# Patient Record
Sex: FEMALE | Race: WHITE | Employment: OTHER | URBAN - METROPOLITAN AREA
[De-identification: names, ages, dates, MRNs, and addresses within clinical notes are randomized per-mention and may not be internally consistent; named-entity substitution may affect disease eponyms.]

---

## 2017-01-09 ENCOUNTER — HOSPITAL ENCOUNTER (OUTPATIENT)
Dept: OTHER | Age: 72
Discharge: HOME | End: 2017-01-09

## 2017-01-09 LAB
ALBUMIN SERPL-MCNC: 4.3 G/DL (ref 3.4–5)
ALP BLD-CCNC: 76 U/L (ref 40–129)
ALT SERPL-CCNC: 12 U/L (ref 10–40)
ANION GAP SERPL CALCULATED.3IONS-SCNC: 12 MMOL/L (ref 3–16)
AST SERPL-CCNC: 15 U/L (ref 15–37)
BACTERIA: ABNORMAL /HPF
BILIRUB SERPL-MCNC: <0.2 MG/DL (ref 0–1)
BILIRUBIN DIRECT: <0.2 MG/DL (ref 0–0.3)
BILIRUBIN URINE: NEGATIVE
BILIRUBIN, INDIRECT: NORMAL MG/DL (ref 0–1)
BLOOD, URINE: NEGATIVE
BUN BLDV-MCNC: 20 MG/DL (ref 7–20)
CALCIUM SERPL-MCNC: 9.5 MG/DL (ref 8.3–10.6)
CHLORIDE BLD-SCNC: 104 MMOL/L (ref 99–110)
CLARITY: CLEAR
CO2: 27 MMOL/L (ref 21–32)
COLOR: YELLOW
CREAT SERPL-MCNC: 0.7 MG/DL (ref 0.6–1.2)
CREATININE URINE: 155.6 MG/DL (ref 28–259)
EPITHELIAL CELLS, UA: ABNORMAL /HPF
GFR AFRICAN AMERICAN: >60
GFR NON-AFRICAN AMERICAN: >60
GLUCOSE BLD-MCNC: 100 MG/DL (ref 70–99)
GLUCOSE URINE: NEGATIVE MG/DL
KETONES, URINE: NEGATIVE MG/DL
LEUKOCYTE ESTERASE, URINE: ABNORMAL
MAGNESIUM: 1.8 MG/DL (ref 1.8–2.4)
MICROALBUMIN UR-MCNC: <1.2 MG/DL
MICROALBUMIN/CREAT UR-RTO: NORMAL MG/G (ref 0–30)
MICROSCOPIC EXAMINATION: YES
MUCUS: ABNORMAL /LPF
NITRITE, URINE: NEGATIVE
PH UA: 7.5
PHOSPHORUS: 3.3 MG/DL (ref 2.5–4.9)
POTASSIUM SERPL-SCNC: 4.7 MMOL/L (ref 3.5–5.1)
PROTEIN UA: ABNORMAL MG/DL
RBC UA: ABNORMAL /HPF (ref 0–2)
SODIUM BLD-SCNC: 143 MMOL/L (ref 136–145)
SPECIFIC GRAVITY UA: 1.02
TOTAL PROTEIN: 7.1 G/DL (ref 6.4–8.2)
URINE TYPE: ABNORMAL
UROBILINOGEN, URINE: 0.2 E.U./DL
WBC UA: ABNORMAL /HPF (ref 0–5)

## 2017-01-13 ENCOUNTER — OFFICE VISIT (OUTPATIENT)
Dept: FAMILY MEDICINE CLINIC | Age: 72
End: 2017-01-13

## 2017-01-13 VITALS
OXYGEN SATURATION: 98 % | HEART RATE: 57 BPM | BODY MASS INDEX: 29.19 KG/M2 | WEIGHT: 154.6 LBS | SYSTOLIC BLOOD PRESSURE: 120 MMHG | DIASTOLIC BLOOD PRESSURE: 62 MMHG | TEMPERATURE: 98 F | HEIGHT: 61 IN

## 2017-01-13 DIAGNOSIS — E11.9 TYPE 2 DIABETES MELLITUS WITHOUT COMPLICATION, WITHOUT LONG-TERM CURRENT USE OF INSULIN (HCC): Primary | ICD-10-CM

## 2017-01-13 DIAGNOSIS — E55.9 VITAMIN D DEFICIENCY: ICD-10-CM

## 2017-01-13 DIAGNOSIS — I10 ESSENTIAL HYPERTENSION: ICD-10-CM

## 2017-01-13 DIAGNOSIS — E78.2 MIXED HYPERLIPIDEMIA: ICD-10-CM

## 2017-01-13 DIAGNOSIS — E03.9 HYPOTHYROIDISM, UNSPECIFIED TYPE: ICD-10-CM

## 2017-01-13 DIAGNOSIS — F41.9 ANXIETY: ICD-10-CM

## 2017-01-13 PROCEDURE — 1036F TOBACCO NON-USER: CPT | Performed by: NURSE PRACTITIONER

## 2017-01-13 PROCEDURE — 4040F PNEUMOC VAC/ADMIN/RCVD: CPT | Performed by: NURSE PRACTITIONER

## 2017-01-13 PROCEDURE — G8427 DOCREV CUR MEDS BY ELIG CLIN: HCPCS | Performed by: NURSE PRACTITIONER

## 2017-01-13 PROCEDURE — 1090F PRES/ABSN URINE INCON ASSESS: CPT | Performed by: NURSE PRACTITIONER

## 2017-01-13 PROCEDURE — 3017F COLORECTAL CA SCREEN DOC REV: CPT | Performed by: NURSE PRACTITIONER

## 2017-01-13 PROCEDURE — G8482 FLU IMMUNIZE ORDER/ADMIN: HCPCS | Performed by: NURSE PRACTITIONER

## 2017-01-13 PROCEDURE — 1123F ACP DISCUSS/DSCN MKR DOCD: CPT | Performed by: NURSE PRACTITIONER

## 2017-01-13 PROCEDURE — 3014F SCREEN MAMMO DOC REV: CPT | Performed by: NURSE PRACTITIONER

## 2017-01-13 PROCEDURE — 99214 OFFICE O/P EST MOD 30 MIN: CPT | Performed by: NURSE PRACTITIONER

## 2017-01-13 PROCEDURE — G8420 CALC BMI NORM PARAMETERS: HCPCS | Performed by: NURSE PRACTITIONER

## 2017-01-13 PROCEDURE — G8399 PT W/DXA RESULTS DOCUMENT: HCPCS | Performed by: NURSE PRACTITIONER

## 2017-01-13 PROCEDURE — 3044F HG A1C LEVEL LT 7.0%: CPT | Performed by: NURSE PRACTITIONER

## 2017-01-13 RX ORDER — LORAZEPAM 0.5 MG/1
0.5 TABLET ORAL DAILY PRN
Qty: 15 TABLET | Refills: 0 | Status: SHIPPED | OUTPATIENT
Start: 2017-01-13 | End: 2017-04-13 | Stop reason: SDUPTHER

## 2017-01-13 ASSESSMENT — PATIENT HEALTH QUESTIONNAIRE - PHQ9
SUM OF ALL RESPONSES TO PHQ9 QUESTIONS 1 & 2: 2
2. FEELING DOWN, DEPRESSED OR HOPELESS: 1
SUM OF ALL RESPONSES TO PHQ QUESTIONS 1-9: 2
1. LITTLE INTEREST OR PLEASURE IN DOING THINGS: 1

## 2017-01-18 ASSESSMENT — ENCOUNTER SYMPTOMS
EYES NEGATIVE: 1
GASTROINTESTINAL NEGATIVE: 1
RESPIRATORY NEGATIVE: 1

## 2017-02-15 ENCOUNTER — TELEPHONE (OUTPATIENT)
Dept: PULMONOLOGY | Age: 72
End: 2017-02-15

## 2017-02-17 ENCOUNTER — OFFICE VISIT (OUTPATIENT)
Dept: PULMONOLOGY | Age: 72
End: 2017-02-17

## 2017-02-17 VITALS
BODY MASS INDEX: 26.46 KG/M2 | WEIGHT: 155 LBS | HEIGHT: 64 IN | OXYGEN SATURATION: 97 % | SYSTOLIC BLOOD PRESSURE: 128 MMHG | DIASTOLIC BLOOD PRESSURE: 74 MMHG | HEART RATE: 64 BPM | TEMPERATURE: 97.6 F | RESPIRATION RATE: 18 BRPM

## 2017-02-17 DIAGNOSIS — G47.33 OSA (OBSTRUCTIVE SLEEP APNEA): Primary | ICD-10-CM

## 2017-02-17 PROCEDURE — 1090F PRES/ABSN URINE INCON ASSESS: CPT | Performed by: INTERNAL MEDICINE

## 2017-02-17 PROCEDURE — G8399 PT W/DXA RESULTS DOCUMENT: HCPCS | Performed by: INTERNAL MEDICINE

## 2017-02-17 PROCEDURE — G8420 CALC BMI NORM PARAMETERS: HCPCS | Performed by: INTERNAL MEDICINE

## 2017-02-17 PROCEDURE — 3014F SCREEN MAMMO DOC REV: CPT | Performed by: INTERNAL MEDICINE

## 2017-02-17 PROCEDURE — 99213 OFFICE O/P EST LOW 20 MIN: CPT | Performed by: INTERNAL MEDICINE

## 2017-02-17 PROCEDURE — G8427 DOCREV CUR MEDS BY ELIG CLIN: HCPCS | Performed by: INTERNAL MEDICINE

## 2017-02-17 PROCEDURE — G8482 FLU IMMUNIZE ORDER/ADMIN: HCPCS | Performed by: INTERNAL MEDICINE

## 2017-02-17 PROCEDURE — 3017F COLORECTAL CA SCREEN DOC REV: CPT | Performed by: INTERNAL MEDICINE

## 2017-02-17 PROCEDURE — 1123F ACP DISCUSS/DSCN MKR DOCD: CPT | Performed by: INTERNAL MEDICINE

## 2017-02-17 PROCEDURE — 1036F TOBACCO NON-USER: CPT | Performed by: INTERNAL MEDICINE

## 2017-02-17 PROCEDURE — 4040F PNEUMOC VAC/ADMIN/RCVD: CPT | Performed by: INTERNAL MEDICINE

## 2017-02-17 ASSESSMENT — SLEEP AND FATIGUE QUESTIONNAIRES
HOW LIKELY ARE YOU TO NOD OFF OR FALL ASLEEP WHILE LYING DOWN TO REST IN THE AFTERNOON WHEN CIRCUMSTANCES PERMIT: 3
HOW LIKELY ARE YOU TO NOD OFF OR FALL ASLEEP WHILE SITTING AND TALKING TO SOMEONE: 0
NECK CIRCUMFERENCE (INCHES): 11.52
HOW LIKELY ARE YOU TO NOD OFF OR FALL ASLEEP WHILE WATCHING TV: 3
HOW LIKELY ARE YOU TO NOD OFF OR FALL ASLEEP WHILE SITTING QUIETLY AFTER LUNCH WITHOUT ALCOHOL: 1
HOW LIKELY ARE YOU TO NOD OFF OR FALL ASLEEP WHEN YOU ARE A PASSENGER IN A CAR FOR AN HOUR WITHOUT A BREAK: 0
ESS TOTAL SCORE: 9
HOW LIKELY ARE YOU TO NOD OFF OR FALL ASLEEP WHILE SITTING INACTIVE IN A PUBLIC PLACE: 0
HOW LIKELY ARE YOU TO NOD OFF OR FALL ASLEEP IN A CAR, WHILE STOPPED FOR A FEW MINUTES IN TRAFFIC: 0
HOW LIKELY ARE YOU TO NOD OFF OR FALL ASLEEP WHILE SITTING AND READING: 2

## 2017-02-24 ENCOUNTER — NURSE ONLY (OUTPATIENT)
Dept: FAMILY MEDICINE CLINIC | Age: 72
End: 2017-02-24

## 2017-02-24 DIAGNOSIS — I10 ESSENTIAL HYPERTENSION: Primary | ICD-10-CM

## 2017-02-24 DIAGNOSIS — E03.9 HYPOTHYROIDISM, UNSPECIFIED TYPE: ICD-10-CM

## 2017-02-24 DIAGNOSIS — E11.9 TYPE 2 DIABETES MELLITUS WITHOUT COMPLICATION, WITHOUT LONG-TERM CURRENT USE OF INSULIN (HCC): ICD-10-CM

## 2017-02-24 DIAGNOSIS — E78.2 MIXED HYPERLIPIDEMIA: ICD-10-CM

## 2017-02-24 LAB
ALBUMIN SERPL-MCNC: 4.4 G/DL (ref 3.4–5)
ALP BLD-CCNC: 76 U/L (ref 40–129)
ALT SERPL-CCNC: 15 U/L (ref 10–40)
AST SERPL-CCNC: 15 U/L (ref 15–37)
BILIRUB SERPL-MCNC: <0.2 MG/DL (ref 0–1)
BILIRUBIN DIRECT: <0.2 MG/DL (ref 0–0.3)
BILIRUBIN, INDIRECT: NORMAL MG/DL (ref 0–1)
CHOLESTEROL, TOTAL: 204 MG/DL (ref 0–199)
HDLC SERPL-MCNC: 69 MG/DL (ref 40–60)
LDL CHOLESTEROL CALCULATED: 109 MG/DL
TOTAL PROTEIN: 6.7 G/DL (ref 6.4–8.2)
TRIGL SERPL-MCNC: 132 MG/DL (ref 0–150)
TSH SERPL DL<=0.05 MIU/L-ACNC: 0.51 UIU/ML (ref 0.27–4.2)
VLDLC SERPL CALC-MCNC: 26 MG/DL

## 2017-02-24 PROCEDURE — 36415 COLL VENOUS BLD VENIPUNCTURE: CPT | Performed by: NURSE PRACTITIONER

## 2017-02-25 LAB
ESTIMATED AVERAGE GLUCOSE: 122.6 MG/DL
HBA1C MFR BLD: 5.9 %

## 2017-03-09 ENCOUNTER — TELEPHONE (OUTPATIENT)
Dept: PULMONOLOGY | Age: 72
End: 2017-03-09

## 2017-03-09 DIAGNOSIS — G47.33 OSA (OBSTRUCTIVE SLEEP APNEA): Primary | ICD-10-CM

## 2017-04-03 RX ORDER — VALSARTAN 80 MG/1
TABLET ORAL
Qty: 90 TABLET | Refills: 0 | Status: SHIPPED | OUTPATIENT
Start: 2017-04-03 | End: 2017-07-10 | Stop reason: SDUPTHER

## 2017-04-13 ENCOUNTER — OFFICE VISIT (OUTPATIENT)
Dept: FAMILY MEDICINE CLINIC | Age: 72
End: 2017-04-13

## 2017-04-13 ENCOUNTER — TELEPHONE (OUTPATIENT)
Dept: FAMILY MEDICINE CLINIC | Age: 72
End: 2017-04-13

## 2017-04-13 VITALS
HEIGHT: 61 IN | DIASTOLIC BLOOD PRESSURE: 84 MMHG | BODY MASS INDEX: 28.96 KG/M2 | WEIGHT: 153.4 LBS | HEART RATE: 60 BPM | SYSTOLIC BLOOD PRESSURE: 158 MMHG | OXYGEN SATURATION: 98 %

## 2017-04-13 DIAGNOSIS — R00.2 HEART PALPITATIONS: ICD-10-CM

## 2017-04-13 DIAGNOSIS — F41.9 ANXIETY: Primary | ICD-10-CM

## 2017-04-13 DIAGNOSIS — E11.9 TYPE 2 DIABETES MELLITUS WITHOUT COMPLICATION, WITHOUT LONG-TERM CURRENT USE OF INSULIN (HCC): ICD-10-CM

## 2017-04-13 DIAGNOSIS — I10 ESSENTIAL HYPERTENSION: ICD-10-CM

## 2017-04-13 PROCEDURE — 99213 OFFICE O/P EST LOW 20 MIN: CPT | Performed by: NURSE PRACTITIONER

## 2017-04-13 PROCEDURE — 1123F ACP DISCUSS/DSCN MKR DOCD: CPT | Performed by: NURSE PRACTITIONER

## 2017-04-13 PROCEDURE — 1036F TOBACCO NON-USER: CPT | Performed by: NURSE PRACTITIONER

## 2017-04-13 PROCEDURE — G8420 CALC BMI NORM PARAMETERS: HCPCS | Performed by: NURSE PRACTITIONER

## 2017-04-13 PROCEDURE — G8399 PT W/DXA RESULTS DOCUMENT: HCPCS | Performed by: NURSE PRACTITIONER

## 2017-04-13 PROCEDURE — G8427 DOCREV CUR MEDS BY ELIG CLIN: HCPCS | Performed by: NURSE PRACTITIONER

## 2017-04-13 PROCEDURE — 1090F PRES/ABSN URINE INCON ASSESS: CPT | Performed by: NURSE PRACTITIONER

## 2017-04-13 PROCEDURE — 3017F COLORECTAL CA SCREEN DOC REV: CPT | Performed by: NURSE PRACTITIONER

## 2017-04-13 PROCEDURE — 3014F SCREEN MAMMO DOC REV: CPT | Performed by: NURSE PRACTITIONER

## 2017-04-13 PROCEDURE — 4040F PNEUMOC VAC/ADMIN/RCVD: CPT | Performed by: NURSE PRACTITIONER

## 2017-04-13 PROCEDURE — 3044F HG A1C LEVEL LT 7.0%: CPT | Performed by: NURSE PRACTITIONER

## 2017-04-13 RX ORDER — PRAVASTATIN SODIUM 10 MG
10 TABLET ORAL DAILY
Qty: 90 TABLET | Refills: 3 | Status: SHIPPED | OUTPATIENT
Start: 2017-04-13 | End: 2017-06-28 | Stop reason: SDUPTHER

## 2017-04-13 RX ORDER — METOPROLOL TARTRATE 50 MG/1
50 TABLET, FILM COATED ORAL 2 TIMES DAILY
Qty: 180 TABLET | Refills: 3 | Status: SHIPPED | OUTPATIENT
Start: 2017-04-13 | End: 2017-10-23 | Stop reason: DRUGHIGH

## 2017-04-13 RX ORDER — AMLODIPINE BESYLATE 5 MG/1
2.5 TABLET ORAL DAILY
Qty: 45 TABLET | Refills: 3 | Status: SHIPPED | OUTPATIENT
Start: 2017-04-13 | End: 2017-06-28 | Stop reason: SDUPTHER

## 2017-04-13 RX ORDER — RANITIDINE 150 MG/1
150 TABLET ORAL 2 TIMES DAILY
Qty: 180 TABLET | Refills: 3 | Status: SHIPPED | OUTPATIENT
Start: 2017-04-13 | End: 2017-10-11 | Stop reason: ALTCHOICE

## 2017-04-13 RX ORDER — LORAZEPAM 0.5 MG/1
0.5 TABLET ORAL DAILY PRN
Qty: 15 TABLET | Refills: 0 | Status: SHIPPED | OUTPATIENT
Start: 2017-04-13 | End: 2017-07-10 | Stop reason: SDUPTHER

## 2017-04-13 RX ORDER — METFORMIN HYDROCHLORIDE 500 MG/1
TABLET, EXTENDED RELEASE ORAL
Qty: 180 TABLET | Refills: 1 | Status: SHIPPED | OUTPATIENT
Start: 2017-04-13 | End: 2017-06-28 | Stop reason: SDUPTHER

## 2017-04-13 ASSESSMENT — ENCOUNTER SYMPTOMS
GASTROINTESTINAL NEGATIVE: 1
RESPIRATORY NEGATIVE: 1
EYES NEGATIVE: 1

## 2017-04-13 ASSESSMENT — PATIENT HEALTH QUESTIONNAIRE - PHQ9
2. FEELING DOWN, DEPRESSED OR HOPELESS: 0
SUM OF ALL RESPONSES TO PHQ QUESTIONS 1-9: 1
SUM OF ALL RESPONSES TO PHQ9 QUESTIONS 1 & 2: 1
1. LITTLE INTEREST OR PLEASURE IN DOING THINGS: 1

## 2017-05-07 ENCOUNTER — HOSPITAL ENCOUNTER (OUTPATIENT)
Dept: OTHER | Age: 72
Discharge: HOME | End: 2017-05-07
Attending: INTERNAL MEDICINE | Admitting: INTERNAL MEDICINE

## 2017-05-07 LAB
ALBUMIN SERPL-MCNC: 4.4 G/DL (ref 3.4–5)
ANION GAP SERPL CALCULATED.3IONS-SCNC: 15 MMOL/L (ref 3–16)
BILIRUBIN URINE: NEGATIVE
BLOOD, URINE: NEGATIVE
BUN BLDV-MCNC: 20 MG/DL (ref 7–20)
CALCIUM SERPL-MCNC: 9.3 MG/DL (ref 8.3–10.6)
CHLORIDE BLD-SCNC: 101 MMOL/L (ref 99–110)
CLARITY: CLEAR
CO2: 25 MMOL/L (ref 21–32)
COLOR: YELLOW
CREAT SERPL-MCNC: 0.7 MG/DL (ref 0.6–1.2)
GFR AFRICAN AMERICAN: >60
GFR NON-AFRICAN AMERICAN: >60
GLUCOSE BLD-MCNC: 98 MG/DL (ref 70–99)
GLUCOSE URINE: NEGATIVE MG/DL
KETONES, URINE: NEGATIVE MG/DL
LEUKOCYTE ESTERASE, URINE: NEGATIVE
MAGNESIUM: 1.4 MG/DL (ref 1.8–2.4)
MICROSCOPIC EXAMINATION: NORMAL
NITRITE, URINE: NEGATIVE
PH UA: 5.5
PHOSPHORUS: 3.3 MG/DL (ref 2.5–4.9)
POTASSIUM SERPL-SCNC: 4.2 MMOL/L (ref 3.5–5.1)
PROTEIN UA: NEGATIVE MG/DL
SODIUM BLD-SCNC: 141 MMOL/L (ref 136–145)
SPECIFIC GRAVITY UA: >=1.03
URINE TYPE: NORMAL
UROBILINOGEN, URINE: 0.2 E.U./DL

## 2017-05-08 LAB
CREATININE URINE: 145.9 MG/DL (ref 28–259)
MICROALBUMIN UR-MCNC: <1.2 MG/DL
MICROALBUMIN/CREAT UR-RTO: NORMAL MG/G (ref 0–30)

## 2017-06-04 ENCOUNTER — HOSPITAL ENCOUNTER (OUTPATIENT)
Dept: OTHER | Age: 72
Discharge: HOME | End: 2017-06-04
Attending: INTERNAL MEDICINE | Admitting: INTERNAL MEDICINE

## 2017-06-04 LAB — MAGNESIUM: 1.8 MG/DL (ref 1.8–2.4)

## 2017-06-20 DIAGNOSIS — E03.9 HYPOTHYROIDISM, UNSPECIFIED TYPE: ICD-10-CM

## 2017-06-20 RX ORDER — LEVOTHYROXINE SODIUM 0.07 MG/1
TABLET ORAL
Qty: 90 TABLET | Refills: 3 | Status: SHIPPED | OUTPATIENT
Start: 2017-06-20 | End: 2018-03-07 | Stop reason: SDUPTHER

## 2017-06-28 DIAGNOSIS — E11.9 TYPE 2 DIABETES MELLITUS WITHOUT COMPLICATION, WITHOUT LONG-TERM CURRENT USE OF INSULIN (HCC): ICD-10-CM

## 2017-06-28 RX ORDER — AMLODIPINE BESYLATE 5 MG/1
2.5 TABLET ORAL DAILY
Qty: 45 TABLET | Refills: 3 | Status: SHIPPED | OUTPATIENT
Start: 2017-06-28 | End: 2017-07-10 | Stop reason: SDUPTHER

## 2017-06-28 RX ORDER — METFORMIN HYDROCHLORIDE 500 MG/1
TABLET, EXTENDED RELEASE ORAL
Qty: 180 TABLET | Refills: 0 | Status: SHIPPED | OUTPATIENT
Start: 2017-06-28 | End: 2017-07-10 | Stop reason: SDUPTHER

## 2017-06-28 RX ORDER — PRAVASTATIN SODIUM 10 MG
10 TABLET ORAL DAILY
Qty: 90 TABLET | Refills: 3 | Status: SHIPPED | OUTPATIENT
Start: 2017-06-28 | End: 2017-07-10 | Stop reason: SDUPTHER

## 2017-07-10 ENCOUNTER — OFFICE VISIT (OUTPATIENT)
Dept: FAMILY MEDICINE CLINIC | Age: 72
End: 2017-07-10

## 2017-07-10 VITALS
HEIGHT: 61 IN | SYSTOLIC BLOOD PRESSURE: 124 MMHG | DIASTOLIC BLOOD PRESSURE: 78 MMHG | OXYGEN SATURATION: 98 % | WEIGHT: 153.4 LBS | BODY MASS INDEX: 28.96 KG/M2 | HEART RATE: 60 BPM

## 2017-07-10 DIAGNOSIS — E03.9 HYPOTHYROIDISM, UNSPECIFIED TYPE: ICD-10-CM

## 2017-07-10 DIAGNOSIS — F41.9 ANXIETY: ICD-10-CM

## 2017-07-10 DIAGNOSIS — Z12.11 SCREENING FOR COLON CANCER: ICD-10-CM

## 2017-07-10 DIAGNOSIS — I10 ESSENTIAL HYPERTENSION: ICD-10-CM

## 2017-07-10 DIAGNOSIS — E11.9 TYPE 2 DIABETES MELLITUS WITHOUT COMPLICATION, WITHOUT LONG-TERM CURRENT USE OF INSULIN (HCC): Primary | ICD-10-CM

## 2017-07-10 DIAGNOSIS — E78.2 MIXED HYPERLIPIDEMIA: ICD-10-CM

## 2017-07-10 DIAGNOSIS — E11.9 TYPE 2 DIABETES MELLITUS WITHOUT COMPLICATION, WITHOUT LONG-TERM CURRENT USE OF INSULIN (HCC): ICD-10-CM

## 2017-07-10 DIAGNOSIS — E55.9 VITAMIN D DEFICIENCY: ICD-10-CM

## 2017-07-10 PROCEDURE — 36415 COLL VENOUS BLD VENIPUNCTURE: CPT | Performed by: NURSE PRACTITIONER

## 2017-07-10 PROCEDURE — 4040F PNEUMOC VAC/ADMIN/RCVD: CPT | Performed by: NURSE PRACTITIONER

## 2017-07-10 PROCEDURE — 3014F SCREEN MAMMO DOC REV: CPT | Performed by: NURSE PRACTITIONER

## 2017-07-10 PROCEDURE — 99214 OFFICE O/P EST MOD 30 MIN: CPT | Performed by: NURSE PRACTITIONER

## 2017-07-10 PROCEDURE — 1123F ACP DISCUSS/DSCN MKR DOCD: CPT | Performed by: NURSE PRACTITIONER

## 2017-07-10 PROCEDURE — G8419 CALC BMI OUT NRM PARAM NOF/U: HCPCS | Performed by: NURSE PRACTITIONER

## 2017-07-10 PROCEDURE — G8427 DOCREV CUR MEDS BY ELIG CLIN: HCPCS | Performed by: NURSE PRACTITIONER

## 2017-07-10 PROCEDURE — 1090F PRES/ABSN URINE INCON ASSESS: CPT | Performed by: NURSE PRACTITIONER

## 2017-07-10 PROCEDURE — 3046F HEMOGLOBIN A1C LEVEL >9.0%: CPT | Performed by: NURSE PRACTITIONER

## 2017-07-10 PROCEDURE — G8399 PT W/DXA RESULTS DOCUMENT: HCPCS | Performed by: NURSE PRACTITIONER

## 2017-07-10 PROCEDURE — 1036F TOBACCO NON-USER: CPT | Performed by: NURSE PRACTITIONER

## 2017-07-10 PROCEDURE — 3017F COLORECTAL CA SCREEN DOC REV: CPT | Performed by: NURSE PRACTITIONER

## 2017-07-10 RX ORDER — VALSARTAN 80 MG/1
TABLET ORAL
Qty: 90 TABLET | Refills: 1 | Status: SHIPPED | OUTPATIENT
Start: 2017-07-10 | End: 2018-01-17 | Stop reason: SDUPTHER

## 2017-07-10 RX ORDER — AMLODIPINE BESYLATE 5 MG/1
2.5 TABLET ORAL DAILY
Qty: 45 TABLET | Refills: 3 | Status: SHIPPED | OUTPATIENT
Start: 2017-07-10 | End: 2017-10-23 | Stop reason: DRUGHIGH

## 2017-07-10 RX ORDER — PRAVASTATIN SODIUM 10 MG
10 TABLET ORAL DAILY
Qty: 90 TABLET | Refills: 3 | Status: SHIPPED | OUTPATIENT
Start: 2017-07-10 | End: 2018-07-25

## 2017-07-10 RX ORDER — LORAZEPAM 0.5 MG/1
0.5 TABLET ORAL DAILY PRN
Qty: 15 TABLET | Refills: 0 | Status: SHIPPED | OUTPATIENT
Start: 2017-07-10 | End: 2018-01-22 | Stop reason: SDUPTHER

## 2017-07-10 RX ORDER — METFORMIN HYDROCHLORIDE 500 MG/1
TABLET, EXTENDED RELEASE ORAL
Qty: 180 TABLET | Refills: 0 | Status: SHIPPED | OUTPATIENT
Start: 2017-07-10 | End: 2018-04-25 | Stop reason: SDUPTHER

## 2017-07-10 RX ORDER — ESOMEPRAZOLE MAGNESIUM 20 MG/1
20 FOR SUSPENSION ORAL DAILY PRN
Qty: 30 PACKET | Status: SHIPPED | COMMUNITY
Start: 2017-07-10 | End: 2017-10-11 | Stop reason: ALTCHOICE

## 2017-07-10 ASSESSMENT — ENCOUNTER SYMPTOMS
EYES NEGATIVE: 1
RESPIRATORY NEGATIVE: 1
GASTROINTESTINAL NEGATIVE: 1

## 2017-07-11 LAB
A/G RATIO: 2 (ref 1.1–2.2)
ALBUMIN SERPL-MCNC: 4.7 G/DL (ref 3.4–5)
ALP BLD-CCNC: 70 U/L (ref 40–129)
ALT SERPL-CCNC: 15 U/L (ref 10–40)
ANION GAP SERPL CALCULATED.3IONS-SCNC: 15 MMOL/L (ref 3–16)
AST SERPL-CCNC: 14 U/L (ref 15–37)
BILIRUB SERPL-MCNC: 0.3 MG/DL (ref 0–1)
BUN BLDV-MCNC: 18 MG/DL (ref 7–20)
CALCIUM SERPL-MCNC: 9.6 MG/DL (ref 8.3–10.6)
CHLORIDE BLD-SCNC: 102 MMOL/L (ref 99–110)
CHOLESTEROL, TOTAL: 250 MG/DL (ref 0–199)
CO2: 25 MMOL/L (ref 21–32)
CREAT SERPL-MCNC: 0.7 MG/DL (ref 0.6–1.2)
ESTIMATED AVERAGE GLUCOSE: 134.1 MG/DL
GFR AFRICAN AMERICAN: >60
GFR NON-AFRICAN AMERICAN: >60
GLOBULIN: 2.4 G/DL
GLUCOSE BLD-MCNC: 98 MG/DL (ref 70–99)
HBA1C MFR BLD: 6.3 %
HDLC SERPL-MCNC: 69 MG/DL (ref 40–60)
LDL CHOLESTEROL CALCULATED: 139 MG/DL
POTASSIUM SERPL-SCNC: 4.4 MMOL/L (ref 3.5–5.1)
SODIUM BLD-SCNC: 142 MMOL/L (ref 136–145)
T3 FREE: 2 PG/ML (ref 2.3–4.2)
T4 FREE: 1.4 NG/DL (ref 0.9–1.8)
TOTAL PROTEIN: 7.1 G/DL (ref 6.4–8.2)
TRIGL SERPL-MCNC: 208 MG/DL (ref 0–150)
TSH SERPL DL<=0.05 MIU/L-ACNC: 1.65 UIU/ML (ref 0.27–4.2)
VITAMIN D 25-HYDROXY: 33 NG/ML
VLDLC SERPL CALC-MCNC: 42 MG/DL

## 2017-07-13 LAB
6-ACETYLMORPHINE: NOT DETECTED
7-AMINOCLONAZEPAM: NOT DETECTED
ALPHA-OH-ALPRAZOLAM: NOT DETECTED
ALPRAZOLAM: NOT DETECTED
AMPHETAMINE: NOT DETECTED
BARBITURATES: NOT DETECTED
BENZOYLECGONINE: NOT DETECTED
BUPRENORPHINE: NOT DETECTED
CARISOPRODOL: NOT DETECTED
CLONAZEPAM: NOT DETECTED
CODEINE: NOT DETECTED
CREATININE URINE: 130.3 MG/DL (ref 20–400)
DIAZEPAM: NOT DETECTED
DRUGS EXPECTED: NORMAL
EER PAIN MGT DRUG PANEL, HIGH RES/EMIT U: NORMAL
ETHYL GLUCURONIDE: NOT DETECTED
FENTANYL: NOT DETECTED
HYDROCODONE: NOT DETECTED
HYDROMORPHONE: NOT DETECTED
LORAZEPAM: NOT DETECTED
MARIJUANA METABOLITE: NOT DETECTED
MDA: NOT DETECTED
MDEA: NOT DETECTED
MDMA URINE: NOT DETECTED
MEPERIDINE: NOT DETECTED
METHADONE: NOT DETECTED
METHAMPHETAMINE: NOT DETECTED
METHYLPHENIDATE: NOT DETECTED
MIDAZOLAM: NOT DETECTED
MORPHINE: NOT DETECTED
NORBUPRENORPHINE, FREE: NOT DETECTED
NORDIAZEPAM: NOT DETECTED
NORFENTANYL: NOT DETECTED
NORHYDROCODONE, URINE: NOT DETECTED
NOROXYCODONE: NOT DETECTED
NOROXYMORPHONE, URINE: NOT DETECTED
OXAZEPAM: NOT DETECTED
OXYCODONE: NOT DETECTED
OXYMORPHONE: NOT DETECTED
PAIN MANAGEMENT DRUG PANEL: NORMAL
PAIN MANAGEMENT DRUG PANEL: NORMAL
PCP: NOT DETECTED
PHENTERMINE: NOT DETECTED
PROPOXYPHENE: NOT DETECTED
TAPENTADOL, URINE: NOT DETECTED
TAPENTADOL-O-SULFATE, URINE: NOT DETECTED
TEMAZEPAM: NOT DETECTED
TRAMADOL: NOT DETECTED
ZOLPIDEM: NOT DETECTED

## 2017-07-14 DIAGNOSIS — R79.89 T3 LOW IN SERUM: Primary | ICD-10-CM

## 2017-07-14 DIAGNOSIS — E03.9 HYPOTHYROIDISM, UNSPECIFIED TYPE: ICD-10-CM

## 2017-07-14 RX ORDER — LIOTHYRONINE SODIUM 5 UG/1
5 TABLET ORAL DAILY
Qty: 30 TABLET | Refills: 1 | Status: SHIPPED | OUTPATIENT
Start: 2017-07-14 | End: 2017-09-06 | Stop reason: SDUPTHER

## 2017-08-04 ENCOUNTER — HOSPITAL ENCOUNTER (OUTPATIENT)
Dept: SURGERY | Age: 72
Discharge: HOME | End: 2017-08-04
Attending: INTERNAL MEDICINE | Admitting: INTERNAL MEDICINE

## 2017-08-04 VITALS
RESPIRATION RATE: 16 BRPM | WEIGHT: 147 LBS | BODY MASS INDEX: 27.75 KG/M2 | TEMPERATURE: 98.6 F | HEIGHT: 61 IN | SYSTOLIC BLOOD PRESSURE: 123 MMHG | OXYGEN SATURATION: 96 % | HEART RATE: 66 BPM | DIASTOLIC BLOOD PRESSURE: 55 MMHG

## 2017-08-04 DIAGNOSIS — Z86.010 HISTORY OF COLONIC POLYPS: ICD-10-CM

## 2017-08-04 LAB
GLUCOSE BLD-MCNC: 107 MG/DL (ref 70–99)
PERFORMED ON: ABNORMAL

## 2017-08-04 RX ORDER — LIDOCAINE HYDROCHLORIDE 10 MG/ML
0.1 INJECTION, SOLUTION EPIDURAL; INFILTRATION; INTRACAUDAL; PERINEURAL ONCE
Status: DISCONTINUED | OUTPATIENT
Start: 2017-08-04 | End: 2017-08-05 | Stop reason: HOSPADM

## 2017-08-04 RX ORDER — SODIUM CHLORIDE, SODIUM LACTATE, POTASSIUM CHLORIDE, CALCIUM CHLORIDE 600; 310; 30; 20 MG/100ML; MG/100ML; MG/100ML; MG/100ML
INJECTION, SOLUTION INTRAVENOUS CONTINUOUS
Status: DISCONTINUED | OUTPATIENT
Start: 2017-08-04 | End: 2017-08-05 | Stop reason: HOSPADM

## 2017-08-04 RX ADMIN — SODIUM CHLORIDE, SODIUM LACTATE, POTASSIUM CHLORIDE, CALCIUM CHLORIDE: 600; 310; 30; 20 INJECTION, SOLUTION INTRAVENOUS at 10:44

## 2017-08-04 ASSESSMENT — PAIN - FUNCTIONAL ASSESSMENT: PAIN_FUNCTIONAL_ASSESSMENT: 0-10

## 2017-08-07 ENCOUNTER — OFFICE VISIT (OUTPATIENT)
Dept: DERMATOLOGY | Age: 72
End: 2017-08-07

## 2017-08-07 DIAGNOSIS — Z12.83 SCREENING EXAM FOR SKIN CANCER: ICD-10-CM

## 2017-08-07 DIAGNOSIS — L82.1 SEBORRHEIC KERATOSES: Primary | ICD-10-CM

## 2017-08-07 DIAGNOSIS — L65.8 FEMALE PATTERN HAIR LOSS: ICD-10-CM

## 2017-08-07 PROCEDURE — G8427 DOCREV CUR MEDS BY ELIG CLIN: HCPCS | Performed by: DERMATOLOGY

## 2017-08-07 PROCEDURE — 4040F PNEUMOC VAC/ADMIN/RCVD: CPT | Performed by: DERMATOLOGY

## 2017-08-07 PROCEDURE — G8419 CALC BMI OUT NRM PARAM NOF/U: HCPCS | Performed by: DERMATOLOGY

## 2017-08-07 PROCEDURE — 1036F TOBACCO NON-USER: CPT | Performed by: DERMATOLOGY

## 2017-08-07 PROCEDURE — 3014F SCREEN MAMMO DOC REV: CPT | Performed by: DERMATOLOGY

## 2017-08-07 PROCEDURE — 1123F ACP DISCUSS/DSCN MKR DOCD: CPT | Performed by: DERMATOLOGY

## 2017-08-07 PROCEDURE — 1090F PRES/ABSN URINE INCON ASSESS: CPT | Performed by: DERMATOLOGY

## 2017-08-07 PROCEDURE — 99213 OFFICE O/P EST LOW 20 MIN: CPT | Performed by: DERMATOLOGY

## 2017-08-07 PROCEDURE — G8399 PT W/DXA RESULTS DOCUMENT: HCPCS | Performed by: DERMATOLOGY

## 2017-08-07 PROCEDURE — 3017F COLORECTAL CA SCREEN DOC REV: CPT | Performed by: DERMATOLOGY

## 2017-08-14 ENCOUNTER — TELEPHONE (OUTPATIENT)
Dept: FAMILY MEDICINE CLINIC | Age: 72
End: 2017-08-14

## 2017-08-15 DIAGNOSIS — N64.4 BREAST PAIN: ICD-10-CM

## 2017-08-15 DIAGNOSIS — Z12.39 BREAST CANCER SCREENING: Primary | ICD-10-CM

## 2017-08-24 ENCOUNTER — HOSPITAL ENCOUNTER (OUTPATIENT)
Dept: MAMMOGRAPHY | Age: 72
Discharge: HOME | End: 2017-08-24
Attending: NURSE PRACTITIONER | Admitting: NURSE PRACTITIONER

## 2017-08-24 DIAGNOSIS — Z12.39 BREAST CANCER SCREENING: ICD-10-CM

## 2017-08-24 DIAGNOSIS — Z12.39 ENCOUNTER FOR OTHER SCREENING FOR MALIGNANT NEOPLASM OF BREAST: ICD-10-CM

## 2017-08-24 DIAGNOSIS — N64.4 BREAST PAIN: ICD-10-CM

## 2017-08-24 DIAGNOSIS — R92.8 ABNORMAL MAMMOGRAM, UNSPECIFIED: ICD-10-CM

## 2017-09-05 ENCOUNTER — NURSE ONLY (OUTPATIENT)
Dept: FAMILY MEDICINE CLINIC | Age: 72
End: 2017-09-05

## 2017-09-05 DIAGNOSIS — R79.89 T3 LOW IN SERUM: ICD-10-CM

## 2017-09-05 DIAGNOSIS — E03.9 HYPOTHYROIDISM, UNSPECIFIED TYPE: ICD-10-CM

## 2017-09-05 LAB
T3 TOTAL: 1.49 NG/ML (ref 0.8–2)
TSH REFLEX: 0.31 UIU/ML (ref 0.27–4.2)

## 2017-09-05 PROCEDURE — 36415 COLL VENOUS BLD VENIPUNCTURE: CPT | Performed by: NURSE PRACTITIONER

## 2017-09-06 RX ORDER — LIOTHYRONINE SODIUM 5 UG/1
5 TABLET ORAL DAILY
Qty: 90 TABLET | Refills: 0 | Status: SHIPPED | OUTPATIENT
Start: 2017-09-06 | End: 2017-10-11 | Stop reason: ALTCHOICE

## 2017-09-20 ENCOUNTER — OFFICE VISIT (OUTPATIENT)
Dept: FAMILY MEDICINE CLINIC | Age: 72
End: 2017-09-20

## 2017-09-20 ENCOUNTER — HOSPITAL ENCOUNTER (OUTPATIENT)
Dept: CT IMAGING | Facility: MEDICAL CENTER | Age: 72
Discharge: HOME | End: 2017-09-20
Attending: NURSE PRACTITIONER | Admitting: NURSE PRACTITIONER

## 2017-09-20 VITALS
SYSTOLIC BLOOD PRESSURE: 124 MMHG | TEMPERATURE: 98.3 F | HEIGHT: 61 IN | DIASTOLIC BLOOD PRESSURE: 70 MMHG | OXYGEN SATURATION: 98 % | BODY MASS INDEX: 28.92 KG/M2 | HEART RATE: 64 BPM | WEIGHT: 153.2 LBS

## 2017-09-20 DIAGNOSIS — I99.8 VASCULAR CALCIFICATION: ICD-10-CM

## 2017-09-20 DIAGNOSIS — R10.9 ABDOMINAL PAIN: ICD-10-CM

## 2017-09-20 DIAGNOSIS — R10.9 ABDOMINAL PAIN, UNSPECIFIED LOCATION: Primary | ICD-10-CM

## 2017-09-20 DIAGNOSIS — N13.30 HYDRONEPHROSIS, UNSPECIFIED HYDRONEPHROSIS TYPE: Primary | ICD-10-CM

## 2017-09-20 DIAGNOSIS — Z29.8 ENCOUNTER FOR HYDRATION PRIOR TO CT SCAN: ICD-10-CM

## 2017-09-20 DIAGNOSIS — I87.8 PHLEBOLITH: ICD-10-CM

## 2017-09-20 DIAGNOSIS — R10.9 RIGHT FLANK PAIN: ICD-10-CM

## 2017-09-20 LAB
BILIRUBIN, POC: NEGATIVE
BLOOD URINE, POC: NEGATIVE
CLARITY, POC: NORMAL
COLOR, POC: NORMAL
GLUCOSE URINE, POC: NEGATIVE
KETONES, POC: NEGATIVE
LEUKOCYTE EST, POC: NORMAL
NITRITE, POC: NEGATIVE
PH, POC: 5
PROTEIN, POC: NEGATIVE
SPECIFIC GRAVITY, POC: 1.02
UROBILINOGEN, POC: 0.2

## 2017-09-20 PROCEDURE — G8399 PT W/DXA RESULTS DOCUMENT: HCPCS | Performed by: NURSE PRACTITIONER

## 2017-09-20 PROCEDURE — G8417 CALC BMI ABV UP PARAM F/U: HCPCS | Performed by: NURSE PRACTITIONER

## 2017-09-20 PROCEDURE — 81002 URINALYSIS NONAUTO W/O SCOPE: CPT | Performed by: NURSE PRACTITIONER

## 2017-09-20 PROCEDURE — 4040F PNEUMOC VAC/ADMIN/RCVD: CPT | Performed by: NURSE PRACTITIONER

## 2017-09-20 PROCEDURE — G8427 DOCREV CUR MEDS BY ELIG CLIN: HCPCS | Performed by: NURSE PRACTITIONER

## 2017-09-20 PROCEDURE — 3014F SCREEN MAMMO DOC REV: CPT | Performed by: NURSE PRACTITIONER

## 2017-09-20 PROCEDURE — 1036F TOBACCO NON-USER: CPT | Performed by: NURSE PRACTITIONER

## 2017-09-20 PROCEDURE — 1090F PRES/ABSN URINE INCON ASSESS: CPT | Performed by: NURSE PRACTITIONER

## 2017-09-20 PROCEDURE — 1123F ACP DISCUSS/DSCN MKR DOCD: CPT | Performed by: NURSE PRACTITIONER

## 2017-09-20 PROCEDURE — 3017F COLORECTAL CA SCREEN DOC REV: CPT | Performed by: NURSE PRACTITIONER

## 2017-09-20 PROCEDURE — 99213 OFFICE O/P EST LOW 20 MIN: CPT | Performed by: NURSE PRACTITIONER

## 2017-09-20 ASSESSMENT — ENCOUNTER SYMPTOMS
ABDOMINAL PAIN: 1
BELCHING: 0
FLATUS: 0
NAUSEA: 0
VOMITING: 0
DIARRHEA: 1
CONSTIPATION: 0
HEMATOCHEZIA: 0

## 2017-09-20 ASSESSMENT — CROHNS DISEASE ACTIVITY INDEX (CDAI): CDAI SCORE: 0

## 2017-09-22 LAB
ORGANISM: ABNORMAL
URINE CULTURE, ROUTINE: ABNORMAL
URINE CULTURE, ROUTINE: ABNORMAL

## 2017-09-28 ASSESSMENT — ENCOUNTER SYMPTOMS
RESPIRATORY NEGATIVE: 1
EYES NEGATIVE: 1

## 2017-10-04 ENCOUNTER — OFFICE VISIT (OUTPATIENT)
Dept: FAMILY MEDICINE CLINIC | Age: 72
End: 2017-10-04

## 2017-10-04 VITALS
HEART RATE: 70 BPM | HEIGHT: 62 IN | BODY MASS INDEX: 28.67 KG/M2 | SYSTOLIC BLOOD PRESSURE: 130 MMHG | TEMPERATURE: 98.5 F | WEIGHT: 155.8 LBS | OXYGEN SATURATION: 97 % | DIASTOLIC BLOOD PRESSURE: 62 MMHG

## 2017-10-04 DIAGNOSIS — Z23 FLU VACCINE NEED: ICD-10-CM

## 2017-10-04 DIAGNOSIS — Z01.818 PREOPERATIVE EXAMINATION: Primary | ICD-10-CM

## 2017-10-04 DIAGNOSIS — E03.9 HYPOTHYROIDISM, UNSPECIFIED TYPE: ICD-10-CM

## 2017-10-04 LAB
A/G RATIO: 1.8 (ref 1.1–2.2)
ALBUMIN SERPL-MCNC: 4.5 G/DL (ref 3.4–5)
ALP BLD-CCNC: 76 U/L (ref 40–129)
ALT SERPL-CCNC: 19 U/L (ref 10–40)
ANION GAP SERPL CALCULATED.3IONS-SCNC: 17 MMOL/L (ref 3–16)
AST SERPL-CCNC: 19 U/L (ref 15–37)
BASOPHILS ABSOLUTE: 0.1 K/UL (ref 0–0.2)
BASOPHILS RELATIVE PERCENT: 1.1 %
BILIRUB SERPL-MCNC: <0.2 MG/DL (ref 0–1)
BUN BLDV-MCNC: 25 MG/DL (ref 7–20)
CALCIUM SERPL-MCNC: 10 MG/DL (ref 8.3–10.6)
CHLORIDE BLD-SCNC: 99 MMOL/L (ref 99–110)
CO2: 27 MMOL/L (ref 21–32)
CREAT SERPL-MCNC: 0.8 MG/DL (ref 0.6–1.2)
EOSINOPHILS ABSOLUTE: 0.1 K/UL (ref 0–0.6)
EOSINOPHILS RELATIVE PERCENT: 1.8 %
GFR AFRICAN AMERICAN: >60
GFR NON-AFRICAN AMERICAN: >60
GLOBULIN: 2.5 G/DL
GLUCOSE BLD-MCNC: 121 MG/DL (ref 70–99)
HCT VFR BLD CALC: 35.5 % (ref 36–48)
HEMOGLOBIN: 11.6 G/DL (ref 12–16)
LYMPHOCYTES ABSOLUTE: 1.4 K/UL (ref 1–5.1)
LYMPHOCYTES RELATIVE PERCENT: 19 %
MCH RBC QN AUTO: 27.5 PG (ref 26–34)
MCHC RBC AUTO-ENTMCNC: 32.8 G/DL (ref 31–36)
MCV RBC AUTO: 83.9 FL (ref 80–100)
MONOCYTES ABSOLUTE: 0.4 K/UL (ref 0–1.3)
MONOCYTES RELATIVE PERCENT: 5.8 %
NEUTROPHILS ABSOLUTE: 5.1 K/UL (ref 1.7–7.7)
NEUTROPHILS RELATIVE PERCENT: 72.3 %
PDW BLD-RTO: 14.6 % (ref 12.4–15.4)
PLATELET # BLD: 242 K/UL (ref 135–450)
PMV BLD AUTO: 9.8 FL (ref 5–10.5)
POTASSIUM SERPL-SCNC: 4.3 MMOL/L (ref 3.5–5.1)
RBC # BLD: 4.23 M/UL (ref 4–5.2)
SODIUM BLD-SCNC: 143 MMOL/L (ref 136–145)
T4 FREE: 1.3 NG/DL (ref 0.9–1.8)
TOTAL PROTEIN: 7 G/DL (ref 6.4–8.2)
TSH SERPL DL<=0.05 MIU/L-ACNC: 0.33 UIU/ML (ref 0.27–4.2)
WBC # BLD: 7.1 K/UL (ref 4–11)

## 2017-10-04 PROCEDURE — G0008 ADMIN INFLUENZA VIRUS VAC: HCPCS | Performed by: NURSE PRACTITIONER

## 2017-10-04 PROCEDURE — 99214 OFFICE O/P EST MOD 30 MIN: CPT | Performed by: NURSE PRACTITIONER

## 2017-10-04 PROCEDURE — 4040F PNEUMOC VAC/ADMIN/RCVD: CPT | Performed by: NURSE PRACTITIONER

## 2017-10-04 PROCEDURE — G8399 PT W/DXA RESULTS DOCUMENT: HCPCS | Performed by: NURSE PRACTITIONER

## 2017-10-04 PROCEDURE — 90688 IIV4 VACCINE SPLT 0.5 ML IM: CPT | Performed by: NURSE PRACTITIONER

## 2017-10-04 PROCEDURE — G8427 DOCREV CUR MEDS BY ELIG CLIN: HCPCS | Performed by: NURSE PRACTITIONER

## 2017-10-04 PROCEDURE — 3014F SCREEN MAMMO DOC REV: CPT | Performed by: NURSE PRACTITIONER

## 2017-10-04 PROCEDURE — 1036F TOBACCO NON-USER: CPT | Performed by: NURSE PRACTITIONER

## 2017-10-04 PROCEDURE — 36415 COLL VENOUS BLD VENIPUNCTURE: CPT | Performed by: NURSE PRACTITIONER

## 2017-10-04 PROCEDURE — 1090F PRES/ABSN URINE INCON ASSESS: CPT | Performed by: NURSE PRACTITIONER

## 2017-10-04 PROCEDURE — 3017F COLORECTAL CA SCREEN DOC REV: CPT | Performed by: NURSE PRACTITIONER

## 2017-10-04 PROCEDURE — G8417 CALC BMI ABV UP PARAM F/U: HCPCS | Performed by: NURSE PRACTITIONER

## 2017-10-04 PROCEDURE — 93000 ELECTROCARDIOGRAM COMPLETE: CPT | Performed by: NURSE PRACTITIONER

## 2017-10-04 PROCEDURE — G8484 FLU IMMUNIZE NO ADMIN: HCPCS | Performed by: NURSE PRACTITIONER

## 2017-10-04 PROCEDURE — 1123F ACP DISCUSS/DSCN MKR DOCD: CPT | Performed by: NURSE PRACTITIONER

## 2017-10-04 RX ORDER — PANTOPRAZOLE SODIUM 40 MG/1
TABLET, DELAYED RELEASE ORAL
COMMUNITY
Start: 2017-09-15 | End: 2018-10-25

## 2017-10-04 NOTE — PATIENT INSTRUCTIONS
risk of bleeding or interact with anesthesia. · If you take blood thinners, such as warfarin (Coumadin), clopidogrel (Plavix), or aspirin, be sure to talk to your doctor. He or she will tell you if you should stop taking these medicines before your surgery. Make sure that you understand exactly what your doctor wants you to do. · Your doctor will tell you which medicines to take or stop before your surgery. You may need to stop taking certain medicines a week or more before surgery. So talk to your doctor as soon as you can. · If you have an advance directive, let your doctor know. It may include a living will and a durable power of  for health care. Bring a copy to the hospital. If you don't have one, you may want to prepare one. It lets your doctor and loved ones know your health care wishes. Doctors advise that everyone prepare these papers before any type of surgery or procedure. · You will get exact instructions about when to stop eating before your surgery. It is important to have an empty stomach before surgery. But this can also lead to low blood sugar. Be sure to talk to your doctor more about this. · Check your blood sugar often in the hours before the surgery. What happens on the day of surgery? · Follow the instructions exactly about when to stop eating and drinking. If you don't, your surgery may be canceled. If your doctor told you to take your medicines on the day of surgery, take them with only a sip of water. · Take a bath or shower before you come in for your surgery. Do not apply lotions, perfumes, deodorants, or nail polish. · Do not shave the surgical site yourself. · Take off all jewelry and piercings. And take out contact lenses, if you wear them. At the hospital or surgery center  · Bring a picture ID. · The area for surgery is often marked to make sure there are no errors. · You will be kept comfortable and safe by your anesthesia provider.  The anesthesia may make you

## 2017-10-04 NOTE — PROGRESS NOTES
[facility].  [facility]                    Preoperative Consultation                  Marilou, [address] [facility]  YOB: 1945    Date of Service:  10/4/2017    Vitals:    10/04/17 1535   BP: 130/62   Site: Left Arm   Position: Sitting   Cuff Size: Medium Adult   Pulse: 70   Temp: 98.5 °F (36.9 °C)   TempSrc: Oral   SpO2: 97%   Weight: 155 lb 12.8 oz (70.7 kg)   Height: 5' 1.5\" (1.562 m)      Wt Readings from Last 2 Encounters:   10/04/17 155 lb 12.8 oz (70.7 kg)   09/20/17 153 lb 3.2 oz (69.5 kg)     BP Readings from Last 3 Encounters:   10/04/17 130/62   09/20/17 124/70   08/09/17 108/88        Chief Complaint   Patient presents with   Layla Mi Pre-op Exam     Patient is her for a pre op she is having surgery with Dr. Narcisa Hall on 10/18 Cystascope at [facility]      Allergies   Allergen Reactions    Aspirin Other (See Comments)     Brain cavernoma - avoid all blood thinners    Lipitor Other (See Comments)     Muscle cramps    Other      Avoid blood thinners    Sodium Hydroxide Other (See Comments)     Blisters    Stadol [Butorphanol Tartrate] Other (See Comments)     Increased BP    Vicodin [Hydrocodone-Acetaminophen]      Elevates blood pressure    Lamisil [Terbinafine Hcl] Rash    Prilosec [Omeprazole] Rash     Current Outpatient Prescriptions   Medication Sig Dispense Refill    pantoprazole (PROTONIX) 40 MG tablet       liothyronine (CYTOMEL) 5 MCG tablet Take 1 tablet by mouth daily 90 tablet 0    esomeprazole Magnesium (NEXIUM) 20 MG PACK Take 1 packet by mouth daily as needed 30 packet     valsartan (DIOVAN) 80 MG tablet TAKE ONE TABLET BY MOUTH DAILY 90 tablet 1    amLODIPine (NORVASC) 5 MG tablet Take 0.5 tablets by mouth daily 45 tablet 3    metFORMIN (GLUCOPHAGE-XR) 500 MG extended release tablet TAKE ONE TABLET BY MOUTH 2 TIMES DAILY 180 tablet 0    pravastatin (PRAVACHOL) 10 MG tablet Take 1 tablet by mouth daily 90 tablet 3    and affect. Her behavior is normal.     EKG Interpretation:  normal sinus rhythm,possible old anteroseptal infarct - must see cardiology for cardiac clearance    Lab Review   Office Visit on 09/20/2017   Component Date Value    Color, UA 09/20/2017 ramsey     Clarity, UA 09/20/2017 cloudy     Glucose, UA POC 09/20/2017 negative     Bilirubin, UA 09/20/2017 negative     Ketones, UA 09/20/2017 negative     Spec Grav, UA 09/20/2017 1.020     Blood, UA POC 09/20/2017 negative     pH, UA 09/20/2017 5.0     Protein, UA POC 09/20/2017 negative     Urobilinogen, UA 09/20/2017 0.2     Leukocytes, UA 09/20/2017 trace     Nitrite, UA 09/20/2017 negative     Urine Culture, Routine 09/20/2017 <50,000 CFU/ml mixed skin/urogenital toribio. No further workup*    Organism 09/20/2017 [facility] Group B (Strep agalacticae)*    Urine Culture, Routine 09/20/2017                      Value:50,000 CFU/ml  Susceptibility testing of penicillin and other beta-lactams is  not necessary for beta hemolytic Streptococci since resistant  strains have not been identified.  (CLSI R947-O54, 2017)     Nurse Only on 09/05/2017   Component Date Value    T3, Total 09/05/2017 1.49     TSH 09/05/2017 0.31    Hospital Outpatient Visit on 08/04/2017   Component Date Value    POC Glucose 08/04/2017 107*    Performed on 08/04/2017 ACCU-CHEK    Office Visit on 07/10/2017   Component Date Value    Cholesterol, Total 07/10/2017 250*    Triglycerides 07/10/2017 208*    HDL 07/10/2017 69*    LDL Calculated 07/10/2017 139*    VLDL CHOLESTEROL CALCULA* 07/10/2017 42     Vit D, 25-Hydroxy 07/10/2017 33.0     Sodium 07/10/2017 142     Potassium 07/10/2017 4.4     Chloride 07/10/2017 102     CO2 07/10/2017 25     Anion Gap 07/10/2017 15     Glucose 07/10/2017 98     BUN 07/10/2017 18     CREATININE 07/10/2017 0.7     GFR Non- 07/10/2017 >60     GFR  07/10/2017 >60     Calcium 07/10/2017 9.6     Total Protein 07/10/2017 7.1     Alb 07/10/2017 4.7     Albumin/Globulin Ratio 07/10/2017 2.0     Total Bilirubin 07/10/2017 0.3     Alkaline Phosphatase 07/10/2017 70     ALT 07/10/2017 15     AST 07/10/2017 14*    Globulin 07/10/2017 2.4     TSH 07/10/2017 1.65     Hemoglobin A1C 07/10/2017 6.3     eAG 07/10/2017 134.1     T4 Free 07/10/2017 1.4     Drugs Expected 07/10/2017 See Interp     Pain Management Drug Pan* 07/10/2017 Consistent     Creatinine, Ur 07/10/2017 130.3     Codeine 07/10/2017 Not Detected     Morphine 07/10/2017 Not Detected     6-Acetylmorphine 07/10/2017 Not Detected     Oxycodone 07/10/2017 Not Detected     Noroxycodone 07/10/2017 Not Detected     Oxymorphone 07/10/2017 Not Detected     NOROXYMORPHONE, URINE 07/10/2017 Not Detected     Hydrocodone 07/10/2017 Not Detected     NORHYDROCODONE, URINE 07/10/2017 Not Detected     Hydromorphone 07/10/2017 Not Detected     Buprenorphine 07/10/2017 Not Detected     Norbuprenorphine 07/10/2017 Not Detected     Fentanyl 07/10/2017 Not Detected     Norfentanyl 07/10/2017 Not Detected     Meperidine 07/10/2017 Not Detected     Tapentadol, Urine 07/10/2017 Not Detected     Tapentadol-O-Sulfate, Ur* 07/10/2017 Not Detected     Methadone 07/10/2017 Not Detected     Propoxyphene 07/10/2017 Not Detected     Tramadol 07/10/2017 Not Detected     Amphetamine 07/10/2017 Not Detected     Methamphetamine 07/10/2017 Not Detected     MDMA URINE 07/10/2017 Not Detected     MDA 07/10/2017 Not Detected     MDEA 07/10/2017 Not Detected     Methylphenidate 07/10/2017 Not Detected     Phentermine 07/10/2017 Not Detected     Benzoylecgonine 07/10/2017 Not Detected     Alprazolam 07/10/2017 Not Detected     Alpha-OH-alprazolam 07/10/2017 Not Detected     Clonazepam 07/10/2017 Not Detected     7-aminoclonazepam 07/10/2017 Not Detected     Diazepam 07/10/2017 Not Detected     NORDIAZEPAM 07/10/2017 Not Detected     OXAZEPAM 07/10/2017 Not 05/07/2017 141     Potassium 05/07/2017 4.2     Chloride 05/07/2017 101     CO2 05/07/2017 25     Anion Gap 05/07/2017 15     Glucose 05/07/2017 98     BUN 05/07/2017 20     CREATININE 05/07/2017 0.7     GFR Non- 05/07/2017 >60     GFR  05/07/2017 >60     Calcium 05/07/2017 9.3     Alb 05/07/2017 4.4     Magnesium 05/07/2017 1.40*    Phosphorus 05/07/2017 3.3     Color, UA 05/07/2017 Yellow     Clarity, UA 05/07/2017 Clear     Glucose, Ur 05/07/2017 Negative     Bilirubin Urine 05/07/2017 Negative     Ketones, Urine 05/07/2017 Negative     Specific Gravity, UA 05/07/2017 >=1.030     Blood, Urine 05/07/2017 Negative     pH, UA 05/07/2017 5.5     Protein, UA 05/07/2017 Negative     Urobilinogen, Urine 05/07/2017 0.2     Nitrite, Urine 05/07/2017 Negative     Leukocyte Esterase, Urine 05/07/2017 Negative     Microscopic Examination 05/07/2017 Not Indicated     Urine Type 05/07/2017 Not Specified     MICROALBUMIN, RANDOM URI* 05/07/2017 <1.20     Creatinine, Ur 05/07/2017 145.9     Microalb Creat Ratio 05/07/2017 see below    Office Visit on 05/02/2017   Component Date Value     05/02/2017 7    Orders Only on 05/02/2017   Component Date Value    Clinical Diagnosis 05/02/2017 Comment     Specimen adequacy: 05/02/2017 Comment     CYTOTECHNOLOGIST: 05/02/2017 Comment     Microscopic Observation 05/02/2017 .  Note: 05/02/2017 Comment     clinical report 05/02/2017 Comment     See below: 05/02/2017 Comment            Assessment:        73 y.o. patient with planned surgery as above. Known risk factors for perioperative complications: Diabetes mellitus , possible old myocardial infarction    Current medications which may produce withdrawal symptoms if withheld perioperatively: None      Plan:     1. Preoperative workup as follows: ECG, hemoglobin, hematocrit, electrolytes, creatinine, glucose, liver function studies.   2. Change in medication

## 2017-10-05 ENCOUNTER — TELEPHONE (OUTPATIENT)
Dept: CARDIOLOGY CLINIC | Age: 72
End: 2017-10-05

## 2017-10-05 NOTE — TELEPHONE ENCOUNTER
I spoke to Jacqueline Pantoja at  [facility] office. She stated that dr Seth Avalos was doing the cardiac clearance. Pt had a change with the EKG. Dr Seth Avalos needs a cardiac clearance from a cardiologist.  Ms. Joselito Turcios is scheduled for 10/11/17 at 11:00 with P.

## 2017-10-11 ENCOUNTER — OFFICE VISIT (OUTPATIENT)
Dept: CARDIOLOGY CLINIC | Age: 72
End: 2017-10-11

## 2017-10-11 ENCOUNTER — TELEPHONE (OUTPATIENT)
Dept: CARDIOLOGY CLINIC | Age: 72
End: 2017-10-11

## 2017-10-11 VITALS
BODY MASS INDEX: 28.16 KG/M2 | HEIGHT: 62 IN | DIASTOLIC BLOOD PRESSURE: 70 MMHG | HEART RATE: 65 BPM | SYSTOLIC BLOOD PRESSURE: 158 MMHG | OXYGEN SATURATION: 97 % | WEIGHT: 153 LBS

## 2017-10-11 DIAGNOSIS — Z01.810 PRE-OPERATIVE CARDIOVASCULAR EXAMINATION: Primary | ICD-10-CM

## 2017-10-11 DIAGNOSIS — I10 ESSENTIAL HYPERTENSION: ICD-10-CM

## 2017-10-11 DIAGNOSIS — R00.2 HEART PALPITATIONS: ICD-10-CM

## 2017-10-11 PROCEDURE — G8484 FLU IMMUNIZE NO ADMIN: HCPCS | Performed by: INTERNAL MEDICINE

## 2017-10-11 PROCEDURE — 1036F TOBACCO NON-USER: CPT | Performed by: INTERNAL MEDICINE

## 2017-10-11 PROCEDURE — 4040F PNEUMOC VAC/ADMIN/RCVD: CPT | Performed by: INTERNAL MEDICINE

## 2017-10-11 PROCEDURE — G8427 DOCREV CUR MEDS BY ELIG CLIN: HCPCS | Performed by: INTERNAL MEDICINE

## 2017-10-11 PROCEDURE — 3017F COLORECTAL CA SCREEN DOC REV: CPT | Performed by: INTERNAL MEDICINE

## 2017-10-11 PROCEDURE — 99213 OFFICE O/P EST LOW 20 MIN: CPT | Performed by: INTERNAL MEDICINE

## 2017-10-11 PROCEDURE — 3014F SCREEN MAMMO DOC REV: CPT | Performed by: INTERNAL MEDICINE

## 2017-10-11 PROCEDURE — 1123F ACP DISCUSS/DSCN MKR DOCD: CPT | Performed by: INTERNAL MEDICINE

## 2017-10-11 PROCEDURE — 1090F PRES/ABSN URINE INCON ASSESS: CPT | Performed by: INTERNAL MEDICINE

## 2017-10-11 PROCEDURE — G8417 CALC BMI ABV UP PARAM F/U: HCPCS | Performed by: INTERNAL MEDICINE

## 2017-10-11 PROCEDURE — G8399 PT W/DXA RESULTS DOCUMENT: HCPCS | Performed by: INTERNAL MEDICINE

## 2017-10-11 NOTE — LETTER
[address] [place] [address] Cardiology - [address] [address]  Phone: [phone]  Fax: [phone]    Unruly Titus MD        October 13, 2017     [address] [address]    Patient: Mariah Wang  MR Number: [id number]  YOB: 1945  Date of Visit: 10/11/2017    Dear Dr. Yamileth Valdes: Thank you for allowing me to participate in the care of Mariah Wang. Below are the relevant portions of my assessment and plan of care. History of present illness on initial date of evaluation:                        Mariah Wang is a 81 y.o. patient who presents for follow up and pre-operative risk assessment for cystoscope. She is scheduled to undergo this on 10/18/17 due to a kidney stone and hematuria. She underwent a pre-operative EKG earlier this month that was thought to be different from her previous EKGs. She reports she has been feeling well from a cardiac standpoint. She has been taking her medications as prescribed. She tolerates daily activity without symptoms. She checks her blood pressure at home and reports it is typically <288 systolic  Assessment:  81 y.o. patient with:  1. Pre-operative risk assessment                        ~cystoscope    2. Palpitations                        ~stable  3. Hyperlipidemia                        ~ sub-optimal 7/2016  4. Hypertension                         ~BP: (158-164)/(70)                          ~stable  5. Chest pain                         ~resolved                        ~normal cardiac cath 2014     Plan:  1. Okay to proceed with cystoscope as planned. There are no cardiac contraindications for the patient's scheduled surgery. The patient's clinical risk low to moderate, and they may proceed with surgery. Please notify patient of our recommendations. 2. Continue current medications. 3. Follow up with me in 1 year. If you have questions, please do not hesitate to call me. I look forward to following Claudine Rogers along with you.     Sincerely,        Diego Hdez MD

## 2017-10-11 NOTE — PROGRESS NOTES
[address] [facility]   Cardiovascular Evaluation    PATIENT: Marcy Lamar  DATE: 10/11/2017  MRN: [id number]  CSN: [id number]  : 1945    Primary Care Doctor: Duane Goad, CNP    Reason for evaluation:   Cardiac Valve Problem; Hypertension; Hyperlipidemia; and Cardiac Clearance (10/4/17 EKG. cystoscopy 10/18/17)      Subjective:     History of present illness on initial date of evaluation:   Marcy Lamar is a 81 y.o. patient who presents for follow up and pre-operative risk assessment for cystoscope. She is scheduled to undergo this on 10/18/17 due to a kidney stone and hematuria. She underwent a pre-operative EKG earlier this month that was thought to be different from her previous EKGs. She reports she has been feeling well from a cardiac standpoint. She has been taking her medications as prescribed. She tolerates daily activity without symptoms. She checks her blood pressure at home and reports it is typically <816 systolic. Patient Active Problem List   Diagnosis    Chest pain    Heart palpitations    GERD (gastroesophageal reflux disease)    Diverticulitis    MVP (mitral valve prolapse)    Fasciitis    Hypothyroidism    JOAQUIN (obstructive sleep apnea)    DM type 2 (diabetes mellitus, type 2) (HCC)    B12 deficiency    Vitamin D deficiency    Hypertension    Hyperlipidemia    Vitamin B12 deficiency    Cerebral cavernoma    Abnormal cardiovascular stress test    Postmenopausal atrophic vaginitis    Rectocele    Cystocele    Encounter for central line care    Vaginal atrophy    History of endometrial cancer    Hypomagnesemia    Anxiety    Encounter for follow-up surveillance of endometrial cancer    Cancer ([facility] [address]    Elevated CA-125         Cardiac Testing: I have reviewed the findings below. EKG:  ECHO:   STRESS TEST:  CATH:  BYPASS:  VASCULAR:      Past Medical History:   has a past medical history of Allergic; Anemia; Brain condition;  Cancer vitamin D (CHOLECALCIFEROL) 1000 UNIT TABS tablet Take 1,000 Units by mouth daily      metoprolol tartrate (LOPRESSOR) 50 MG tablet Take 1 tablet by mouth 2 times daily 180 tablet 3    Sennosides (SENOKOT PO) Take by mouth      magnesium oxide (MAG-OX) 400 (241.3 MG) MG TABS tablet Take 1 tablet by mouth 4 times daily 1 tid 90 tablet 5    docusate sodium (COLACE) 100 MG capsule Take 1 capsule by mouth daily as needed for Constipation 30 capsule 1    glucose blood VI test strips (HOSSEIN CONTOUR TEST) strip TEST ONCE DAILY DX: 250.00 100 strip 11     No current facility-administered medications for this visit. Allergies:  Aspirin; Lipitor; Other; Sodium hydroxide; Stadol [butorphanol tartrate]; Vicodin [hydrocodone-acetaminophen]; Lamisil [terbinafine hcl]; and Prilosec [omeprazole]     Review of Systems:   All 14 point review of symptoms completed. Pertinent positives identified in the HPI, all other review of symptoms negative as below.     Review of Systems - History obtained from the patient  General ROS: negative for - chills, fever or night sweats  Psychological ROS: negative for - disorientation or hallucinations  Ophthalmic ROS: negative for - dry eyes, eye pain or loss of vision  ENT ROS: negative for - nasal discharge or sore throat  Allergy and Immunology ROS: negative for - hives or itchy/watery eyes  Hematological and Lymphatic ROS: negative for - jaundice or night sweats  Endocrine ROS: negative for - mood swings or temperature intolerance  Breast ROS: deferred  Respiratory ROS: negative for - hemoptysis or stridor  Cardiovascular ROS: negative for - chest pain, dyspnea on exertion or palpitations  Gastrointestinal ROS: no abdominal pain, change in bowel habits, or black or bloody stools  Genito-Urinary ROS: no dysuria, trouble voiding, or hematuria  Musculoskeletal ROS: negative for - gait disturbance, joint pain or joint stiffness  Neurological ROS: negative for - seizures or speech

## 2017-10-12 RX ORDER — GLUCOSAMINE/D3/BOSWELLIA SERRA 1500MG-400
TABLET ORAL DAILY
COMMUNITY
End: 2018-07-25

## 2017-10-12 RX ORDER — LIOTHYRONINE SODIUM 5 UG/1
5 TABLET ORAL DAILY
COMMUNITY
End: 2018-01-22 | Stop reason: SDUPTHER

## 2017-10-12 NOTE — PRE-PROCEDURE INSTRUCTIONS
1.  Do not eat or drink anything after 12 midnight prior to surgery. This includes no water, chewing gum or mints. 2.  Take the following pills with a small sip of water on the morning of surgery . 3. Aspirin, Ibuprofen, Advil, Naproxen, Vitamin E and other Anti-inflammatory products should be stopped for 5 days before surgery or as directed by your physician. 4.  Check with your doctor regarding stopping Plavix, Coumadin, Lovenox, Fragmin or other blood thinners. 5.  Do not smoke and do not drink alcoholic beverages 24 hours prior to surgery. This includes NA Beer. 6.  You may brush your teeth and gargle the morning of surgery. DO NOT SWALLOW WATER.  7.  You MUST make arrangements for a responsible adult to take you home after your surgery. You will not be allowed to leave alone or drive yourself home. It is strongly suggested someone stay with you the first 24 hours. Your surgery will be cancelled if you do not have a ride home. 8.  A parent/legal guardian must accompany a child scheduled for surgery and plan to stay at the hospital until the child is discharged. Please do not bring other children with you. 9.  Please wear simple, loose fitting clothing to the hospital.  Jonatan Herd not bring valuables ( money, credit cards, checkbooks, etc.)  Do not wear any makeup (including no eye makeup) or nail polish on your fingers or toes. 10.  Do not wear any jewelry or piercing on the day of surgery. All body piercing jewelry must be removed. 11.  If you have dentures, they will be removed before going to the OR; we will provide you a container. If you wear contact lenses or glasses, they will be removed; please bring a case for them. 12.  Please see your family doctor/pediatrician for a history & physical and/or concerning medications. Bring any test results/reports from your physician's office the day of surgery. 15.  Remember to bring Blood Bank Bracelet to the hospital on the day of surgery.   14.  If

## 2017-10-18 ENCOUNTER — HOSPITAL ENCOUNTER (OUTPATIENT)
Dept: SURGERY | Age: 72
Discharge: HOME | End: 2017-10-18
Attending: UROLOGY | Admitting: UROLOGY

## 2017-10-18 VITALS
SYSTOLIC BLOOD PRESSURE: 159 MMHG | DIASTOLIC BLOOD PRESSURE: 78 MMHG | HEART RATE: 67 BPM | OXYGEN SATURATION: 94 % | TEMPERATURE: 97.8 F | RESPIRATION RATE: 20 BRPM

## 2017-10-18 DIAGNOSIS — N23 RENAL COLIC: ICD-10-CM

## 2017-10-18 LAB
GLUCOSE BLD-MCNC: 106 MG/DL (ref 70–99)
PERFORMED ON: ABNORMAL

## 2017-10-18 RX ORDER — OXYCODONE HYDROCHLORIDE AND ACETAMINOPHEN 5; 325 MG/1; MG/1
1 TABLET ORAL PRN
Status: CANCELLED | OUTPATIENT
Start: 2017-10-18 | End: 2017-10-18

## 2017-10-18 RX ORDER — MEPERIDINE HYDROCHLORIDE 25 MG/ML
12.5 INJECTION INTRAMUSCULAR; INTRAVENOUS; SUBCUTANEOUS EVERY 5 MIN PRN
Status: CANCELLED | OUTPATIENT
Start: 2017-10-18

## 2017-10-18 RX ORDER — HYDROMORPHONE HCL 110MG/55ML
0.25 PATIENT CONTROLLED ANALGESIA SYRINGE INTRAVENOUS EVERY 5 MIN PRN
Status: CANCELLED | OUTPATIENT
Start: 2017-10-18

## 2017-10-18 RX ORDER — HYDROMORPHONE HCL 110MG/55ML
0.5 PATIENT CONTROLLED ANALGESIA SYRINGE INTRAVENOUS EVERY 5 MIN PRN
Status: CANCELLED | OUTPATIENT
Start: 2017-10-18

## 2017-10-18 RX ORDER — PHENAZOPYRIDINE HYDROCHLORIDE 200 MG/1
200 TABLET, FILM COATED ORAL 3 TIMES DAILY PRN
Qty: 15 TABLET | Refills: 0 | Status: SHIPPED | OUTPATIENT
Start: 2017-10-18 | End: 2017-10-23

## 2017-10-18 RX ORDER — SODIUM CHLORIDE, SODIUM LACTATE, POTASSIUM CHLORIDE, CALCIUM CHLORIDE 600; 310; 30; 20 MG/100ML; MG/100ML; MG/100ML; MG/100ML
INJECTION, SOLUTION INTRAVENOUS CONTINUOUS
Status: DISCONTINUED | OUTPATIENT
Start: 2017-10-18 | End: 2017-10-19 | Stop reason: HOSPADM

## 2017-10-18 RX ORDER — SULFAMETHOXAZOLE AND TRIMETHOPRIM 400; 80 MG/1; MG/1
1 TABLET ORAL 2 TIMES DAILY
Qty: 8 TABLET | Refills: 0 | Status: SHIPPED | OUTPATIENT
Start: 2017-10-18 | End: 2017-10-22

## 2017-10-18 RX ORDER — OXYCODONE HYDROCHLORIDE AND ACETAMINOPHEN 5; 325 MG/1; MG/1
2 TABLET ORAL PRN
Status: CANCELLED | OUTPATIENT
Start: 2017-10-18 | End: 2017-10-18

## 2017-10-18 RX ORDER — ONDANSETRON 2 MG/ML
4 INJECTION INTRAMUSCULAR; INTRAVENOUS EVERY 10 MIN PRN
Status: CANCELLED | OUTPATIENT
Start: 2017-10-18

## 2017-10-18 RX ORDER — LABETALOL HYDROCHLORIDE 5 MG/ML
5 INJECTION, SOLUTION INTRAVENOUS EVERY 10 MIN PRN
Status: CANCELLED | OUTPATIENT
Start: 2017-10-18

## 2017-10-18 RX ORDER — HYDRALAZINE HYDROCHLORIDE 20 MG/ML
5 INJECTION INTRAMUSCULAR; INTRAVENOUS EVERY 10 MIN PRN
Status: CANCELLED | OUTPATIENT
Start: 2017-10-18

## 2017-10-18 RX ORDER — LIDOCAINE HYDROCHLORIDE 10 MG/ML
5 INJECTION, SOLUTION INFILTRATION; PERINEURAL ONCE
Status: COMPLETED | OUTPATIENT
Start: 2017-10-18 | End: 2017-10-18

## 2017-10-18 RX ADMIN — LIDOCAINE HYDROCHLORIDE 0.2 ML: 10 INJECTION, SOLUTION INFILTRATION; PERINEURAL at 12:12

## 2017-10-18 RX ADMIN — SODIUM CHLORIDE, SODIUM LACTATE, POTASSIUM CHLORIDE, CALCIUM CHLORIDE: 600; 310; 30; 20 INJECTION, SOLUTION INTRAVENOUS at 12:12

## 2017-10-18 ASSESSMENT — PAIN - FUNCTIONAL ASSESSMENT: PAIN_FUNCTIONAL_ASSESSMENT: 0-10

## 2017-10-18 ASSESSMENT — PAIN SCALES - GENERAL
PAINLEVEL_OUTOF10: 0
PAINLEVEL_OUTOF10: 0

## 2017-10-18 NOTE — PLAN OF CARE
Phase II:  1. Patient is identified using name and the date of birth. 2.  The patient is free from signs and symptoms of chemical, electrical, laser, radiation, positioning, or transfer/transport injury. 3.  The patient receives appropriate medication(s), safely administered during the Perioperative period. 4.  The patient has wound/tissue perfusion consistent with or improved from baseline levels established preoperatively. 5.  The patient is at or returning to normothermia at the conclusion of the immediate postoperative period. 6.  The patient's fluid, electrolyte, and acid base balances are consistent with or improved from baseline levels established preoperatively. 7.  The patient's pulmonary function is consistent with or improved from baseline levels established preoperatively. 8.  The patient's cardiovascular status is consistent with or improved from baseline levels established preoperatively. 9.  The patient/caregiver demonstrates knowledge of nutritional management related to the operative or other invasive procedure. 10. The patient/caregiver demonstrates knowledge of medication, pain, and wound management. 11. The patient participates in the rehabilitation process as applicable. 12.  The patient/caregiver participates in decisions affection his or her Perioperative plan of care. 13.  The patient's care is consistent with the individualized Perioperative plan of care. 14.  The patient's right to privacy is maintained. 15. The patient is the recipient of competent and ethical care within legal standards of practice. 16.  The patient's value system, lifestyle, ethnicity, and culture are considered, respected, and incorporated in the Perioperative plan of care and understands special services available. 17.  The patient demonstrates and/or reports adequate pain control throughout the the Perioperative period. 18.   The patient's neurological status is consistent with or improved from

## 2017-10-18 NOTE — PLAN OF CARE
Pre-Operative:  1. Patient/Caregiver identifies - states name and date of birth. 2.  The patient is free from signs and symptoms of injury. 3.  The patient receives appropriate medication(s), safely administered during the Perioperative period. 4.  The patient is free from signs and symptoms of infection. 5.  The patient has wound / tissue perfusion. 6.  The patients's fluid, electrolyte, and acid-base balances are established preoperatively. 7.  The patient's pulmonary function is established preoperatively. 8.  The patient's cardiovascular status is established preoperatively. 9.  The patient / caregiver demonstrates knowledge of nutritional management related to the operative or other invasive procedure. 10. The patient/caregiver demonstrates knowledge of medication management. 11. The patient/caregiver demonstrates knowledge of pain management. 12.  The patient participates in the rehabilitation process as applicable. 13.  The patient/caregiver participates in decisions affection his or her Perioperative plan of care. 14.  The patient's care is consistent with the individualized Perioperative plan of care. 15.  The patient's right to privacy is maintained. 16.  The patient is the recipient of competent and ethical care within legal standards of practice. 17.  The patient's value system, lifestyle, ethnicity, and culture are considered, respected, and incorporated in the Perioperative plan of care and understands special services available. 18.  The patient demonstrates and/or reports adequate pain control throughout the the Perioperative period. 19. The patient's neurological status is established preoperatively. 20. The patient/caregiver demonstrates knowledge of the expected responses to the operative or invasive procedure. 21.  Patient/Caregiver has reduced anxiety. Interventions- Familiarize with environment and equipment.   22. Patient/Caregiver verbalizes understanding of Phase I

## 2017-10-18 NOTE — PROGRESS NOTES
Arrived from OR awakens easily and respirations unlabored. No vaginal drainage noted at this time. Report received from Dominique Johnson RN. No discomfort noted at this time.

## 2017-10-18 NOTE — ANESTHESIA POST-OP
Postoperative Anesthesia Note    Name:    Stephanie Mcgarry  MRN:      [id number]    Patient Vitals for the past 12 hrs:   BP Temp Temp src Pulse Resp SpO2   10/18/17 1435 (!) 159/78 - - 67 20 94 %   10/18/17 1405 134/73 97.8 °F (36.6 °C) Temporal 86 16 92 %   10/18/17 1154 (!) 185/83 97.4 °F (36.3 °C) Temporal 72 16 97 %        LABS:    CBC  Lab Results   Component Value Date/Time    WBC 7.1 10/04/2017 04:22 PM    HGB 11.6 (L) 10/04/2017 04:22 PM    HCT 35.5 (L) 10/04/2017 04:22 PM     10/04/2017 04:22 PM     RENAL  Lab Results   Component Value Date/Time     10/04/2017 04:22 PM    K 4.3 10/04/2017 04:22 PM    CL 99 10/04/2017 04:22 PM    CO2 27 10/04/2017 04:22 PM    BUN 25 (H) 10/04/2017 04:22 PM    CREATININE 0.8 10/04/2017 04:22 PM    GLUCOSE 121 (H) 10/04/2017 04:22 PM    GLUCOSE 96 06/01/2016 11:08 AM     COAGS  Lab Results   Component Value Date/Time    PROTIME 11.3 10/24/2014 08:40 AM    INR 1.04 10/24/2014 08:40 AM    APTT 26.0 04/22/2014 09:21 AM       Intake & Output: In: 550 [P.O.:100; I.V.:450]  Out: -     Nausea & Vomiting:  No    Level of Consciousness:  Awake    Pain Assessment:  Adequate analgesia    Anesthesia Complications:  No apparent anesthetic complications    SUMMARY      Vital signs stable  OK to discharge from Stage I post anesthesia care.   Care transferred from Anesthesiology department on discharge from perioperative area

## 2017-10-18 NOTE — ANESTHESIA PRE-OP
Department of Anesthesiology  Preprocedure Note       Name:  Jayjay Morris   Age:  81 y.o.  :  1945                                          MRN:  [id number]         Date:  10/18/2017      Surgeon:    Procedure:    Medications prior to admission:   Prior to Admission medications    Medication Sig Start Date End Date Taking?  Authorizing Provider   Biotin 68059 MCG TABS Take by mouth daily   Yes Historical Provider, MD   liothyronine (CYTOMEL) 5 MCG tablet Take 5 mcg by mouth daily   Yes Historical Provider, MD   valsartan (DIOVAN) 80 MG tablet TAKE ONE TABLET BY MOUTH DAILY 7/10/17  Yes Akhil Sides, CNP   amLODIPine (NORVASC) 5 MG tablet Take 0.5 tablets by mouth daily 7/10/17  Yes Akhil Sides, CNP   metFORMIN (GLUCOPHAGE-XR) 500 MG extended release tablet TAKE ONE TABLET BY MOUTH 2 TIMES DAILY 7/10/17  Yes Akhil Sides, CNP   pravastatin (PRAVACHOL) 10 MG tablet Take 1 tablet by mouth daily 7/10/17  Yes Akhil Sides, CNP   LORazepam (ATIVAN) 0.5 MG tablet Take 1 tablet by mouth daily as needed for Anxiety 7/10/17  Yes Akhil Sides, CNP   levothyroxine (SYNTHROID) 75 MCG tablet TAKE ONE TABLET BY MOUTH DAILY 17  Yes Akhil Sides, CNP   vitamin D (CHOLECALCIFEROL) 1000 UNIT TABS tablet Take 1,000 Units by mouth daily   Yes Historical Provider, MD   metoprolol tartrate (LOPRESSOR) 50 MG tablet Take 1 tablet by mouth 2 times daily 17  Yes Akhil Sides, CNP   magnesium oxide (MAG-OX) 400 (241.3 MG) MG TABS tablet Take 1 tablet by mouth 4 times daily 1 tid 16  Yes Akhil Sides, CNP   docusate sodium (COLACE) 100 MG capsule Take 1 capsule by mouth daily as needed for Constipation 10/9/15  Yes Akhil Sides, CNP   pantoprazole (PROTONIX) 40 MG tablet  9/15/17   Historical Provider, MD   glucose blood VI test strips (HOSSEIN CONTOUR TEST) strip TEST ONCE DAILY DX: 250.00 10/13/16   Akhil Sides, CNP   Sennosides (SENOKOT PO) Take by mouth    Historical Provider, MD       Current medications:    Current Outpatient Prescriptions   Medication Sig Dispense Refill    Biotin 33747 MCG TABS Take by mouth daily      liothyronine (CYTOMEL) 5 MCG tablet Take 5 mcg by mouth daily      valsartan (DIOVAN) 80 MG tablet TAKE ONE TABLET BY MOUTH DAILY 90 tablet 1    amLODIPine (NORVASC) 5 MG tablet Take 0.5 tablets by mouth daily 45 tablet 3    metFORMIN (GLUCOPHAGE-XR) 500 MG extended release tablet TAKE ONE TABLET BY MOUTH 2 TIMES DAILY 180 tablet 0    pravastatin (PRAVACHOL) 10 MG tablet Take 1 tablet by mouth daily 90 tablet 3    LORazepam (ATIVAN) 0.5 MG tablet Take 1 tablet by mouth daily as needed for Anxiety 15 tablet 0    levothyroxine (SYNTHROID) 75 MCG tablet TAKE ONE TABLET BY MOUTH DAILY 90 tablet 3    vitamin D (CHOLECALCIFEROL) 1000 UNIT TABS tablet Take 1,000 Units by mouth daily      metoprolol tartrate (LOPRESSOR) 50 MG tablet Take 1 tablet by mouth 2 times daily 180 tablet 3    magnesium oxide (MAG-OX) 400 (241.3 MG) MG TABS tablet Take 1 tablet by mouth 4 times daily 1 tid 90 tablet 5    docusate sodium (COLACE) 100 MG capsule Take 1 capsule by mouth daily as needed for Constipation 30 capsule 1    pantoprazole (PROTONIX) 40 MG tablet       glucose blood VI test strips (HOSSEIN CONTOUR TEST) strip TEST ONCE DAILY DX: 250.00 100 strip 11    Sennosides (SENOKOT PO) Take by mouth       No current facility-administered medications for this encounter. Allergies:     Allergies   Allergen Reactions    Aspirin Other (See Comments)     Brain cavernoma - avoid all blood thinners    Lipitor Other (See Comments)     Muscle cramps    Other      Avoid blood thinners    Sodium Hydroxide Other (See Comments)     Blisters    Stadol [Butorphanol Tartrate] Other (See Comments)     Increased BP    Vicodin [Hydrocodone-Acetaminophen]      Elevates blood pressure    Lamisil [Terbinafine Hcl] Rash    Prilosec [Omeprazole] Rash of complication, not stated as uncontrolled     Unspecified sleep apnea     Vitamin B12 deficiency     Vitamin D deficiency        Past Surgical History:        Procedure Laterality Date    COLONOSCOPY      COLONOSCOPY  07/14/2016    COLONOSCOPY      DIAGNOSTIC CARDIAC CATH LAB PROCEDURE  1/12/2005    [facility], Dr Balbir Mayer, No CAD.  DILATION AND CURETTAGE OF UTERUS      ENDOSCOPY, COLON, DIAGNOSTIC      HYSTERECTOMY  1/28/2013    due to uterine cancer    SALIVARY GLAND SURGERY      KINGSLEY AND BSO  1/28/13    THYROID SURGERY      right thyroidectomy for benign reasons    UPPER GASTROINTESTINAL ENDOSCOPY  07/14/2016    UPPER GASTROINTESTINAL ENDOSCOPY         Social History:    Social History   Substance Use Topics    Smoking status: Never Smoker    Smokeless tobacco: Never Used    Alcohol use No                                Counseling given: Not Answered      Vital Signs (Current): There were no vitals filed for this visit. BP Readings from Last 3 Encounters:   10/11/17 (!) 158/70   10/04/17 130/62   09/20/17 124/70       NPO Status:                                                                                 BMI:   Wt Readings from Last 3 Encounters:   10/11/17 153 lb (69.4 kg)   10/04/17 155 lb 12.8 oz (70.7 kg)   09/20/17 153 lb 3.2 oz (69.5 kg)     There is no height or weight on file to calculate BMI.     Anesthesia Evaluation  Patient summary reviewed no history of anesthetic complications:   Airway: Mallampati: II  TM distance: >3 FB   Neck ROM: full  Mouth opening: > = 3 FB Dental: normal exam         Pulmonary:   (+) pneumonia:  sleep apnea: on CPAP,                             Cardiovascular:    (+) hypertension:,                   Neuro/Psych:   {neg ROS              GI/Hepatic/Renal:   (+) GERD: poorly controlled,           Endo/Other:    (+) Type II DM, , hypothyroidism::.                 Abdominal:           Vascular: Readings from Last 1 Encounters:   10/11/17 5' 1.5\" (1.562 m)     Wt Readings from Last 1 Encounters:   10/11/17 153 lb (69.4 kg)     There is no height or weight on file to calculate BMI. CBC   Lab Results   Component Value Date    WBC 7.1 10/04/2017    RBC 4.23 10/04/2017    RBC 4.09 06/07/2017    HGB 11.6 10/04/2017    HCT 35.5 10/04/2017    MCV 83.9 10/04/2017    RDW 14.6 10/04/2017     10/04/2017     CMP    Lab Results   Component Value Date     10/04/2017    K 4.3 10/04/2017    CL 99 10/04/2017    CO2 27 10/04/2017    BUN 25 10/04/2017    CREATININE 0.8 10/04/2017    GFRAA >60 10/04/2017    GFRAA >60 03/18/2013    AGRATIO 1.8 10/04/2017    LABGLOM >60 10/04/2017    GLUCOSE 121 10/04/2017    GLUCOSE 96 06/01/2016    PROT 7.0 10/04/2017    PROT 7.1 06/01/2016    CALCIUM 10.0 10/04/2017    BILITOT <0.2 10/04/2017    ALKPHOS 76 10/04/2017    AST 19 10/04/2017    ALT 19 10/04/2017     POCGlucose  No results for input(s): GLUCOSE in the last 72 hours. Coags    Lab Results   Component Value Date    PROTIME 11.3 10/24/2014    INR 1.04 10/24/2014    APTT 26.0 07/21/5826     HCG (If Applicable) No results found for: PREGTESTUR, PREGSERUM, HCG, HCGQUANT   ABGs No results found for: PHART, PO2ART, YUM4UPA, ZXG6MPV, BEART, E4IJQOHQ   Type & Screen (If Applicable)  No results found for: LABABO, LABRH        Anesthesia Plan      general     ASA 3       Induction: intravenous. MIPS: Postoperative opioids intended and Prophylactic antiemetics administered. Anesthetic plan and risks discussed with patient. Plan discussed with CRNA. All questions answered and agrees with plan.         Stanley Simmons MD   10/18/2017

## 2017-10-23 ENCOUNTER — OFFICE VISIT (OUTPATIENT)
Dept: FAMILY MEDICINE CLINIC | Age: 72
End: 2017-10-23

## 2017-10-23 VITALS
WEIGHT: 154 LBS | SYSTOLIC BLOOD PRESSURE: 160 MMHG | OXYGEN SATURATION: 98 % | DIASTOLIC BLOOD PRESSURE: 80 MMHG | HEART RATE: 57 BPM | BODY MASS INDEX: 29.07 KG/M2 | HEIGHT: 61 IN

## 2017-10-23 DIAGNOSIS — E78.2 MIXED HYPERLIPIDEMIA: ICD-10-CM

## 2017-10-23 DIAGNOSIS — E11.9 TYPE 2 DIABETES MELLITUS WITHOUT COMPLICATION, WITHOUT LONG-TERM CURRENT USE OF INSULIN (HCC): Primary | ICD-10-CM

## 2017-10-23 DIAGNOSIS — R00.2 HEART PALPITATIONS: ICD-10-CM

## 2017-10-23 DIAGNOSIS — F41.9 ANXIETY: ICD-10-CM

## 2017-10-23 DIAGNOSIS — I10 ESSENTIAL HYPERTENSION: ICD-10-CM

## 2017-10-23 PROBLEM — N81.9 PELVIC PROLAPSE: Status: ACTIVE | Noted: 2017-10-23

## 2017-10-23 PROBLEM — R31.9 HEMATURIA: Status: ACTIVE | Noted: 2017-10-23

## 2017-10-23 PROBLEM — N34.3 URETHRAL SYNDROME: Status: ACTIVE | Noted: 2017-10-23

## 2017-10-23 PROBLEM — N13.30 HYDRONEPHROSIS: Status: ACTIVE | Noted: 2017-10-23

## 2017-10-23 PROCEDURE — 1036F TOBACCO NON-USER: CPT | Performed by: NURSE PRACTITIONER

## 2017-10-23 PROCEDURE — 1090F PRES/ABSN URINE INCON ASSESS: CPT | Performed by: NURSE PRACTITIONER

## 2017-10-23 PROCEDURE — 36415 COLL VENOUS BLD VENIPUNCTURE: CPT | Performed by: NURSE PRACTITIONER

## 2017-10-23 PROCEDURE — G8484 FLU IMMUNIZE NO ADMIN: HCPCS | Performed by: NURSE PRACTITIONER

## 2017-10-23 PROCEDURE — 3044F HG A1C LEVEL LT 7.0%: CPT | Performed by: NURSE PRACTITIONER

## 2017-10-23 PROCEDURE — G8427 DOCREV CUR MEDS BY ELIG CLIN: HCPCS | Performed by: NURSE PRACTITIONER

## 2017-10-23 PROCEDURE — 3017F COLORECTAL CA SCREEN DOC REV: CPT | Performed by: NURSE PRACTITIONER

## 2017-10-23 PROCEDURE — 1123F ACP DISCUSS/DSCN MKR DOCD: CPT | Performed by: NURSE PRACTITIONER

## 2017-10-23 PROCEDURE — 3014F SCREEN MAMMO DOC REV: CPT | Performed by: NURSE PRACTITIONER

## 2017-10-23 PROCEDURE — 4040F PNEUMOC VAC/ADMIN/RCVD: CPT | Performed by: NURSE PRACTITIONER

## 2017-10-23 PROCEDURE — 99214 OFFICE O/P EST MOD 30 MIN: CPT | Performed by: NURSE PRACTITIONER

## 2017-10-23 PROCEDURE — G8417 CALC BMI ABV UP PARAM F/U: HCPCS | Performed by: NURSE PRACTITIONER

## 2017-10-23 PROCEDURE — G8399 PT W/DXA RESULTS DOCUMENT: HCPCS | Performed by: NURSE PRACTITIONER

## 2017-10-23 RX ORDER — METOPROLOL TARTRATE 50 MG/1
50 TABLET, FILM COATED ORAL 2 TIMES DAILY
Qty: 180 TABLET | Refills: 3 | Status: SHIPPED | OUTPATIENT
Start: 2017-10-23 | End: 2018-04-19 | Stop reason: SDUPTHER

## 2017-10-23 RX ORDER — AMLODIPINE BESYLATE 5 MG/1
5 TABLET ORAL DAILY
Qty: 30 TABLET | Refills: 3 | Status: SHIPPED | OUTPATIENT
Start: 2017-10-23 | End: 2018-01-22 | Stop reason: SDUPTHER

## 2017-10-23 NOTE — PROGRESS NOTES
edema, palpitations, dry cough and fatigue. Antihypertensive medication side effects: no medication side effects noted. Use of agents associated with hypertension: thyroid hormones. Hyperlipidemia:  No new myalgias or GI upset on pravastatin (Pravachol). Lab Results   Component Value Date    LABA1C 6.3 07/10/2017    LABA1C 5.9 02/24/2017    LABA1C 6.1 10/13/2016     Lab Results   Component Value Date    LABMICR Not Indicated 05/07/2017    LABMICR <1.20 05/07/2017    CREATININE 0.8 10/04/2017     Lab Results   Component Value Date    ALT 19 10/04/2017    AST 19 10/04/2017     Lab Results   Component Value Date    CHOL 250 (H) 07/10/2017    TRIG 208 (H) 07/10/2017    HDL 69 (H) 07/10/2017    LDLCALC 139 (H) 07/10/2017                Review of Systems   Constitutional: Negative. HENT: Negative. Eyes: Negative. Respiratory: Negative. Cardiovascular: Negative. Gastrointestinal: Negative. Genitourinary: Negative. Musculoskeletal: Negative. Skin: Negative. Neurological: Negative. Endo/Heme/Allergies: Negative. Psychiatric/Behavioral: Negative. All other systems reviewed and are negative. Past Medical History:   Diagnosis Date    Allergic     Anemia     Brain condition     pt states a cluster of blood vessels left side of brain    Cancer [facility]) 1/2013    endometrial cancer/ Dr. Theodore Brown and Dr. Zeinab Hollis Cerebral cavernoma     followed by Dr. Andrea Ramirez at [place] [id number] Complication of anesthesia     Takes a long time to wake up    Diverticulosis     Endometrial cancer ([facility] [address]     Fungus infection     Fungus infection on bilat toes.  GERD (gastroesophageal reflux disease)     Hyperlipidemia     Hypertension     Hypomagnesemia 1/2016    consult with Dr. Vannessa Feliz ([facility]) and no renal wasting.  felt most likely d/t HCTz and PPI,  HCTZ discontinued,  increased mag ox to 400 mg 2 tabs BID    Hypothyroidism     Irritable bowel syndrome     MVP (mitral valve prolapse)     JOAQUIN (obstructive sleep apnea)     Pneumonia     infant    Type 2 diabetes mellitus without complication (HCC)     Type II or unspecified type diabetes mellitus without mention of complication, not stated as uncontrolled     Unspecified sleep apnea     Vitamin B12 deficiency     Vitamin D deficiency      Family History   Problem Relation Age of Onset    Cancer Mother      colon    COPD Father     Heart Disease Father     Cancer Father      NMSC, skin     Past Surgical History:   Procedure Laterality Date    COLONOSCOPY      COLONOSCOPY  07/14/2016    COLONOSCOPY      DIAGNOSTIC CARDIAC CATH LAB PROCEDURE  1/12/2005    [facility], No CAD.  DILATION AND CURETTAGE OF UTERUS      ENDOSCOPY, COLON, DIAGNOSTIC      HYSTERECTOMY  1/28/2013    due to uterine cancer    OTHER SURGICAL HISTORY  10/18/2017    cystoscopy, bilateral retrogrades    SALIVARY GLAND SURGERY      KINGSLEY AND BSO  1/28/13    THYROID SURGERY      right thyroidectomy for benign reasons    UPPER GASTROINTESTINAL ENDOSCOPY  07/14/2016    UPPER GASTROINTESTINAL ENDOSCOPY       Social History     Social History    Marital status:      Spouse name: N/A    Number of children: N/A    Years of education: N/A     Occupational History    Not on file.      Social History Main Topics    Smoking status: Never Smoker    Smokeless tobacco: Never Used    Alcohol use No    Drug use: No    Sexual activity: No     Other Topics Concern    Not on file     Social History Narrative    No narrative on file     Current Outpatient Prescriptions   Medication Sig Dispense Refill    phenazopyridine (PYRIDIUM) 200 MG tablet Take 1 tablet by mouth 3 times daily as needed for Pain 15 tablet 0    Biotin 97950 MCG TABS Take by mouth daily      liothyronine (CYTOMEL) 5 MCG tablet Take 5 mcg by mouth daily      pantoprazole (PROTONIX) 40 MG tablet       valsartan (DIOVAN) 80 MG tablet TAKE ONE TABLET BY MOUTH DAILY 90 tablet 1    amLODIPine (NORVASC) 5 MG tablet Take 0.5 tablets by mouth daily 45 tablet 3    metFORMIN (GLUCOPHAGE-XR) 500 MG extended release tablet TAKE ONE TABLET BY MOUTH 2 TIMES DAILY 180 tablet 0    pravastatin (PRAVACHOL) 10 MG tablet Take 1 tablet by mouth daily 90 tablet 3    LORazepam (ATIVAN) 0.5 MG tablet Take 1 tablet by mouth daily as needed for Anxiety 15 tablet 0    levothyroxine (SYNTHROID) 75 MCG tablet TAKE ONE TABLET BY MOUTH DAILY 90 tablet 3    vitamin D (CHOLECALCIFEROL) 1000 UNIT TABS tablet Take 1,000 Units by mouth daily      metoprolol tartrate (LOPRESSOR) 50 MG tablet Take 1 tablet by mouth 2 times daily 180 tablet 3    glucose blood VI test strips (HOSSEIN CONTOUR TEST) strip TEST ONCE DAILY DX: 250.00 100 strip 11    Sennosides (SENOKOT PO) Take by mouth      magnesium oxide (MAG-OX) 400 (241.3 MG) MG TABS tablet Take 1 tablet by mouth 4 times daily 1 tid 90 tablet 5    docusate sodium (COLACE) 100 MG capsule Take 1 capsule by mouth daily as needed for Constipation 30 capsule 1     No current facility-administered medications for this visit. Objective:       Physical Exam   Constitutional: She is oriented to person, place, and time. She appears well-developed and well-nourished. No distress. HENT:   Head: Normocephalic and atraumatic. Right Ear: Tympanic membrane, external ear and ear canal normal.   Left Ear: Tympanic membrane, external ear and ear canal normal.   Nose: Nose normal.   Mouth/Throat: Uvula is midline, oropharynx is clear and moist and mucous membranes are normal. No oropharyngeal exudate. Eyes: Conjunctivae, EOM and lids are normal. Pupils are equal, round, and reactive to light. Neck: Normal range of motion. Neck supple. No JVD present. Carotid bruit is not present. No thyromegaly present. Cardiovascular: Normal rate, regular rhythm, normal heart sounds and normal pulses. Exam reveals no gallop and no friction rub. No murmur heard.   Pulses: Radial pulses are 2+ on the right side, and 2+ on the left side. Dorsalis pedis pulses are 2+ on the right side, and 2+ on the left side. Posterior tibial pulses are 2+ on the right side, and 2+ on the left side. Pulmonary/Chest: Effort normal and breath sounds normal.   Abdominal: Soft. Normal appearance and bowel sounds are normal. She exhibits no mass. There is no hepatosplenomegaly. There is no tenderness. Musculoskeletal: Normal range of motion. She exhibits no edema. Lymphadenopathy:        Head (right side): No submandibular adenopathy present. Head (left side): No submandibular adenopathy present. She has no cervical adenopathy. Neurological: She is alert and oriented to person, place, and time. She has normal strength. Gait normal.   Skin: Skin is warm and dry. No lesion and no rash noted. Psychiatric: She has a normal mood and affect. Her speech is normal and behavior is normal. Judgment and thought content normal. Cognition and memory are normal.   Nursing note and vitals reviewed. BP (!) 145/81 (Site: Left Arm, Position: Sitting, Cuff Size: Medium Adult)   Pulse 57   Ht 5' 0.75\" (1.543 m)   Wt 154 lb (69.9 kg)   SpO2 98%   BMI 29.34 kg/m²   Body mass index is 29.34 kg/m².     BP Readings from Last 2 Encounters:   10/23/17 (!) 145/81   10/18/17 (!) 159/78       Wt Readings from Last 3 Encounters:   10/23/17 154 lb (69.9 kg)   10/11/17 153 lb (69.4 kg)   10/04/17 155 lb 12.8 oz (70.7 kg)       Lab Review   Hospital Outpatient Visit on 10/18/2017   Component Date Value    POC Glucose 10/18/2017 106*    Performed on 10/18/2017 ACCU-CHEK    Office Visit on 10/04/2017   Component Date Value    WBC 10/04/2017 7.1     RBC 10/04/2017 4.23     Hemoglobin 10/04/2017 11.6*    Hematocrit 10/04/2017 35.5*    MCV 10/04/2017 83.9     MCH 10/04/2017 27.5     MCHC 10/04/2017 32.8     RDW 10/04/2017 14.6     Platelets 51/22/4447 242     MPV 10/04/2017 9.8     Neutrophils % 10/04/2017 72.3     Lymphocytes % 10/04/2017 19.0     Monocytes % 10/04/2017 5.8     Eosinophils % 10/04/2017 1.8     Basophils % 10/04/2017 1.1     Neutrophils # 10/04/2017 5.1     Lymphocytes # 10/04/2017 1.4     Monocytes # 10/04/2017 0.4     Eosinophils # 10/04/2017 0.1     Basophils # 10/04/2017 0.1     Sodium 10/04/2017 143     Potassium 10/04/2017 4.3     Chloride 10/04/2017 99     CO2 10/04/2017 27     Anion Gap 10/04/2017 17*    Glucose 10/04/2017 121*    BUN 10/04/2017 25*    CREATININE 10/04/2017 0.8     GFR Non- 10/04/2017 >60     GFR  10/04/2017 >60     Calcium 10/04/2017 10.0     Total Protein 10/04/2017 7.0     Alb 10/04/2017 4.5     Albumin/Globulin Ratio 10/04/2017 1.8     Total Bilirubin 10/04/2017 <0.2     Alkaline Phosphatase 10/04/2017 76     ALT 10/04/2017 19     AST 10/04/2017 19     Globulin 10/04/2017 2.5     TSH 10/04/2017 0.33     T4 Free 10/04/2017 1.3    Office Visit on 09/20/2017   Component Date Value    Color, UA 09/20/2017 ramsey     Clarity, UA 09/20/2017 cloudy     Glucose, UA POC 09/20/2017 negative     Bilirubin, UA 09/20/2017 negative     Ketones, UA 09/20/2017 negative     Spec Grav, UA 09/20/2017 1.020     Blood, UA POC 09/20/2017 negative     pH, UA 09/20/2017 5.0     Protein, UA POC 09/20/2017 negative     Urobilinogen, UA 09/20/2017 0.2     Leukocytes, UA 09/20/2017 trace     Nitrite, UA 09/20/2017 negative     Urine Culture, Routine 09/22/2017 <50,000 CFU/ml mixed skin/urogenital toribio. No further workup*    Organism 09/22/2017 [facility] Group B (Strep agalacticae)*    Urine Culture, Routine 09/22/2017                      Value:50,000 CFU/ml  Susceptibility testing of penicillin and other beta-lactams is  not necessary for beta hemolytic Streptococci since resistant  strains have not been identified.  (CLSI J181-D18, 2017)     Nurse Only on 09/05/2017   Component Date Value    T3, Total 09/05/2017 1.49     TSH 09/05/2017 0.31        No results found for this visit on 10/23/17. Assessment:       1. Type 2 diabetes mellitus without complication, without long-term current use of insulin ([address]    2. Essential hypertension    3. Mixed hyperlipidemia    4. Heart palpitations    5. Anxiety               Plan:       Dougie Ryan was seen today for anxiety, depression, hypertension, hyperlipidemia and diabetes. Diagnoses and all orders for this visit:    Type 2 diabetes mellitus without complication, without long-term current use of insulin (HCC)  -     Hemoglobin A1C  Diabetes Mellitus type II, under good control   1. Rx changes: none  2. Education: Reviewed ABCs of diabetes management (respective goals in parentheses):  A1C (<7), blood pressure (<130/80), and cholesterol (LDL <100). 3.  Compliance at present is estimated to be good. Efforts to improve compliance (if necessary) will be directed at dietary modifications, increased exercise and regular blood sugar monitoring. Diabetes education provided today:  Metabolic syndrome: association of diabetes with dyslipidemia, HTN and obesity. Diabetic Neuropathy: signs and therapy. Foot care: advised to wash feet daily, pat dry and apply lotion at night, avoiding between toes. Need to look at feet daily and report to a physician any signs of inflammation or skin damage. Discussed diabetes shoes and socks. Diabetic retinopathy: the most frequent cause of blindness in [place] currently. Measures to prevent that. Diabetic nephropathy: Kidney function, microalbumin as a sign of diabetic nephropathy. Stages of kidney disease. Nutrition as a mainstream of diabetes therapy. Carbs: good carbs and bad carbs, importance of carb counting, incorporation of protein with each meal to reduce Glycemic index, importance of portions,   Fats: Good fats and bad fats, meal planning and supplements.   Physical activity: advised to exercise 5-7 days a week 30-60 mins at least. Discussed how it affects BS readings. Essential hypertension  -     metoprolol tartrate (LOPRESSOR) 50 MG tablet; Take 1 tablet by mouth 2 times daily Taking 1 in the morning and 1/2 tablet in the afternoon  INCREASE  -     amLODIPine (NORVASC) 5 MG tablet; Take 1 tablet by mouth daily  Hypertension, uncontrolled. Medication: dosage change Of amlodipine from 2.5 mg to 5 mg daily. Dietary sodium restriction. Regular aerobic exercise. Check blood pressures daily and record and bring in for review in 2 weeks    Mixed hyperlipidemia  -     Lipid Panel  -     Comprehensive Metabolic Panel   1. Continue dietary measures. Reduce saturated fat, \"trans\" monounsaturated fatty acids, and cholesterol  Increase soluble fiber  Plant sterols 2g/d (e.g. Benecol)  2. Start/Continue regular exercise. Advised to engage in regular physical activity with frequency goal being  3-4 times a week    Heart palpitations  -     metoprolol tartrate (LOPRESSOR) 50 MG tablet; Take 1 tablet by mouth 2 times daily Taking 1 in the morning and 1/2 tablet in the afternoon    Anxiety  Chronic benzodiazepine treatment protocol was discussed with the patient again including taking medications only as prescribed , using one pharmacy and getting all controlled substances from one physician unless okayed with me. Proper safeguarding and disposal of medications were also discussed with the patient.     The current treatment regimen is needed to decrease the patient's anxiety symptoms, improve the quality of life and ability to function and improve sleep and mood symptoms.        Patient given following instructions - You are on medications which could impair your senses, you are at risk of weakness, falls, dizziness, or drowsiness. You should be careful during activities which could place you at risk of harm, such as climbing, using stairs, operating machinery, or driving vehicles.  If you feel you cannot safely do these activities, you should request others to help you, or avoid the activities altogether. If you are drowsy for any other reason, you should use the same precautions as listed above.     The patient is made aware of the potential of drowsiness with the prescribed medications, and that care should be exercised in operating machinery, vehicles, or placing oneself in situations of risk to their health, ie heights and climbing and stairs.     The patient denies nausea, vomiting, diarrhea and constipation. No respiratory problems. No increased sleepiness, drowsiness or confusion. The patient states that the medications and treatment have helped improve the quality of life and helped in psychosocial functioning. There are no indicators for possible drug abuse, addiction or diversion problems. Attestation: The Prescription Monitoring Report for this patient was reviewed today. [facility] Zenaida Mao CNP)  Documentation: Possible medication side effects, risk of tolerance and/or dependence, and alternative treatments discussed., No signs of potential drug abuse or diversion identified., Existing medication contract. (Bernardino Dancer, CNP)          Patient has been instructed call the office immediately with new symptoms, change in symptoms or worsening of symptoms. If this is not feasible, patient is instructed to report to the emergency room. Medication profile reviewed. Medication side effects and possible impairments from medications were discussed as applicable. Allergies were reviewed. Health maintenance was reviewed and updated as appropriate.

## 2017-10-23 NOTE — PATIENT INSTRUCTIONS
hours. They can cause low blood sugar if you don't eat as you planned. They include glipizide and glyburide. · Thiazolidinediones. These reduce the amount of blood glucose. They also help you respond better to insulin. They include pioglitazone and rosiglitazone. You may need to take more than one medicine for diabetes. Two or more medicines may work better to lower your blood sugar level than just one does. Follow-up care is a key part of your treatment and safety. Be sure to make and go to all appointments, and call your doctor if you are having problems. It's also a good idea to know your test results and keep a list of the medicines you take. How can you care for yourself at home? · Eat a healthy diet. Get some exercise each day. This may help you to reduce how much medicine you need. · Do not take other prescription or over-the-counter medicines, vitamins, herbal products, or supplements without talking to your doctor first. Some medicines for type 2 diabetes can cause problems with other medicines or supplements. · Tell your doctor if you plan to get pregnant. Some of these drugs are not safe for pregnant women. · Be safe with medicines. Take your medicines exactly as prescribed. Meglitinides and sulfonylureas can cause your blood sugar to drop very low. Call your doctor if you think you are having a problem with your medicine. · Check your blood sugar often. You can use a glucose monitor. Keeping track can help you know how certain foods, activities, and medicines affect your blood sugar. And it can help you keep your blood sugar from getting so low that it's not safe. When should you call for help? Call 911 anytime you think you may need emergency care. For example, call if:  · You passed out (lost consciousness). · You are confused or cannot think clearly. · Your blood sugar is very high or very low.   Watch closely for changes in your health, and be sure to contact your doctor if:  · Your blood sugar stays outside the level your doctor set for you. · You have any problems. Where can you learn more? Go to https://chpepiceweb.avelisbiotech.com. org and sign in to your Colores[employer] account. Enter H153 in the ReaLync box to learn more about \"Noninsulin Medicines for Type 2 Diabetes: Care Instructions. \"     If you do not have an account, please click on the \"Sign Up Now\" link. Current as of: March 13, 2017  Content Version: 11.3  © 9521-2721 [employer]. Care instructions adapted under license by [facility] ([facility]). If you have questions about a medical condition or this instruction, always ask your healthcare professional. [address] any warranty or liability for your use of this information. Patient Education        High Blood Pressure: Care Instructions  Your Care Instructions  If your blood pressure is usually above 140/90, you have high blood pressure, or hypertension. That means the top number is 140 or higher or the bottom number is 90 or higher, or both. Despite what a lot of people think, high blood pressure usually doesn't cause headaches or make you feel dizzy or lightheaded. It usually has no symptoms. But it does increase your risk for heart attack, stroke, and kidney or eye damage. The higher your blood pressure, the more your risk increases. Your doctor will give you a goal for your blood pressure. Your goal will be based on your health and your age. An example of a goal is to keep your blood pressure below 140/90. Lifestyle changes, such as eating healthy and being active, are always important to help lower blood pressure. You might also take medicine to reach your blood pressure goal.  Follow-up care is a key part of your treatment and safety. Be sure to make and go to all appointments, and call your doctor if you are having problems. It's also a good idea to know your test results and keep a list of the medicines you take.   How can you care for yourself at home? Medical treatment  · If you stop taking your medicine, your blood pressure will go back up. You may take one or more types of medicine to lower your blood pressure. Be safe with medicines. Take your medicine exactly as prescribed. Call your doctor if you think you are having a problem with your medicine. · Talk to your doctor before you start taking aspirin every day. Aspirin can help certain people lower their risk of a heart attack or stroke. But taking aspirin isn't right for everyone, because it can cause serious bleeding. · See your doctor regularly. You may need to see the doctor more often at first or until your blood pressure comes down. · If you are taking blood pressure medicine, talk to your doctor before you take decongestants or anti-inflammatory medicine, such as ibuprofen. Some of these medicines can raise blood pressure. · Learn how to check your blood pressure at home. Lifestyle changes  · Stay at a healthy weight. This is especially important if you put on weight around the waist. Losing even 10 pounds can help you lower your blood pressure. · If your doctor recommends it, get more exercise. Walking is a good choice. Bit by bit, increase the amount you walk every day. Try for at least 30 minutes on most days of the week. You also may want to swim, bike, or do other activities. · Avoid or limit alcohol. Talk to your doctor about whether you can drink any alcohol. · Try to limit how much sodium you eat to less than 2,300 milligrams (mg) a day. Your doctor may ask you to try to eat less than 1,500 mg a day. · Eat plenty of fruits (such as bananas and oranges), vegetables, legumes, whole grains, and low-fat dairy products. · Lower the amount of saturated fat in your diet. Saturated fat is found in animal products such as milk, cheese, and meat. Limiting these foods may help you lose weight and also lower your risk for heart disease. · Do not smoke.  Smoking increases your risk for heart attack and stroke. If you need help quitting, talk to your doctor about stop-smoking programs and medicines. These can increase your chances of quitting for good. When should you call for help? Call 911 anytime you think you may need emergency care. This may mean having symptoms that suggest that your blood pressure is causing a serious heart or blood vessel problem. Your blood pressure may be over 180/110. For example, call 911 if:  · You have symptoms of a heart attack. These may include:  ¨ Chest pain or pressure, or a strange feeling in the chest.  ¨ Sweating. ¨ Shortness of breath. ¨ Nausea or vomiting. ¨ Pain, pressure, or a strange feeling in the back, neck, jaw, or upper belly or in one or both shoulders or arms. ¨ Lightheadedness or sudden weakness. ¨ A fast or irregular heartbeat. · You have symptoms of a stroke. These may include:  ¨ Sudden numbness, tingling, weakness, or loss of movement in your face, arm, or leg, especially on only one side of your body. ¨ Sudden vision changes. ¨ Sudden trouble speaking. ¨ Sudden confusion or trouble understanding simple statements. ¨ Sudden problems with walking or balance. ¨ A sudden, severe headache that is different from past headaches. · You have severe back or belly pain. Do not wait until your blood pressure comes down on its own. Get help right away. Call your doctor now or seek immediate care if:  · Your blood pressure is much higher than normal (such as 180/110 or higher), but you don't have symptoms. · You think high blood pressure is causing symptoms, such as:  ¨ Severe headache. ¨ Blurry vision. Watch closely for changes in your health, and be sure to contact your doctor if:  · Your blood pressure measures 140/90 or higher at least 2 times. That means the top number is 140 or higher or the bottom number is 90 or higher, or both. · You think you may be having side effects from your blood pressure medicine.   · Your blood pressure is usually normal, but it goes above normal at least 2 times. Where can you learn more? Go to https://chpepiceweb.Elixr. org and sign in to your [employer] account. Enter Z432 in the Unified Social box to learn more about \"High Blood Pressure: Care Instructions. \"     If you do not have an account, please click on the \"Sign Up Now\" link. Current as of: August 8, 2016  Content Version: 11.3  © 2752-8603 [employer]. Care instructions adapted under license by [facility] ([facility]). If you have questions about a medical condition or this instruction, always ask your healthcare professional. [place] [address] any warranty or liability for your use of this information. Patient given following instructions - You are on medications which could impair your senses, you are at risk of weakness, falls, dizziness, or drowsiness. You should be careful during activities which could place you at risk of harm, such as climbing, using stairs, operating machinery, or driving vehicles. If you feel you cannot safely do these activities, you should request others to help you, or avoid the activities altogether. If you are drowsy for any other reason, you should use the same precautions as listed above.

## 2017-10-23 NOTE — OP NOTE
urine. At this point, under fluoroscopic  guidance, which was used throughout the remainder of the patient's  procedure, a 5-Estonian cone-tipped catheter was put into each ureteral  orifice. Bilateral retrograde pyelograms were then performed showing  the entire ureteral calyceal systems to be intact with no filling  defects, tumors, stones, or points of obstruction. Prompt drainage  was seen bilaterally. Overall, the patient tolerated both of these procedures well. After  draining her bladder, lidocaine jelly was applied and she was taken  back to the postop recovery room in stable condition.         Pradeep Gasca MD    D: 10/22/2017 21:48:58       T: 10/23/2017 4:34:10     [email or web address]  Job#: [id number]     Doc#: [id number]

## 2017-10-24 LAB
A/G RATIO: 1.5 (ref 1.1–2.2)
ALBUMIN SERPL-MCNC: 4.2 G/DL (ref 3.4–5)
ALP BLD-CCNC: 79 U/L (ref 40–129)
ALT SERPL-CCNC: 15 U/L (ref 10–40)
ANION GAP SERPL CALCULATED.3IONS-SCNC: 16 MMOL/L (ref 3–16)
AST SERPL-CCNC: 15 U/L (ref 15–37)
BILIRUB SERPL-MCNC: <0.2 MG/DL (ref 0–1)
BUN BLDV-MCNC: 28 MG/DL (ref 7–20)
CALCIUM SERPL-MCNC: 9.7 MG/DL (ref 8.3–10.6)
CHLORIDE BLD-SCNC: 99 MMOL/L (ref 99–110)
CHOLESTEROL, TOTAL: 209 MG/DL (ref 0–199)
CO2: 26 MMOL/L (ref 21–32)
CREAT SERPL-MCNC: 0.8 MG/DL (ref 0.6–1.2)
ESTIMATED AVERAGE GLUCOSE: 134.1 MG/DL
GFR AFRICAN AMERICAN: >60
GFR NON-AFRICAN AMERICAN: >60
GLOBULIN: 2.8 G/DL
GLUCOSE BLD-MCNC: 91 MG/DL (ref 70–99)
HBA1C MFR BLD: 6.3 %
HDLC SERPL-MCNC: 64 MG/DL (ref 40–60)
LDL CHOLESTEROL CALCULATED: 115 MG/DL
POTASSIUM SERPL-SCNC: 4.9 MMOL/L (ref 3.5–5.1)
SODIUM BLD-SCNC: 141 MMOL/L (ref 136–145)
TOTAL PROTEIN: 7 G/DL (ref 6.4–8.2)
TRIGL SERPL-MCNC: 150 MG/DL (ref 0–150)
VLDLC SERPL CALC-MCNC: 30 MG/DL

## 2017-10-27 ASSESSMENT — ENCOUNTER SYMPTOMS
RESPIRATORY NEGATIVE: 1
EYES NEGATIVE: 1
GASTROINTESTINAL NEGATIVE: 1

## 2017-10-30 ENCOUNTER — HOSPITAL ENCOUNTER (OUTPATIENT)
Dept: OTHER | Age: 72
Discharge: HOME | End: 2017-10-30
Attending: INTERNAL MEDICINE | Admitting: INTERNAL MEDICINE

## 2017-10-30 LAB — MAGNESIUM: 1.8 MG/DL (ref 1.8–2.4)

## 2017-10-31 DIAGNOSIS — E78.2 MIXED HYPERLIPIDEMIA: Primary | ICD-10-CM

## 2017-10-31 RX ORDER — ROSUVASTATIN CALCIUM 20 MG/1
20 TABLET, COATED ORAL NIGHTLY
Qty: 30 TABLET | Refills: 5 | Status: SHIPPED | OUTPATIENT
Start: 2017-10-31 | End: 2018-01-17 | Stop reason: SDUPTHER

## 2017-11-16 PROBLEM — H25.9 BILATERAL SENILE CATARACTS: Status: ACTIVE | Noted: 2017-11-16

## 2017-12-01 ENCOUNTER — HOSPITAL ENCOUNTER (OUTPATIENT)
Dept: OTHER | Age: 72
Discharge: HOME | End: 2017-12-01
Attending: INTERNAL MEDICINE | Admitting: INTERNAL MEDICINE

## 2017-12-01 LAB
ANION GAP SERPL CALCULATED.3IONS-SCNC: 13 MMOL/L (ref 3–16)
BUN BLDV-MCNC: 21 MG/DL (ref 7–20)
CALCIUM SERPL-MCNC: 9.6 MG/DL (ref 8.3–10.6)
CHLORIDE BLD-SCNC: 99 MMOL/L (ref 99–110)
CO2: 28 MMOL/L (ref 21–32)
CREAT SERPL-MCNC: 0.7 MG/DL (ref 0.6–1.2)
GFR AFRICAN AMERICAN: >60
GFR NON-AFRICAN AMERICAN: >60
GLUCOSE BLD-MCNC: 104 MG/DL (ref 70–99)
MAGNESIUM: 1.5 MG/DL (ref 1.8–2.4)
POTASSIUM SERPL-SCNC: 4.3 MMOL/L (ref 3.5–5.1)
SODIUM BLD-SCNC: 140 MMOL/L (ref 136–145)

## 2017-12-18 ENCOUNTER — HOSPITAL ENCOUNTER (OUTPATIENT)
Dept: OTHER | Age: 72
Discharge: HOME | End: 2017-12-18
Attending: INTERNAL MEDICINE | Admitting: INTERNAL MEDICINE

## 2017-12-18 LAB — MAGNESIUM: 1.8 MG/DL (ref 1.8–2.4)

## 2018-01-17 DIAGNOSIS — I10 ESSENTIAL HYPERTENSION: ICD-10-CM

## 2018-01-17 RX ORDER — VALSARTAN 80 MG/1
TABLET ORAL
Qty: 90 TABLET | Refills: 0 | Status: SHIPPED | OUTPATIENT
Start: 2018-01-17 | End: 2018-04-19 | Stop reason: SDUPTHER

## 2018-01-17 RX ORDER — ROSUVASTATIN CALCIUM 20 MG/1
20 TABLET, COATED ORAL NIGHTLY
Qty: 30 TABLET | Refills: 5 | Status: SHIPPED | OUTPATIENT
Start: 2018-01-17 | End: 2018-07-09 | Stop reason: SDUPTHER

## 2018-01-22 ENCOUNTER — OFFICE VISIT (OUTPATIENT)
Dept: FAMILY MEDICINE CLINIC | Age: 73
End: 2018-01-22

## 2018-01-22 VITALS
WEIGHT: 155 LBS | BODY MASS INDEX: 30.43 KG/M2 | HEIGHT: 60 IN | DIASTOLIC BLOOD PRESSURE: 84 MMHG | SYSTOLIC BLOOD PRESSURE: 132 MMHG

## 2018-01-22 DIAGNOSIS — I10 ESSENTIAL HYPERTENSION: ICD-10-CM

## 2018-01-22 DIAGNOSIS — F41.9 ANXIETY: ICD-10-CM

## 2018-01-22 DIAGNOSIS — E03.9 HYPOTHYROIDISM, UNSPECIFIED TYPE: ICD-10-CM

## 2018-01-22 DIAGNOSIS — E11.9 TYPE 2 DIABETES MELLITUS WITHOUT COMPLICATION, WITHOUT LONG-TERM CURRENT USE OF INSULIN (HCC): Primary | ICD-10-CM

## 2018-01-22 LAB — HBA1C MFR BLD: 6.3 %

## 2018-01-22 PROCEDURE — 1123F ACP DISCUSS/DSCN MKR DOCD: CPT | Performed by: NURSE PRACTITIONER

## 2018-01-22 PROCEDURE — G8427 DOCREV CUR MEDS BY ELIG CLIN: HCPCS | Performed by: NURSE PRACTITIONER

## 2018-01-22 PROCEDURE — 4040F PNEUMOC VAC/ADMIN/RCVD: CPT | Performed by: NURSE PRACTITIONER

## 2018-01-22 PROCEDURE — 3014F SCREEN MAMMO DOC REV: CPT | Performed by: NURSE PRACTITIONER

## 2018-01-22 PROCEDURE — 3044F HG A1C LEVEL LT 7.0%: CPT | Performed by: NURSE PRACTITIONER

## 2018-01-22 PROCEDURE — 3017F COLORECTAL CA SCREEN DOC REV: CPT | Performed by: NURSE PRACTITIONER

## 2018-01-22 PROCEDURE — G8417 CALC BMI ABV UP PARAM F/U: HCPCS | Performed by: NURSE PRACTITIONER

## 2018-01-22 PROCEDURE — G8484 FLU IMMUNIZE NO ADMIN: HCPCS | Performed by: NURSE PRACTITIONER

## 2018-01-22 PROCEDURE — 99214 OFFICE O/P EST MOD 30 MIN: CPT | Performed by: NURSE PRACTITIONER

## 2018-01-22 PROCEDURE — 1090F PRES/ABSN URINE INCON ASSESS: CPT | Performed by: NURSE PRACTITIONER

## 2018-01-22 PROCEDURE — 1036F TOBACCO NON-USER: CPT | Performed by: NURSE PRACTITIONER

## 2018-01-22 PROCEDURE — 83036 HEMOGLOBIN GLYCOSYLATED A1C: CPT | Performed by: NURSE PRACTITIONER

## 2018-01-22 PROCEDURE — G8399 PT W/DXA RESULTS DOCUMENT: HCPCS | Performed by: NURSE PRACTITIONER

## 2018-01-22 RX ORDER — AMLODIPINE BESYLATE 5 MG/1
5 TABLET ORAL DAILY
Qty: 30 TABLET | Refills: 5 | Status: SHIPPED | OUTPATIENT
Start: 2018-01-22 | End: 2018-08-02 | Stop reason: SDUPTHER

## 2018-01-22 RX ORDER — LIOTHYRONINE SODIUM 5 UG/1
5 TABLET ORAL DAILY
Qty: 30 TABLET | Refills: 5 | Status: SHIPPED | OUTPATIENT
Start: 2018-01-22 | End: 2018-07-26 | Stop reason: SDUPTHER

## 2018-01-22 RX ORDER — LORAZEPAM 0.5 MG/1
0.5 TABLET ORAL DAILY PRN
Qty: 15 TABLET | Refills: 0 | Status: SHIPPED | OUTPATIENT
Start: 2018-01-22 | End: 2018-02-21

## 2018-01-22 ASSESSMENT — ENCOUNTER SYMPTOMS
GASTROINTESTINAL NEGATIVE: 1
EYES NEGATIVE: 1
RESPIRATORY NEGATIVE: 1

## 2018-01-22 NOTE — PROGRESS NOTES
pedis pulses are 2+ on the right side, and 2+ on the left side. Posterior tibial pulses are 2+ on the right side, and 2+ on the left side. Pulmonary/Chest: Effort normal and breath sounds normal.   Abdominal: Soft. Normal appearance and bowel sounds are normal. She exhibits no mass. There is no hepatosplenomegaly. There is no tenderness. Musculoskeletal: Normal range of motion. She exhibits no edema. Lymphadenopathy:        Head (right side): No submandibular adenopathy present. Head (left side): No submandibular adenopathy present. She has no cervical adenopathy. Neurological: She is alert and oriented to person, place, and time. She has normal strength. Gait normal.   Skin: Skin is warm and dry. No lesion and no rash noted. Psychiatric: She has a normal mood and affect. Her speech is normal and behavior is normal. Judgment and thought content normal. Cognition and memory are normal.   Nursing note and vitals reviewed. /84 (Site: Left Arm, Position: Sitting, Cuff Size: Medium Adult)   Ht 5' (1.524 m)   Wt 155 lb (70.3 kg)   BMI 30.27 kg/m²   Body mass index is 30.27 kg/m². BP Readings from Last 2 Encounters:   01/22/18 132/84   10/23/17 (!) 160/80       Wt Readings from Last 3 Encounters:   01/22/18 155 lb (70.3 kg)   10/23/17 154 lb (69.9 kg)   10/11/17 153 lb (69.4 kg)       Lab Review   Hospital Outpatient Visit on 12/18/2017   Component Date Value    Magnesium 12/18/2017 1.80    Hospital Outpatient Visit on 12/01/2017   Component Date Value    Sodium 12/01/2017 140     Potassium 12/01/2017 4.3     Chloride 12/01/2017 99     CO2 12/01/2017 28     Anion Gap 12/01/2017 13     Glucose 12/01/2017 104*    BUN 12/01/2017 21*    CREATININE 12/01/2017 0.7     GFR Non- 12/01/2017 >60     GFR  12/01/2017 >60     Calcium 12/01/2017 9.6     Magnesium 12/01/2017 1.50*       No results found for this visit on 01/22/18.        Assessment: 1. Essential hypertension    2. Anxiety        Results for POC orders placed in visit on 01/22/18   POCT glycosylated hemoglobin (Hb A1C)   Result Value Ref Range    Hemoglobin A1C 6.3 %            Plan:       Orlin Ames was seen today for diabetes and medication check. Diagnoses and all orders for this visit:    Type 2 diabetes mellitus without complication, without long-term current use of insulin ([facility])  -     POCT glycosylated hemoglobin (Hb A1C)  Diabetes Mellitus type II, under excellent control   1. Rx changes: none  2. Education: Reviewed ABCs of diabetes management (respective goals in parentheses):  A1C (<7), blood pressure (<130/80), and cholesterol (LDL <100). 3.  Compliance at present is estimated to be excellent. Efforts to improve compliance (if necessary) will be directed at dietary modifications, increased exercise and regular blood sugar monitoring. Diabetes education provided today:  Metabolic syndrome: association of diabetes with dyslipidemia, HTN and obesity. Diabetic Neuropathy: signs and therapy. Foot care: advised to wash feet daily, pat dry and apply lotion at night, avoiding between toes. Need to look at feet daily and report to a physician any signs of inflammation or skin damage. Discussed diabetes shoes and socks. Diabetic retinopathy: the most frequent cause of blindness in [place] currently. Measures to prevent that. Diabetic nephropathy: Kidney function, microalbumin as a sign of diabetic nephropathy. Stages of kidney disease. Nutrition as a mainstream of diabetes therapy. Carbs: good carbs and bad carbs, importance of carb counting, incorporation of protein with each meal to reduce Glycemic index, importance of portions,   Fats: Good fats and bad fats, meal planning and supplements. Physical activity: advised to exercise 5-7 days a week 30-60 mins at least. Discussed how it affects BS readings.     Hypothyroidism, unspecified type  RESTART  -     liothyronine (CYTOMEL) 5

## 2018-01-22 NOTE — PROGRESS NOTES
BP Readings from Last 2 Encounters:   10/23/17 (!) 160/80   10/18/17 (!) 159/78     Hemoglobin A1C (%)   Date Value   10/23/2017 6.3     Microscopic Examination (no units)   Date Value   05/07/2017 Not Indicated     Microalbumin, Random Urine (mg/dL)   Date Value   05/07/2017 <1.20     LDL Calculated (mg/dL)   Date Value   10/23/2017 115 (H)              Tobacco use:  Patient  reports that she has never smoked. She has never used smokeless tobacco.    If Smoker - Cessation materials given? No    Is Daily aspirin on medication list?:  Yes    Diabetic retinal exam done? Yes   If yes, document in Health Maintenance. Monofilament placed on counter? Yes    Shoes and socks removed? Yes    BP taken with correct size cuff? Yes   Repeated if > 130/80     Microalbumin performed if applicable?   Yes

## 2018-02-14 ENCOUNTER — TELEPHONE (OUTPATIENT)
Dept: SURGERY | Age: 73
End: 2018-02-14

## 2018-02-15 ENCOUNTER — TELEPHONE (OUTPATIENT)
Dept: CARDIOLOGY CLINIC | Age: 73
End: 2018-02-15

## 2018-02-19 ENCOUNTER — PAT TELEPHONE (OUTPATIENT)
Dept: PREADMISSION TESTING | Age: 73
End: 2018-02-19

## 2018-02-19 VITALS — HEIGHT: 62 IN | WEIGHT: 155 LBS | BODY MASS INDEX: 28.52 KG/M2

## 2018-02-27 ENCOUNTER — HOSPITAL ENCOUNTER (OUTPATIENT)
Dept: OTHER | Age: 73
Discharge: HOME | End: 2018-02-27
Attending: INTERNAL MEDICINE | Admitting: INTERNAL MEDICINE

## 2018-02-27 LAB — MAGNESIUM: 1.7 MG/DL (ref 1.8–2.4)

## 2018-02-28 ENCOUNTER — OFFICE VISIT (OUTPATIENT)
Dept: PULMONOLOGY | Age: 73
End: 2018-02-28

## 2018-02-28 VITALS
BODY MASS INDEX: 28.34 KG/M2 | RESPIRATION RATE: 16 BRPM | OXYGEN SATURATION: 97 % | HEART RATE: 75 BPM | DIASTOLIC BLOOD PRESSURE: 68 MMHG | TEMPERATURE: 98.1 F | SYSTOLIC BLOOD PRESSURE: 137 MMHG | WEIGHT: 154 LBS | HEIGHT: 62 IN

## 2018-02-28 DIAGNOSIS — G47.33 OSA (OBSTRUCTIVE SLEEP APNEA): Primary | ICD-10-CM

## 2018-02-28 PROCEDURE — 3017F COLORECTAL CA SCREEN DOC REV: CPT | Performed by: INTERNAL MEDICINE

## 2018-02-28 PROCEDURE — G8417 CALC BMI ABV UP PARAM F/U: HCPCS | Performed by: INTERNAL MEDICINE

## 2018-02-28 PROCEDURE — G8427 DOCREV CUR MEDS BY ELIG CLIN: HCPCS | Performed by: INTERNAL MEDICINE

## 2018-02-28 PROCEDURE — G8399 PT W/DXA RESULTS DOCUMENT: HCPCS | Performed by: INTERNAL MEDICINE

## 2018-02-28 PROCEDURE — 1123F ACP DISCUSS/DSCN MKR DOCD: CPT | Performed by: INTERNAL MEDICINE

## 2018-02-28 PROCEDURE — 3014F SCREEN MAMMO DOC REV: CPT | Performed by: INTERNAL MEDICINE

## 2018-02-28 PROCEDURE — 4040F PNEUMOC VAC/ADMIN/RCVD: CPT | Performed by: INTERNAL MEDICINE

## 2018-02-28 PROCEDURE — 1090F PRES/ABSN URINE INCON ASSESS: CPT | Performed by: INTERNAL MEDICINE

## 2018-02-28 PROCEDURE — 1036F TOBACCO NON-USER: CPT | Performed by: INTERNAL MEDICINE

## 2018-02-28 PROCEDURE — 99213 OFFICE O/P EST LOW 20 MIN: CPT | Performed by: INTERNAL MEDICINE

## 2018-02-28 PROCEDURE — G8484 FLU IMMUNIZE NO ADMIN: HCPCS | Performed by: INTERNAL MEDICINE

## 2018-02-28 RX ORDER — LORAZEPAM 0.5 MG/1
0.5 TABLET ORAL EVERY 6 HOURS PRN
COMMUNITY
End: 2018-10-25 | Stop reason: SDUPTHER

## 2018-02-28 ASSESSMENT — SLEEP AND FATIGUE QUESTIONNAIRES
HOW LIKELY ARE YOU TO NOD OFF OR FALL ASLEEP WHEN YOU ARE A PASSENGER IN A CAR FOR AN HOUR WITHOUT A BREAK: 2
HOW LIKELY ARE YOU TO NOD OFF OR FALL ASLEEP WHILE WATCHING TV: 3
HOW LIKELY ARE YOU TO NOD OFF OR FALL ASLEEP WHILE SITTING AND READING: 0
HOW LIKELY ARE YOU TO NOD OFF OR FALL ASLEEP WHILE SITTING INACTIVE IN A PUBLIC PLACE: 0
HOW LIKELY ARE YOU TO NOD OFF OR FALL ASLEEP IN A CAR, WHILE STOPPED FOR A FEW MINUTES IN TRAFFIC: 0
HOW LIKELY ARE YOU TO NOD OFF OR FALL ASLEEP WHILE SITTING AND TALKING TO SOMEONE: 0
ESS TOTAL SCORE: 9
NECK CIRCUMFERENCE (INCHES): 13
HOW LIKELY ARE YOU TO NOD OFF OR FALL ASLEEP WHILE LYING DOWN TO REST IN THE AFTERNOON WHEN CIRCUMSTANCES PERMIT: 3
HOW LIKELY ARE YOU TO NOD OFF OR FALL ASLEEP WHILE SITTING QUIETLY AFTER LUNCH WITHOUT ALCOHOL: 1

## 2018-02-28 NOTE — PATIENT INSTRUCTIONS
[facility] representative Otis George. CPAP Equipment Cleaning and Disinfecting Schedule  Equipment Cleaning Frequency Instructions  Disinfecting Frequency   Non-Disposable Filters  Weekly Mild soapy water, Rinse, Air Dry Not Required   Disposable Filters Change as needed  2-4 weeks Do Not Wash Not Required   Hose/tubing Daily Mild soapy water, Rinse, Air Dry Once a week   Mask / Nasal Pillows Daily Mild soapy water, Rinse, Air Dry Once a week   Headgear Weekly Hand wash, Mild soapy water, Rinse, Dry  Not Required   Humidifier Daily Empty water daily  Mild soapy water, Rinse well, Air Dry  Once a week   CPAP Unit As Needed Dust with damp cloth,  No detergents or sprays Not Required         Disinfect (per schedule) with 1 part white vinegar and 3 parts water- soak mask and water chamber for 30 minutes every 1-2 weeks, more often if sick. Allow water/vinegar mixture to run through tubing. Allow all equipment to air dry. Drying Hints:   Always hang tubing away from direct sunlight, as this will cause the tubing to become yellow, brittle and crack over a period of time. DO NOT attach the wet tubing to your CPAP unit to blow-dry it. The moisture from the tubing can drain back into your machine. Moisture in your unit can cause sudden pressure increases or short circuits  DO's and DON'Ts:  - Don't use alcohol-based products to clean your mask, because it can cause the materials to become hard and brittle. - Don't put headgear in the washer or dryer  - Don't use any caustic or household cleaning solutions such as bleach on your CPAP   equipment.  - Do follow the recommended cleaning schedule. - Do change your disposable filter frequently. Adapted From: [email or web address]. shtm.   These are general suggestions for all models please follow specific s recommendations and specific instructions

## 2018-03-02 ENCOUNTER — HOSPITAL ENCOUNTER (OUTPATIENT)
Dept: SURGERY | Age: 73
Discharge: HOME | End: 2018-03-02
Attending: SURGERY | Admitting: SURGERY

## 2018-03-02 VITALS
TEMPERATURE: 98.8 F | HEART RATE: 84 BPM | HEIGHT: 62 IN | BODY MASS INDEX: 28.34 KG/M2 | OXYGEN SATURATION: 96 % | SYSTOLIC BLOOD PRESSURE: 143 MMHG | DIASTOLIC BLOOD PRESSURE: 75 MMHG | RESPIRATION RATE: 12 BRPM | WEIGHT: 154 LBS

## 2018-03-02 DIAGNOSIS — C54.1 ENDOMETRIAL CANCER (HCC): Primary | ICD-10-CM

## 2018-03-02 LAB
ANION GAP SERPL CALCULATED.3IONS-SCNC: 14 MMOL/L (ref 3–16)
BUN BLDV-MCNC: 18 MG/DL (ref 7–20)
CALCIUM SERPL-MCNC: 9.4 MG/DL (ref 8.3–10.6)
CHLORIDE BLD-SCNC: 102 MMOL/L (ref 99–110)
CO2: 27 MMOL/L (ref 21–32)
CREAT SERPL-MCNC: 0.7 MG/DL (ref 0.6–1.2)
GFR AFRICAN AMERICAN: >60
GFR NON-AFRICAN AMERICAN: >60
GLUCOSE BLD-MCNC: 106 MG/DL (ref 70–99)
GLUCOSE BLD-MCNC: 121 MG/DL (ref 70–99)
HCT VFR BLD CALC: 33.6 % (ref 36–48)
HEMOGLOBIN: 11.1 G/DL (ref 12–16)
MCH RBC QN AUTO: 26.7 PG (ref 26–34)
MCHC RBC AUTO-ENTMCNC: 33.1 G/DL (ref 31–36)
MCV RBC AUTO: 80.6 FL (ref 80–100)
PDW BLD-RTO: 14.3 % (ref 12.4–15.4)
PERFORMED ON: ABNORMAL
PLATELET # BLD: 200 K/UL (ref 135–450)
PMV BLD AUTO: 8.4 FL (ref 5–10.5)
POTASSIUM REFLEX MAGNESIUM: 4.6 MMOL/L (ref 3.5–5.1)
RBC # BLD: 4.16 M/UL (ref 4–5.2)
SODIUM BLD-SCNC: 143 MMOL/L (ref 136–145)
WBC # BLD: 5.6 K/UL (ref 4–11)

## 2018-03-02 PROCEDURE — 36590 REMOVAL TUNNELED CV CATH: CPT | Performed by: SURGERY

## 2018-03-02 RX ORDER — OXYCODONE HYDROCHLORIDE AND ACETAMINOPHEN 5; 325 MG/1; MG/1
1 TABLET ORAL PRN
Status: ACTIVE | OUTPATIENT
Start: 2018-03-02 | End: 2018-03-02

## 2018-03-02 RX ORDER — MEPERIDINE HYDROCHLORIDE 50 MG/ML
12.5 INJECTION INTRAMUSCULAR; INTRAVENOUS; SUBCUTANEOUS EVERY 5 MIN PRN
Status: DISCONTINUED | OUTPATIENT
Start: 2018-03-02 | End: 2018-03-03 | Stop reason: HOSPADM

## 2018-03-02 RX ORDER — MORPHINE SULFATE 1 MG/ML
1 INJECTION, SOLUTION EPIDURAL; INTRATHECAL; INTRAVENOUS EVERY 5 MIN PRN
Status: DISCONTINUED | OUTPATIENT
Start: 2018-03-02 | End: 2018-03-03 | Stop reason: HOSPADM

## 2018-03-02 RX ORDER — LABETALOL HYDROCHLORIDE 5 MG/ML
5 INJECTION, SOLUTION INTRAVENOUS EVERY 10 MIN PRN
Status: DISCONTINUED | OUTPATIENT
Start: 2018-03-02 | End: 2018-03-03 | Stop reason: HOSPADM

## 2018-03-02 RX ORDER — MORPHINE SULFATE 4 MG/ML
2 INJECTION, SOLUTION INTRAMUSCULAR; INTRAVENOUS EVERY 5 MIN PRN
Status: DISCONTINUED | OUTPATIENT
Start: 2018-03-02 | End: 2018-03-03 | Stop reason: HOSPADM

## 2018-03-02 RX ORDER — HYDRALAZINE HYDROCHLORIDE 20 MG/ML
5 INJECTION INTRAMUSCULAR; INTRAVENOUS EVERY 10 MIN PRN
Status: DISCONTINUED | OUTPATIENT
Start: 2018-03-02 | End: 2018-03-03 | Stop reason: HOSPADM

## 2018-03-02 RX ORDER — OXYCODONE HYDROCHLORIDE 5 MG/1
5 TABLET ORAL EVERY 4 HOURS PRN
Qty: 12 TABLET | Refills: 0 | Status: SHIPPED | OUTPATIENT
Start: 2018-03-02 | End: 2018-03-09

## 2018-03-02 RX ORDER — OXYCODONE HYDROCHLORIDE AND ACETAMINOPHEN 5; 325 MG/1; MG/1
2 TABLET ORAL PRN
Status: ACTIVE | OUTPATIENT
Start: 2018-03-02 | End: 2018-03-02

## 2018-03-02 RX ORDER — ONDANSETRON 2 MG/ML
4 INJECTION INTRAMUSCULAR; INTRAVENOUS
Status: ACTIVE | OUTPATIENT
Start: 2018-03-02 | End: 2018-03-02

## 2018-03-02 RX ORDER — DIPHENHYDRAMINE HYDROCHLORIDE 50 MG/ML
12.5 INJECTION INTRAMUSCULAR; INTRAVENOUS
Status: ACTIVE | OUTPATIENT
Start: 2018-03-02 | End: 2018-03-02

## 2018-03-02 RX ORDER — LIDOCAINE HYDROCHLORIDE 10 MG/ML
2 INJECTION, SOLUTION INFILTRATION; PERINEURAL
Status: DISPENSED | OUTPATIENT
Start: 2018-03-02 | End: 2018-03-02

## 2018-03-02 RX ORDER — SODIUM CHLORIDE, SODIUM LACTATE, POTASSIUM CHLORIDE, CALCIUM CHLORIDE 600; 310; 30; 20 MG/100ML; MG/100ML; MG/100ML; MG/100ML
INJECTION, SOLUTION INTRAVENOUS CONTINUOUS
Status: DISCONTINUED | OUTPATIENT
Start: 2018-03-02 | End: 2018-03-03 | Stop reason: HOSPADM

## 2018-03-02 RX ORDER — PROMETHAZINE HYDROCHLORIDE 25 MG/ML
6.25 INJECTION, SOLUTION INTRAMUSCULAR; INTRAVENOUS
Status: ACTIVE | OUTPATIENT
Start: 2018-03-02 | End: 2018-03-02

## 2018-03-02 ASSESSMENT — PAIN SCALES - GENERAL: PAINLEVEL_OUTOF10: 0

## 2018-03-05 LAB
EKG ATRIAL RATE: 71 BPM
EKG DIAGNOSIS: NORMAL
EKG P AXIS: 60 DEGREES
EKG P-R INTERVAL: 164 MS
EKG Q-T INTERVAL: 398 MS
EKG QRS DURATION: 70 MS
EKG QTC CALCULATION (BAZETT): 432 MS
EKG R AXIS: 13 DEGREES
EKG T AXIS: 45 DEGREES
EKG VENTRICULAR RATE: 71 BPM

## 2018-03-06 NOTE — H&P
Department of General Surgery - Adult   History and Physical      PATIENT NAME: Bette Naidu OF BIRTH: 1945    ADMISSION DATE: 3/2/2018  8:21 AM      TODAY'S DATE: 3/6/2018    CHIEF COMPLAINT:  none      HISTORY OF PRESENT ILLNESS:  The patient is a 81 y.o. female  who presents for removal of portacath. No fevers, chills, SOB or chest pain. Past Medical History:        Diagnosis Date    Allergic     Anemia     Brain condition     pt states a cluster of blood vessels left side of brain    Cancer [facility]) 1/2013    endometrial cancer/ Dr. Jade Westfall and Dr. Carreno Officer Cerebral cavernoma     followed by Dr. Edwina Hodge at [place] [address] Complication of anesthesia     Takes a long time to wake up    Diverticulosis     Endometrial cancer ([facility] [address]     Fungus infection     Fungus infection on bilat toes.  GERD (gastroesophageal reflux disease)     Hyperlipidemia     Hypertension     Hypomagnesemia 1/2016    consult with Dr. Macho Ruffin ([facility]) and no renal wasting. felt most likely d/t HCTz and PPI,  HCTZ discontinued,  increased mag ox to 400 mg 2 tabs BID    Hypothyroidism     Irritable bowel syndrome     MVP (mitral valve prolapse)     JOAQUIN (obstructive sleep apnea)     Pneumonia     infant    Prolonged emergence from general anesthesia     Type 2 diabetes mellitus without complication (HCC)     Type II or unspecified type diabetes mellitus without mention of complication, not stated as uncontrolled     Unspecified sleep apnea     Vitamin B12 deficiency     Vitamin D deficiency        Past Surgical History:        Procedure Laterality Date    COLONOSCOPY      COLONOSCOPY  07/14/2016    COLONOSCOPY      CYSTOSCOPY  10/18/2017    Cystoscopy with Urethral dilation    CYSTOSCOPY Bilateral 10/18/2017    Cystoscopy with bilateral retrograde pyelograms    DIAGNOSTIC CARDIAC CATH LAB PROCEDURE  1/12/2005    [facility], Dr Ruchi Lainez, No CAD.     DILATION AND CURETTAGE OF UTERUS      last 72 hours. BMP:  No results for input(s): NA, K, CL, CO2, BUN, CREATININE, GLUCOSE in the last 72 hours. Hepatic: No results for input(s): AST, ALT, ALB, BILITOT, ALKPHOS in the last 72 hours. Mag:    No results for input(s): MG in the last 72 hours. Phos:   No results for input(s): PHOS in the last 72 hours. INR: No results for input(s): INR in the last 72 hours. Radiology Review: Images personally reviewed by me. NA      ASSESSMENT AND PLAN:  80 yo with history of endometrial cancer  1. Discussed plans for portacath removal and risks  2. NPO since midnight  3. Plan or today      PRACTITIONER CERTIFICATION   I certify that Marleny Esquivel is expected to be hospitalized for 0 days based on the above assessment and plan.     [facility]  [id number]

## 2018-03-13 DIAGNOSIS — E11.9 TYPE 2 DIABETES MELLITUS WITHOUT COMPLICATION, WITHOUT LONG-TERM CURRENT USE OF INSULIN (HCC): ICD-10-CM

## 2018-03-13 RX ORDER — METFORMIN HYDROCHLORIDE 500 MG/1
TABLET, EXTENDED RELEASE ORAL
Qty: 180 TABLET | Refills: 3 | Status: SHIPPED | OUTPATIENT
Start: 2018-03-13 | End: 2019-03-20 | Stop reason: SDUPTHER

## 2018-04-04 ENCOUNTER — HOSPITAL ENCOUNTER (OUTPATIENT)
Dept: OTHER | Age: 73
Discharge: HOME | End: 2018-04-04
Attending: INTERNAL MEDICINE | Admitting: INTERNAL MEDICINE

## 2018-04-04 LAB — MAGNESIUM: 1.7 MG/DL (ref 1.8–2.4)

## 2018-04-19 DIAGNOSIS — I10 ESSENTIAL HYPERTENSION: ICD-10-CM

## 2018-04-19 DIAGNOSIS — R00.2 HEART PALPITATIONS: ICD-10-CM

## 2018-04-19 RX ORDER — VALSARTAN 80 MG/1
TABLET ORAL
Qty: 90 TABLET | Refills: 0 | Status: SHIPPED | OUTPATIENT
Start: 2018-04-19 | End: 2018-07-09 | Stop reason: SDUPTHER

## 2018-04-19 RX ORDER — METOPROLOL TARTRATE 50 MG/1
TABLET, FILM COATED ORAL
Qty: 60 TABLET | Refills: 0 | Status: SHIPPED | OUTPATIENT
Start: 2018-04-19 | End: 2018-06-05 | Stop reason: SDUPTHER

## 2018-04-25 ENCOUNTER — OFFICE VISIT (OUTPATIENT)
Dept: FAMILY MEDICINE CLINIC | Age: 73
End: 2018-04-25

## 2018-04-25 VITALS
DIASTOLIC BLOOD PRESSURE: 64 MMHG | BODY MASS INDEX: 28.52 KG/M2 | WEIGHT: 155 LBS | HEIGHT: 62 IN | SYSTOLIC BLOOD PRESSURE: 122 MMHG

## 2018-04-25 DIAGNOSIS — I10 ESSENTIAL HYPERTENSION: ICD-10-CM

## 2018-04-25 DIAGNOSIS — E11.42 TYPE 2 DIABETES MELLITUS WITH DIABETIC POLYNEUROPATHY, WITHOUT LONG-TERM CURRENT USE OF INSULIN (HCC): Primary | ICD-10-CM

## 2018-04-25 DIAGNOSIS — E78.2 MIXED HYPERLIPIDEMIA: ICD-10-CM

## 2018-04-25 DIAGNOSIS — E55.9 VITAMIN D DEFICIENCY: ICD-10-CM

## 2018-04-25 DIAGNOSIS — Z80.8 FAMILY HISTORY OF THYROID CANCER: ICD-10-CM

## 2018-04-25 DIAGNOSIS — E04.1 THYROID NODULE: ICD-10-CM

## 2018-04-25 LAB — HBA1C MFR BLD: 6.6 %

## 2018-04-25 PROCEDURE — 36415 COLL VENOUS BLD VENIPUNCTURE: CPT | Performed by: NURSE PRACTITIONER

## 2018-04-25 PROCEDURE — 99213 OFFICE O/P EST LOW 20 MIN: CPT | Performed by: NURSE PRACTITIONER

## 2018-04-25 PROCEDURE — 83036 HEMOGLOBIN GLYCOSYLATED A1C: CPT | Performed by: NURSE PRACTITIONER

## 2018-04-25 PROCEDURE — 4040F PNEUMOC VAC/ADMIN/RCVD: CPT | Performed by: NURSE PRACTITIONER

## 2018-04-25 PROCEDURE — G8399 PT W/DXA RESULTS DOCUMENT: HCPCS | Performed by: NURSE PRACTITIONER

## 2018-04-25 PROCEDURE — G8417 CALC BMI ABV UP PARAM F/U: HCPCS | Performed by: NURSE PRACTITIONER

## 2018-04-25 PROCEDURE — 1036F TOBACCO NON-USER: CPT | Performed by: NURSE PRACTITIONER

## 2018-04-25 PROCEDURE — 3017F COLORECTAL CA SCREEN DOC REV: CPT | Performed by: NURSE PRACTITIONER

## 2018-04-25 PROCEDURE — 2022F DILAT RTA XM EVC RTNOPTHY: CPT | Performed by: NURSE PRACTITIONER

## 2018-04-25 PROCEDURE — 3044F HG A1C LEVEL LT 7.0%: CPT | Performed by: NURSE PRACTITIONER

## 2018-04-25 PROCEDURE — 1090F PRES/ABSN URINE INCON ASSESS: CPT | Performed by: NURSE PRACTITIONER

## 2018-04-25 PROCEDURE — G8427 DOCREV CUR MEDS BY ELIG CLIN: HCPCS | Performed by: NURSE PRACTITIONER

## 2018-04-25 PROCEDURE — 1123F ACP DISCUSS/DSCN MKR DOCD: CPT | Performed by: NURSE PRACTITIONER

## 2018-04-25 ASSESSMENT — PATIENT HEALTH QUESTIONNAIRE - PHQ9
2. FEELING DOWN, DEPRESSED OR HOPELESS: 0
SUM OF ALL RESPONSES TO PHQ QUESTIONS 1-9: 0
SUM OF ALL RESPONSES TO PHQ9 QUESTIONS 1 & 2: 0
1. LITTLE INTEREST OR PLEASURE IN DOING THINGS: 0

## 2018-04-26 LAB
A/G RATIO: 2.2 (ref 1.1–2.2)
ALBUMIN SERPL-MCNC: 4.8 G/DL (ref 3.4–5)
ALP BLD-CCNC: 81 U/L (ref 40–129)
ALT SERPL-CCNC: 13 U/L (ref 10–40)
ANION GAP SERPL CALCULATED.3IONS-SCNC: 16 MMOL/L (ref 3–16)
AST SERPL-CCNC: 15 U/L (ref 15–37)
BILIRUB SERPL-MCNC: 0.3 MG/DL (ref 0–1)
BUN BLDV-MCNC: 23 MG/DL (ref 7–20)
CALCIUM SERPL-MCNC: 10.1 MG/DL (ref 8.3–10.6)
CHLORIDE BLD-SCNC: 100 MMOL/L (ref 99–110)
CHOLESTEROL, TOTAL: 177 MG/DL (ref 0–199)
CO2: 27 MMOL/L (ref 21–32)
CREAT SERPL-MCNC: 0.7 MG/DL (ref 0.6–1.2)
CREATININE URINE: 165.7 MG/DL (ref 28–259)
GFR AFRICAN AMERICAN: >60
GFR NON-AFRICAN AMERICAN: >60
GLOBULIN: 2.2 G/DL
GLUCOSE BLD-MCNC: 97 MG/DL (ref 70–99)
HDLC SERPL-MCNC: 73 MG/DL (ref 40–60)
LDL CHOLESTEROL CALCULATED: 73 MG/DL
MICROALBUMIN UR-MCNC: <1.2 MG/DL
MICROALBUMIN/CREAT UR-RTO: NORMAL MG/G (ref 0–30)
POTASSIUM SERPL-SCNC: 5.3 MMOL/L (ref 3.5–5.1)
SODIUM BLD-SCNC: 143 MMOL/L (ref 136–145)
TOTAL PROTEIN: 7 G/DL (ref 6.4–8.2)
TRIGL SERPL-MCNC: 153 MG/DL (ref 0–150)
TSH SERPL DL<=0.05 MIU/L-ACNC: 0.14 UIU/ML (ref 0.27–4.2)
VITAMIN D 25-HYDROXY: 49.7 NG/ML
VLDLC SERPL CALC-MCNC: 31 MG/DL

## 2018-04-30 ENCOUNTER — HOSPITAL ENCOUNTER (OUTPATIENT)
Dept: ULTRASOUND IMAGING | Age: 73
Discharge: HOME | End: 2018-04-30
Attending: NURSE PRACTITIONER | Admitting: NURSE PRACTITIONER

## 2018-04-30 DIAGNOSIS — E04.1 THYROID NODULE: ICD-10-CM

## 2018-04-30 DIAGNOSIS — E04.1 NONTOXIC SINGLE THYROID NODULE: ICD-10-CM

## 2018-05-01 ASSESSMENT — ENCOUNTER SYMPTOMS
GASTROINTESTINAL NEGATIVE: 1
EYES NEGATIVE: 1
RESPIRATORY NEGATIVE: 1

## 2018-05-02 DIAGNOSIS — E87.6 LOW BLOOD POTASSIUM: ICD-10-CM

## 2018-05-02 DIAGNOSIS — E03.9 HYPOTHYROIDISM, UNSPECIFIED TYPE: Primary | ICD-10-CM

## 2018-05-02 RX ORDER — LEVOTHYROXINE SODIUM 0.05 MG/1
50 TABLET ORAL DAILY
Qty: 30 TABLET | Refills: 1 | Status: SHIPPED | OUTPATIENT
Start: 2018-05-02 | End: 2018-06-19 | Stop reason: SDUPTHER

## 2018-05-03 ENCOUNTER — NURSE ONLY (OUTPATIENT)
Dept: FAMILY MEDICINE CLINIC | Age: 73
End: 2018-05-03

## 2018-05-03 DIAGNOSIS — E03.9 HYPOTHYROIDISM, UNSPECIFIED TYPE: ICD-10-CM

## 2018-05-03 DIAGNOSIS — E87.6 LOW BLOOD POTASSIUM: ICD-10-CM

## 2018-05-03 PROCEDURE — 36415 COLL VENOUS BLD VENIPUNCTURE: CPT | Performed by: NURSE PRACTITIONER

## 2018-05-04 LAB
POTASSIUM SERPL-SCNC: 4.1 MMOL/L (ref 3.5–5.1)
TSH SERPL DL<=0.05 MIU/L-ACNC: 0.1 UIU/ML (ref 0.27–4.2)

## 2018-05-25 ENCOUNTER — TELEPHONE (OUTPATIENT)
Dept: FAMILY MEDICINE CLINIC | Age: 73
End: 2018-05-25

## 2018-06-05 DIAGNOSIS — R00.2 HEART PALPITATIONS: ICD-10-CM

## 2018-06-05 DIAGNOSIS — I10 ESSENTIAL HYPERTENSION: ICD-10-CM

## 2018-06-05 RX ORDER — METOPROLOL TARTRATE 50 MG/1
TABLET, FILM COATED ORAL
Qty: 60 TABLET | Refills: 0 | Status: SHIPPED | OUTPATIENT
Start: 2018-06-05 | End: 2018-07-09 | Stop reason: SDUPTHER

## 2018-06-10 ENCOUNTER — HOSPITAL ENCOUNTER (OUTPATIENT)
Dept: OTHER | Age: 73
Discharge: HOME | End: 2018-06-10
Attending: INTERNAL MEDICINE | Admitting: INTERNAL MEDICINE

## 2018-06-10 LAB
ALBUMIN SERPL-MCNC: 4.6 G/DL (ref 3.4–5)
ANION GAP SERPL CALCULATED.3IONS-SCNC: 12 MMOL/L (ref 3–16)
BUN BLDV-MCNC: 19 MG/DL (ref 7–20)
CALCIUM SERPL-MCNC: 9.8 MG/DL (ref 8.3–10.6)
CHLORIDE BLD-SCNC: 99 MMOL/L (ref 99–110)
CO2: 27 MMOL/L (ref 21–32)
CREAT SERPL-MCNC: 0.7 MG/DL (ref 0.6–1.2)
GFR AFRICAN AMERICAN: >60
GFR NON-AFRICAN AMERICAN: >60
GLUCOSE BLD-MCNC: 102 MG/DL (ref 70–99)
MAGNESIUM: 1.7 MG/DL (ref 1.8–2.4)
PHOSPHORUS: 4.1 MG/DL (ref 2.5–4.9)
POTASSIUM SERPL-SCNC: 4.3 MMOL/L (ref 3.5–5.1)
SODIUM BLD-SCNC: 138 MMOL/L (ref 136–145)

## 2018-06-19 RX ORDER — LEVOTHYROXINE SODIUM 0.05 MG/1
TABLET ORAL
Qty: 30 TABLET | Refills: 0 | Status: SHIPPED | OUTPATIENT
Start: 2018-06-19 | End: 2018-07-26 | Stop reason: SDUPTHER

## 2018-07-09 DIAGNOSIS — R00.2 HEART PALPITATIONS: ICD-10-CM

## 2018-07-09 DIAGNOSIS — I10 ESSENTIAL HYPERTENSION: ICD-10-CM

## 2018-07-09 RX ORDER — METOPROLOL TARTRATE 50 MG/1
TABLET, FILM COATED ORAL
Qty: 60 TABLET | Refills: 0 | Status: SHIPPED | OUTPATIENT
Start: 2018-07-09 | End: 2018-07-26 | Stop reason: SDUPTHER

## 2018-07-09 RX ORDER — VALSARTAN 80 MG/1
TABLET ORAL
Qty: 90 TABLET | Refills: 0 | Status: SHIPPED | OUTPATIENT
Start: 2018-07-09 | End: 2018-07-20

## 2018-07-09 RX ORDER — ROSUVASTATIN CALCIUM 20 MG/1
20 TABLET, COATED ORAL NIGHTLY
Qty: 90 TABLET | Refills: 0 | Status: SHIPPED | OUTPATIENT
Start: 2018-07-09 | End: 2018-10-25

## 2018-07-19 ENCOUNTER — TELEPHONE (OUTPATIENT)
Dept: FAMILY MEDICINE CLINIC | Age: 73
End: 2018-07-19

## 2018-07-19 NOTE — TELEPHONE ENCOUNTER
I am not heard anything about valsartan being taken off of the market.  Please call pharmacy and checked the accuracy of this and then let me know

## 2018-07-19 NOTE — TELEPHONE ENCOUNTER
Please informed the patient that not all valsartan has been recalled. On the certain manufactures of valsartan have been recalled and patient should check with her pharmacy to see if she was on one from a  that has been recalled. Patient may discontinue valsartan if she desires.   May start Benicar 20 mg one by mouth daily #30 with one refill

## 2018-07-20 RX ORDER — OLMESARTAN MEDOXOMIL 20 MG/1
20 TABLET ORAL DAILY
Qty: 30 TABLET | Refills: 1 | Status: SHIPPED | OUTPATIENT
Start: 2018-07-20 | End: 2018-08-17

## 2018-07-23 ENCOUNTER — NURSE ONLY (OUTPATIENT)
Dept: FAMILY MEDICINE CLINIC | Age: 73
End: 2018-07-23

## 2018-07-23 DIAGNOSIS — E03.9 HYPOTHYROIDISM, UNSPECIFIED TYPE: Primary | ICD-10-CM

## 2018-07-23 PROCEDURE — 36415 COLL VENOUS BLD VENIPUNCTURE: CPT | Performed by: NURSE PRACTITIONER

## 2018-07-24 LAB — TSH SERPL DL<=0.05 MIU/L-ACNC: 1.04 UIU/ML (ref 0.27–4.2)

## 2018-07-25 ENCOUNTER — OFFICE VISIT (OUTPATIENT)
Dept: FAMILY MEDICINE CLINIC | Age: 73
End: 2018-07-25

## 2018-07-25 VITALS
DIASTOLIC BLOOD PRESSURE: 74 MMHG | BODY MASS INDEX: 28.71 KG/M2 | SYSTOLIC BLOOD PRESSURE: 132 MMHG | WEIGHT: 156 LBS | HEIGHT: 62 IN

## 2018-07-25 DIAGNOSIS — E03.9 HYPOTHYROIDISM, UNSPECIFIED TYPE: ICD-10-CM

## 2018-07-25 DIAGNOSIS — Z23 NEED FOR SHINGLES VACCINE: ICD-10-CM

## 2018-07-25 DIAGNOSIS — I10 ESSENTIAL HYPERTENSION: ICD-10-CM

## 2018-07-25 DIAGNOSIS — Z79.899 HIGH RISK MEDICATION USE: ICD-10-CM

## 2018-07-25 DIAGNOSIS — F41.9 ANXIETY: ICD-10-CM

## 2018-07-25 DIAGNOSIS — E11.42 TYPE 2 DIABETES MELLITUS WITH DIABETIC POLYNEUROPATHY, WITHOUT LONG-TERM CURRENT USE OF INSULIN (HCC): Primary | ICD-10-CM

## 2018-07-25 LAB — HBA1C MFR BLD: 6.2 %

## 2018-07-25 PROCEDURE — G8399 PT W/DXA RESULTS DOCUMENT: HCPCS | Performed by: NURSE PRACTITIONER

## 2018-07-25 PROCEDURE — 2022F DILAT RTA XM EVC RTNOPTHY: CPT | Performed by: NURSE PRACTITIONER

## 2018-07-25 PROCEDURE — 1090F PRES/ABSN URINE INCON ASSESS: CPT | Performed by: NURSE PRACTITIONER

## 2018-07-25 PROCEDURE — 83036 HEMOGLOBIN GLYCOSYLATED A1C: CPT | Performed by: NURSE PRACTITIONER

## 2018-07-25 PROCEDURE — 1123F ACP DISCUSS/DSCN MKR DOCD: CPT | Performed by: NURSE PRACTITIONER

## 2018-07-25 PROCEDURE — 1036F TOBACCO NON-USER: CPT | Performed by: NURSE PRACTITIONER

## 2018-07-25 PROCEDURE — 3017F COLORECTAL CA SCREEN DOC REV: CPT | Performed by: NURSE PRACTITIONER

## 2018-07-25 PROCEDURE — G8427 DOCREV CUR MEDS BY ELIG CLIN: HCPCS | Performed by: NURSE PRACTITIONER

## 2018-07-25 PROCEDURE — 99213 OFFICE O/P EST LOW 20 MIN: CPT | Performed by: NURSE PRACTITIONER

## 2018-07-25 PROCEDURE — 1101F PT FALLS ASSESS-DOCD LE1/YR: CPT | Performed by: NURSE PRACTITIONER

## 2018-07-25 PROCEDURE — G8417 CALC BMI ABV UP PARAM F/U: HCPCS | Performed by: NURSE PRACTITIONER

## 2018-07-25 PROCEDURE — 4040F PNEUMOC VAC/ADMIN/RCVD: CPT | Performed by: NURSE PRACTITIONER

## 2018-07-25 PROCEDURE — 3044F HG A1C LEVEL LT 7.0%: CPT | Performed by: NURSE PRACTITIONER

## 2018-07-25 ASSESSMENT — ENCOUNTER SYMPTOMS
EYES NEGATIVE: 1
GASTROINTESTINAL NEGATIVE: 1
RESPIRATORY NEGATIVE: 1

## 2018-07-25 NOTE — PATIENT INSTRUCTIONS
Patient Education        Learning About Diabetes Food Guidelines  Your Care Instructions    Meal planning is important to manage diabetes. It helps keep your blood sugar at a target level (which you set with your doctor). You don't have to eat special foods. You can eat what your family eats, including sweets once in a while. But you do have to pay attention to how often you eat and how much you eat of certain foods. You may want to work with a dietitian or a certified diabetes educator (CDE) to help you plan meals and snacks. A dietitian or CDE can also help you lose weight if that is one of your goals. What should you know about eating carbs? Managing the amount of carbohydrate (carbs) you eat is an important part of healthy meals when you have diabetes. Carbohydrate is found in many foods. · Learn which foods have carbs. And learn the amounts of carbs in different foods. ¨ Bread, cereal, pasta, and rice have about 15 grams of carbs in a serving. A serving is 1 slice of bread (1 ounce), ½ cup of cooked cereal, or 1/3 cup of cooked pasta or rice. ¨ Fruits have 15 grams of carbs in a serving. A serving is 1 small fresh fruit, such as an apple or orange; ½ of a banana; ½ cup of cooked or canned fruit; ½ cup of fruit juice; 1 cup of melon or raspberries; or 2 tablespoons of dried fruit. ¨ Milk and no-sugar-added yogurt have 15 grams of carbs in a serving. A serving is 1 cup of milk or 2/3 cup of no-sugar-added yogurt. ¨ Starchy vegetables have 15 grams of carbs in a serving. A serving is ½ cup of mashed potatoes or sweet potato; 1 cup winter squash; ½ of a small baked potato; ½ cup of cooked beans; or ½ cup cooked corn or green peas. · Learn how much carbs to eat each day and at each meal. A dietitian or CDE can teach you how to keep track of the amount of carbs you eat. This is called carbohydrate counting. · If you are not sure how to count carbohydrate grams, use the Plate Method to plan meals.  It is a good, quick way to make sure that you have a balanced meal. It also helps you spread carbs throughout the day. ¨ Divide your plate by types of foods. Put non-starchy vegetables on half the plate, meat or other protein food on one-quarter of the plate, and a grain or starchy vegetable in the final quarter of the plate. To this you can add a small piece of fruit and 1 cup of milk or yogurt, depending on how many carbs you are supposed to eat at a meal.  · Try to eat about the same amount of carbs at each meal. Do not \"save up\" your daily allowance of carbs to eat at one meal.  · Proteins have very little or no carbs per serving. Examples of proteins are beef, chicken, turkey, fish, eggs, tofu, cheese, cottage cheese, and peanut butter. A serving size of meat is 3 ounces, which is about the size of a deck of cards. Examples of meat substitute serving sizes (equal to 1 ounce of meat) are 1/4 cup of cottage cheese, 1 egg, 1 tablespoon of peanut butter, and ½ cup of tofu. How can you eat out and still eat healthy? · Learn to estimate the serving sizes of foods that have carbohydrate. If you measure food at home, it will be easier to estimate the amount in a serving of restaurant food. · If the meal you order has too much carbohydrate (such as potatoes, corn, or baked beans), ask to have a low-carbohydrate food instead. Ask for a salad or green vegetables. · If you use insulin, check your blood sugar before and after eating out to help you plan how much to eat in the future. · If you eat more carbohydrate at a meal than you had planned, take a walk or do other exercise. This will help lower your blood sugar. What else should you know? · Limit saturated fat, such as the fat from meat and dairy products. This is a healthy choice because people who have diabetes are at higher risk of heart disease. So choose lean cuts of meat and nonfat or low-fat dairy products.  Use olive or canola oil instead of butter or shortening when cooking. · Don't skip meals. Your blood sugar may drop too low if you skip meals and take insulin or certain medicines for diabetes. · Check with your doctor before you drink alcohol. Alcohol can cause your blood sugar to drop too low. Alcohol can also cause a bad reaction if you take certain diabetes medicines. Follow-up care is a key part of your treatment and safety. Be sure to make and go to all appointments, and call your doctor if you are having problems. It's also a good idea to know your test results and keep a list of the medicines you take. Where can you learn more? Go to [email or web address] and sign in to your [employer] account. Enter S934 in the [employer] box to learn more about \"Learning About Diabetes Food Guidelines. \"     If you do not have an account, please click on the \"Sign Up Now\" link. Current as of: December 7, 2017  Content Version: 11.6  © 4521-4180 [employer]. Care instructions adapted under license by [facility] ([facility]). If you have questions about a medical condition or this instruction, always ask your healthcare professional. [place] [address] any warranty or liability for your use of this information.

## 2018-07-25 NOTE — PROGRESS NOTES
Subjective:     Patient Name: Kavita Carnes is a 81 y.o. female. Chief Complaint   Patient presents with    Diabetes     pt is checking bs at home in the morning pt said they are running goood     Hyperlipidemia     pt is having leg cramps , pt is fasting for blood work if needed     Hypertension     pt is asking for a different medication she said the new one is to expensive     Anxiety     pt is doing fine , pt does not need refill at this time, 3 month control substance agreement       HPI  Diabetes Mellitus Type 2: Current symptoms/problems include neuropathy. Medication compliance:  compliant most of the time  Diabetic diet compliance:  compliant most of the time,  Weight trend: stable  Current exercise: no regular exercise  Barriers: none    Home blood sugar records: fasting range: 100-110, patient tests 1 time(s) per day  Any episodes of hypoglycemia? no  Eye exam current (within one year): yes   reports that she has never smoked. She has never used smokeless tobacco.   Daily Aspirin? No: allergy    Lab Results   Component Value Date    LABA1C 6.6 04/25/2018    LABA1C 6.3 01/22/2018    LABA1C 6.3 10/23/2017     Lab Results   Component Value Date    LABMICR <1.20 04/25/2018    CREATININE 0.7 06/10/2018     Lab Results   Component Value Date    ALT 13 04/25/2018    AST 15 04/25/2018     Lab Results   Component Value Date    CHOL 177 04/25/2018    TRIG 153 (H) 04/25/2018    HDL 73 (H) 04/25/2018    LDLCALC 73 04/25/2018            Mood Disorder:  Patient presents for follow-up of depression and anxiety disorder. Current complaints include: Anxious at times. She denies any other symptoms. Symptoms/signs of waa: none. External stressors: nothing new. Current treatment includes: benzodiazepine- Ativan when necessary. Medication side effects: none. Has only used 1-2 times since last prescription given. Hypothyroidism: Recent symptoms: hair loss, anxiety and palpitations.  She denies other Dolly and Dr. Mu Schafer    Cerebral cavernoma     followed by Dr. Charisse Shepherd at [place] [id number] Complication of anesthesia     Takes a long time to wake up    Diverticulosis     Endometrial cancer ([address]     Fungus infection     Fungus infection on bilat toes.  GERD (gastroesophageal reflux disease)     Hyperlipidemia     Hypertension     Hypomagnesemia 1/2016    consult with Dr. Jose Antonio Figueroa ([facility]) and no renal wasting. felt most likely d/t HCTz and PPI,  HCTZ discontinued,  increased mag ox to 400 mg 2 tabs BID    Hypothyroidism     Irritable bowel syndrome     MVP (mitral valve prolapse)     JOAQUIN (obstructive sleep apnea)     Pneumonia     infant    Prolonged emergence from general anesthesia     Type 2 diabetes mellitus without complication (HCC)     Type II or unspecified type diabetes mellitus without mention of complication, not stated as uncontrolled     Unspecified sleep apnea     Vitamin B12 deficiency     Vitamin D deficiency      Family History   Problem Relation Age of Onset    Cancer Mother         colon    COPD Father     Heart Disease Father     Cancer Father         NMSC, skin     Past Surgical History:   Procedure Laterality Date    COLONOSCOPY      COLONOSCOPY  07/14/2016    COLONOSCOPY      CYSTOSCOPY  10/18/2017    Cystoscopy with Urethral dilation    CYSTOSCOPY Bilateral 10/18/2017    Cystoscopy with bilateral retrograde pyelograms    DIAGNOSTIC CARDIAC CATH LAB PROCEDURE  1/12/2005    One Dr Dhruv Clemons, No CAD.     DILATION AND CURETTAGE OF UTERUS      ENDOSCOPY, COLON, DIAGNOSTIC      HYSTERECTOMY  1/28/2013    due to uterine cancer    OTHER SURGICAL HISTORY  10/18/2017    cystoscopy, bilateral retrogrades    OTHER SURGICAL HISTORY Left 03/02/2017    port removal    SALIVARY GLAND SURGERY      KINGSLEY AND BSO  1/28/13    THYROID SURGERY      right thyroidectomy for benign reasons    UPPER GASTROINTESTINAL ENDOSCOPY  07/14/2016    UPPER GASTROINTESTINAL ENDOSCOPY       Social History     Social History    Marital status:      Spouse name: N/A    Number of children: N/A    Years of education: N/A     Occupational History    Not on file. Social History Main Topics    Smoking status: Never Smoker    Smokeless tobacco: Never Used    Alcohol use No    Drug use: No    Sexual activity: No     Other Topics Concern    Not on file     Social History Narrative    No narrative on file     Current Outpatient Prescriptions   Medication Sig Dispense Refill    olmesartan (BENICAR) 20 MG tablet Take 1 tablet by mouth daily 30 tablet 1    rosuvastatin (CRESTOR) 20 MG tablet TAKE 1 TABLET BY MOUTH NIGHTLY 90 tablet 0    metoprolol tartrate (LOPRESSOR) 50 MG tablet TAKE ONE TABLET BY MOUTH 2 TIMES DAILY 60 tablet 0    levothyroxine (SYNTHROID) 50 MCG tablet TAKE ONE (1) tablet BY MOUTH DAILY 30 tablet 0    HOSSEIN CONTOUR NEXT TEST strip TEST ONCE DAILY DX: 250.00 100 each 5    metFORMIN (GLUCOPHAGE-XR) 500 MG extended release tablet TAKE ONE TABLET BY MOUTH 2 TIMES DAILY 180 tablet 3    LORazepam (ATIVAN) 0.5 MG tablet Take 0.5 mg by mouth every 6 hours as needed for Anxiety.  amLODIPine (NORVASC) 5 MG tablet Take 1 tablet by mouth daily 30 tablet 5    liothyronine (CYTOMEL) 5 MCG tablet Take 1 tablet by mouth daily 30 tablet 5    pantoprazole (PROTONIX) 40 MG tablet       Sennosides (SENOKOT PO) Take by mouth      magnesium oxide (MAG-OX) 400 (241.3 MG) MG TABS tablet Take 1 tablet by mouth 4 times daily 1 tid (Patient taking differently: Take 1,600 mg by mouth daily 1 tid) 90 tablet 5    docusate sodium (COLACE) 100 MG capsule Take 1 capsule by mouth daily as needed for Constipation 30 capsule 1     No current facility-administered medications for this visit. No changes in past medical history, past surgical history, social history, or family history were noted during the patient encounter unless specifically listed above.   All updates of past medical history, past surgical history, social history, or family history were reviewed personally by me during the office visit. All problems listed in the assessment are stable unless noted otherwise. Medication profile reviewed personally by me during the office visit. Medication side effects and possible impairments from medications were discussed as applicable. Objective:       Physical Exam   Constitutional: She is oriented to person, place, and time. Vital signs are normal. She appears well-developed and well-nourished. She is cooperative. Non-toxic appearance. She does not have a sickly appearance. No distress. HENT:   Head: Normocephalic and atraumatic. Right Ear: Hearing, tympanic membrane and ear canal normal.   Left Ear: Hearing, tympanic membrane and ear canal normal.   Nose: Nose normal.   Mouth/Throat: Uvula is midline, oropharynx is clear and moist and mucous membranes are normal.   Eyes: Conjunctivae and lids are normal.   Neck: Neck supple. Cardiovascular: Normal rate, regular rhythm, normal heart sounds and normal pulses. Pulmonary/Chest: Effort normal and breath sounds normal. No accessory muscle usage. No respiratory distress. Abdominal: Soft. Normal appearance. There is no tenderness. Lymphadenopathy:        Head (right side): No submental and no submandibular adenopathy present. Head (left side): No submental and no submandibular adenopathy present. She has no cervical adenopathy. Neurological: She is alert and oriented to person, place, and time. Examination of the feet reveals warm, good capillary refill, nail exam normal nails without lesions, no trophic changes or ulcerative lesions, normal DP and PT pulses, reduced sensation at right lateral ball of foot and right heel and callus formation bilateral balls of feet. Skin: Skin is warm and dry. No lesion and no rash noted. Psychiatric: She has a normal mood and affect.  Her speech is normal and behavior is normal. Cognition and memory are normal.       /74 (Site: Left Arm, Position: Sitting, Cuff Size: Medium Adult)   Ht 5' 1.5\" (1.562 m)   Wt 156 lb (70.8 kg)   BMI 29.00 kg/m²   Body mass index is 29 kg/m². BP Readings from Last 2 Encounters:   07/25/18 132/74   04/25/18 122/64       Wt Readings from Last 3 Encounters:   07/25/18 156 lb (70.8 kg)   04/25/18 155 lb (70.3 kg)   03/02/18 154 lb (69.9 kg)       Lab Review   Nurse Only on 07/23/2018   Component Date Value    TSH 07/23/2018 1.04    Hospital Outpatient Visit on 06/10/2018   Component Date Value    Magnesium 06/10/2018 1.70*    Sodium 06/10/2018 138     Potassium 06/10/2018 4.3     Chloride 06/10/2018 99     CO2 06/10/2018 27     Anion Gap 06/10/2018 12     Glucose 06/10/2018 102*    BUN 06/10/2018 19     CREATININE 06/10/2018 0.7     GFR Non- 06/10/2018 >60     GFR  06/10/2018 >60     Calcium 06/10/2018 9.8     Phosphorus 06/10/2018 4.1     Alb 06/10/2018 4.6        No results found for this visit on 07/25/18. Assessment:       1. Type 2 diabetes mellitus with diabetic polyneuropathy, without long-term current use of insulin ([address]    2. Anxiety    3. Hypothyroidism, unspecified type    4. Essential hypertension    5. Need for shingles vaccine    6. High risk medication use        Results for POC orders placed in visit on 07/25/18   POCT glycosylated hemoglobin (Hb A1C)   Result Value Ref Range    Hemoglobin A1C 6.2 %            Plan:       Sheila Boss was seen today for diabetes, hyperlipidemia, hypertension and anxiety. Diagnoses and all orders for this visit:    Type 2 diabetes mellitus with diabetic polyneuropathy, without long-term current use of insulin ([facility])  -     Diabetic Foot Exam  -     POCT glycosylated hemoglobin (Hb A1C)  Diabetes Mellitus type II, under excellent control   1. Rx changes: none  2.   Education: Reviewed ABCs of diabetes management (respective goals in parentheses):  A1C (<7), blood pressure (<130/80), and cholesterol (LDL <100). 3.  Compliance at present is estimated to be good. Efforts to improve compliance (if necessary) will be directed at dietary modifications, increased exercise and regular blood sugar monitoring. Diabetes education provided today:  Metabolic syndrome: association of diabetes with dyslipidemia, HTN and obesity. Diabetic Neuropathy: signs and therapy. Foot care: advised to wash feet daily, pat dry and apply lotion at night, avoiding between toes. Need to look at feet daily and report to a physician any signs of inflammation or skin damage. Discussed diabetes shoes and socks. Diabetic retinopathy: the most frequent cause of blindness in [place] currently. Measures to prevent that. Diabetic nephropathy: Kidney function, microalbumin as a sign of diabetic nephropathy. Stages of kidney disease. Nutrition as a mainstream of diabetes therapy. Carbs: good carbs and bad carbs, importance of carb counting, incorporation of protein with each meal to reduce Glycemic index, importance of portions,   Fats: Good fats and bad fats, meal planning and supplements. Physical activity: advised to exercise 5-7 days a week 30-60 mins at least. Discussed how it affects BS readings. Anxiety  -     Drug Panel-PM-HI Res-UR Interp-A  Chronic benzodiazepine treatment protocol was discussed with the patient again including taking medications only as prescribed , using one pharmacy and getting all controlled substances from one physician unless okayed with me.  Proper safeguarding and disposal of medications were also discussed with the patient.     The current treatment regimen is needed to decrease the patient's anxiety symptoms, improve the quality of life and ability to function and improve sleep and mood symptoms.        Patient given following instructions - You are on medications which could impair your senses, you are at risk of weakness, falls, dizziness, vaccine  -     zoster recombinant adjuvanted vaccine (SHINGRIX) 50 MCG SUSR injection; Inject 0.5 mLs into the muscle once for 1 dose One now and one in 2-6  month    High risk medication use  -     Drug Panel-PM-HI Res-UR Interp-A          Patient has been instructed call the office immediately with new symptoms, change in symptoms or worsening of symptoms. If this is not feasible, patient is instructed to report to the emergency room. Medication profile reviewed. Medication side effects and possible impairments from medications were discussed as applicable. Allergies were reviewed. Health maintenance was reviewed and updated as appropriate.

## 2018-07-26 DIAGNOSIS — I10 ESSENTIAL HYPERTENSION: ICD-10-CM

## 2018-07-26 DIAGNOSIS — E03.9 HYPOTHYROIDISM, UNSPECIFIED TYPE: ICD-10-CM

## 2018-07-26 DIAGNOSIS — R00.2 HEART PALPITATIONS: ICD-10-CM

## 2018-07-26 RX ORDER — LIOTHYRONINE SODIUM 5 UG/1
TABLET ORAL
Qty: 90 TABLET | Refills: 1 | Status: SHIPPED | OUTPATIENT
Start: 2018-07-26 | End: 2018-10-25

## 2018-07-26 RX ORDER — METOPROLOL TARTRATE 50 MG/1
TABLET, FILM COATED ORAL
Qty: 180 TABLET | Refills: 1 | Status: SHIPPED | OUTPATIENT
Start: 2018-07-26 | End: 2018-11-29 | Stop reason: ALTCHOICE

## 2018-07-26 RX ORDER — LEVOTHYROXINE SODIUM 0.05 MG/1
TABLET ORAL
Qty: 30 TABLET | Refills: 5 | Status: SHIPPED | OUTPATIENT
Start: 2018-07-26 | End: 2018-12-06

## 2018-07-30 LAB
6-ACETYLMORPHINE: NOT DETECTED
7-AMINOCLONAZEPAM: NOT DETECTED
ALPHA-OH-ALPRAZOLAM: NOT DETECTED
ALPRAZOLAM: NOT DETECTED
AMPHETAMINE: NOT DETECTED
BARBITURATES: NOT DETECTED
BENZOYLECGONINE: NOT DETECTED
BUPRENORPHINE: NOT DETECTED
CARISOPRODOL: NOT DETECTED
CLONAZEPAM: NOT DETECTED
CODEINE: NOT DETECTED
CREATININE URINE: 81.2 MG/DL (ref 20–400)
DIAZEPAM: NOT DETECTED
DRUGS EXPECTED: NORMAL
EER PAIN MGT DRUG PANEL, HIGH RES/EMIT U: NORMAL
ETHYL GLUCURONIDE: NOT DETECTED
FENTANYL: NOT DETECTED
HYDROCODONE: NOT DETECTED
HYDROMORPHONE: NOT DETECTED
LORAZEPAM: NOT DETECTED
MARIJUANA METABOLITE: NOT DETECTED
MDA: NOT DETECTED
MDEA: NOT DETECTED
MDMA URINE: NOT DETECTED
MEPERIDINE: NOT DETECTED
METHADONE: NOT DETECTED
METHAMPHETAMINE: NOT DETECTED
METHYLPHENIDATE: NOT DETECTED
MIDAZOLAM: NOT DETECTED
MORPHINE: NOT DETECTED
NORBUPRENORPHINE, FREE: NOT DETECTED
NORDIAZEPAM: NOT DETECTED
NORFENTANYL: NOT DETECTED
NORHYDROCODONE, URINE: NOT DETECTED
NOROXYCODONE: NOT DETECTED
NOROXYMORPHONE, URINE: NOT DETECTED
OXAZEPAM: NOT DETECTED
OXYCODONE: NOT DETECTED
OXYMORPHONE: NOT DETECTED
PAIN MANAGEMENT DRUG PANEL: NORMAL
PAIN MANAGEMENT DRUG PANEL: NORMAL
PCP: NOT DETECTED
PHENTERMINE: NOT DETECTED
PROPOXYPHENE: NOT DETECTED
TAPENTADOL, URINE: NOT DETECTED
TAPENTADOL-O-SULFATE, URINE: NOT DETECTED
TEMAZEPAM: NOT DETECTED
TRAMADOL: NOT DETECTED
ZOLPIDEM: NOT DETECTED

## 2018-08-02 DIAGNOSIS — I10 ESSENTIAL HYPERTENSION: ICD-10-CM

## 2018-08-02 DIAGNOSIS — E03.9 HYPOTHYROIDISM, UNSPECIFIED TYPE: ICD-10-CM

## 2018-08-02 DIAGNOSIS — R00.2 HEART PALPITATIONS: ICD-10-CM

## 2018-08-02 RX ORDER — AMLODIPINE BESYLATE 5 MG/1
TABLET ORAL
Qty: 30 TABLET | Refills: 0 | Status: SHIPPED | OUTPATIENT
Start: 2018-08-02 | End: 2018-08-27 | Stop reason: SDUPTHER

## 2018-08-02 RX ORDER — LEVOTHYROXINE SODIUM 0.05 MG/1
TABLET ORAL
Qty: 30 TABLET | Refills: 0 | OUTPATIENT
Start: 2018-08-02

## 2018-08-02 RX ORDER — METOPROLOL TARTRATE 50 MG/1
TABLET, FILM COATED ORAL
Qty: 60 TABLET | Refills: 0 | Status: SHIPPED | OUTPATIENT
Start: 2018-08-02 | End: 2019-02-18 | Stop reason: SDUPTHER

## 2018-08-02 RX ORDER — LIOTHYRONINE SODIUM 5 UG/1
TABLET ORAL
Qty: 30 TABLET | Refills: 0 | Status: SHIPPED | OUTPATIENT
Start: 2018-08-02 | End: 2018-10-25

## 2018-08-02 NOTE — TELEPHONE ENCOUNTER
Rich Guillen is requesting refill(s)   Last OV 7/25/18 (pertaining to medication)  LR 1/22/18 (per medication requested)  Next office visit scheduled or attempted No   If no, reason:

## 2018-08-13 DIAGNOSIS — Z78.0 ASYMPTOMATIC MENOPAUSAL STATE: ICD-10-CM

## 2018-08-13 DIAGNOSIS — Z13.820 OSTEOPOROSIS SCREENING: Primary | ICD-10-CM

## 2018-08-13 RX ORDER — OLMESARTAN MEDOXOMIL 20 MG/1
20 TABLET ORAL DAILY
Qty: 30 TABLET | Refills: 1 | Status: CANCELLED | OUTPATIENT
Start: 2018-08-13

## 2018-08-17 RX ORDER — LOSARTAN POTASSIUM 100 MG/1
100 TABLET ORAL DAILY
Qty: 30 TABLET | Refills: 3 | Status: SHIPPED | OUTPATIENT
Start: 2018-08-17 | End: 2018-10-25

## 2018-08-27 DIAGNOSIS — I10 ESSENTIAL HYPERTENSION: ICD-10-CM

## 2018-08-28 RX ORDER — AMLODIPINE BESYLATE 5 MG/1
TABLET ORAL
Qty: 30 TABLET | Refills: 1 | Status: SHIPPED | OUTPATIENT
Start: 2018-08-28 | End: 2018-10-25 | Stop reason: SDUPTHER

## 2018-09-04 ENCOUNTER — TELEPHONE (OUTPATIENT)
Dept: PULMONOLOGY | Age: 73
End: 2018-09-04

## 2018-09-04 DIAGNOSIS — G47.33 OSA (OBSTRUCTIVE SLEEP APNEA): Primary | ICD-10-CM

## 2018-10-10 ENCOUNTER — HOSPITAL ENCOUNTER (OUTPATIENT)
Dept: GENERAL RADIOLOGY | Age: 73
Discharge: HOME | End: 2018-10-10
Payer: MEDICARE

## 2018-10-10 DIAGNOSIS — Z78.0 ASYMPTOMATIC MENOPAUSAL STATE: ICD-10-CM

## 2018-10-10 DIAGNOSIS — Z13.820 OSTEOPOROSIS SCREENING: ICD-10-CM

## 2018-10-10 PROCEDURE — 77080 DXA BONE DENSITY AXIAL: CPT

## 2018-10-25 ENCOUNTER — OFFICE VISIT (OUTPATIENT)
Dept: FAMILY MEDICINE CLINIC | Age: 73
End: 2018-10-25
Payer: MEDICARE

## 2018-10-25 VITALS
HEIGHT: 62 IN | BODY MASS INDEX: 29.26 KG/M2 | DIASTOLIC BLOOD PRESSURE: 80 MMHG | WEIGHT: 159 LBS | SYSTOLIC BLOOD PRESSURE: 132 MMHG

## 2018-10-25 DIAGNOSIS — F41.9 ANXIETY: ICD-10-CM

## 2018-10-25 DIAGNOSIS — Z12.11 COLON CANCER SCREENING: ICD-10-CM

## 2018-10-25 DIAGNOSIS — E03.9 HYPOTHYROIDISM, UNSPECIFIED TYPE: ICD-10-CM

## 2018-10-25 DIAGNOSIS — E11.42 TYPE 2 DIABETES MELLITUS WITH DIABETIC POLYNEUROPATHY, WITHOUT LONG-TERM CURRENT USE OF INSULIN (HCC): ICD-10-CM

## 2018-10-25 DIAGNOSIS — E78.2 MIXED HYPERLIPIDEMIA: ICD-10-CM

## 2018-10-25 DIAGNOSIS — I10 ESSENTIAL HYPERTENSION: ICD-10-CM

## 2018-10-25 DIAGNOSIS — F33.1 MODERATE EPISODE OF RECURRENT MAJOR DEPRESSIVE DISORDER (HCC): Primary | ICD-10-CM

## 2018-10-25 DIAGNOSIS — R42 VERTIGO: ICD-10-CM

## 2018-10-25 LAB — HBA1C MFR BLD: 6.5 %

## 2018-10-25 PROCEDURE — G8427 DOCREV CUR MEDS BY ELIG CLIN: HCPCS | Performed by: NURSE PRACTITIONER

## 2018-10-25 PROCEDURE — 36415 COLL VENOUS BLD VENIPUNCTURE: CPT | Performed by: NURSE PRACTITIONER

## 2018-10-25 PROCEDURE — 99215 OFFICE O/P EST HI 40 MIN: CPT | Performed by: NURSE PRACTITIONER

## 2018-10-25 PROCEDURE — 83036 HEMOGLOBIN GLYCOSYLATED A1C: CPT | Performed by: NURSE PRACTITIONER

## 2018-10-25 PROCEDURE — 4040F PNEUMOC VAC/ADMIN/RCVD: CPT | Performed by: NURSE PRACTITIONER

## 2018-10-25 PROCEDURE — 2022F DILAT RTA XM EVC RTNOPTHY: CPT | Performed by: NURSE PRACTITIONER

## 2018-10-25 PROCEDURE — 1101F PT FALLS ASSESS-DOCD LE1/YR: CPT | Performed by: NURSE PRACTITIONER

## 2018-10-25 PROCEDURE — 1036F TOBACCO NON-USER: CPT | Performed by: NURSE PRACTITIONER

## 2018-10-25 PROCEDURE — 3044F HG A1C LEVEL LT 7.0%: CPT | Performed by: NURSE PRACTITIONER

## 2018-10-25 PROCEDURE — G0444 DEPRESSION SCREEN ANNUAL: HCPCS | Performed by: NURSE PRACTITIONER

## 2018-10-25 PROCEDURE — G8417 CALC BMI ABV UP PARAM F/U: HCPCS | Performed by: NURSE PRACTITIONER

## 2018-10-25 PROCEDURE — 1090F PRES/ABSN URINE INCON ASSESS: CPT | Performed by: NURSE PRACTITIONER

## 2018-10-25 PROCEDURE — 3017F COLORECTAL CA SCREEN DOC REV: CPT | Performed by: NURSE PRACTITIONER

## 2018-10-25 PROCEDURE — G8484 FLU IMMUNIZE NO ADMIN: HCPCS | Performed by: NURSE PRACTITIONER

## 2018-10-25 PROCEDURE — 1123F ACP DISCUSS/DSCN MKR DOCD: CPT | Performed by: NURSE PRACTITIONER

## 2018-10-25 PROCEDURE — G8399 PT W/DXA RESULTS DOCUMENT: HCPCS | Performed by: NURSE PRACTITIONER

## 2018-10-25 RX ORDER — AMLODIPINE BESYLATE 5 MG/1
TABLET ORAL
Qty: 90 TABLET | Refills: 2 | Status: SHIPPED | OUTPATIENT
Start: 2018-10-25 | End: 2019-06-17 | Stop reason: SDUPTHER

## 2018-10-25 RX ORDER — LORAZEPAM 0.5 MG/1
0.5 TABLET ORAL DAILY PRN
Qty: 15 TABLET | Refills: 0 | Status: SHIPPED | OUTPATIENT
Start: 2018-10-25 | End: 2019-04-15 | Stop reason: SDUPTHER

## 2018-10-25 RX ORDER — MECLIZINE HYDROCHLORIDE 25 MG/1
25 TABLET ORAL 3 TIMES DAILY PRN
Qty: 30 TABLET | Refills: 0 | Status: SHIPPED | OUTPATIENT
Start: 2018-10-25 | End: 2018-11-04

## 2018-10-25 RX ORDER — LOSARTAN POTASSIUM 100 MG/1
100 TABLET ORAL DAILY
Qty: 30 TABLET | Refills: 3 | Status: SHIPPED | OUTPATIENT
Start: 2018-10-25 | End: 2019-04-15 | Stop reason: SDUPTHER

## 2018-10-25 RX ORDER — LACTULOSE 10 G/15ML
20 SOLUTION ORAL 3 TIMES DAILY
COMMUNITY
End: 2019-04-15

## 2018-10-25 RX ORDER — CITALOPRAM 10 MG/1
10 TABLET ORAL DAILY
Qty: 30 TABLET | Refills: 2 | Status: SHIPPED | OUTPATIENT
Start: 2018-10-25 | End: 2018-12-06

## 2018-10-25 RX ORDER — ROSUVASTATIN CALCIUM 20 MG/1
10 TABLET, COATED ORAL NIGHTLY
Qty: 90 TABLET | Refills: 0
Start: 2018-10-25 | End: 2019-02-18 | Stop reason: SDUPTHER

## 2018-10-25 ASSESSMENT — ENCOUNTER SYMPTOMS
RESPIRATORY NEGATIVE: 1
GASTROINTESTINAL NEGATIVE: 1
EYES NEGATIVE: 1

## 2018-10-25 ASSESSMENT — PATIENT HEALTH QUESTIONNAIRE - PHQ9
1. LITTLE INTEREST OR PLEASURE IN DOING THINGS: 3
SUM OF ALL RESPONSES TO PHQ QUESTIONS 1-9: 11
9. THOUGHTS THAT YOU WOULD BE BETTER OFF DEAD, OR OF HURTING YOURSELF: 0
6. FEELING BAD ABOUT YOURSELF - OR THAT YOU ARE A FAILURE OR HAVE LET YOURSELF OR YOUR FAMILY DOWN: 0
SUM OF ALL RESPONSES TO PHQ QUESTIONS 1-9: 11
10. IF YOU CHECKED OFF ANY PROBLEMS, HOW DIFFICULT HAVE THESE PROBLEMS MADE IT FOR YOU TO DO YOUR WORK, TAKE CARE OF THINGS AT HOME, OR GET ALONG WITH OTHER PEOPLE: 0
5. POOR APPETITE OR OVEREATING: 1
7. TROUBLE CONCENTRATING ON THINGS, SUCH AS READING THE NEWSPAPER OR WATCHING TELEVISION: 0
8. MOVING OR SPEAKING SO SLOWLY THAT OTHER PEOPLE COULD HAVE NOTICED. OR THE OPPOSITE, BEING SO FIGETY OR RESTLESS THAT YOU HAVE BEEN MOVING AROUND A LOT MORE THAN USUAL: 0
3. TROUBLE FALLING OR STAYING ASLEEP: 1
2. FEELING DOWN, DEPRESSED OR HOPELESS: 3
SUM OF ALL RESPONSES TO PHQ9 QUESTIONS 1 & 2: 6
4. FEELING TIRED OR HAVING LITTLE ENERGY: 3

## 2018-10-25 NOTE — PROGRESS NOTES
POCT glycosylated hemoglobin (Hb A1C)   Result Value Ref Range    Hemoglobin A1C 6.5 %            Plan:       Wili Mccullough was seen today for diabetes, dizziness, other and hyperlipidemia. Diagnoses and all orders for this visit:    Moderate episode of recurrent major depressive disorder (HCC)---new   Start   -     citalopram (CELEXA) 10 MG tablet; Take 1 tablet by mouth daily  Educated if patient develops SI/HI/awa to call 911 or go to ER. Discussed use, benefit, risks and side effects of prescribed medications. Barriers to compliance discussed. All patient questions answered. Pt voiced understanding. Type 2 diabetes mellitus with diabetic polyneuropathy, without long-term current use of insulin (HCC)  -     POCT glycosylated hemoglobin (Hb A1C)  Well controlled,  No medication changes    Vertigo  -     meclizine (ANTIVERT) 25 MG tablet; Take 1 tablet by mouth 3 times daily as needed for Dizziness    Essential hypertension  -     amLODIPine (NORVASC) 5 MG tablet; TAKE ONE TABLET BY MOUTH DAILY  -     Comprehensive Metabolic Panel  -     losartan (COZAAR) 100 MG tablet; Take 1 tablet by mouth daily  Hypertension, controlled. Medication: no change. Dietary sodium restriction. Regular aerobic exercise. Check blood pressures weekly and record. Anxiety  -     LORazepam (ATIVAN) 0.5 MG tablet; Take 1 tablet by mouth daily as needed for Anxiety for up to 30 days. .  -     citalopram (CELEXA) 10 MG tablet; Take 1 tablet by mouth daily  Chronic benzodiazepine treatment protocol was discussed with the patient again including taking medications only as prescribed , using one pharmacy and getting all controlled substances from one physician unless okayed with me.  Proper safeguarding and disposal of medications were also discussed with the patient.     The current treatment regimen is needed to decrease the patient's anxiety symptoms, improve the quality of life and ability to function and improve sleep and mood symptoms.        Patient given following instructions - You are on medications which could impair your senses, you are at risk of weakness, falls, dizziness, or drowsiness. You should be careful during activities which could place you at risk of harm, such as climbing, using stairs, operating machinery, or driving vehicles. If you feel you cannot safely do these activities, you should request others to help you, or avoid the activities altogether. If you are drowsy for any other reason, you should use the same precautions as listed above.     The patient is made aware of the potential of drowsiness with the prescribed medications, and that care should be exercised in operating machinery, vehicles, or placing oneself in situations of risk to their health, ie heights and climbing and stairs.     The patient denies nausea, vomiting, diarrhea and constipation. No respiratory problems. No increased sleepiness, drowsiness or confusion. The patient states that the medications and treatment have helped improve the quality of life and helped in psychosocial functioning. There are no indicators for possible drug abuse, addiction or diversion problems. Attestation: The Prescription Monitoring Report for this patient was reviewed today. (Andrew Lyon, APRN - CNP)  Documentation: Possible medication side effects, risk of tolerance/dependence & alternative treatments discussed., No signs of potential drug abuse or diversion identified. (Andrew Lyon, APRN - CNP)    Mixed hyperlipidemia  -     Comprehensive Metabolic Panel  -     Lipid Panel  -     rosuvastatin (CRESTOR) 20 MG tablet; Take 0.5 tablets by mouth nightly---will try 1/2 tablet to see if it helps with leg cramps  Explained to patient that her low magnesium level may also be the cause    Hypothyroidism, unspecified type  -     TSH with Reflex    Colon cancer screening  -     POCT Fecal Immunochemical Test (FIT);  Future      Patient has been instructed call the office immediately with new symptoms, change in symptoms or worseningof symptoms. If this is not feasible, patient is instructed to report to the emergency room. Medication profile reviewed. Medication side effects and possible impairments from medications were discussed as applicable. Allergies were reviewed. Health maintenance was reviewed and updated as appropriate.

## 2018-10-26 LAB
A/G RATIO: 1.9 (ref 1.1–2.2)
ALBUMIN SERPL-MCNC: 4.7 G/DL (ref 3.4–5)
ALP BLD-CCNC: 79 U/L (ref 40–129)
ALT SERPL-CCNC: 22 U/L (ref 10–40)
ANION GAP SERPL CALCULATED.3IONS-SCNC: 16 MMOL/L (ref 3–16)
AST SERPL-CCNC: 17 U/L (ref 15–37)
BILIRUB SERPL-MCNC: 0.3 MG/DL (ref 0–1)
BUN BLDV-MCNC: 19 MG/DL (ref 7–20)
CALCIUM SERPL-MCNC: 9.6 MG/DL (ref 8.3–10.6)
CHLORIDE BLD-SCNC: 99 MMOL/L (ref 99–110)
CHOLESTEROL, TOTAL: 284 MG/DL (ref 0–199)
CO2: 25 MMOL/L (ref 21–32)
CREAT SERPL-MCNC: 0.7 MG/DL (ref 0.6–1.2)
GFR AFRICAN AMERICAN: >60
GFR NON-AFRICAN AMERICAN: >60
GLOBULIN: 2.5 G/DL
GLUCOSE BLD-MCNC: 103 MG/DL (ref 70–99)
HDLC SERPL-MCNC: 73 MG/DL (ref 40–60)
LDL CHOLESTEROL CALCULATED: 178 MG/DL
POTASSIUM SERPL-SCNC: 4.5 MMOL/L (ref 3.5–5.1)
SODIUM BLD-SCNC: 140 MMOL/L (ref 136–145)
T4 FREE: 1.3 NG/DL (ref 0.9–1.8)
TOTAL PROTEIN: 7.2 G/DL (ref 6.4–8.2)
TRIGL SERPL-MCNC: 166 MG/DL (ref 0–150)
TSH REFLEX: 5.28 UIU/ML (ref 0.27–4.2)
VLDLC SERPL CALC-MCNC: 33 MG/DL

## 2018-10-29 DIAGNOSIS — E03.9 HYPOTHYROIDISM, UNSPECIFIED TYPE: Primary | ICD-10-CM

## 2018-10-30 ENCOUNTER — HOSPITAL ENCOUNTER (OUTPATIENT)
Dept: MAMMOGRAPHY | Age: 73
Discharge: HOME | End: 2018-10-30
Payer: MEDICARE

## 2018-10-30 DIAGNOSIS — Z12.31 ENCOUNTER FOR SCREENING MAMMOGRAM FOR BREAST CANCER: ICD-10-CM

## 2018-10-30 PROCEDURE — 77067 SCR MAMMO BI INCL CAD: CPT

## 2018-11-04 ENCOUNTER — HOSPITAL ENCOUNTER (OUTPATIENT)
Age: 73
Discharge: HOME | End: 2018-11-04
Payer: MEDICARE

## 2018-11-04 LAB
ALBUMIN SERPL-MCNC: 4.3 G/DL (ref 3.4–5)
ANION GAP SERPL CALCULATED.3IONS-SCNC: 13 MMOL/L (ref 3–16)
BUN BLDV-MCNC: 20 MG/DL (ref 7–20)
CALCIUM SERPL-MCNC: 9.4 MG/DL (ref 8.3–10.6)
CHLORIDE BLD-SCNC: 102 MMOL/L (ref 99–110)
CO2: 27 MMOL/L (ref 21–32)
CREAT SERPL-MCNC: 0.8 MG/DL (ref 0.6–1.2)
GFR AFRICAN AMERICAN: >60
GFR NON-AFRICAN AMERICAN: >60
GLUCOSE BLD-MCNC: 123 MG/DL (ref 70–99)
MAGNESIUM: 1.3 MG/DL (ref 1.8–2.4)
PHOSPHORUS: 3.3 MG/DL (ref 2.5–4.9)
POTASSIUM SERPL-SCNC: 4 MMOL/L (ref 3.5–5.1)
SODIUM BLD-SCNC: 142 MMOL/L (ref 136–145)

## 2018-11-04 PROCEDURE — 36415 COLL VENOUS BLD VENIPUNCTURE: CPT

## 2018-11-04 PROCEDURE — 83735 ASSAY OF MAGNESIUM: CPT

## 2018-11-04 PROCEDURE — 80069 RENAL FUNCTION PANEL: CPT

## 2018-11-09 ENCOUNTER — NURSE ONLY (OUTPATIENT)
Dept: FAMILY MEDICINE CLINIC | Age: 73
End: 2018-11-09
Payer: MEDICARE

## 2018-11-09 DIAGNOSIS — Z23 NEED FOR INFLUENZA VACCINATION: Primary | ICD-10-CM

## 2018-11-09 PROCEDURE — 90688 IIV4 VACCINE SPLT 0.5 ML IM: CPT | Performed by: NURSE PRACTITIONER

## 2018-11-09 PROCEDURE — G0008 ADMIN INFLUENZA VIRUS VAC: HCPCS | Performed by: NURSE PRACTITIONER

## 2018-11-12 DIAGNOSIS — E83.42 HYPOMAGNESEMIA: ICD-10-CM

## 2018-11-13 ENCOUNTER — HOSPITAL ENCOUNTER (OUTPATIENT)
Age: 73
Discharge: HOME | End: 2018-11-13
Payer: MEDICARE

## 2018-11-13 LAB
ANION GAP SERPL CALCULATED.3IONS-SCNC: 11 MMOL/L (ref 3–16)
BUN BLDV-MCNC: 22 MG/DL (ref 7–20)
CALCIUM SERPL-MCNC: 9.8 MG/DL (ref 8.3–10.6)
CHLORIDE BLD-SCNC: 100 MMOL/L (ref 99–110)
CO2: 30 MMOL/L (ref 21–32)
CREAT SERPL-MCNC: 0.9 MG/DL (ref 0.6–1.2)
GFR AFRICAN AMERICAN: >60
GFR NON-AFRICAN AMERICAN: >60
GLUCOSE BLD-MCNC: 126 MG/DL (ref 70–99)
MAGNESIUM: 1.8 MG/DL (ref 1.8–2.4)
POTASSIUM SERPL-SCNC: 4.2 MMOL/L (ref 3.5–5.1)
SODIUM BLD-SCNC: 141 MMOL/L (ref 136–145)

## 2018-11-13 PROCEDURE — 36415 COLL VENOUS BLD VENIPUNCTURE: CPT

## 2018-11-13 PROCEDURE — 83735 ASSAY OF MAGNESIUM: CPT

## 2018-11-13 PROCEDURE — 80048 BASIC METABOLIC PNL TOTAL CA: CPT

## 2018-11-29 ENCOUNTER — OFFICE VISIT (OUTPATIENT)
Dept: CARDIOLOGY CLINIC | Age: 73
End: 2018-11-29
Payer: MEDICARE

## 2018-11-29 VITALS
SYSTOLIC BLOOD PRESSURE: 120 MMHG | DIASTOLIC BLOOD PRESSURE: 76 MMHG | HEART RATE: 58 BPM | OXYGEN SATURATION: 96 % | HEIGHT: 62 IN | BODY MASS INDEX: 29.08 KG/M2 | WEIGHT: 158 LBS

## 2018-11-29 DIAGNOSIS — E78.2 MIXED HYPERLIPIDEMIA: ICD-10-CM

## 2018-11-29 DIAGNOSIS — I10 ESSENTIAL HYPERTENSION: Primary | ICD-10-CM

## 2018-11-29 PROCEDURE — G8399 PT W/DXA RESULTS DOCUMENT: HCPCS | Performed by: INTERNAL MEDICINE

## 2018-11-29 PROCEDURE — 1123F ACP DISCUSS/DSCN MKR DOCD: CPT | Performed by: INTERNAL MEDICINE

## 2018-11-29 PROCEDURE — 1101F PT FALLS ASSESS-DOCD LE1/YR: CPT | Performed by: INTERNAL MEDICINE

## 2018-11-29 PROCEDURE — 4040F PNEUMOC VAC/ADMIN/RCVD: CPT | Performed by: INTERNAL MEDICINE

## 2018-11-29 PROCEDURE — G8482 FLU IMMUNIZE ORDER/ADMIN: HCPCS | Performed by: INTERNAL MEDICINE

## 2018-11-29 PROCEDURE — 1090F PRES/ABSN URINE INCON ASSESS: CPT | Performed by: INTERNAL MEDICINE

## 2018-11-29 PROCEDURE — G8417 CALC BMI ABV UP PARAM F/U: HCPCS | Performed by: INTERNAL MEDICINE

## 2018-11-29 PROCEDURE — 3017F COLORECTAL CA SCREEN DOC REV: CPT | Performed by: INTERNAL MEDICINE

## 2018-11-29 PROCEDURE — 99214 OFFICE O/P EST MOD 30 MIN: CPT | Performed by: INTERNAL MEDICINE

## 2018-11-29 PROCEDURE — 1036F TOBACCO NON-USER: CPT | Performed by: INTERNAL MEDICINE

## 2018-11-29 PROCEDURE — G8427 DOCREV CUR MEDS BY ELIG CLIN: HCPCS | Performed by: INTERNAL MEDICINE

## 2018-11-29 NOTE — PROGRESS NOTES
components found for: CHLPL  Lab Results   Component Value Date    TRIG 166 (H) 10/25/2018    TRIG 153 (H) 04/25/2018    TRIG 150 10/23/2017     Lab Results   Component Value Date    HDL 73 (H) 10/25/2018    HDL 73 (H) 04/25/2018    HDL 64 (H) 10/23/2017     Lab Results   Component Value Date    LDLCALC 178 (H) 10/25/2018    LDLCALC 73 04/25/2018    LDLCALC 115 (H) 10/23/2017     Lab Results   Component Value Date    LABVLDL 33 10/25/2018    LABVLDL 31 04/25/2018    LABVLDL 30 10/23/2017     Lab Results   Component Value Date    ALT 22 10/25/2018    AST 17 10/25/2018    ALKPHOS 79 10/25/2018    BILITOT 0.3 10/25/2018       Imaging:  I have reviewed the below testing personally and my interpretation is below. EKG:  CXR:      Assessment:  72 y.o. patient with:  1. Palpitations   ~stable  2. Hyperlipidemia   ~ sub-optimal 7/2016  3. Hypertension    ~BP: (120)/(76)     ~stable  4. Chest pain    ~resolved   ~normal cardiac cath 2014    The 10-year ASCVD risk score (Car Cam, et al., 2013) is: 23.6%    Values used to calculate the score:      Age: 72 years      Sex: Female      Is Non- : No      Diabetic: Yes      Tobacco smoker: No      Systolic Blood Pressure: 667 mmHg      Is BP treated: Yes      HDL Cholesterol: 73 mg/dL      Total Cholesterol: 284 mg/dL     Plan:  1. I feel that if possible, I recommend you take Aspirin 81 mg daily. Follow up with your neurosurgeon. 2. The patient was seen for >25 minutes. >50% of the time was devoted to giving the patient detailed instructions instructions on addressing diet, regular exercise, weight control, smoking abstention, medication compliance, and stress minimization. The patient was provided written and verbal instructions regarding risk factor modification. 3. Follow up with me in 1 year.      This note was scribed in the presence of Adali Crews MD, by Esther Hunter RN.      I, Dr. Adali Crews, personally performed the services

## 2018-12-06 ENCOUNTER — OFFICE VISIT (OUTPATIENT)
Dept: FAMILY MEDICINE CLINIC | Age: 73
End: 2018-12-06
Payer: MEDICARE

## 2018-12-06 VITALS
DIASTOLIC BLOOD PRESSURE: 72 MMHG | OXYGEN SATURATION: 98 % | SYSTOLIC BLOOD PRESSURE: 155 MMHG | WEIGHT: 158 LBS | HEIGHT: 62 IN | BODY MASS INDEX: 29.08 KG/M2 | HEART RATE: 71 BPM

## 2018-12-06 DIAGNOSIS — E03.9 HYPOTHYROIDISM, UNSPECIFIED TYPE: ICD-10-CM

## 2018-12-06 DIAGNOSIS — R42 DIZZINESS: Primary | ICD-10-CM

## 2018-12-06 DIAGNOSIS — R79.0 LOW MAGNESIUM LEVEL: ICD-10-CM

## 2018-12-06 PROCEDURE — 1101F PT FALLS ASSESS-DOCD LE1/YR: CPT | Performed by: NURSE PRACTITIONER

## 2018-12-06 PROCEDURE — 1090F PRES/ABSN URINE INCON ASSESS: CPT | Performed by: NURSE PRACTITIONER

## 2018-12-06 PROCEDURE — 93000 ELECTROCARDIOGRAM COMPLETE: CPT | Performed by: NURSE PRACTITIONER

## 2018-12-06 PROCEDURE — 36415 COLL VENOUS BLD VENIPUNCTURE: CPT | Performed by: NURSE PRACTITIONER

## 2018-12-06 PROCEDURE — G8417 CALC BMI ABV UP PARAM F/U: HCPCS | Performed by: NURSE PRACTITIONER

## 2018-12-06 PROCEDURE — G8482 FLU IMMUNIZE ORDER/ADMIN: HCPCS | Performed by: NURSE PRACTITIONER

## 2018-12-06 PROCEDURE — G8427 DOCREV CUR MEDS BY ELIG CLIN: HCPCS | Performed by: NURSE PRACTITIONER

## 2018-12-06 PROCEDURE — 4040F PNEUMOC VAC/ADMIN/RCVD: CPT | Performed by: NURSE PRACTITIONER

## 2018-12-06 PROCEDURE — 1123F ACP DISCUSS/DSCN MKR DOCD: CPT | Performed by: NURSE PRACTITIONER

## 2018-12-06 PROCEDURE — G8399 PT W/DXA RESULTS DOCUMENT: HCPCS | Performed by: NURSE PRACTITIONER

## 2018-12-06 PROCEDURE — 99214 OFFICE O/P EST MOD 30 MIN: CPT | Performed by: NURSE PRACTITIONER

## 2018-12-06 PROCEDURE — 3017F COLORECTAL CA SCREEN DOC REV: CPT | Performed by: NURSE PRACTITIONER

## 2018-12-06 PROCEDURE — 1036F TOBACCO NON-USER: CPT | Performed by: NURSE PRACTITIONER

## 2018-12-06 RX ORDER — MECLIZINE HYDROCHLORIDE 25 MG/1
25 TABLET ORAL 3 TIMES DAILY PRN
Qty: 30 TABLET | Refills: 0 | Status: SHIPPED | OUTPATIENT
Start: 2018-12-06 | End: 2018-12-16

## 2018-12-06 RX ORDER — PROMETHAZINE HYDROCHLORIDE 25 MG/1
25 TABLET ORAL EVERY 6 HOURS PRN
Qty: 20 TABLET | Refills: 0 | Status: SHIPPED | OUTPATIENT
Start: 2018-12-06 | End: 2019-09-19 | Stop reason: ALTCHOICE

## 2018-12-06 RX ORDER — LEVOTHYROXINE SODIUM 0.07 MG/1
75 TABLET ORAL DAILY
COMMUNITY
End: 2019-01-29 | Stop reason: SDUPTHER

## 2018-12-06 ASSESSMENT — ENCOUNTER SYMPTOMS
DIARRHEA: 0
EYE PAIN: 0
RHINORRHEA: 0
ABDOMINAL PAIN: 0
VOMITING: 0
COUGH: 0
NAUSEA: 1
SORE THROAT: 0
SHORTNESS OF BREATH: 0
CHANGE IN BOWEL HABIT: 0
SWOLLEN GLANDS: 0
VISUAL CHANGE: 0
NAIL CHANGES: 0

## 2018-12-06 NOTE — PROGRESS NOTES
 COPD Father     Heart Disease Father     Cancer Father         NMSC, skin     Past Surgical History:   Procedure Laterality Date    COLONOSCOPY      COLONOSCOPY  07/14/2016    COLONOSCOPY      CYSTOSCOPY  10/18/2017    Cystoscopy with Urethral dilation    CYSTOSCOPY Bilateral 10/18/2017    Cystoscopy with bilateral retrograde pyelograms    DIAGNOSTIC CARDIAC CATH LAB PROCEDURE  1/12/2005    [facility] Alysa Clemons, No CAD.  DILATION AND CURETTAGE OF UTERUS      ENDOSCOPY, COLON, DIAGNOSTIC      HYSTERECTOMY  1/28/2013    due to uterine cancer    OTHER SURGICAL HISTORY  10/18/2017    cystoscopy, bilateral retrogrades    OTHER SURGICAL HISTORY Left 03/02/2017    port removal    SALIVARY GLAND SURGERY      KINGSLEY AND BSO  1/28/13    THYROID SURGERY      right thyroidectomy for benign reasons    UPPER GASTROINTESTINAL ENDOSCOPY  07/14/2016    UPPER GASTROINTESTINAL ENDOSCOPY       Social History     Social History    Marital status:      Spouse name: N/A    Number of children: N/A    Years of education: N/A     Occupational History    Not on file.      Social History Main Topics    Smoking status: Never Smoker    Smokeless tobacco: Never Used    Alcohol use No    Drug use: No    Sexual activity: No     Other Topics Concern    Not on file     Social History Narrative    No narrative on file     Current Outpatient Prescriptions   Medication Sig Dispense Refill    levothyroxine (SYNTHROID) 75 MCG tablet Take 75 mcg by mouth Daily      RaNITidine HCl (ZANTAC PO) Take by mouth      Esomeprazole Magnesium (NEXIUM PO) Take by mouth      magnesium oxide (MAG-OX) 400 (241.3 Mg) MG TABS tablet Take 1,200 mg by mouth 2 times daily  90 tablet 5    lactulose (CHRONULAC) 10 GM/15ML solution Take 20 g by mouth 3 times daily      amLODIPine (NORVASC) 5 MG tablet TAKE ONE TABLET BY MOUTH DAILY 90 tablet 2    losartan (COZAAR) 100 MG tablet Take 1 tablet by mouth daily 30 tablet 3    rosuvastatin

## 2018-12-07 LAB
MAGNESIUM: 1.7 MG/DL (ref 1.8–2.4)
TSH SERPL DL<=0.05 MIU/L-ACNC: 1.11 UIU/ML (ref 0.27–4.2)

## 2018-12-17 LAB
CONTROL: PRESENT
HEMOCCULT STL QL: NEGATIVE

## 2018-12-19 ENCOUNTER — TELEPHONE (OUTPATIENT)
Dept: FAMILY MEDICINE CLINIC | Age: 73
End: 2018-12-19

## 2018-12-19 DIAGNOSIS — I10 ESSENTIAL HYPERTENSION: ICD-10-CM

## 2018-12-19 DIAGNOSIS — R42 DIZZY SPELLS: Primary | ICD-10-CM

## 2018-12-20 ENCOUNTER — HOSPITAL ENCOUNTER (OUTPATIENT)
Dept: MRI IMAGING | Age: 73
Discharge: HOME | End: 2018-12-20
Payer: MEDICARE

## 2018-12-20 DIAGNOSIS — R42 DIZZY SPELLS: ICD-10-CM

## 2018-12-20 LAB
ANION GAP SERPL CALCULATED.3IONS-SCNC: 14 MMOL/L (ref 3–16)
BUN BLDV-MCNC: 25 MG/DL (ref 7–20)
CALCIUM SERPL-MCNC: 9.7 MG/DL (ref 8.3–10.6)
CHLORIDE BLD-SCNC: 100 MMOL/L (ref 99–110)
CO2: 27 MMOL/L (ref 21–32)
CREAT SERPL-MCNC: 1 MG/DL (ref 0.6–1.2)
GFR AFRICAN AMERICAN: >60
GFR NON-AFRICAN AMERICAN: 55
GLUCOSE BLD-MCNC: 100 MG/DL (ref 70–99)
POTASSIUM SERPL-SCNC: 4.4 MMOL/L (ref 3.5–5.1)
SODIUM BLD-SCNC: 141 MMOL/L (ref 136–145)

## 2018-12-20 PROCEDURE — 70553 MRI BRAIN STEM W/O & W/DYE: CPT

## 2018-12-20 PROCEDURE — 6360000004 HC RX CONTRAST MEDICATION: Performed by: NURSE PRACTITIONER

## 2018-12-20 PROCEDURE — 36415 COLL VENOUS BLD VENIPUNCTURE: CPT

## 2018-12-20 PROCEDURE — 80048 BASIC METABOLIC PNL TOTAL CA: CPT

## 2018-12-20 PROCEDURE — A9579 GAD-BASE MR CONTRAST NOS,1ML: HCPCS | Performed by: NURSE PRACTITIONER

## 2018-12-20 RX ADMIN — GADOTERIDOL 14 ML: 279.3 INJECTION, SOLUTION INTRAVENOUS at 18:07

## 2018-12-27 ENCOUNTER — TELEPHONE (OUTPATIENT)
Dept: FAMILY MEDICINE CLINIC | Age: 73
End: 2018-12-27

## 2018-12-27 ASSESSMENT — ENCOUNTER SYMPTOMS
EYES NEGATIVE: 1
RESPIRATORY NEGATIVE: 1

## 2019-01-08 ENCOUNTER — HOSPITAL ENCOUNTER (OUTPATIENT)
Age: 74
Discharge: HOME | End: 2019-01-08
Payer: MEDICARE

## 2019-01-08 LAB
ANION GAP SERPL CALCULATED.3IONS-SCNC: 12 MMOL/L (ref 3–16)
BUN BLDV-MCNC: 18 MG/DL (ref 7–20)
CALCIUM SERPL-MCNC: 9.8 MG/DL (ref 8.3–10.6)
CHLORIDE BLD-SCNC: 102 MMOL/L (ref 99–110)
CO2: 27 MMOL/L (ref 21–32)
CREAT SERPL-MCNC: 0.7 MG/DL (ref 0.6–1.2)
GFR AFRICAN AMERICAN: >60
GFR NON-AFRICAN AMERICAN: >60
GLUCOSE BLD-MCNC: 104 MG/DL (ref 70–99)
MAGNESIUM: 1.9 MG/DL (ref 1.8–2.4)
POTASSIUM SERPL-SCNC: 4.2 MMOL/L (ref 3.5–5.1)
SODIUM BLD-SCNC: 141 MMOL/L (ref 136–145)

## 2019-01-08 PROCEDURE — 36415 COLL VENOUS BLD VENIPUNCTURE: CPT

## 2019-01-08 PROCEDURE — 80048 BASIC METABOLIC PNL TOTAL CA: CPT

## 2019-01-08 PROCEDURE — 83735 ASSAY OF MAGNESIUM: CPT

## 2019-01-14 LAB
CATARACTS: POSITIVE
DIABETIC RETINOPATHY: NEGATIVE
GLAUCOMA: NEGATIVE
INTRAOCULAR PRESSURE EYE: NORMAL
VISUAL ACUITY DISTANCE LEFT EYE: NORMAL
VISUAL ACUITY DISTANCE RIGHT EYE: NORMAL

## 2019-01-29 RX ORDER — LEVOTHYROXINE SODIUM 0.07 MG/1
75 TABLET ORAL DAILY
Qty: 30 TABLET | Refills: 1 | Status: SHIPPED | OUTPATIENT
Start: 2019-01-29 | End: 2019-04-15 | Stop reason: SDUPTHER

## 2019-01-31 ENCOUNTER — HOSPITAL ENCOUNTER (OUTPATIENT)
Dept: GENERAL RADIOLOGY | Age: 74
Discharge: HOME | End: 2019-01-31
Payer: MEDICARE

## 2019-01-31 ENCOUNTER — HOSPITAL ENCOUNTER (OUTPATIENT)
Age: 74
Discharge: HOME | End: 2019-01-31
Payer: MEDICARE

## 2019-01-31 ENCOUNTER — OFFICE VISIT (OUTPATIENT)
Dept: FAMILY MEDICINE CLINIC | Age: 74
End: 2019-01-31
Payer: MEDICARE

## 2019-01-31 VITALS
WEIGHT: 157 LBS | BODY MASS INDEX: 28.89 KG/M2 | DIASTOLIC BLOOD PRESSURE: 70 MMHG | SYSTOLIC BLOOD PRESSURE: 110 MMHG | HEIGHT: 62 IN

## 2019-01-31 DIAGNOSIS — Z83.49 FAMILY HISTORY OF B12 DEFICIENCY: ICD-10-CM

## 2019-01-31 DIAGNOSIS — E11.42 TYPE 2 DIABETES MELLITUS WITH DIABETIC POLYNEUROPATHY, WITHOUT LONG-TERM CURRENT USE OF INSULIN (HCC): ICD-10-CM

## 2019-01-31 DIAGNOSIS — R79.0 LOW MAGNESIUM LEVEL: ICD-10-CM

## 2019-01-31 DIAGNOSIS — M25.552 PAIN OF BOTH HIP JOINTS: ICD-10-CM

## 2019-01-31 DIAGNOSIS — Z12.11 COLON CANCER SCREENING: ICD-10-CM

## 2019-01-31 DIAGNOSIS — F41.9 ANXIETY: ICD-10-CM

## 2019-01-31 DIAGNOSIS — C54.1 ENDOMETRIAL CANCER (HCC): ICD-10-CM

## 2019-01-31 DIAGNOSIS — M25.551 PAIN OF BOTH HIP JOINTS: ICD-10-CM

## 2019-01-31 DIAGNOSIS — F33.1 MODERATE EPISODE OF RECURRENT MAJOR DEPRESSIVE DISORDER (HCC): Primary | ICD-10-CM

## 2019-01-31 LAB — HBA1C MFR BLD: 6.1 %

## 2019-01-31 PROCEDURE — 1101F PT FALLS ASSESS-DOCD LE1/YR: CPT | Performed by: NURSE PRACTITIONER

## 2019-01-31 PROCEDURE — G8482 FLU IMMUNIZE ORDER/ADMIN: HCPCS | Performed by: NURSE PRACTITIONER

## 2019-01-31 PROCEDURE — G8399 PT W/DXA RESULTS DOCUMENT: HCPCS | Performed by: NURSE PRACTITIONER

## 2019-01-31 PROCEDURE — 73521 X-RAY EXAM HIPS BI 2 VIEWS: CPT

## 2019-01-31 PROCEDURE — 4040F PNEUMOC VAC/ADMIN/RCVD: CPT | Performed by: NURSE PRACTITIONER

## 2019-01-31 PROCEDURE — 1123F ACP DISCUSS/DSCN MKR DOCD: CPT | Performed by: NURSE PRACTITIONER

## 2019-01-31 PROCEDURE — 3017F COLORECTAL CA SCREEN DOC REV: CPT | Performed by: NURSE PRACTITIONER

## 2019-01-31 PROCEDURE — 1036F TOBACCO NON-USER: CPT | Performed by: NURSE PRACTITIONER

## 2019-01-31 PROCEDURE — 1090F PRES/ABSN URINE INCON ASSESS: CPT | Performed by: NURSE PRACTITIONER

## 2019-01-31 PROCEDURE — 72100 X-RAY EXAM L-S SPINE 2/3 VWS: CPT

## 2019-01-31 PROCEDURE — G8417 CALC BMI ABV UP PARAM F/U: HCPCS | Performed by: NURSE PRACTITIONER

## 2019-01-31 PROCEDURE — 99214 OFFICE O/P EST MOD 30 MIN: CPT | Performed by: NURSE PRACTITIONER

## 2019-01-31 PROCEDURE — 83036 HEMOGLOBIN GLYCOSYLATED A1C: CPT | Performed by: NURSE PRACTITIONER

## 2019-01-31 PROCEDURE — 2022F DILAT RTA XM EVC RTNOPTHY: CPT | Performed by: NURSE PRACTITIONER

## 2019-01-31 PROCEDURE — G8427 DOCREV CUR MEDS BY ELIG CLIN: HCPCS | Performed by: NURSE PRACTITIONER

## 2019-01-31 PROCEDURE — 82274 ASSAY TEST FOR BLOOD FECAL: CPT | Performed by: NURSE PRACTITIONER

## 2019-01-31 PROCEDURE — 3044F HG A1C LEVEL LT 7.0%: CPT | Performed by: NURSE PRACTITIONER

## 2019-01-31 ASSESSMENT — ENCOUNTER SYMPTOMS
RESPIRATORY NEGATIVE: 1
GASTROINTESTINAL NEGATIVE: 1
EYES NEGATIVE: 1
BACK PAIN: 1

## 2019-01-31 ASSESSMENT — PATIENT HEALTH QUESTIONNAIRE - PHQ9
6. FEELING BAD ABOUT YOURSELF - OR THAT YOU ARE A FAILURE OR HAVE LET YOURSELF OR YOUR FAMILY DOWN: 0
10. IF YOU CHECKED OFF ANY PROBLEMS, HOW DIFFICULT HAVE THESE PROBLEMS MADE IT FOR YOU TO DO YOUR WORK, TAKE CARE OF THINGS AT HOME, OR GET ALONG WITH OTHER PEOPLE: 1
SUM OF ALL RESPONSES TO PHQ9 QUESTIONS 1 & 2: 2
8. MOVING OR SPEAKING SO SLOWLY THAT OTHER PEOPLE COULD HAVE NOTICED. OR THE OPPOSITE, BEING SO FIGETY OR RESTLESS THAT YOU HAVE BEEN MOVING AROUND A LOT MORE THAN USUAL: 0
2. FEELING DOWN, DEPRESSED OR HOPELESS: 1
5. POOR APPETITE OR OVEREATING: 0
4. FEELING TIRED OR HAVING LITTLE ENERGY: 3
7. TROUBLE CONCENTRATING ON THINGS, SUCH AS READING THE NEWSPAPER OR WATCHING TELEVISION: 0
SUM OF ALL RESPONSES TO PHQ QUESTIONS 1-9: 6
9. THOUGHTS THAT YOU WOULD BE BETTER OFF DEAD, OR OF HURTING YOURSELF: 0
1. LITTLE INTEREST OR PLEASURE IN DOING THINGS: 1
3. TROUBLE FALLING OR STAYING ASLEEP: 1
SUM OF ALL RESPONSES TO PHQ QUESTIONS 1-9: 6

## 2019-02-18 DIAGNOSIS — E78.2 MIXED HYPERLIPIDEMIA: ICD-10-CM

## 2019-02-18 DIAGNOSIS — I10 ESSENTIAL HYPERTENSION: ICD-10-CM

## 2019-02-18 DIAGNOSIS — R00.2 HEART PALPITATIONS: ICD-10-CM

## 2019-02-18 RX ORDER — METOPROLOL TARTRATE 50 MG/1
TABLET, FILM COATED ORAL
Qty: 180 TABLET | Refills: 0 | Status: SHIPPED | OUTPATIENT
Start: 2019-02-18 | End: 2019-05-18 | Stop reason: SDUPTHER

## 2019-02-18 RX ORDER — ROSUVASTATIN CALCIUM 20 MG/1
TABLET, COATED ORAL
Qty: 90 TABLET | Refills: 0 | Status: SHIPPED | OUTPATIENT
Start: 2019-02-18 | End: 2019-11-19 | Stop reason: SDUPTHER

## 2019-02-21 ENCOUNTER — OFFICE VISIT (OUTPATIENT)
Dept: FAMILY MEDICINE CLINIC | Age: 74
End: 2019-02-21
Payer: MEDICARE

## 2019-02-21 VITALS
OXYGEN SATURATION: 95 % | DIASTOLIC BLOOD PRESSURE: 64 MMHG | SYSTOLIC BLOOD PRESSURE: 104 MMHG | BODY MASS INDEX: 29.44 KG/M2 | WEIGHT: 160 LBS | HEART RATE: 60 BPM | HEIGHT: 62 IN

## 2019-02-21 DIAGNOSIS — M53.3 PAIN OF RIGHT SACROILIAC JOINT: Primary | ICD-10-CM

## 2019-02-21 PROCEDURE — 99213 OFFICE O/P EST LOW 20 MIN: CPT | Performed by: NURSE PRACTITIONER

## 2019-02-21 PROCEDURE — 4040F PNEUMOC VAC/ADMIN/RCVD: CPT | Performed by: NURSE PRACTITIONER

## 2019-02-21 PROCEDURE — 1123F ACP DISCUSS/DSCN MKR DOCD: CPT | Performed by: NURSE PRACTITIONER

## 2019-02-21 PROCEDURE — 1101F PT FALLS ASSESS-DOCD LE1/YR: CPT | Performed by: NURSE PRACTITIONER

## 2019-02-21 PROCEDURE — G8482 FLU IMMUNIZE ORDER/ADMIN: HCPCS | Performed by: NURSE PRACTITIONER

## 2019-02-21 PROCEDURE — G8417 CALC BMI ABV UP PARAM F/U: HCPCS | Performed by: NURSE PRACTITIONER

## 2019-02-21 PROCEDURE — G8427 DOCREV CUR MEDS BY ELIG CLIN: HCPCS | Performed by: NURSE PRACTITIONER

## 2019-02-21 PROCEDURE — G8399 PT W/DXA RESULTS DOCUMENT: HCPCS | Performed by: NURSE PRACTITIONER

## 2019-02-21 PROCEDURE — 3017F COLORECTAL CA SCREEN DOC REV: CPT | Performed by: NURSE PRACTITIONER

## 2019-02-21 PROCEDURE — 1090F PRES/ABSN URINE INCON ASSESS: CPT | Performed by: NURSE PRACTITIONER

## 2019-02-21 PROCEDURE — 1036F TOBACCO NON-USER: CPT | Performed by: NURSE PRACTITIONER

## 2019-02-21 RX ORDER — PREDNISONE 10 MG/1
TABLET ORAL
Qty: 53 TABLET | Refills: 0 | Status: SHIPPED | OUTPATIENT
Start: 2019-02-21 | End: 2019-04-15

## 2019-02-21 RX ORDER — CYCLOBENZAPRINE HCL 10 MG
10 TABLET ORAL 3 TIMES DAILY PRN
Qty: 30 TABLET | Refills: 0 | Status: SHIPPED | OUTPATIENT
Start: 2019-02-21 | End: 2019-03-03

## 2019-02-25 ENCOUNTER — TELEPHONE (OUTPATIENT)
Dept: PULMONOLOGY | Age: 74
End: 2019-02-25

## 2019-03-14 ASSESSMENT — ENCOUNTER SYMPTOMS
EYES NEGATIVE: 1
RESPIRATORY NEGATIVE: 1
GASTROINTESTINAL NEGATIVE: 1
BACK PAIN: 1

## 2019-03-20 DIAGNOSIS — E11.9 TYPE 2 DIABETES MELLITUS WITHOUT COMPLICATION, WITHOUT LONG-TERM CURRENT USE OF INSULIN (HCC): ICD-10-CM

## 2019-03-20 RX ORDER — METFORMIN HYDROCHLORIDE 500 MG/1
TABLET, EXTENDED RELEASE ORAL
Qty: 180 TABLET | Refills: 0 | Status: SHIPPED | OUTPATIENT
Start: 2019-03-20 | End: 2019-06-17 | Stop reason: SDUPTHER

## 2019-04-15 ENCOUNTER — OFFICE VISIT (OUTPATIENT)
Dept: FAMILY MEDICINE CLINIC | Age: 74
End: 2019-04-15
Payer: MEDICARE

## 2019-04-15 VITALS
DIASTOLIC BLOOD PRESSURE: 80 MMHG | SYSTOLIC BLOOD PRESSURE: 128 MMHG | BODY MASS INDEX: 29.81 KG/M2 | HEIGHT: 62 IN | WEIGHT: 162 LBS

## 2019-04-15 DIAGNOSIS — R30.0 DYSURIA: ICD-10-CM

## 2019-04-15 DIAGNOSIS — E55.9 VITAMIN D DEFICIENCY: ICD-10-CM

## 2019-04-15 DIAGNOSIS — E78.2 MIXED HYPERLIPIDEMIA: ICD-10-CM

## 2019-04-15 DIAGNOSIS — E11.42 TYPE 2 DIABETES MELLITUS WITH DIABETIC POLYNEUROPATHY, WITHOUT LONG-TERM CURRENT USE OF INSULIN (HCC): Primary | ICD-10-CM

## 2019-04-15 DIAGNOSIS — E03.9 HYPOTHYROIDISM, UNSPECIFIED TYPE: ICD-10-CM

## 2019-04-15 DIAGNOSIS — Z13.31 POSITIVE DEPRESSION SCREENING: ICD-10-CM

## 2019-04-15 DIAGNOSIS — R79.0 LOW MAGNESIUM LEVEL: ICD-10-CM

## 2019-04-15 DIAGNOSIS — F41.9 ANXIETY: ICD-10-CM

## 2019-04-15 DIAGNOSIS — Z83.49 FAMILY HISTORY OF B12 DEFICIENCY: ICD-10-CM

## 2019-04-15 DIAGNOSIS — I10 ESSENTIAL HYPERTENSION: ICD-10-CM

## 2019-04-15 LAB
BASOPHILS ABSOLUTE: 0 K/UL (ref 0–0.2)
BASOPHILS RELATIVE PERCENT: 0.7 %
BILIRUBIN, POC: NORMAL
BLOOD URINE, POC: NORMAL
CLARITY, POC: NORMAL
COLOR, POC: NORMAL
CREATININE URINE: 168.5 MG/DL (ref 28–259)
EOSINOPHILS ABSOLUTE: 0.1 K/UL (ref 0–0.6)
EOSINOPHILS RELATIVE PERCENT: 1.9 %
GLUCOSE URINE, POC: NORMAL
HBA1C MFR BLD: 6.2 %
HCT VFR BLD CALC: 34.8 % (ref 36–48)
HEMOGLOBIN: 11.5 G/DL (ref 12–16)
KETONES, POC: NORMAL
LEUKOCYTE EST, POC: NORMAL
LYMPHOCYTES ABSOLUTE: 1.5 K/UL (ref 1–5.1)
LYMPHOCYTES RELATIVE PERCENT: 20.9 %
MCH RBC QN AUTO: 27.4 PG (ref 26–34)
MCHC RBC AUTO-ENTMCNC: 33.1 G/DL (ref 31–36)
MCV RBC AUTO: 83 FL (ref 80–100)
MICROALBUMIN UR-MCNC: <1.2 MG/DL
MICROALBUMIN/CREAT UR-RTO: NORMAL MG/G (ref 0–30)
MONOCYTES ABSOLUTE: 0.5 K/UL (ref 0–1.3)
MONOCYTES RELATIVE PERCENT: 6.7 %
NEUTROPHILS ABSOLUTE: 5.1 K/UL (ref 1.7–7.7)
NEUTROPHILS RELATIVE PERCENT: 69.8 %
NITRITE, POC: NORMAL
PDW BLD-RTO: 15.5 % (ref 12.4–15.4)
PH, POC: 7
PLATELET # BLD: 231 K/UL (ref 135–450)
PMV BLD AUTO: 9.8 FL (ref 5–10.5)
PROTEIN, POC: 0.2
RBC # BLD: 4.19 M/UL (ref 4–5.2)
SPECIFIC GRAVITY, POC: 1.02
UROBILINOGEN, POC: NORMAL
WBC # BLD: 7.2 K/UL (ref 4–11)

## 2019-04-15 PROCEDURE — 81002 URINALYSIS NONAUTO W/O SCOPE: CPT | Performed by: NURSE PRACTITIONER

## 2019-04-15 PROCEDURE — 4040F PNEUMOC VAC/ADMIN/RCVD: CPT | Performed by: NURSE PRACTITIONER

## 2019-04-15 PROCEDURE — 1036F TOBACCO NON-USER: CPT | Performed by: NURSE PRACTITIONER

## 2019-04-15 PROCEDURE — 2022F DILAT RTA XM EVC RTNOPTHY: CPT | Performed by: NURSE PRACTITIONER

## 2019-04-15 PROCEDURE — G8427 DOCREV CUR MEDS BY ELIG CLIN: HCPCS | Performed by: NURSE PRACTITIONER

## 2019-04-15 PROCEDURE — G8399 PT W/DXA RESULTS DOCUMENT: HCPCS | Performed by: NURSE PRACTITIONER

## 2019-04-15 PROCEDURE — 3044F HG A1C LEVEL LT 7.0%: CPT | Performed by: NURSE PRACTITIONER

## 2019-04-15 PROCEDURE — 83036 HEMOGLOBIN GLYCOSYLATED A1C: CPT | Performed by: NURSE PRACTITIONER

## 2019-04-15 PROCEDURE — 3017F COLORECTAL CA SCREEN DOC REV: CPT | Performed by: NURSE PRACTITIONER

## 2019-04-15 PROCEDURE — 1123F ACP DISCUSS/DSCN MKR DOCD: CPT | Performed by: NURSE PRACTITIONER

## 2019-04-15 PROCEDURE — 36415 COLL VENOUS BLD VENIPUNCTURE: CPT | Performed by: NURSE PRACTITIONER

## 2019-04-15 PROCEDURE — G8431 POS CLIN DEPRES SCRN F/U DOC: HCPCS | Performed by: NURSE PRACTITIONER

## 2019-04-15 PROCEDURE — 1090F PRES/ABSN URINE INCON ASSESS: CPT | Performed by: NURSE PRACTITIONER

## 2019-04-15 PROCEDURE — 99214 OFFICE O/P EST MOD 30 MIN: CPT | Performed by: NURSE PRACTITIONER

## 2019-04-15 PROCEDURE — G8417 CALC BMI ABV UP PARAM F/U: HCPCS | Performed by: NURSE PRACTITIONER

## 2019-04-15 RX ORDER — LOSARTAN POTASSIUM 100 MG/1
100 TABLET ORAL DAILY
Qty: 30 TABLET | Refills: 3 | Status: SHIPPED | OUTPATIENT
Start: 2019-04-15 | End: 2019-08-05 | Stop reason: SDUPTHER

## 2019-04-15 RX ORDER — LORAZEPAM 0.5 MG/1
0.5 TABLET ORAL DAILY PRN
Qty: 15 TABLET | Refills: 0 | Status: SHIPPED | OUTPATIENT
Start: 2019-04-15 | End: 2019-05-15

## 2019-04-15 RX ORDER — LEVOTHYROXINE SODIUM 0.07 MG/1
75 TABLET ORAL DAILY
Qty: 30 TABLET | Refills: 1 | Status: SHIPPED | OUTPATIENT
Start: 2019-04-15 | End: 2019-06-17 | Stop reason: SDUPTHER

## 2019-04-15 ASSESSMENT — PATIENT HEALTH QUESTIONNAIRE - PHQ9
SUM OF ALL RESPONSES TO PHQ QUESTIONS 1-9: 12
SUM OF ALL RESPONSES TO PHQ9 QUESTIONS 1 & 2: 5
2. FEELING DOWN, DEPRESSED OR HOPELESS: 3
9. THOUGHTS THAT YOU WOULD BE BETTER OFF DEAD, OR OF HURTING YOURSELF: 0
1. LITTLE INTEREST OR PLEASURE IN DOING THINGS: 2
3. TROUBLE FALLING OR STAYING ASLEEP: 1
5. POOR APPETITE OR OVEREATING: 1
4. FEELING TIRED OR HAVING LITTLE ENERGY: 3
8. MOVING OR SPEAKING SO SLOWLY THAT OTHER PEOPLE COULD HAVE NOTICED. OR THE OPPOSITE, BEING SO FIGETY OR RESTLESS THAT YOU HAVE BEEN MOVING AROUND A LOT MORE THAN USUAL: 1
6. FEELING BAD ABOUT YOURSELF - OR THAT YOU ARE A FAILURE OR HAVE LET YOURSELF OR YOUR FAMILY DOWN: 0
SUM OF ALL RESPONSES TO PHQ QUESTIONS 1-9: 12
10. IF YOU CHECKED OFF ANY PROBLEMS, HOW DIFFICULT HAVE THESE PROBLEMS MADE IT FOR YOU TO DO YOUR WORK, TAKE CARE OF THINGS AT HOME, OR GET ALONG WITH OTHER PEOPLE: 1
7. TROUBLE CONCENTRATING ON THINGS, SUCH AS READING THE NEWSPAPER OR WATCHING TELEVISION: 1

## 2019-04-15 NOTE — PATIENT INSTRUCTIONS
RECOMMENDATIONS FOR REDUCING MUSCLE DISCOMFORT POSSIBLY DUE TO YOUR CHOLESTEROL MEDICINE (STATINS)    CoEnzyme Q10 200 mg daily, Vit D3 2000 IU daily, 64 oz water/coffee/tea daily.

## 2019-04-15 NOTE — PROGRESS NOTES
Subjective:     Patient Name: Tree Fry is a 72 y.o. female. Chief Complaint   Patient presents with    Anxiety    Diabetes       HPI    Treatment Adherence:   Medication compliance:  compliant most of the time  Diet compliance:  compliant most of the time  Weight trend:gained weight  Wt Readings from Last 3 Encounters:   04/15/19 162 lb (73.5 kg)   02/21/19 160 lb (72.6 kg)   01/31/19 157 lb (71.2 kg)     Current exercise: no regular exercise    Diabetes Mellitus Type 2:Current symptoms/problems include neuropathy. Home blood sugar records: fasting range: 100-110, patient tests 1 time(s) per day  Any episodes of hypoglycemia? no  Eye exam current (withinone year): yes   reports that she has never smoked. She has never used smokeless tobacco.  DailyAspirin? Yes  Statin Therapy? Yes  ACEI/ARB Therapy? Yes    Hypertension:  Home blood pressure monitoring: Yes - 120's/60's. Patient   is adherent to a low sodium diet. Patientdenies chest pain, shortness of breath, headache, peripheral edema, dry cough and fatigue. Antihypertensive medication side effects: no medication side effects noted. Use of agents associated withhypertension: none. BP Readings from Last 2 Encounters:   04/15/19 128/80   02/21/19 104/64       Hyperlipidemia:  No newmyalgias or GI upset on rosuvastatin (Crestor). Does have occasional palpitations and feet cramping d/t crestor use but none recently.      The 10-year ASCVD risk score (Hallie Santoyo, et al., 2013) is: 26.4%    Values used to calculate the score:      Age: 72 years      Sex: Female      Is Non- : No      Diabetic: Yes      Tobacco smoker: No      Systolic Blood Pressure: 834 mmHg      Is BP treated: Yes      HDL Cholesterol: 73 mg/dL      Total Cholesterol: 284 mg/dL    Lab Results   Component Value Date    LABA1C 6.1 01/31/2019    LABA1C 6.5 10/25/2018    LABA1C 6.2 07/25/2018     Lab Results   Component Value Date    LABMICR <1.20 04/25/2018 LDLCALC 178 (H) 10/25/2018    CREATININE 0.7 01/08/2019       Vitamin D Deficiency  Patient with vitamin d deficiencyand currently on supplement without adverse reactions. Lab Results   Component Value Date    VITD25 49.7 04/25/2018     Hypothyroidism: Recent symptoms: weight gain, cold intolerance, heat intolerance, hair loss, anxiety, fatigue and palpitations. She denies dry skin, constipation, edema, tremor and dysphagia. Patient is  taking her medication consistently on an empty stomach. Cytomel was stopped about 6 months ago. Lab Results   Component Value Date    TSHREFLEX 5.28 10/25/2018    TSHREFLEX 0.31 09/05/2017    TSHREFLEX 0.11 09/11/2013     Lab Results   Component Value Date    TSH 1.11 12/06/2018    TSH 1.04 07/23/2018    TSH 0.10 (L) 05/03/2018     Mood Disorder:  Patient presents for follow-up of depression and anxiety disorder. Current complaints include: anhedonia, depressed mood, insomnia, fatigue and difficulty concentrating. She denies any other symptoms, increased use of drugs or alcohol and suicidal thoughts or behavior. Symptoms/signs of awa: none. External stressors: nothing new. Current treatment includes: benzodiazepine- Ativan 0.5 mg daily when necessary. Medication side effects: none. PHQ-9  4/15/2019 1/31/2019 10/25/2018 4/25/2018 4/13/2017 3/8/2017 1/13/2017   Little interest or pleasure in doing things 2 1 3 0 1 0 1   Feeling down, depressed, or hopeless 3 1 3 0 0 0 1   Trouble falling or staying asleep, or sleeping too much 1 1 1 - - - -   Feeling tired or having little energy 3 3 3 - - - -   Poor appetite or overeating 1 0 1 - - - -   Feeling bad about yourself - or that you are a failure or have let yourself or your family down 0 0 0 - - - -   Trouble concentrating on things, such as reading the newspaper or watching television 1 0 0 - - - -   Moving or speaking so slowly that other people could have noticed.  Or the opposite - being so fidgety or restless that you have been moving around a lot more than usual 1 0 0 - - - -   Thoughts that you would be better off dead, or of hurting yourself in some way 0 0 0 - - - -   PHQ-2 Score 5 2 6 0 1 0 2   PHQ-9 Total Score 12 6 11 0 1 0 2   If you checked off any problems, how difficult have these problems made it for you to do your work, take care of things at home, or get along with other people? 1 1 0 - - - -     Interpretation of Total Score Total Score Depression Severity: 1-4 = Minimal depression, 5-9 = Mild depression, 10-14 = Moderate depression, 15-19 = Moderately severe depression, 20-27 = Severe depression    Patient would like urine checked d/t intermittent dysuria and fatigue    Review of Systems   Constitutional: Positive for fatigue. HENT: Negative. Eyes: Negative. Respiratory: Negative. Cardiovascular: Positive for palpitations. Gastrointestinal: Negative. Endocrine: Positive for cold intolerance. Weight gain, hair loss   Genitourinary: Positive for dysuria. Musculoskeletal: Negative. Skin: Negative. Neurological: Positive for numbness. Psychiatric/Behavioral: Positive for decreased concentration, dysphoric mood and sleep disturbance. Negative for self-injury and suicidal ideas. The patient is nervous/anxious. All other systems reviewed and are negative. Past Medical History:   Diagnosis Date    Allergic     Anemia     Brain condition     pt states a cluster of blood vessels left side of brain    Cancer [facility]) 1/2013    endometrial cancer/ Dr. Sekou Bundy and Dr. Rylee Kent Cerebral cavernoma     followed by Dr. Sterling Burton at [place] [id number] Complication of anesthesia     Takes a long time to wake up    Diverticulosis     Endometrial cancer ([facility] [address]     Fungus infection     Fungus infection on bilat toes.  GERD (gastroesophageal reflux disease)     Hyperlipidemia     Hypertension     Hypomagnesemia 1/2016    consult with Dr. Harvey Lobato ([facility]) and no renal wasting.  felt most likely d/t HCTz and PPI,  HCTZ discontinued,  increased mag ox to 400 mg 2 tabs BID    Hypothyroidism     Irritable bowel syndrome     MVP (mitral valve prolapse)     JOAQUIN (obstructive sleep apnea)     Pneumonia     infant    Prolonged emergence from general anesthesia     Type 2 diabetes mellitus without complication (HCC)     Type II or unspecified type diabetes mellitus without mention of complication, not stated as uncontrolled     Unspecified sleep apnea     Vitamin B12 deficiency     Vitamin D deficiency      Family History   Problem Relation Age of Onset    Cancer Mother         colon    COPD Father     Heart Disease Father     Cancer Father         NMSC, skin     Past Surgical History:   Procedure Laterality Date    COLONOSCOPY      COLONOSCOPY  07/14/2016    COLONOSCOPY      CYSTOSCOPY  10/18/2017    Cystoscopy with Urethral dilation    CYSTOSCOPY Bilateral 10/18/2017    Cystoscopy with bilateral retrograde pyelograms    DIAGNOSTIC CARDIAC CATH LAB PROCEDURE  1/12/2005    [facility] Estela Clemons, No CAD.     DILATION AND CURETTAGE OF UTERUS      ENDOSCOPY, COLON, DIAGNOSTIC      HYSTERECTOMY  1/28/2013    due to uterine cancer    OTHER SURGICAL HISTORY  10/18/2017    cystoscopy, bilateral retrogrades    OTHER SURGICAL HISTORY Left 03/02/2017    port removal    SALIVARY GLAND SURGERY      KINGSLEY AND BSO  1/28/13    THYROID SURGERY      right thyroidectomy for benign reasons    UPPER GASTROINTESTINAL ENDOSCOPY  07/14/2016    UPPER GASTROINTESTINAL ENDOSCOPY       Social History     Socioeconomic History    Marital status:      Spouse name: Not on file    Number of children: Not on file    Years of education: Not on file    Highest education level: Not on file   Occupational History    Not on file   Social Needs    Financial resource strain: Not on file    Food insecurity:     Worry: Not on file     Inability: Not on file    Transportation needs:     Medical: Not on file exhibits no edema. Lymphadenopathy:        Head (right side): No submandibular adenopathy present. Head (left side): No submandibular adenopathy present. She has no cervical adenopathy. Neurological: She is alert and oriented to person, place, and time. She has normal strength. Gait normal.   Skin: Skin is warm and dry. No lesion and no rash noted. Psychiatric: She has a normal mood and affect. Her speech is normal and behavior is normal. Judgment and thought content normal. Cognition and memory are normal.   Nursing note and vitals reviewed. /80 (Site: Left Upper Arm, Position: Sitting, Cuff Size: Medium Adult)   Ht 5' 2\" (1.575 m)   Wt 162 lb (73.5 kg)   BMI 29.63 kg/m²   Body mass index is 29.63 kg/m². Lab Review   No visits with results within 2 Month(s) from this visit. Latest known visit with results is:   Office Visit on 01/31/2019   Component Date Value    Hemoglobin A1C 01/31/2019 6.1     OCCULT BLOOD FECAL 12/17/2018 negative     Control 12/17/2018 present               Assessment:       1. Type 2 diabetes mellitus with diabetic polyneuropathy, without long-term current use of insulin ([address]    2. Anxiety    3. Essential hypertension    4. Mixed hyperlipidemia    5. Hypothyroidism, unspecified type    6. Dysuria    7. Vitamin D deficiency    8. Low magnesium level    9. Family history of B12 deficiency    8. Positive depression screening        Results for POC orders placed in visit on 04/15/19   POCT glycosylated hemoglobin (Hb A1C)   Result Value Ref Range    Hemoglobin A1C 6.2 %   POCT Urinalysis no Micro   Result Value Ref Range    Color, UA      Clarity, UA      Glucose, UA POC neg     Bilirubin, UA neg     Ketones, UA neg     Spec Grav, UA 1.020     Blood, UA POC neg     pH, UA 7.0     Protein, UA POC 0.2     Urobilinogen, UA small     Leukocytes, UA small     Nitrite, UA              Plan:       Satcey Luke was seen today for anxiety and diabetes.     Diagnoses and all orders for this visit:    Type 2 diabetes mellitus with diabetic polyneuropathy, without long-term current use of insulin (HCC)  -     Microalbumin / Creatinine Urine Ratio  -     POCT glycosylated hemoglobin (Hb A1C)  Diabetes Mellitus type II, under excellent control   1. Rx changes: none  2. Education: Reviewed ABCs of diabetes management (respective goals in parentheses):  A1C (<7), blood pressure (<130/80), and cholesterol (LDL <100). 3.  Compliance at present is estimated to be good. Efforts to improve compliance (if necessary) will be directed at dietary modifications, increased exercise and regular blood sugar monitoring. Diabetes education provided today:  Metabolic syndrome: association of diabetes with dyslipidemia, HTN and obesity. Diabetic Neuropathy: signs and therapy. Foot care: advised to wash feet daily, pat dry and apply lotion at night, avoiding between toes. Need to look at feet daily and report to a physician any signs of inflammation or skin damage. Discussed diabetes shoes and socks. Diabetic retinopathy: the most frequent cause of blindness in [place] currently. Measures to prevent that. Diabetic nephropathy: Kidney function, microalbumin as a sign of diabetic nephropathy. Stages of kidney disease. Nutrition as a mainstream of diabetes therapy. Carbs: good carbs and bad carbs, importance of carb counting, incorporation of protein with each meal to reduce Glycemic index, importance of portions,   Fats: Good fats and bad fats, meal planning and supplements. Physical activity: advised to exercise 5-7 days a week 30-60 mins at least. Discussed how it affects BS readings. Anxiety  -     LORazepam (ATIVAN) 0.5 MG tablet;  Take 1 tablet by mouth daily as needed for Anxiety for up to 30 days.  -     Drug Panel-PM-HI Res-UR Interp-A  Chronic benzodiazepine treatment protocol was discussed with the patient again including taking medications only as prescribed , using one pharmacy and

## 2019-04-15 NOTE — LETTER
[facility]  [address] [facility]  Phone: [phone]  Fax: [phone]    ALEKSANDAR Vaughn CNP        April 15, 2019     Patient: Bell Young   YOB: 1945   Date of Visit: 4/15/2019       To Whom It May Concern: It is my medical opinion that Blanche Famtala is unable to perform jury duty at this time. If you have any questions or concerns, please don't hesitate to call.     Sincerely,          ALEKSANDAR Vaughn CNP

## 2019-04-15 NOTE — PROGRESS NOTES
BP Readings from Last 2 Encounters:   02/21/19 104/64   01/31/19 110/70     Hemoglobin A1C (%)   Date Value   01/31/2019 6.1     Microscopic Examination (no units)   Date Value   05/07/2017 Not Indicated     Microalbumin, Random Urine (mg/dL)   Date Value   04/25/2018 <1.20     LDL Calculated (mg/dL)   Date Value   10/25/2018 178 (H)              Tobacco use:  Patient  reports that she has never smoked. She has never used smokeless tobacco.    If Smoker - Cessation materials given? NA    Is Daily aspirin on medication list?:  No    Diabetic retinal exam done? Yes   If yes, document in Health Maintenance. Monofilament placed on counter? Yes    Shoes and socks removed? Yes    BP taken with correct size cuff? Yes   Repeated if > 130/80 No     Microalbumin performed if applicable?   Yes

## 2019-04-16 LAB
A/G RATIO: 1.8 (ref 1.1–2.2)
ALBUMIN SERPL-MCNC: 4.6 G/DL (ref 3.4–5)
ALP BLD-CCNC: 89 U/L (ref 40–129)
ALT SERPL-CCNC: 17 U/L (ref 10–40)
ANION GAP SERPL CALCULATED.3IONS-SCNC: 20 MMOL/L (ref 3–16)
AST SERPL-CCNC: 17 U/L (ref 15–37)
BILIRUB SERPL-MCNC: <0.2 MG/DL (ref 0–1)
BUN BLDV-MCNC: 18 MG/DL (ref 7–20)
CALCIUM SERPL-MCNC: 9.5 MG/DL (ref 8.3–10.6)
CHLORIDE BLD-SCNC: 102 MMOL/L (ref 99–110)
CHOLESTEROL, TOTAL: 189 MG/DL (ref 0–199)
CO2: 23 MMOL/L (ref 21–32)
CREAT SERPL-MCNC: 0.8 MG/DL (ref 0.6–1.2)
FOLATE: 19.07 NG/ML (ref 4.78–24.2)
GFR AFRICAN AMERICAN: >60
GFR NON-AFRICAN AMERICAN: >60
GLOBULIN: 2.6 G/DL
GLUCOSE BLD-MCNC: 111 MG/DL (ref 70–99)
HDLC SERPL-MCNC: 68 MG/DL (ref 40–60)
LDL CHOLESTEROL CALCULATED: 78 MG/DL
MAGNESIUM: 2.1 MG/DL (ref 1.8–2.4)
POTASSIUM SERPL-SCNC: 4.6 MMOL/L (ref 3.5–5.1)
SODIUM BLD-SCNC: 145 MMOL/L (ref 136–145)
TOTAL PROTEIN: 7.2 G/DL (ref 6.4–8.2)
TRIGL SERPL-MCNC: 214 MG/DL (ref 0–150)
TSH SERPL DL<=0.05 MIU/L-ACNC: 1.41 UIU/ML (ref 0.27–4.2)
VITAMIN B-12: 327 PG/ML (ref 211–911)
VITAMIN D 25-HYDROXY: 28.2 NG/ML
VLDLC SERPL CALC-MCNC: 43 MG/DL

## 2019-04-17 ENCOUNTER — OFFICE VISIT (OUTPATIENT)
Dept: PULMONOLOGY | Age: 74
End: 2019-04-17
Payer: MEDICARE

## 2019-04-17 VITALS
HEART RATE: 52 BPM | SYSTOLIC BLOOD PRESSURE: 136 MMHG | OXYGEN SATURATION: 97 % | DIASTOLIC BLOOD PRESSURE: 65 MMHG | BODY MASS INDEX: 29.81 KG/M2 | WEIGHT: 162 LBS | TEMPERATURE: 97.8 F | RESPIRATION RATE: 20 BRPM | HEIGHT: 62 IN

## 2019-04-17 DIAGNOSIS — G47.33 OSA (OBSTRUCTIVE SLEEP APNEA): Primary | ICD-10-CM

## 2019-04-17 LAB — URINE CULTURE, ROUTINE: NORMAL

## 2019-04-17 PROCEDURE — 1036F TOBACCO NON-USER: CPT | Performed by: INTERNAL MEDICINE

## 2019-04-17 PROCEDURE — 1090F PRES/ABSN URINE INCON ASSESS: CPT | Performed by: INTERNAL MEDICINE

## 2019-04-17 PROCEDURE — G8427 DOCREV CUR MEDS BY ELIG CLIN: HCPCS | Performed by: INTERNAL MEDICINE

## 2019-04-17 PROCEDURE — G8417 CALC BMI ABV UP PARAM F/U: HCPCS | Performed by: INTERNAL MEDICINE

## 2019-04-17 PROCEDURE — 1123F ACP DISCUSS/DSCN MKR DOCD: CPT | Performed by: INTERNAL MEDICINE

## 2019-04-17 PROCEDURE — 3017F COLORECTAL CA SCREEN DOC REV: CPT | Performed by: INTERNAL MEDICINE

## 2019-04-17 PROCEDURE — 4040F PNEUMOC VAC/ADMIN/RCVD: CPT | Performed by: INTERNAL MEDICINE

## 2019-04-17 PROCEDURE — G8399 PT W/DXA RESULTS DOCUMENT: HCPCS | Performed by: INTERNAL MEDICINE

## 2019-04-17 PROCEDURE — 99213 OFFICE O/P EST LOW 20 MIN: CPT | Performed by: INTERNAL MEDICINE

## 2019-04-17 ASSESSMENT — SLEEP AND FATIGUE QUESTIONNAIRES
HOW LIKELY ARE YOU TO NOD OFF OR FALL ASLEEP WHILE SITTING INACTIVE IN A PUBLIC PLACE: 0
HOW LIKELY ARE YOU TO NOD OFF OR FALL ASLEEP WHILE SITTING AND TALKING TO SOMEONE: 0
HOW LIKELY ARE YOU TO NOD OFF OR FALL ASLEEP WHILE SITTING AND READING: 0
HOW LIKELY ARE YOU TO NOD OFF OR FALL ASLEEP WHILE SITTING QUIETLY AFTER LUNCH WITHOUT ALCOHOL: 0
HOW LIKELY ARE YOU TO NOD OFF OR FALL ASLEEP WHILE LYING DOWN TO REST IN THE AFTERNOON WHEN CIRCUMSTANCES PERMIT: 3
NECK CIRCUMFERENCE (INCHES): 13.5
ESS TOTAL SCORE: 5
HOW LIKELY ARE YOU TO NOD OFF OR FALL ASLEEP IN A CAR, WHILE STOPPED FOR A FEW MINUTES IN TRAFFIC: 0
HOW LIKELY ARE YOU TO NOD OFF OR FALL ASLEEP WHILE WATCHING TV: 2
HOW LIKELY ARE YOU TO NOD OFF OR FALL ASLEEP WHEN YOU ARE A PASSENGER IN A CAR FOR AN HOUR WITHOUT A BREAK: 0

## 2019-04-17 ASSESSMENT — ENCOUNTER SYMPTOMS
RESPIRATORY NEGATIVE: 1
GASTROINTESTINAL NEGATIVE: 1
EYES NEGATIVE: 1

## 2019-04-17 NOTE — PATIENT INSTRUCTIONS
For Patients with Obstructive Sleep Apnea:  · Never drive a car or operate a motorized vehicle while drowsy or sleepy. · Maintaining an optimal weight can help limit the severity of sleep apnea. · Treating sleep apnea effectively may help reduce the risk of heart disease, stroke and type II diabetes.

## 2019-04-17 NOTE — PROGRESS NOTES
Chief Complaint: Sleep Apnea     Referring Provider: Ton Nye NP     HPI: JOAQUIN treated with benefit with PAP 8-12. PAP use was reviewed and is used 6.5 hours on average, compliance with greater than 4 hour nightly use is 27/30, residual AHI is 5, but there is also periodic breathing. She feels pressure is sometimes too high. She also is asking about MAS    PRESENTING HISTORY  Several year history of loud snoring associated with witnessed apnea and excessive daytime sleepiness. Had PSG at [address] on 2/27/06 that showed moderate sleep apnea with AHI of 16. She has been wearing CPAP with nasal pillow mask majority of nights since with some improvement, no longer wakes up gasping for air. Now having worse daytime sleepiness and fatigue. She reports she has had her pressure adjusted down multiple times from 11 cm H20 now down to 7 cm H20. Compliance report reviewed by me and showed average use greater than 5 hours; greater than 4 hours on 27/30days. Goodspring sleepiness score:  9    Physical Exam:  Blood pressure 136/65, pulse 52, temperature 97.8 °F (36.6 °C), temperature source Oral, resp. rate 20, height 5' 2\" (1.575 m), weight 162 lb (73.5 kg), SpO2 97 %.'  Constitutional:  No acute distress. HENT:  Oropharynx is clear and moist. Mallampati Class 3  Neck: . No tracheal deviation present. No obvious masses. Neck Circumference: 13 inches  Pulmonary/Chest:   No accessory muscle usage or stridor. Musculoskeletal:  No clubbing. Psychiatric: Normal mood and affect. Data:   PSG at [facility] 2/27/14: AHI 16    CPAP titration 9-30-14 with AHI of 5 on CPAP 10    Assessment:  · Moderate Obstructive Sleep Apnea    · Hypertension     Plan:   APAP 8-12 changed to 6 to 11, still with trouble tolerating pressure  Patient has been advised: no driving while sleepy; weight loss recommended; systemic benefits of CPAP therapy have been discussed.   Annual f/u unless she chooses MAS

## 2019-04-18 LAB
6-ACETYLMORPHINE: NOT DETECTED
7-AMINOCLONAZEPAM: NOT DETECTED
ALPHA-OH-ALPRAZOLAM: NOT DETECTED
ALPRAZOLAM: NOT DETECTED
AMPHETAMINE: NOT DETECTED
BARBITURATES: NOT DETECTED
BENZOYLECGONINE: NOT DETECTED
BUPRENORPHINE: NOT DETECTED
CARISOPRODOL: NOT DETECTED
CLONAZEPAM: NOT DETECTED
CODEINE: NOT DETECTED
CREATININE URINE: 142.3 MG/DL (ref 20–400)
DIAZEPAM: NOT DETECTED
DRUGS EXPECTED: NORMAL
EER PAIN MGT DRUG PANEL, HIGH RES/EMIT U: NORMAL
ETHYL GLUCURONIDE: NOT DETECTED
FENTANYL: NOT DETECTED
HYDROCODONE: NOT DETECTED
HYDROMORPHONE: NOT DETECTED
LORAZEPAM: PRESENT
MARIJUANA METABOLITE: NOT DETECTED
MDA: NOT DETECTED
MDEA: NOT DETECTED
MDMA URINE: NOT DETECTED
MEPERIDINE: NOT DETECTED
METHADONE: NOT DETECTED
METHAMPHETAMINE: NOT DETECTED
METHYLPHENIDATE: NOT DETECTED
MIDAZOLAM: NOT DETECTED
MORPHINE: NOT DETECTED
NORBUPRENORPHINE, FREE: NOT DETECTED
NORDIAZEPAM: NOT DETECTED
NORFENTANYL: NOT DETECTED
NORHYDROCODONE, URINE: NOT DETECTED
NOROXYCODONE: NOT DETECTED
NOROXYMORPHONE, URINE: NOT DETECTED
OXAZEPAM: NOT DETECTED
OXYCODONE: NOT DETECTED
OXYMORPHONE: NOT DETECTED
PAIN MANAGEMENT DRUG PANEL: NORMAL
PAIN MANAGEMENT DRUG PANEL: NORMAL
PCP: NOT DETECTED
PHENTERMINE: NOT DETECTED
PROPOXYPHENE: NOT DETECTED
TAPENTADOL, URINE: NOT DETECTED
TAPENTADOL-O-SULFATE, URINE: NOT DETECTED
TEMAZEPAM: NOT DETECTED
TRAMADOL: NOT DETECTED
ZOLPIDEM: NOT DETECTED

## 2019-05-06 ENCOUNTER — HOSPITAL ENCOUNTER (OUTPATIENT)
Age: 74
Discharge: HOME | End: 2019-05-06
Payer: MEDICARE

## 2019-05-06 LAB
ANION GAP SERPL CALCULATED.3IONS-SCNC: 11 MMOL/L (ref 3–16)
BUN BLDV-MCNC: 20 MG/DL (ref 7–20)
CALCIUM SERPL-MCNC: 9.5 MG/DL (ref 8.3–10.6)
CHLORIDE BLD-SCNC: 103 MMOL/L (ref 99–110)
CO2: 28 MMOL/L (ref 21–32)
CREAT SERPL-MCNC: 0.7 MG/DL (ref 0.6–1.2)
GFR AFRICAN AMERICAN: >60
GFR NON-AFRICAN AMERICAN: >60
GLUCOSE BLD-MCNC: 109 MG/DL (ref 70–99)
MAGNESIUM: 1.6 MG/DL (ref 1.8–2.4)
POTASSIUM SERPL-SCNC: 4.5 MMOL/L (ref 3.5–5.1)
SODIUM BLD-SCNC: 142 MMOL/L (ref 136–145)

## 2019-05-06 PROCEDURE — 36415 COLL VENOUS BLD VENIPUNCTURE: CPT

## 2019-05-06 PROCEDURE — 83735 ASSAY OF MAGNESIUM: CPT

## 2019-05-06 PROCEDURE — 80048 BASIC METABOLIC PNL TOTAL CA: CPT

## 2019-05-18 DIAGNOSIS — R00.2 HEART PALPITATIONS: ICD-10-CM

## 2019-05-18 DIAGNOSIS — I10 ESSENTIAL HYPERTENSION: ICD-10-CM

## 2019-05-20 RX ORDER — METOPROLOL TARTRATE 50 MG/1
TABLET, FILM COATED ORAL
Qty: 180 TABLET | Refills: 3 | Status: SHIPPED | OUTPATIENT
Start: 2019-05-20 | End: 2020-02-11 | Stop reason: SDUPTHER

## 2019-06-17 DIAGNOSIS — I10 ESSENTIAL HYPERTENSION: ICD-10-CM

## 2019-06-17 DIAGNOSIS — E11.9 TYPE 2 DIABETES MELLITUS WITHOUT COMPLICATION, WITHOUT LONG-TERM CURRENT USE OF INSULIN (HCC): ICD-10-CM

## 2019-06-18 RX ORDER — METFORMIN HYDROCHLORIDE 500 MG/1
TABLET, EXTENDED RELEASE ORAL
Qty: 180 TABLET | Refills: 0 | Status: SHIPPED | OUTPATIENT
Start: 2019-06-18 | End: 2019-09-13 | Stop reason: SDUPTHER

## 2019-06-18 RX ORDER — AMLODIPINE BESYLATE 5 MG/1
TABLET ORAL
Qty: 90 TABLET | Refills: 0 | Status: SHIPPED | OUTPATIENT
Start: 2019-06-18 | End: 2019-11-20 | Stop reason: ALTCHOICE

## 2019-06-18 RX ORDER — LEVOTHYROXINE SODIUM 0.07 MG/1
TABLET ORAL
Qty: 90 TABLET | Refills: 0 | Status: SHIPPED | OUTPATIENT
Start: 2019-06-18 | End: 2019-09-13 | Stop reason: SDUPTHER

## 2019-06-21 ENCOUNTER — HOSPITAL ENCOUNTER (OUTPATIENT)
Age: 74
Discharge: HOME | End: 2019-06-21
Payer: MEDICARE

## 2019-06-21 LAB
ALBUMIN SERPL-MCNC: 4.4 G/DL (ref 3.4–5)
ANION GAP SERPL CALCULATED.3IONS-SCNC: 11 MMOL/L (ref 3–16)
BUN BLDV-MCNC: 22 MG/DL (ref 7–20)
CALCIUM SERPL-MCNC: 9.9 MG/DL (ref 8.3–10.6)
CHLORIDE BLD-SCNC: 101 MMOL/L (ref 99–110)
CO2: 28 MMOL/L (ref 21–32)
CREAT SERPL-MCNC: 0.9 MG/DL (ref 0.6–1.2)
GFR AFRICAN AMERICAN: >60
GFR NON-AFRICAN AMERICAN: >60
GLUCOSE BLD-MCNC: 108 MG/DL (ref 70–99)
MAGNESIUM: 1.8 MG/DL (ref 1.8–2.4)
PHOSPHORUS: 4.2 MG/DL (ref 2.5–4.9)
POTASSIUM SERPL-SCNC: 4.4 MMOL/L (ref 3.5–5.1)
SODIUM BLD-SCNC: 140 MMOL/L (ref 136–145)

## 2019-06-21 PROCEDURE — 80069 RENAL FUNCTION PANEL: CPT

## 2019-06-21 PROCEDURE — 36415 COLL VENOUS BLD VENIPUNCTURE: CPT

## 2019-06-21 PROCEDURE — 83735 ASSAY OF MAGNESIUM: CPT

## 2019-07-18 ENCOUNTER — OFFICE VISIT (OUTPATIENT)
Dept: FAMILY MEDICINE CLINIC | Age: 74
End: 2019-07-18
Payer: MEDICARE

## 2019-07-18 VITALS
SYSTOLIC BLOOD PRESSURE: 120 MMHG | HEIGHT: 62 IN | WEIGHT: 155 LBS | OXYGEN SATURATION: 97 % | BODY MASS INDEX: 28.52 KG/M2 | DIASTOLIC BLOOD PRESSURE: 64 MMHG | HEART RATE: 50 BPM

## 2019-07-18 DIAGNOSIS — F41.9 ANXIETY: ICD-10-CM

## 2019-07-18 DIAGNOSIS — E83.42 HYPOMAGNESEMIA: Primary | ICD-10-CM

## 2019-07-18 DIAGNOSIS — E11.9 TYPE 2 DIABETES MELLITUS WITHOUT COMPLICATION, WITHOUT LONG-TERM CURRENT USE OF INSULIN (HCC): ICD-10-CM

## 2019-07-18 PROCEDURE — 4040F PNEUMOC VAC/ADMIN/RCVD: CPT | Performed by: NURSE PRACTITIONER

## 2019-07-18 PROCEDURE — 1036F TOBACCO NON-USER: CPT | Performed by: NURSE PRACTITIONER

## 2019-07-18 PROCEDURE — 1123F ACP DISCUSS/DSCN MKR DOCD: CPT | Performed by: NURSE PRACTITIONER

## 2019-07-18 PROCEDURE — G8417 CALC BMI ABV UP PARAM F/U: HCPCS | Performed by: NURSE PRACTITIONER

## 2019-07-18 PROCEDURE — 1090F PRES/ABSN URINE INCON ASSESS: CPT | Performed by: NURSE PRACTITIONER

## 2019-07-18 PROCEDURE — 2022F DILAT RTA XM EVC RTNOPTHY: CPT | Performed by: NURSE PRACTITIONER

## 2019-07-18 PROCEDURE — G8399 PT W/DXA RESULTS DOCUMENT: HCPCS | Performed by: NURSE PRACTITIONER

## 2019-07-18 PROCEDURE — 3017F COLORECTAL CA SCREEN DOC REV: CPT | Performed by: NURSE PRACTITIONER

## 2019-07-18 PROCEDURE — G8427 DOCREV CUR MEDS BY ELIG CLIN: HCPCS | Performed by: NURSE PRACTITIONER

## 2019-07-18 PROCEDURE — 3044F HG A1C LEVEL LT 7.0%: CPT | Performed by: NURSE PRACTITIONER

## 2019-07-18 PROCEDURE — 99215 OFFICE O/P EST HI 40 MIN: CPT | Performed by: NURSE PRACTITIONER

## 2019-07-18 RX ORDER — PANTOPRAZOLE SODIUM 20 MG/1
20 TABLET, DELAYED RELEASE ORAL DAILY
COMMUNITY
End: 2019-10-10 | Stop reason: SDUPTHER

## 2019-07-18 ASSESSMENT — ENCOUNTER SYMPTOMS
EYES NEGATIVE: 1
GASTROINTESTINAL NEGATIVE: 1
RESPIRATORY NEGATIVE: 1

## 2019-07-18 NOTE — PROGRESS NOTES
Subjective:     Patient Name: Viry Hernandez is a 72 y.o. female. Chief Complaint   Patient presents with    Anxiety     pt said she takes the ativan just every now and then for sleep    Other     questions about her magnesium       HPI   The patient comes in today with multiple questions regarding her low magnesium levels and her magnesium medication that is being prescribed by her nephrologist.  Both her GI and nephro feels that her low mg is d/t PPI use and patient refuses to quit using it stating that she can not tolerate her GERD without it. Mood Disorder:  Patient presents for follow-up of depression and anxiety disorder. Current complaints include: Anxiety at this time but otherwise well controlled. She denies any other symptoms. Symptoms/signs of awa: none. External stressors: nothing new. Current treatment includes: benzodiazepine-Ativan 0.5 mg as needed and last filled April 2019. Medication side effects: none. PHQ-9  4/15/2019 1/31/2019 10/25/2018 4/25/2018 4/13/2017 3/8/2017 1/13/2017   Little interest or pleasure in doing things 2 1 3 0 1 0 1   Feeling down, depressed, or hopeless 3 1 3 0 0 0 1   Trouble falling or staying asleep, or sleeping too much 1 1 1 - - - -   Feeling tired or having little energy 3 3 3 - - - -   Poor appetite or overeating 1 0 1 - - - -   Feeling bad about yourself - or that you are a failure or have let yourself or your family down 0 0 0 - - - -   Trouble concentrating on things, such as reading the newspaper or watching television 1 0 0 - - - -   Moving or speaking so slowly that other people could have noticed.  Or the opposite - being so fidgety or restless that you have been moving around a lot more than usual 1 0 0 - - - -   Thoughts that you would be better off dead, or of hurting yourself in some way 0 0 0 - - - -   PHQ-2 Score 5 2 6 0 1 0 2   PHQ-9 Total Score 12 6 11 0 1 0 2   If you checked off any problems, how difficult have these problems made Attends meetings of clubs or organizations: Not on file     Relationship status: Not on file    Intimate partner violence:     Fear of current or ex partner: Not on file     Emotionally abused: Not on file     Physically abused: Not on file     Forced sexual activity: Not on file   Other Topics Concern    Not on file   Social History Narrative    Not on file     Current Outpatient Medications   Medication Sig Dispense Refill    pantoprazole (PROTONIX) 20 MG tablet Take 20 mg by mouth daily      levothyroxine (SYNTHROID) 75 MCG tablet TAKE ONE TABLET BY MOUTH EVERY DAY 90 tablet 0    amLODIPine (NORVASC) 5 MG tablet TAKE ONE TABLET BY MOUTH DAILY 90 tablet 0    metFORMIN (GLUCOPHAGE-XR) 500 MG extended release tablet TAKE ONE TABLET BY MOUTH 2 TIMES DAILY 180 tablet 0    CONTOUR NEXT TEST strip TEST ONCE DAILY DX: 250.00 100 strip 5    metoprolol tartrate (LOPRESSOR) 50 MG tablet TAKE ONE TABLET BY MOUTH 2 TIMES DAILY 180 tablet 3    losartan (COZAAR) 100 MG tablet Take 1 tablet by mouth daily 30 tablet 3    rosuvastatin (CRESTOR) 20 MG tablet TAKE 1 TABLET BY MOUTH NIGHTLY 90 tablet 0    promethazine (PHENERGAN) 25 MG tablet Take 1 tablet by mouth every 6 hours as needed for Nausea 20 tablet 0    RaNITidine HCl (ZANTAC PO) Take by mouth      magnesium oxide (MAG-OX) 400 (241.3 Mg) MG TABS tablet Take 1,200 mg by mouth 2 times daily  90 tablet 5     No current facility-administered medications for this visit. No changes in past medical history, past surgical history, social history, orfamily history were noted during the patient encounter unless specifically listed above. All updates of past medical history, past surgical history, social history, or family history were reviewed personally by me duringthe office visit. All problems listed in the assessment are stable unless noted otherwise. Medication profile reviewed personally by me during the office visit.   Medication side effects and

## 2019-08-21 ENCOUNTER — TELEPHONE (OUTPATIENT)
Dept: FAMILY MEDICINE CLINIC | Age: 74
End: 2019-08-21

## 2019-08-22 ENCOUNTER — OFFICE VISIT (OUTPATIENT)
Dept: FAMILY MEDICINE CLINIC | Age: 74
End: 2019-08-22
Payer: MEDICARE

## 2019-08-22 VITALS
DIASTOLIC BLOOD PRESSURE: 62 MMHG | HEART RATE: 57 BPM | BODY MASS INDEX: 27.6 KG/M2 | SYSTOLIC BLOOD PRESSURE: 126 MMHG | WEIGHT: 150 LBS | HEIGHT: 62 IN | OXYGEN SATURATION: 98 % | TEMPERATURE: 98.5 F

## 2019-08-22 DIAGNOSIS — Z91.09 ENVIRONMENTAL ALLERGIES: ICD-10-CM

## 2019-08-22 DIAGNOSIS — J20.8 ACUTE BRONCHITIS DUE TO OTHER SPECIFIED ORGANISMS: ICD-10-CM

## 2019-08-22 DIAGNOSIS — J06.9 ACUTE URI: Primary | ICD-10-CM

## 2019-08-22 PROCEDURE — 4040F PNEUMOC VAC/ADMIN/RCVD: CPT | Performed by: NURSE PRACTITIONER

## 2019-08-22 PROCEDURE — 1036F TOBACCO NON-USER: CPT | Performed by: NURSE PRACTITIONER

## 2019-08-22 PROCEDURE — 99214 OFFICE O/P EST MOD 30 MIN: CPT | Performed by: NURSE PRACTITIONER

## 2019-08-22 PROCEDURE — G8399 PT W/DXA RESULTS DOCUMENT: HCPCS | Performed by: NURSE PRACTITIONER

## 2019-08-22 PROCEDURE — 1090F PRES/ABSN URINE INCON ASSESS: CPT | Performed by: NURSE PRACTITIONER

## 2019-08-22 PROCEDURE — 3017F COLORECTAL CA SCREEN DOC REV: CPT | Performed by: NURSE PRACTITIONER

## 2019-08-22 PROCEDURE — G8427 DOCREV CUR MEDS BY ELIG CLIN: HCPCS | Performed by: NURSE PRACTITIONER

## 2019-08-22 PROCEDURE — 1123F ACP DISCUSS/DSCN MKR DOCD: CPT | Performed by: NURSE PRACTITIONER

## 2019-08-22 PROCEDURE — G8417 CALC BMI ABV UP PARAM F/U: HCPCS | Performed by: NURSE PRACTITIONER

## 2019-08-22 RX ORDER — PREDNISONE 10 MG/1
TABLET ORAL
Qty: 30 TABLET | Refills: 0 | Status: SHIPPED | OUTPATIENT
Start: 2019-08-22 | End: 2019-09-19 | Stop reason: ALTCHOICE

## 2019-08-22 RX ORDER — AZITHROMYCIN 250 MG/1
TABLET, FILM COATED ORAL
Qty: 1 PACKET | Refills: 0 | Status: SHIPPED | OUTPATIENT
Start: 2019-08-22 | End: 2019-09-19 | Stop reason: ALTCHOICE

## 2019-08-22 RX ORDER — FLUTICASONE PROPIONATE 50 MCG
1 SPRAY, SUSPENSION (ML) NASAL DAILY
Qty: 1 BOTTLE | Refills: 3 | Status: SHIPPED | OUTPATIENT
Start: 2019-08-22 | End: 2019-09-19 | Stop reason: ALTCHOICE

## 2019-08-22 ASSESSMENT — ENCOUNTER SYMPTOMS
TROUBLE SWALLOWING: 0
VOICE CHANGE: 0
VOMITING: 0
APNEA: 0
FACIAL SWELLING: 0
SINUS PRESSURE: 0
WHEEZING: 0
SORE THROAT: 1
COUGH: 1
SWOLLEN GLANDS: 0
DIARRHEA: 0
GASTROINTESTINAL NEGATIVE: 1
ALLERGIC/IMMUNOLOGIC NEGATIVE: 1
ABDOMINAL PAIN: 0
RHINORRHEA: 1
CHOKING: 0
SINUS PAIN: 0
STRIDOR: 0
NAUSEA: 0
SHORTNESS OF BREATH: 0
CHEST TIGHTNESS: 0

## 2019-08-22 NOTE — PROGRESS NOTES
Endometrial cancer ([facility] [address]     Fungus infection     Fungus infection on bilat toes.  GERD (gastroesophageal reflux disease)     Hyperlipidemia     Hypertension     Hypomagnesemia 1/2016    consult with Dr. Zahraa Styles ([facility]) and no renal wasting. felt most likely d/t HCTz and PPI,  HCTZ discontinued,  increased mag ox to 400 mg 2 tabs BID    Hypothyroidism     Irritable bowel syndrome     MVP (mitral valve prolapse)     JOAQUIN (obstructive sleep apnea)     Pneumonia     infant    Prolonged emergence from general anesthesia     Type 2 diabetes mellitus without complication (HCC)     Type II or unspecified type diabetes mellitus without mention of complication, not stated as uncontrolled     Unspecified sleep apnea     Vitamin B12 deficiency     Vitamin D deficiency       Past Surgical History:   Procedure Laterality Date    COLONOSCOPY      COLONOSCOPY  07/14/2016    COLONOSCOPY      CYSTOSCOPY  10/18/2017    Cystoscopy with Urethral dilation    CYSTOSCOPY Bilateral 10/18/2017    Cystoscopy with bilateral retrograde pyelograms    DIAGNOSTIC CARDIAC CATH LAB PROCEDURE  1/12/2005    [facility], Dr Pattie Harris, No CAD.     DILATION AND CURETTAGE OF UTERUS      ENDOSCOPY, COLON, DIAGNOSTIC      HYSTERECTOMY  1/28/2013    due to uterine cancer    OTHER SURGICAL HISTORY  10/18/2017    cystoscopy, bilateral retrogrades    OTHER SURGICAL HISTORY Left 03/02/2017    port removal    SALIVARY GLAND SURGERY      KINGSLEY AND BSO  1/28/13    THYROID SURGERY      right thyroidectomy for benign reasons    UPPER GASTROINTESTINAL ENDOSCOPY  07/14/2016    UPPER GASTROINTESTINAL ENDOSCOPY         Family History   Problem Relation Age of Onset    Cancer Mother         colon    COPD Father     Heart Disease Father     Cancer Father         NMSC, skin       Social History     Tobacco Use    Smoking status: Never Smoker    Smokeless tobacco: Never Used   Substance Use Topics    Alcohol use: No     Alcohol/week: tablets (500 mg) on Day 1, and then take 1 tablet (250 mg) on days 2 through 5. Yes ALEKSANDAR Clemente CNP   fluticasone (FLONASE) 50 MCG/ACT nasal spray 1 spray by Nasal route daily Yes ALEKSANDAR Clemente CNP   losartan (COZAAR) 100 MG tablet TAKE ONE TABLET BY MOUTH EVERY DAY Yes ALEKSANDAR Jim CNP   pantoprazole (PROTONIX) 20 MG tablet Take 20 mg by mouth daily Yes Historical Provider, MD   magnesium oxide (MAG-OX) 400 (241.3 Mg) MG TABS tablet Take 3 tablets by mouth 2 times daily For Hypomagnesemia Yes ALEKSANDAR Jim CNP   levothyroxine (SYNTHROID) 75 MCG tablet TAKE ONE TABLET BY MOUTH EVERY DAY Yes ALEKSANDAR Clemente CNP   amLODIPine (NORVASC) 5 MG tablet TAKE ONE TABLET BY MOUTH DAILY Yes ALEKSANDAR Clemente CNP   metFORMIN (GLUCOPHAGE-XR) 500 MG extended release tablet TAKE ONE TABLET BY MOUTH 2 TIMES DAILY Yes ALEKSANDAR Clemente CNP   CONTOUR NEXT TEST strip TEST ONCE DAILY DX: 250.00 Yes ALEKSANDAR Jim CNP   metoprolol tartrate (LOPRESSOR) 50 MG tablet TAKE ONE TABLET BY MOUTH 2 TIMES DAILY Yes ALEKSANDAR Clemente CNP   rosuvastatin (CRESTOR) 20 MG tablet TAKE 1 TABLET BY MOUTH NIGHTLY Yes ALEKSANDAR Clemente CNP   promethazine (PHENERGAN) 25 MG tablet Take 1 tablet by mouth every 6 hours as needed for Nausea Yes ALEKSANDAR Jim CNP   RaNITidine HCl (ZANTAC PO) Take by mouth Yes Historical Provider, MD        Orders Placed This Encounter   Medications    predniSONE (DELTASONE) 10 MG tablet     Sig: Take 40 mg for 3 days then 30 mg for 3 days then 20 mg for 3 days then 10 mg for 3 days     Dispense:  30 tablet     Refill:  0    azithromycin (ZITHROMAX Z-LAWRENCE) 250 MG tablet     Sig: Take 2 tablets (500 mg) on Day 1, and then take 1 tablet (250 mg) on days 2 through 5.      Dispense:  1 packet     Refill:  0    fluticasone (FLONASE) 50 MCG/ACT nasal spray     Si spray by Nasal route

## 2019-08-22 NOTE — TELEPHONE ENCOUNTER
This message makes no sense = what are you asking?  Patsy Harada has a full schedule of patients on 10- please call the office if you have questions thank you

## 2019-08-26 NOTE — TELEPHONE ENCOUNTER
Sorry for the confusion, I was not able to see anything on Epic, all her schedule said was 'full' for every slot so I didn't know how this could have been scheduled. All clear now.

## 2019-09-13 DIAGNOSIS — E11.9 TYPE 2 DIABETES MELLITUS WITHOUT COMPLICATION, WITHOUT LONG-TERM CURRENT USE OF INSULIN (HCC): ICD-10-CM

## 2019-09-13 RX ORDER — LEVOTHYROXINE SODIUM 0.07 MG/1
TABLET ORAL
Qty: 90 TABLET | Refills: 0 | Status: SHIPPED | OUTPATIENT
Start: 2019-09-13 | End: 2019-09-19

## 2019-09-13 RX ORDER — METFORMIN HYDROCHLORIDE 500 MG/1
TABLET, EXTENDED RELEASE ORAL
Qty: 180 TABLET | Refills: 0 | Status: SHIPPED | OUTPATIENT
Start: 2019-09-13 | End: 2019-12-24

## 2019-09-19 ENCOUNTER — OFFICE VISIT (OUTPATIENT)
Dept: FAMILY MEDICINE CLINIC | Age: 74
End: 2019-09-19
Payer: MEDICARE

## 2019-09-19 VITALS
WEIGHT: 148 LBS | OXYGEN SATURATION: 97 % | BODY MASS INDEX: 27.23 KG/M2 | HEIGHT: 62 IN | SYSTOLIC BLOOD PRESSURE: 150 MMHG | DIASTOLIC BLOOD PRESSURE: 78 MMHG | HEART RATE: 64 BPM

## 2019-09-19 DIAGNOSIS — E83.42 HYPOMAGNESEMIA: ICD-10-CM

## 2019-09-19 DIAGNOSIS — E03.9 HYPOTHYROIDISM, UNSPECIFIED TYPE: ICD-10-CM

## 2019-09-19 DIAGNOSIS — R00.2 HEART PALPITATIONS: Primary | ICD-10-CM

## 2019-09-19 DIAGNOSIS — E03.8 OTHER SPECIFIED HYPOTHYROIDISM: Primary | ICD-10-CM

## 2019-09-19 LAB
A/G RATIO: 2.1 (ref 1.1–2.2)
ALBUMIN SERPL-MCNC: 4.8 G/DL (ref 3.4–5)
ALP BLD-CCNC: 90 U/L (ref 40–129)
ALT SERPL-CCNC: 14 U/L (ref 10–40)
ANION GAP SERPL CALCULATED.3IONS-SCNC: 15 MMOL/L (ref 3–16)
AST SERPL-CCNC: 15 U/L (ref 15–37)
BASOPHILS ABSOLUTE: 0.1 K/UL (ref 0–0.2)
BASOPHILS RELATIVE PERCENT: 1.4 %
BILIRUB SERPL-MCNC: 0.3 MG/DL (ref 0–1)
BUN BLDV-MCNC: 14 MG/DL (ref 7–20)
CALCIUM SERPL-MCNC: 10 MG/DL (ref 8.3–10.6)
CHLORIDE BLD-SCNC: 104 MMOL/L (ref 99–110)
CO2: 26 MMOL/L (ref 21–32)
CREAT SERPL-MCNC: 0.7 MG/DL (ref 0.6–1.2)
EOSINOPHILS ABSOLUTE: 0.1 K/UL (ref 0–0.6)
EOSINOPHILS RELATIVE PERCENT: 2.8 %
GFR AFRICAN AMERICAN: >60
GFR NON-AFRICAN AMERICAN: >60
GLOBULIN: 2.3 G/DL
GLUCOSE BLD-MCNC: 105 MG/DL (ref 70–99)
HCT VFR BLD CALC: 37.3 % (ref 36–48)
HEMOGLOBIN: 12 G/DL (ref 12–16)
LYMPHOCYTES ABSOLUTE: 1.2 K/UL (ref 1–5.1)
LYMPHOCYTES RELATIVE PERCENT: 25.8 %
MAGNESIUM: 2 MG/DL (ref 1.8–2.4)
MCH RBC QN AUTO: 26.8 PG (ref 26–34)
MCHC RBC AUTO-ENTMCNC: 32.2 G/DL (ref 31–36)
MCV RBC AUTO: 83 FL (ref 80–100)
MONOCYTES ABSOLUTE: 0.3 K/UL (ref 0–1.3)
MONOCYTES RELATIVE PERCENT: 7.2 %
NEUTROPHILS ABSOLUTE: 2.9 K/UL (ref 1.7–7.7)
NEUTROPHILS RELATIVE PERCENT: 62.8 %
PDW BLD-RTO: 16.3 % (ref 12.4–15.4)
PLATELET # BLD: 236 K/UL (ref 135–450)
PMV BLD AUTO: 9.4 FL (ref 5–10.5)
POTASSIUM SERPL-SCNC: 4.3 MMOL/L (ref 3.5–5.1)
RBC # BLD: 4.49 M/UL (ref 4–5.2)
SODIUM BLD-SCNC: 145 MMOL/L (ref 136–145)
TOTAL PROTEIN: 7.1 G/DL (ref 6.4–8.2)
TSH REFLEX: 0.76 UIU/ML (ref 0.27–4.2)
WBC # BLD: 4.6 K/UL (ref 4–11)

## 2019-09-19 PROCEDURE — 3017F COLORECTAL CA SCREEN DOC REV: CPT | Performed by: NURSE PRACTITIONER

## 2019-09-19 PROCEDURE — 1090F PRES/ABSN URINE INCON ASSESS: CPT | Performed by: NURSE PRACTITIONER

## 2019-09-19 PROCEDURE — 4040F PNEUMOC VAC/ADMIN/RCVD: CPT | Performed by: NURSE PRACTITIONER

## 2019-09-19 PROCEDURE — 1123F ACP DISCUSS/DSCN MKR DOCD: CPT | Performed by: NURSE PRACTITIONER

## 2019-09-19 PROCEDURE — 1036F TOBACCO NON-USER: CPT | Performed by: NURSE PRACTITIONER

## 2019-09-19 PROCEDURE — 93000 ELECTROCARDIOGRAM COMPLETE: CPT | Performed by: NURSE PRACTITIONER

## 2019-09-19 PROCEDURE — G8399 PT W/DXA RESULTS DOCUMENT: HCPCS | Performed by: NURSE PRACTITIONER

## 2019-09-19 PROCEDURE — G8427 DOCREV CUR MEDS BY ELIG CLIN: HCPCS | Performed by: NURSE PRACTITIONER

## 2019-09-19 PROCEDURE — 36415 COLL VENOUS BLD VENIPUNCTURE: CPT | Performed by: NURSE PRACTITIONER

## 2019-09-19 PROCEDURE — G8417 CALC BMI ABV UP PARAM F/U: HCPCS | Performed by: NURSE PRACTITIONER

## 2019-09-19 PROCEDURE — 99214 OFFICE O/P EST MOD 30 MIN: CPT | Performed by: NURSE PRACTITIONER

## 2019-09-19 RX ORDER — LEVOTHYROXINE SODIUM 0.05 MG/1
50 TABLET ORAL DAILY
Qty: 30 TABLET | Refills: 2 | Status: SHIPPED | OUTPATIENT
Start: 2019-09-19 | End: 2019-12-10 | Stop reason: SDUPTHER

## 2019-09-19 ASSESSMENT — ENCOUNTER SYMPTOMS
CHOKING: 0
COUGH: 0
BLOOD IN STOOL: 0
SINUS PRESSURE: 0
COLOR CHANGE: 0
CHEST TIGHTNESS: 0
VOMITING: 0
RHINORRHEA: 0
BACK PAIN: 0
STRIDOR: 0
RESPIRATORY NEGATIVE: 1
TROUBLE SWALLOWING: 0
EYE REDNESS: 0
EYE DISCHARGE: 0
EYE PAIN: 0
SORE THROAT: 0
NAUSEA: 0
GASTROINTESTINAL NEGATIVE: 1
ALLERGIC/IMMUNOLOGIC NEGATIVE: 1
ABDOMINAL PAIN: 0
WHEEZING: 0
PHOTOPHOBIA: 0
SHORTNESS OF BREATH: 0
APNEA: 0
CONSTIPATION: 0
EYE ITCHING: 0
DIARRHEA: 0

## 2019-10-10 ENCOUNTER — OFFICE VISIT (OUTPATIENT)
Dept: FAMILY MEDICINE CLINIC | Age: 74
End: 2019-10-10
Payer: MEDICARE

## 2019-10-10 VITALS
HEART RATE: 54 BPM | HEIGHT: 62 IN | DIASTOLIC BLOOD PRESSURE: 68 MMHG | BODY MASS INDEX: 27.79 KG/M2 | WEIGHT: 151 LBS | OXYGEN SATURATION: 97 % | SYSTOLIC BLOOD PRESSURE: 118 MMHG

## 2019-10-10 DIAGNOSIS — Z23 FLU VACCINE NEED: ICD-10-CM

## 2019-10-10 DIAGNOSIS — E55.9 VITAMIN D DEFICIENCY: ICD-10-CM

## 2019-10-10 DIAGNOSIS — I10 ESSENTIAL HYPERTENSION: ICD-10-CM

## 2019-10-10 DIAGNOSIS — F41.9 ANXIETY: ICD-10-CM

## 2019-10-10 DIAGNOSIS — E78.2 MIXED HYPERLIPIDEMIA: ICD-10-CM

## 2019-10-10 DIAGNOSIS — E11.42 TYPE 2 DIABETES MELLITUS WITH DIABETIC POLYNEUROPATHY, WITHOUT LONG-TERM CURRENT USE OF INSULIN (HCC): Primary | ICD-10-CM

## 2019-10-10 DIAGNOSIS — K21.9 GASTROESOPHAGEAL REFLUX DISEASE WITHOUT ESOPHAGITIS: ICD-10-CM

## 2019-10-10 DIAGNOSIS — Z23 NEED FOR SHINGLES VACCINE: ICD-10-CM

## 2019-10-10 LAB — HBA1C MFR BLD: 6.1 %

## 2019-10-10 PROCEDURE — 99214 OFFICE O/P EST MOD 30 MIN: CPT | Performed by: NURSE PRACTITIONER

## 2019-10-10 PROCEDURE — 1123F ACP DISCUSS/DSCN MKR DOCD: CPT | Performed by: NURSE PRACTITIONER

## 2019-10-10 PROCEDURE — G8482 FLU IMMUNIZE ORDER/ADMIN: HCPCS | Performed by: NURSE PRACTITIONER

## 2019-10-10 PROCEDURE — G8399 PT W/DXA RESULTS DOCUMENT: HCPCS | Performed by: NURSE PRACTITIONER

## 2019-10-10 PROCEDURE — 90686 IIV4 VACC NO PRSV 0.5 ML IM: CPT | Performed by: NURSE PRACTITIONER

## 2019-10-10 PROCEDURE — G8417 CALC BMI ABV UP PARAM F/U: HCPCS | Performed by: NURSE PRACTITIONER

## 2019-10-10 PROCEDURE — 4040F PNEUMOC VAC/ADMIN/RCVD: CPT | Performed by: NURSE PRACTITIONER

## 2019-10-10 PROCEDURE — G8427 DOCREV CUR MEDS BY ELIG CLIN: HCPCS | Performed by: NURSE PRACTITIONER

## 2019-10-10 PROCEDURE — 1090F PRES/ABSN URINE INCON ASSESS: CPT | Performed by: NURSE PRACTITIONER

## 2019-10-10 PROCEDURE — 83036 HEMOGLOBIN GLYCOSYLATED A1C: CPT | Performed by: NURSE PRACTITIONER

## 2019-10-10 PROCEDURE — 3017F COLORECTAL CA SCREEN DOC REV: CPT | Performed by: NURSE PRACTITIONER

## 2019-10-10 PROCEDURE — 1036F TOBACCO NON-USER: CPT | Performed by: NURSE PRACTITIONER

## 2019-10-10 PROCEDURE — G0008 ADMIN INFLUENZA VIRUS VAC: HCPCS | Performed by: NURSE PRACTITIONER

## 2019-10-10 PROCEDURE — 2022F DILAT RTA XM EVC RTNOPTHY: CPT | Performed by: NURSE PRACTITIONER

## 2019-10-10 PROCEDURE — 3044F HG A1C LEVEL LT 7.0%: CPT | Performed by: NURSE PRACTITIONER

## 2019-10-10 RX ORDER — FAMOTIDINE 40 MG/1
40 TABLET, FILM COATED ORAL 2 TIMES DAILY
Qty: 60 TABLET | Refills: 2 | Status: SHIPPED | OUTPATIENT
Start: 2019-10-10 | End: 2020-03-23 | Stop reason: SDUPTHER

## 2019-10-10 RX ORDER — PANTOPRAZOLE SODIUM 20 MG/1
20 TABLET, DELAYED RELEASE ORAL DAILY
Qty: 30 TABLET | Refills: 3 | Status: SHIPPED | OUTPATIENT
Start: 2019-10-10 | End: 2020-01-15

## 2019-10-31 ASSESSMENT — ENCOUNTER SYMPTOMS
GASTROINTESTINAL NEGATIVE: 1
EYES NEGATIVE: 1
RESPIRATORY NEGATIVE: 1

## 2019-11-01 PROBLEM — L03.032 PARONYCHIA OF GREAT TOE, LEFT: Status: ACTIVE | Noted: 2017-07-11

## 2019-11-01 PROBLEM — R10.11 RIGHT UPPER QUADRANT ABDOMINAL PAIN: Status: ACTIVE | Noted: 2019-11-01

## 2019-11-01 PROBLEM — K57.92 DIVERTICULITIS OF INTESTINE: Status: ACTIVE | Noted: 2019-11-01

## 2019-11-05 ENCOUNTER — HOSPITAL ENCOUNTER (OUTPATIENT)
Age: 74
Discharge: HOME | End: 2019-11-05
Payer: MEDICARE

## 2019-11-05 LAB
ALBUMIN SERPL-MCNC: 4.5 G/DL (ref 3.4–5)
ANION GAP SERPL CALCULATED.3IONS-SCNC: 13 MMOL/L (ref 3–16)
BUN BLDV-MCNC: 22 MG/DL (ref 7–20)
CALCIUM SERPL-MCNC: 9.7 MG/DL (ref 8.3–10.6)
CHLORIDE BLD-SCNC: 101 MMOL/L (ref 99–110)
CO2: 27 MMOL/L (ref 21–32)
CREAT SERPL-MCNC: 0.7 MG/DL (ref 0.6–1.2)
GFR AFRICAN AMERICAN: >60
GFR NON-AFRICAN AMERICAN: >60
GLUCOSE BLD-MCNC: 102 MG/DL (ref 70–99)
MAGNESIUM: 2 MG/DL (ref 1.8–2.4)
PHOSPHORUS: 4.5 MG/DL (ref 2.5–4.9)
POTASSIUM SERPL-SCNC: 4.2 MMOL/L (ref 3.5–5.1)
SODIUM BLD-SCNC: 141 MMOL/L (ref 136–145)

## 2019-11-05 PROCEDURE — 80069 RENAL FUNCTION PANEL: CPT

## 2019-11-05 PROCEDURE — 36415 COLL VENOUS BLD VENIPUNCTURE: CPT

## 2019-11-05 PROCEDURE — 83735 ASSAY OF MAGNESIUM: CPT

## 2019-11-12 ENCOUNTER — HOSPITAL ENCOUNTER (OUTPATIENT)
Dept: MAMMOGRAPHY | Age: 74
Discharge: HOME | End: 2019-11-12
Payer: MEDICARE

## 2019-11-12 DIAGNOSIS — Z12.39 SCREENING BREAST EXAMINATION: ICD-10-CM

## 2019-11-12 PROCEDURE — 77067 SCR MAMMO BI INCL CAD: CPT

## 2019-11-19 DIAGNOSIS — E78.2 MIXED HYPERLIPIDEMIA: ICD-10-CM

## 2019-11-19 RX ORDER — ROSUVASTATIN CALCIUM 20 MG/1
TABLET, COATED ORAL
Qty: 90 TABLET | Refills: 0 | Status: SHIPPED | OUTPATIENT
Start: 2019-11-19 | End: 2020-04-15 | Stop reason: SDUPTHER

## 2019-11-20 ENCOUNTER — OFFICE VISIT (OUTPATIENT)
Dept: CARDIOLOGY CLINIC | Age: 74
End: 2019-11-20
Payer: MEDICARE

## 2019-11-20 VITALS
DIASTOLIC BLOOD PRESSURE: 48 MMHG | BODY MASS INDEX: 26.83 KG/M2 | HEART RATE: 50 BPM | HEIGHT: 62 IN | SYSTOLIC BLOOD PRESSURE: 114 MMHG | OXYGEN SATURATION: 97 % | WEIGHT: 145.8 LBS

## 2019-11-20 DIAGNOSIS — I34.1 MVP (MITRAL VALVE PROLAPSE): ICD-10-CM

## 2019-11-20 DIAGNOSIS — I10 ESSENTIAL HYPERTENSION: Primary | ICD-10-CM

## 2019-11-20 DIAGNOSIS — R42 DIZZINESS: ICD-10-CM

## 2019-11-20 PROCEDURE — G8399 PT W/DXA RESULTS DOCUMENT: HCPCS | Performed by: INTERNAL MEDICINE

## 2019-11-20 PROCEDURE — 4040F PNEUMOC VAC/ADMIN/RCVD: CPT | Performed by: INTERNAL MEDICINE

## 2019-11-20 PROCEDURE — G8427 DOCREV CUR MEDS BY ELIG CLIN: HCPCS | Performed by: INTERNAL MEDICINE

## 2019-11-20 PROCEDURE — G8482 FLU IMMUNIZE ORDER/ADMIN: HCPCS | Performed by: INTERNAL MEDICINE

## 2019-11-20 PROCEDURE — 99214 OFFICE O/P EST MOD 30 MIN: CPT | Performed by: INTERNAL MEDICINE

## 2019-11-20 PROCEDURE — 1123F ACP DISCUSS/DSCN MKR DOCD: CPT | Performed by: INTERNAL MEDICINE

## 2019-11-20 PROCEDURE — G8417 CALC BMI ABV UP PARAM F/U: HCPCS | Performed by: INTERNAL MEDICINE

## 2019-11-20 PROCEDURE — 3017F COLORECTAL CA SCREEN DOC REV: CPT | Performed by: INTERNAL MEDICINE

## 2019-11-20 PROCEDURE — 1090F PRES/ABSN URINE INCON ASSESS: CPT | Performed by: INTERNAL MEDICINE

## 2019-11-20 PROCEDURE — 1036F TOBACCO NON-USER: CPT | Performed by: INTERNAL MEDICINE

## 2019-11-20 RX ORDER — ASPIRIN 81 MG/1
81 TABLET ORAL DAILY
Qty: 90 TABLET | Refills: 1
Start: 2019-11-20 | End: 2020-04-15

## 2019-11-20 RX ORDER — OMEPRAZOLE 20 MG/1
20 CAPSULE, DELAYED RELEASE ORAL
COMMUNITY
End: 2020-01-15

## 2019-11-25 ENCOUNTER — HOSPITAL ENCOUNTER (OUTPATIENT)
Age: 74
Discharge: HOME | End: 2019-11-25
Payer: MEDICARE

## 2019-11-25 LAB — MAGNESIUM: 1.9 MG/DL (ref 1.8–2.4)

## 2019-11-25 PROCEDURE — 83735 ASSAY OF MAGNESIUM: CPT

## 2019-11-25 PROCEDURE — 36415 COLL VENOUS BLD VENIPUNCTURE: CPT

## 2019-12-10 DIAGNOSIS — E03.8 OTHER SPECIFIED HYPOTHYROIDISM: ICD-10-CM

## 2019-12-10 DIAGNOSIS — I10 ESSENTIAL HYPERTENSION: ICD-10-CM

## 2019-12-11 RX ORDER — LEVOTHYROXINE SODIUM 0.05 MG/1
50 TABLET ORAL DAILY
Qty: 90 TABLET | Refills: 0 | Status: SHIPPED | OUTPATIENT
Start: 2019-12-11 | End: 2020-01-15 | Stop reason: DRUGHIGH

## 2019-12-11 RX ORDER — LOSARTAN POTASSIUM 100 MG/1
TABLET ORAL
Qty: 90 TABLET | Refills: 0 | Status: SHIPPED | OUTPATIENT
Start: 2019-12-11 | End: 2020-04-15 | Stop reason: SDUPTHER

## 2020-01-09 ENCOUNTER — HOSPITAL ENCOUNTER (OUTPATIENT)
Age: 75
Discharge: HOME | End: 2020-01-09
Payer: MEDICARE

## 2020-01-09 LAB
BASOPHILS ABSOLUTE: 0.1 K/UL (ref 0–0.2)
BASOPHILS RELATIVE PERCENT: 1.2 %
EOSINOPHILS ABSOLUTE: 0.3 K/UL (ref 0–0.6)
EOSINOPHILS RELATIVE PERCENT: 3.3 %
HCG QUALITATIVE: NEGATIVE
HCT VFR BLD CALC: 38.7 % (ref 36–48)
HEMOGLOBIN: 12.5 G/DL (ref 12–16)
LYMPHOCYTES ABSOLUTE: 1.7 K/UL (ref 1–5.1)
LYMPHOCYTES RELATIVE PERCENT: 21.9 %
MCH RBC QN AUTO: 27 PG (ref 26–34)
MCHC RBC AUTO-ENTMCNC: 32.2 G/DL (ref 31–36)
MCV RBC AUTO: 83.9 FL (ref 80–100)
MONOCYTES ABSOLUTE: 0.5 K/UL (ref 0–1.3)
MONOCYTES RELATIVE PERCENT: 6.6 %
NEUTROPHILS ABSOLUTE: 5.2 K/UL (ref 1.7–7.7)
NEUTROPHILS RELATIVE PERCENT: 67 %
PDW BLD-RTO: 15.2 % (ref 12.4–15.4)
PLATELET # BLD: 257 K/UL (ref 135–450)
PMV BLD AUTO: 8.6 FL (ref 5–10.5)
RBC # BLD: 4.61 M/UL (ref 4–5.2)
WBC # BLD: 7.8 K/UL (ref 4–11)

## 2020-01-09 PROCEDURE — 36415 COLL VENOUS BLD VENIPUNCTURE: CPT

## 2020-01-09 PROCEDURE — 84443 ASSAY THYROID STIM HORMONE: CPT

## 2020-01-09 PROCEDURE — 84270 ASSAY OF SEX HORMONE GLOBUL: CPT

## 2020-01-09 PROCEDURE — 82306 VITAMIN D 25 HYDROXY: CPT

## 2020-01-09 PROCEDURE — 86038 ANTINUCLEAR ANTIBODIES: CPT

## 2020-01-09 PROCEDURE — 83550 IRON BINDING TEST: CPT

## 2020-01-09 PROCEDURE — 83540 ASSAY OF IRON: CPT

## 2020-01-09 PROCEDURE — 84630 ASSAY OF ZINC: CPT

## 2020-01-09 PROCEDURE — 82627 DEHYDROEPIANDROSTERONE: CPT

## 2020-01-09 PROCEDURE — 82728 ASSAY OF FERRITIN: CPT

## 2020-01-09 PROCEDURE — 85025 COMPLETE CBC W/AUTO DIFF WBC: CPT

## 2020-01-09 PROCEDURE — 84403 ASSAY OF TOTAL TESTOSTERONE: CPT

## 2020-01-09 PROCEDURE — 84703 CHORIONIC GONADOTROPIN ASSAY: CPT

## 2020-01-10 LAB
ANTI-NUCLEAR ANTIBODY (ANA): NEGATIVE
FERRITIN: 13 NG/ML (ref 15–150)
IRON: 43 UG/DL (ref 37–145)
TOTAL IRON BINDING CAPACITY: 369 UG/DL (ref 260–445)
TSH SERPL DL<=0.05 MIU/L-ACNC: 4.84 UIU/ML (ref 0.27–4.2)
VITAMIN D 25-HYDROXY: 27 NG/ML

## 2020-01-14 NOTE — PROGRESS NOTES
Subjective:     Patient Name: Wai Perry is a 69 y.o. female. Chief Complaint   Patient presents with    Diabetes     pt is checking her sugars at home , pt is taking medication as prescribed     Anxiety     3 month med contract for lorazepam        HPI  History of endometrial cancer  Patient is s/p total hysterectomy   Oncology care follow up and was last seen 10/23/2019  Not currently on chemo/medication    Patient complains of her left lower extremity having \"knots\" in them for several years. Patient states a couple weeks ago the 2 \"knots\" became somewhat tender and seem to have \"swelled up\" the patient states that they went back to their normal size. Patient states when they were swollen and tender she did not have any lower extremity edema present. She states that she had a \"nerve like pain from her hip to her ankle for approximately 1 to 2 days during this episode but that has also resolved    Vitamin D Deficiency  Patient with vitamin d deficiency and not currently on supplement   Lab Results   Component Value Date    VITD25 27.0 (L) 01/09/2020       Treatment Adherence:   Medication compliance:  compliant most of the time  Diet compliance:  compliant most of the time  Weight trend:fluctuating  Wt Readings from Last 3 Encounters:   01/15/20 147 lb (66.7 kg)   11/20/19 145 lb 12.8 oz (66.1 kg)   10/10/19 151 lb (68.5 kg)     Current exercise: no regular exercise    Diabetes Mellitus Type 2:Current symptoms/problems include none. Home blood sugar records: fasting range: below 100, patient tests 1 time(s) per day  Any episodes of hypoglycemia? no  Eye exam current (withinone year): no--1/14/19   reports that she has never smoked. She has never used smokeless tobacco.  DailyAspirin? Yes  Statin Therapy? Yes  ACEI/ARB Therapy?  Yes  BP Readings from Last 2 Encounters:   01/15/20 138/72   11/20/19 (!) 114/48     The 10-year ASCVD risk score (Zhao Blanco., et al., 2013) is: 22.1%    Values used to TABLET BY MOUTH EVERY DAY 90 tablet 0    omeprazole (PRILOSEC) 20 MG delayed release capsule Take 20 mg by mouth every 72 hours Only on weekendsd      aspirin EC 81 MG EC tablet Take 1 tablet by mouth daily 90 tablet 1    rosuvastatin (CRESTOR) 20 MG tablet TAKE 1 TABLET BY MOUTH NIGHTLY 90 tablet 0    pantoprazole (PROTONIX) 20 MG tablet Take 1 tablet by mouth daily (Patient not taking: Reported on 11/20/2019) 30 tablet 3    famotidine (PEPCID) 40 MG tablet Take 1 tablet by mouth 2 times daily 60 tablet 2    magnesium oxide (MAG-OX) 400 (241.3 Mg) MG TABS tablet Take 3 tablets by mouth 2 times daily For Hypomagnesemia 180 tablet 5    CONTOUR NEXT TEST strip TEST ONCE DAILY DX: 250.00 100 strip 5    metoprolol tartrate (LOPRESSOR) 50 MG tablet TAKE ONE TABLET BY MOUTH 2 TIMES DAILY 180 tablet 3    RaNITidine HCl (ZANTAC PO) Take by mouth       No current facility-administered medications for this visit. No changes in past medicalhistory, past surgical history, social history, or family history were noted during the patient encounter unless specifically listed above. All updates of past medical history, past surgical history, social history, orfamily history were reviewed personally by me during the office visit. All problems listed in the assessment are stable unless noted otherwise. Medication profile reviewed personally by me during the office visit. Medication side effects and possible impairments from medications were discussed as applicable. Objective:       Physical Exam  Vitals signs and nursing note reviewed. Constitutional:       General: She is not in acute distress. Appearance: Normal appearance. She is well-developed. HENT:      Head: Normocephalic and atraumatic. Right Ear: Tympanic membrane, ear canal and external ear normal.      Left Ear: Tympanic membrane, ear canal and external ear normal.      Nose: Nose normal.      Mouth/Throat:      Pharynx: Uvula midline. (<130/80), and cholesterol (LDL <100). 3.  Compliance at present is estimated to be excellent. Efforts to improve compliance (if necessary) will be directed at dietary modifications, increased exercise and regular blood sugar monitoring. Diabetes education provided today:  Metabolic syndrome: association of diabetes with dyslipidemia, HTN and obesity. Diabetic Neuropathy: signs and therapy. Foot care: advised to wash feet daily, pat dry and apply lotion at night, avoiding between toes. Need to look at feet daily and report to a physician any signs of inflammation or skin damage. Discussed diabetes shoes and socks. Diabetic retinopathy: the most frequent cause of blindness in [place] currently. Measures to prevent that. Diabetic nephropathy: Kidney function, microalbumin as a sign of diabetic nephropathy. Stages of kidney disease. Nutrition as a mainstream of diabetes therapy. Carbs: good carbs and bad carbs, importance of carb counting, incorporation of protein with each meal to reduce Glycemic index, importance of portions,   Fats: Good fats and bad fats, meal planning and supplements. Physical activity: advised to exercise 5-7 days a week 30-60 mins at least. Discussed how it affects BS readings. Endometrial cancer ([facility] [address]  Continue with hematologist    Hypothyroidism, unspecified type--uncontrolled  Increase  -     levothyroxine (SYNTHROID) 75 MCG tablet; Take 1 tablet by mouth daily  -     TSH with Reflex; Future--in 6-8 weeks from today    Subcutaneous nodule of lower extremity--new problem  The patient states that these nodules have been there on both legs for several years however it was only the left nodules that seem to be swollen and tender for a couple of days. Patient states that her symptoms have since resolved. It is most likely that the patient has some fatty tumor in the area.   Recommend evaluation and possible surgical excision however the patient declined since she is no longer

## 2020-01-15 ENCOUNTER — OFFICE VISIT (OUTPATIENT)
Dept: FAMILY MEDICINE CLINIC | Age: 75
End: 2020-01-15
Payer: MEDICARE

## 2020-01-15 VITALS
BODY MASS INDEX: 27.05 KG/M2 | SYSTOLIC BLOOD PRESSURE: 138 MMHG | HEIGHT: 62 IN | DIASTOLIC BLOOD PRESSURE: 72 MMHG | WEIGHT: 147 LBS | HEART RATE: 61 BPM | OXYGEN SATURATION: 97 %

## 2020-01-15 LAB
DHEAS (DHEA SULFATE): 82.9 UG/DL (ref 10–90)
HBA1C MFR BLD: 5 %
SEX HORMONE BINDING GLOBULIN: 24 NMOL/L (ref 30–135)
TESTOSTERONE FREE-NONMALE: 1.3 PG/ML (ref 0.6–3.8)
TESTOSTERONE TOTAL: 6 NG/DL (ref 20–70)
ZINC: 88.3 UG/DL (ref 60–120)

## 2020-01-15 PROCEDURE — 1090F PRES/ABSN URINE INCON ASSESS: CPT | Performed by: NURSE PRACTITIONER

## 2020-01-15 PROCEDURE — 4040F PNEUMOC VAC/ADMIN/RCVD: CPT | Performed by: NURSE PRACTITIONER

## 2020-01-15 PROCEDURE — G8417 CALC BMI ABV UP PARAM F/U: HCPCS | Performed by: NURSE PRACTITIONER

## 2020-01-15 PROCEDURE — 99215 OFFICE O/P EST HI 40 MIN: CPT | Performed by: NURSE PRACTITIONER

## 2020-01-15 PROCEDURE — 2022F DILAT RTA XM EVC RTNOPTHY: CPT | Performed by: NURSE PRACTITIONER

## 2020-01-15 PROCEDURE — G8399 PT W/DXA RESULTS DOCUMENT: HCPCS | Performed by: NURSE PRACTITIONER

## 2020-01-15 PROCEDURE — 3017F COLORECTAL CA SCREEN DOC REV: CPT | Performed by: NURSE PRACTITIONER

## 2020-01-15 PROCEDURE — 1123F ACP DISCUSS/DSCN MKR DOCD: CPT | Performed by: NURSE PRACTITIONER

## 2020-01-15 PROCEDURE — 3044F HG A1C LEVEL LT 7.0%: CPT | Performed by: NURSE PRACTITIONER

## 2020-01-15 PROCEDURE — 83036 HEMOGLOBIN GLYCOSYLATED A1C: CPT | Performed by: NURSE PRACTITIONER

## 2020-01-15 PROCEDURE — 1036F TOBACCO NON-USER: CPT | Performed by: NURSE PRACTITIONER

## 2020-01-15 PROCEDURE — G8427 DOCREV CUR MEDS BY ELIG CLIN: HCPCS | Performed by: NURSE PRACTITIONER

## 2020-01-15 PROCEDURE — G8482 FLU IMMUNIZE ORDER/ADMIN: HCPCS | Performed by: NURSE PRACTITIONER

## 2020-01-15 RX ORDER — AMLODIPINE BESYLATE AND ATORVASTATIN CALCIUM 5; 10 MG/1; MG/1
1 TABLET, FILM COATED ORAL
COMMUNITY
End: 2020-01-15

## 2020-01-15 RX ORDER — ESOMEPRAZOLE MAGNESIUM 20 MG/1
20 FOR SUSPENSION ORAL
COMMUNITY
End: 2021-05-05

## 2020-01-15 RX ORDER — LEVOTHYROXINE SODIUM 0.07 MG/1
75 TABLET ORAL DAILY
Qty: 30 TABLET | Refills: 0
Start: 2020-01-15 | End: 2020-03-23 | Stop reason: SDUPTHER

## 2020-01-15 RX ORDER — AMLODIPINE BESYLATE 5 MG/1
5 TABLET ORAL DAILY
Qty: 90 TABLET | Refills: 1 | Status: SHIPPED | OUTPATIENT
Start: 2020-01-15 | End: 2020-07-02

## 2020-01-15 RX ORDER — MELATONIN
1000 DAILY
COMMUNITY
Start: 2020-01-15

## 2020-01-15 RX ORDER — METFORMIN HYDROCHLORIDE 500 MG/1
500 TABLET, EXTENDED RELEASE ORAL
Qty: 90 TABLET | Refills: 1
Start: 2020-01-15 | End: 2020-03-23 | Stop reason: SDUPTHER

## 2020-01-15 RX ORDER — LORAZEPAM 0.5 MG/1
0.5 TABLET ORAL DAILY PRN
Qty: 15 TABLET | Refills: 0 | Status: SHIPPED | OUTPATIENT
Start: 2020-01-15 | End: 2020-05-06 | Stop reason: SDUPTHER

## 2020-01-15 RX ORDER — CLOBETASOL PROPIONATE 0.46 MG/ML
SOLUTION TOPICAL
COMMUNITY
Start: 2020-01-14

## 2020-01-15 RX ORDER — AMLODIPINE BESYLATE 5 MG/1
5 TABLET ORAL DAILY
COMMUNITY
End: 2020-01-15 | Stop reason: SDUPTHER

## 2020-01-15 RX ORDER — LORAZEPAM 0.5 MG/1
0.5 TABLET ORAL
COMMUNITY
End: 2020-01-15 | Stop reason: SDUPTHER

## 2020-01-15 ASSESSMENT — ENCOUNTER SYMPTOMS
GASTROINTESTINAL NEGATIVE: 1
CHEST TIGHTNESS: 0
SHORTNESS OF BREATH: 0
EYES NEGATIVE: 1
RESPIRATORY NEGATIVE: 1

## 2020-01-18 LAB
6-ACETYLMORPHINE: NOT DETECTED
7-AMINOCLONAZEPAM: NOT DETECTED
ALPHA-OH-ALPRAZOLAM: NOT DETECTED
ALPRAZOLAM: NOT DETECTED
AMPHETAMINE: NOT DETECTED
BARBITURATES: NOT DETECTED
BENZOYLECGONINE: NOT DETECTED
BUPRENORPHINE: NOT DETECTED
CARISOPRODOL: NOT DETECTED
CLONAZEPAM: NOT DETECTED
CODEINE: NOT DETECTED
CREATININE URINE: 121.6 MG/DL (ref 20–400)
DIAZEPAM: NOT DETECTED
DRUGS EXPECTED: NORMAL
EER PAIN MGT DRUG PANEL, HIGH RES/EMIT U: NORMAL
ETHYL GLUCURONIDE: NOT DETECTED
FENTANYL: NOT DETECTED
HYDROCODONE: NOT DETECTED
HYDROMORPHONE: NOT DETECTED
LORAZEPAM: NOT DETECTED
MARIJUANA METABOLITE: NOT DETECTED
MDA: NOT DETECTED
MDEA: NOT DETECTED
MDMA URINE: NOT DETECTED
MEPERIDINE: NOT DETECTED
METHADONE: NOT DETECTED
METHAMPHETAMINE: NOT DETECTED
METHYLPHENIDATE: NOT DETECTED
MIDAZOLAM: NOT DETECTED
MORPHINE: NOT DETECTED
NORBUPRENORPHINE, FREE: NOT DETECTED
NORDIAZEPAM: NOT DETECTED
NORFENTANYL: NOT DETECTED
NORHYDROCODONE, URINE: NOT DETECTED
NOROXYCODONE: NOT DETECTED
NOROXYMORPHONE, URINE: NOT DETECTED
OXAZEPAM: NOT DETECTED
OXYCODONE: NOT DETECTED
OXYMORPHONE: NOT DETECTED
PAIN MANAGEMENT DRUG PANEL: NORMAL
PAIN MANAGEMENT DRUG PANEL: NORMAL
PCP: NOT DETECTED
PHENTERMINE: NOT DETECTED
PROPOXYPHENE: NOT DETECTED
TAPENTADOL, URINE: NOT DETECTED
TAPENTADOL-O-SULFATE, URINE: NOT DETECTED
TEMAZEPAM: NOT DETECTED
TRAMADOL: NOT DETECTED
ZOLPIDEM: NOT DETECTED

## 2020-02-11 RX ORDER — METOPROLOL TARTRATE 50 MG/1
TABLET, FILM COATED ORAL
Qty: 180 TABLET | Refills: 3 | Status: SHIPPED | OUTPATIENT
Start: 2020-02-11 | End: 2020-05-06 | Stop reason: DRUGHIGH

## 2020-02-18 PROBLEM — L98.9: Status: ACTIVE | Noted: 2020-02-18

## 2020-02-24 ENCOUNTER — HOSPITAL ENCOUNTER (OUTPATIENT)
Age: 75
Discharge: HOME | End: 2020-02-24
Payer: MEDICARE

## 2020-02-24 LAB
MAGNESIUM: 2 MG/DL (ref 1.8–2.4)
TSH REFLEX: 0.45 UIU/ML (ref 0.27–4.2)

## 2020-02-24 PROCEDURE — 83735 ASSAY OF MAGNESIUM: CPT

## 2020-02-24 PROCEDURE — 84443 ASSAY THYROID STIM HORMONE: CPT

## 2020-02-24 PROCEDURE — 36415 COLL VENOUS BLD VENIPUNCTURE: CPT

## 2020-03-23 RX ORDER — LEVOTHYROXINE SODIUM 0.07 MG/1
75 TABLET ORAL DAILY
Qty: 90 TABLET | Refills: 0 | Status: SHIPPED | OUTPATIENT
Start: 2020-03-23 | End: 2020-05-06 | Stop reason: DRUGHIGH

## 2020-03-23 RX ORDER — METFORMIN HYDROCHLORIDE 500 MG/1
500 TABLET, EXTENDED RELEASE ORAL
Qty: 90 TABLET | Refills: 0 | Status: SHIPPED | OUTPATIENT
Start: 2020-03-23 | End: 2020-04-15 | Stop reason: SDUPTHER

## 2020-03-23 RX ORDER — FAMOTIDINE 40 MG/1
40 TABLET, FILM COATED ORAL 2 TIMES DAILY
Qty: 180 TABLET | Refills: 0 | Status: SHIPPED | OUTPATIENT
Start: 2020-03-23 | End: 2020-04-15 | Stop reason: SDUPTHER

## 2020-04-14 ENCOUNTER — TELEPHONE (OUTPATIENT)
Dept: PULMONOLOGY | Age: 75
End: 2020-04-14

## 2020-04-15 ENCOUNTER — VIRTUAL VISIT (OUTPATIENT)
Dept: FAMILY MEDICINE CLINIC | Age: 75
End: 2020-04-15
Payer: MEDICARE

## 2020-04-15 PROBLEM — F33.1 MODERATE EPISODE OF RECURRENT MAJOR DEPRESSIVE DISORDER (HCC): Status: ACTIVE | Noted: 2020-04-15

## 2020-04-15 PROCEDURE — G2012 BRIEF CHECK IN BY MD/QHP: HCPCS | Performed by: NURSE PRACTITIONER

## 2020-04-15 PROCEDURE — G0444 DEPRESSION SCREEN ANNUAL: HCPCS | Performed by: NURSE PRACTITIONER

## 2020-04-15 RX ORDER — FAMOTIDINE 40 MG/1
40 TABLET, FILM COATED ORAL 2 TIMES DAILY
Qty: 180 TABLET | Refills: 1 | Status: SHIPPED | OUTPATIENT
Start: 2020-04-15 | End: 2020-10-14 | Stop reason: SDUPTHER

## 2020-04-15 RX ORDER — LOSARTAN POTASSIUM 100 MG/1
TABLET ORAL
Qty: 90 TABLET | Refills: 1 | Status: SHIPPED | OUTPATIENT
Start: 2020-04-15 | End: 2020-12-07 | Stop reason: SDUPTHER

## 2020-04-15 RX ORDER — LEVOTHYROXINE SODIUM 0.07 MG/1
75 TABLET ORAL DAILY
Qty: 90 TABLET | Refills: 1 | Status: CANCELLED | OUTPATIENT
Start: 2020-04-15

## 2020-04-15 RX ORDER — METFORMIN HYDROCHLORIDE 500 MG/1
500 TABLET, EXTENDED RELEASE ORAL
Qty: 90 TABLET | Refills: 1 | Status: SHIPPED | OUTPATIENT
Start: 2020-04-15 | End: 2020-07-16 | Stop reason: SDUPTHER

## 2020-04-15 RX ORDER — ROSUVASTATIN CALCIUM 20 MG/1
TABLET, COATED ORAL
Qty: 90 TABLET | Refills: 1 | Status: SHIPPED | OUTPATIENT
Start: 2020-04-15 | End: 2020-11-06 | Stop reason: SDUPTHER

## 2020-04-15 ASSESSMENT — PATIENT HEALTH QUESTIONNAIRE - PHQ9
SUM OF ALL RESPONSES TO PHQ9 QUESTIONS 1 & 2: 0
6. FEELING BAD ABOUT YOURSELF - OR THAT YOU ARE A FAILURE OR HAVE LET YOURSELF OR YOUR FAMILY DOWN: 0
3. TROUBLE FALLING OR STAYING ASLEEP: 0
7. TROUBLE CONCENTRATING ON THINGS, SUCH AS READING THE NEWSPAPER OR WATCHING TELEVISION: 0
5. POOR APPETITE OR OVEREATING: 0
8. MOVING OR SPEAKING SO SLOWLY THAT OTHER PEOPLE COULD HAVE NOTICED. OR THE OPPOSITE, BEING SO FIGETY OR RESTLESS THAT YOU HAVE BEEN MOVING AROUND A LOT MORE THAN USUAL: 0
9. THOUGHTS THAT YOU WOULD BE BETTER OFF DEAD, OR OF HURTING YOURSELF: 0
4. FEELING TIRED OR HAVING LITTLE ENERGY: 3
SUM OF ALL RESPONSES TO PHQ QUESTIONS 1-9: 3
2. FEELING DOWN, DEPRESSED OR HOPELESS: 0
10. IF YOU CHECKED OFF ANY PROBLEMS, HOW DIFFICULT HAVE THESE PROBLEMS MADE IT FOR YOU TO DO YOUR WORK, TAKE CARE OF THINGS AT HOME, OR GET ALONG WITH OTHER PEOPLE: 0
SUM OF ALL RESPONSES TO PHQ QUESTIONS 1-9: 3
1. LITTLE INTEREST OR PLEASURE IN DOING THINGS: 0

## 2020-04-15 ASSESSMENT — ENCOUNTER SYMPTOMS
CHEST TIGHTNESS: 0
SHORTNESS OF BREATH: 1
GASTROINTESTINAL NEGATIVE: 1
WHEEZING: 0
EYES NEGATIVE: 1
ALLERGIC/IMMUNOLOGIC NEGATIVE: 1
CHOKING: 0

## 2020-04-15 NOTE — PROGRESS NOTES
Gilberto Cnator is a 69 y.o. female evaluated via telephone on 4/15/2020. Consent:  She and/or health care decision maker is aware that that she may receive a bill for this telephone service, depending on her insurance coverage, and has provided verbal consent to proceed: Yes       Subjective:     Patient Name: Gilberto Cantor is a 69 y.o. female. Chief Complaint   Patient presents with    Irregular Heart Beat     pt has questions about some palpatations she been having    Anxiety     pt said her anxiety been more here lately not sure if that is causing her issue with the heart, 3 month follow up to contract for naseem JENKINS   Diabetes Mellitus Type 2: Current symptoms/problems include none. Medication compliance:  compliant most of the time  Diabetic diet compliance:  compliant most of the time,  Weight trend: increasing by 2-3 pounds  Current exercise: no regular exercise    Home blood sugar records: fasting range: , patient tests 1 time(s) per day  Any episodes of hypoglycemia? no  Eye exam current (within one year): no   reports that she has never smoked. She has never used smokeless tobacco.   Daily Aspirin? Yes  1/15/20 Rx changes: decrease metformin to 1 tab daily    Lab Results   Component Value Date    LABA1C 5.0 01/15/2020    LABA1C 6.1 10/10/2019    LABA1C 6.2 04/15/2019     Lab Results   Component Value Date    LABMICR <1.20 04/15/2019    CREATININE 0.7 11/05/2019     Lab Results   Component Value Date    ALT 14 09/19/2019    AST 15 09/19/2019     Lab Results   Component Value Date    CHOL 189 04/15/2019    TRIG 214 (H) 04/15/2019    HDL 68 (H) 04/15/2019    LDLCALC 78 04/15/2019          Hypothyroidism: Recent symptoms: cold intolerance, heat intolerance, anxiety and palpitations. She denies fatigue, weight gain, weight loss, hair loss, dry skin, constipation, diarrhea, edema and dysphagia. Patient is  taking her medication consistently on an empty stomach.   1/15/20 Increased  -     levothyroxine (SYNTHROID) 75 MCG tablet; Take 1 tablet by mouth daily    Has been using doxycycline prescribed by derm for scalp condition/Lichen planopilaris (LPP). Patient has only been able to take one a day because doxy is possibly causing palpitations, nausea and stomach discomfort. Patient has history of palpitations but states they have  been worse the past week. Patient feels the palpitations are increasing her anxiety. She does complain of occasional dyspnea that last only a few seconds and feels it may be r/t anxiety. Patient denies any exertional chest pain, syncope, orthopnea, edema or paroxysmal nocturnal dyspnea. Patient quit taking doxy about 1 week ago without improvement in her palpitations    The patient saw her cardiologist November 2019 and had recommended that she stop taking her amlodipine because her blood sugar was low. Patient states that she restarted her amlodipine 5 mg daily because her blood pressure was starting to increase. She states she has had some recent low blood pressure readings and pulse in the 50s at times. Lab Results   Component Value Date    TSHREFLEX 0.45 02/24/2020    TSHREFLEX 0.76 09/19/2019    TSHREFLEX 5.28 10/25/2018     Lab Results   Component Value Date    TSH 4.84 (H) 01/09/2020    TSH 1.41 04/15/2019    TSH 1.11 12/06/2018       Mood Disorder:  Patient presents for follow-up of anxiety and depressive disorder. Current complaints include: anxiety. She denies any other symptoms. Symptoms/signs of awa: none. External stressors: nothing new. Current treatment includes: benzodiazepine- Ativan 0.5 mg as needed. Last refilled on 1/26/20 . Medication side effects: none.   PHQ-9  4/15/2020 4/15/2019 1/31/2019 10/25/2018 4/25/2018 4/13/2017 3/8/2017   Little interest or pleasure in doing things 0 2 1 3 0 1 0   Feeling down, depressed, or hopeless 0 3 1 3 0 0 0   Trouble falling or staying asleep, or sleeping too much 0 1 1 1 - - Past Medical History:   Diagnosis Date    Allergic     Anemia     Brain condition     pt states a cluster of blood vessels left side of brain    Cancer [facility]) 1/2013    endometrial cancer/ Dr. Josh Caruso and Dr. Luis M Jensen Cerebral cavernoma     followed by Dr. David Nolen at [place] [id number] Complication of anesthesia     Takes a long time to wake up    Diverticulosis     Endometrial cancer ([facility] [address]     Fungus infection     Fungus infection on bilat toes.  GERD (gastroesophageal reflux disease)     Hyperlipidemia     Hypertension     Hypomagnesemia 1/2016    consult with Dr. Марина Keating ([facility]) and no renal wasting. felt most likely d/t HCTz and PPI,  HCTZ discontinued,  increased mag ox to 400 mg 2 tabs BID    Hypothyroidism     Irritable bowel syndrome     MVP (mitral valve prolapse)     JOAQUIN (obstructive sleep apnea)     Paronychia of great toe, left 7/11/2017    Pneumonia     infant    Prolonged emergence from general anesthesia     Type 2 diabetes mellitus without complication ([facility] [address]     Type II or unspecified type diabetes mellitus without mention of complication, not stated as uncontrolled     Unspecified sleep apnea     Vitamin B12 deficiency     Vitamin D deficiency      Family History   Problem Relation Age of Onset    Cancer Mother         colon    COPD Father     Heart Disease Father     Cancer Father         NMSC, skin     Past Surgical History:   Procedure Laterality Date    COLONOSCOPY      COLONOSCOPY  07/14/2016    COLONOSCOPY      CYSTOSCOPY  10/18/2017    Cystoscopy with Urethral dilation    CYSTOSCOPY Bilateral 10/18/2017    Cystoscopy with bilateral retrograde pyelograms    DIAGNOSTIC CARDIAC CATH LAB PROCEDURE  1/12/2005    [facility], Dr Brandyn Mathew, No CAD.     DILATION AND CURETTAGE OF UTERUS      ENDOSCOPY, COLON, DIAGNOSTIC      HYSTERECTOMY  1/28/2013    due to uterine cancer    OTHER SURGICAL HISTORY  10/18/2017    cystoscopy, bilateral retrogrades    OTHER Hospital Outpatient Visit on 02/24/2020   Component Date Value    Magnesium 02/24/2020 2.00     TSH 02/24/2020 0.45        No results found for this visit on 04/15/20. Assessment:       1. Type 2 diabetes mellitus with diabetic polyneuropathy, without long-term current use of insulin [facility]    2. Moderate episode of recurrent major depressive disorder (HCC)    3. Heart palpitations    4. Hypothyroidism, unspecified type    5. Anxiety    6. Gastroesophageal reflux disease without esophagitis    7. Mixed hyperlipidemia    8. Essential hypertension        No results found for this visit on 04/15/20. Plan:       Yordan Camacho was seen today for irregular heart beat and anxiety. Diagnoses and all orders for this visit:    Type 2 diabetes mellitus with diabetic polyneuropathy, without long-term current use of insulin (HCC)  -     metFORMIN (GLUCOPHAGE-XR) 500 MG extended release tablet; Take 1 tablet by mouth daily (with breakfast)  Patient states that she is doing well on the reduced dosage of metformin  Unable to check hemoglobin A1c today but do recommend checking in approximately 2 months  Patient reports that her blood glucose levels are stable and denies any side effects from medication. Moderate episode of recurrent major depressive disorder (HCC)---Resolved  Patient's PHQ 9 above shows no current issues with depression. She is not on any medication and symptoms have essentially resolved. Heart palpitations  Patient had longstanding history of palpitations however they have worsened over the past week. I discussed options with the patient and discussed that it may be her increase in the Synthroid that was done in January that might be causing her increased palpitations. Patient previously was on Cytomel 5 mg daily which significantly helped with her palpitations at the time however it was costly so she quit taking the medication.   I did discuss possibly restarting the Cytomel however she does constipation. No respiratory problems. No increased sleepiness, drowsiness or confusion. The patient states that the medications and treatment have helped improve the quality of life and helped in psychosocial functioning. There are no indicators for possible drug abuse, addiction or diversion problems. Periodic Controlled Substance Monitoring: Possible medication side effects, risk of tolerance/dependence & alternative treatments discussed., No signs of potential drug abuse or diversion identified. (Tora Shone Conn, APRN - CNP)    Essential hypertension  -     losartan (COZAAR) 100 MG tablet; TAKE ONE TABLET BY MOUTH EVERY DAY  . Originally the cardiologist recommended she stop taking the amlodipine back in November 2019 which the patient did for short period of time. However she is restarted the amlodipine over the past few months. The patient again has been having some occasional low blood pressure and pulse readings  I asked the patient to decrease her amlodipine from 5mg to 2.5 mg once a day and call with her blood pressure and pulse readings in 2 weeks    Gastroesophageal reflux disease without esophagitis  -     famotidine (PEPCID) 40 MG tablet; Take 1 tablet by mouth 2 times daily    Mixed hyperlipidemia  -     rosuvastatin (CRESTOR) 20 MG tablet; TAKE 1 TABLET BY MOUTH NIGHTLY    Patient has been instructed call the office immediately with new symptoms, change in symptoms or worseningof symptoms. If this is not feasible, patient is instructed to report to the emergency room. Medication profile reviewed. Medication side effects and possible impairments from medications were discussed as applicable. Allergies were reviewed. Health maintenance was reviewed and updated as appropriate. Documentation:  I communicated with the patient and/or health care decision maker about above conditions.    Details of this discussion including any medical advice provided: as noted above      I affirm this is a

## 2020-05-04 ENCOUNTER — HOSPITAL ENCOUNTER (OUTPATIENT)
Age: 75
Discharge: HOME | End: 2020-05-04
Payer: MEDICARE

## 2020-05-04 LAB
ALBUMIN SERPL-MCNC: 4.5 G/DL (ref 3.4–5)
ANION GAP SERPL CALCULATED.3IONS-SCNC: 11 MMOL/L (ref 3–16)
BUN BLDV-MCNC: 18 MG/DL (ref 7–20)
CALCIUM SERPL-MCNC: 9.3 MG/DL (ref 8.3–10.6)
CHLORIDE BLD-SCNC: 99 MMOL/L (ref 99–110)
CO2: 27 MMOL/L (ref 21–32)
CREAT SERPL-MCNC: 0.7 MG/DL (ref 0.6–1.2)
GFR AFRICAN AMERICAN: >60
GFR NON-AFRICAN AMERICAN: >60
GLUCOSE BLD-MCNC: 104 MG/DL (ref 70–99)
MAGNESIUM: 2 MG/DL (ref 1.8–2.4)
PHOSPHORUS: 2.9 MG/DL (ref 2.5–4.9)
POTASSIUM SERPL-SCNC: 4.1 MMOL/L (ref 3.5–5.1)
SODIUM BLD-SCNC: 137 MMOL/L (ref 136–145)

## 2020-05-04 PROCEDURE — 83735 ASSAY OF MAGNESIUM: CPT

## 2020-05-04 PROCEDURE — 36415 COLL VENOUS BLD VENIPUNCTURE: CPT

## 2020-05-04 PROCEDURE — 80069 RENAL FUNCTION PANEL: CPT

## 2020-05-06 ENCOUNTER — TELEPHONE (OUTPATIENT)
Dept: FAMILY MEDICINE CLINIC | Age: 75
End: 2020-05-06

## 2020-05-06 RX ORDER — LEVOTHYROXINE SODIUM 0.05 MG/1
50 TABLET ORAL DAILY
Qty: 90 TABLET | Refills: 1 | Status: SHIPPED | OUTPATIENT
Start: 2020-05-06 | End: 2021-01-15

## 2020-05-06 RX ORDER — LORAZEPAM 0.5 MG/1
0.5 TABLET ORAL DAILY PRN
Qty: 15 TABLET | Refills: 0 | Status: SHIPPED | OUTPATIENT
Start: 2020-05-06 | End: 2020-10-14 | Stop reason: SDUPTHER

## 2020-05-06 NOTE — TELEPHONE ENCOUNTER
Continue synthroid at the decreased dosage of 50 mcg daily since her palpitations have improved.   I will send a new rx to pharmacy   Also I do not like her pulse rate being below 60,  Will decrease her metoprolol from 50 mg to 25 mg bid  Continue to monitor BP and report readings and pulse in 2-4 weeks  Also the derm can try minocycline or plaquenil --which ever derm wants

## 2020-05-14 ENCOUNTER — NURSE ONLY (OUTPATIENT)
Dept: FAMILY MEDICINE CLINIC | Age: 75
End: 2020-05-14

## 2020-06-03 ENCOUNTER — HOSPITAL ENCOUNTER (OUTPATIENT)
Age: 75
Discharge: HOME | End: 2020-06-03
Payer: MEDICARE

## 2020-06-03 LAB
ALBUMIN SERPL-MCNC: 4.4 G/DL (ref 3.4–5)
ALP BLD-CCNC: 91 U/L (ref 40–129)
ALT SERPL-CCNC: 13 U/L (ref 10–40)
AST SERPL-CCNC: 17 U/L (ref 15–37)
BILIRUB SERPL-MCNC: 0.4 MG/DL (ref 0–1)
BILIRUBIN DIRECT: <0.2 MG/DL (ref 0–0.3)
BILIRUBIN, INDIRECT: NORMAL MG/DL (ref 0–1)
CHOLESTEROL, TOTAL: 194 MG/DL (ref 0–199)
CREATININE URINE: 114.2 MG/DL (ref 28–259)
HDLC SERPL-MCNC: 67 MG/DL (ref 40–60)
LDL CHOLESTEROL CALCULATED: 92 MG/DL
MICROALBUMIN UR-MCNC: <1.2 MG/DL
MICROALBUMIN/CREAT UR-RTO: NORMAL MG/G (ref 0–30)
T4 FREE: 1 NG/DL (ref 0.9–1.8)
TOTAL PROTEIN: 7.2 G/DL (ref 6.4–8.2)
TRIGL SERPL-MCNC: 174 MG/DL (ref 0–150)
TSH REFLEX: 4.8 UIU/ML (ref 0.27–4.2)
VLDLC SERPL CALC-MCNC: 35 MG/DL

## 2020-06-03 PROCEDURE — 82570 ASSAY OF URINE CREATININE: CPT

## 2020-06-03 PROCEDURE — 84443 ASSAY THYROID STIM HORMONE: CPT

## 2020-06-03 PROCEDURE — 36415 COLL VENOUS BLD VENIPUNCTURE: CPT

## 2020-06-03 PROCEDURE — 80061 LIPID PANEL: CPT

## 2020-06-03 PROCEDURE — 84439 ASSAY OF FREE THYROXINE: CPT

## 2020-06-03 PROCEDURE — 83036 HEMOGLOBIN GLYCOSYLATED A1C: CPT

## 2020-06-03 PROCEDURE — 80076 HEPATIC FUNCTION PANEL: CPT

## 2020-06-03 PROCEDURE — 82043 UR ALBUMIN QUANTITATIVE: CPT

## 2020-06-04 LAB
ESTIMATED AVERAGE GLUCOSE: 134.1 MG/DL
HBA1C MFR BLD: 6.3 %

## 2020-07-16 ENCOUNTER — VIRTUAL VISIT (OUTPATIENT)
Dept: FAMILY MEDICINE CLINIC | Age: 75
End: 2020-07-16
Payer: MEDICARE

## 2020-07-16 PROCEDURE — 99442 PR PHYS/QHP TELEPHONE EVALUATION 11-20 MIN: CPT | Performed by: NURSE PRACTITIONER

## 2020-07-16 RX ORDER — METFORMIN HYDROCHLORIDE 500 MG/1
500 TABLET, EXTENDED RELEASE ORAL 2 TIMES DAILY
Qty: 180 TABLET | Refills: 1 | Status: SHIPPED | OUTPATIENT
Start: 2020-07-16 | End: 2020-12-07 | Stop reason: SDUPTHER

## 2020-07-16 ASSESSMENT — PATIENT HEALTH QUESTIONNAIRE - PHQ9
4. FEELING TIRED OR HAVING LITTLE ENERGY: 3
SUM OF ALL RESPONSES TO PHQ QUESTIONS 1-9: 4
6. FEELING BAD ABOUT YOURSELF - OR THAT YOU ARE A FAILURE OR HAVE LET YOURSELF OR YOUR FAMILY DOWN: 0
1. LITTLE INTEREST OR PLEASURE IN DOING THINGS: 0
9. THOUGHTS THAT YOU WOULD BE BETTER OFF DEAD, OR OF HURTING YOURSELF: 0
2. FEELING DOWN, DEPRESSED OR HOPELESS: 1
SUM OF ALL RESPONSES TO PHQ9 QUESTIONS 1 & 2: 1
SUM OF ALL RESPONSES TO PHQ QUESTIONS 1-9: 4
8. MOVING OR SPEAKING SO SLOWLY THAT OTHER PEOPLE COULD HAVE NOTICED. OR THE OPPOSITE, BEING SO FIGETY OR RESTLESS THAT YOU HAVE BEEN MOVING AROUND A LOT MORE THAN USUAL: 0
5. POOR APPETITE OR OVEREATING: 0
7. TROUBLE CONCENTRATING ON THINGS, SUCH AS READING THE NEWSPAPER OR WATCHING TELEVISION: 0
3. TROUBLE FALLING OR STAYING ASLEEP: 0
10. IF YOU CHECKED OFF ANY PROBLEMS, HOW DIFFICULT HAVE THESE PROBLEMS MADE IT FOR YOU TO DO YOUR WORK, TAKE CARE OF THINGS AT HOME, OR GET ALONG WITH OTHER PEOPLE: 0

## 2020-07-16 NOTE — PROGRESS NOTES
[address] [place] [address]  [address] [place] [address]  Dept: [phone]  Dept Fax: [phone]  Loc: [phone]    Alex Owens is a 69 y.o. female evaluated via telephone on 7/16/2020. Consent:  She and/or health care decision maker is aware that that she may receive a bill for this telephone service, depending on her insurance coverage, and has provided verbal consent to proceed: Yes    Alex Owens is a 69 y.o. female who presents today for her medical conditions/complaints as noted below. Alex Owens is c/o of Anxiety (3 month med check for lorazepam) and Diabetes (pt is checking her sugars at home, pt is putting a topical steroid on her scalp so her sugars have been a little higher  pt increased her metformin on her own to two a day )        [address] [place] [address]  [address] [place] [address]  Dept: [phone]  Dept Fax: [phone]  Loc: [phone]    Alex Owens is a 69 y.o. female who presents today for her medical conditions/complaints as noted below. Alex Owens is c/o of Anxiety (3 month med check for lorazepam) and Diabetes (pt is checking her sugars at home, pt is putting a topical steroid on her scalp so her sugars have been a little higher  pt increased her metformin on her own to two a day )       Subjective:     Chief Complaint   Patient presents with    Anxiety     3 month med check for lorazepam    Diabetes     pt is checking her sugars at home, pt is putting a topical steroid on her scalp so her sugars have been a little higher  pt increased her metformin on her own to two a day        HPI    Diabetes Mellitus Type 2:  Patient is here for follow up on diabetes. Treatment Adherence:   Medication compliance:  Taking medication as prescribed. Diet compliance:  Trying to eat a healthy, diabetic diet.     Weight trend:stable  Wt Readings from Last 3 Encounters:   01/15/20 147 lb (66.7 kg)   11/20/19 145 lb 12.8 oz (66.1 kg)   10/10/19 151 lb (68.5 kg)     Current exercise: no regular exercise  Denies any hypoglycemia episodes. Current symptoms/problems include:  Neuropathy is  present  Denies any open areas on feet/ulcerations  Denies any polyuria, polydipsia, or polyphagia, nausea, vomiting or diarrhea. Home blood sugar records: fasting range: below 120 typically, patient tests 1 time(s) per day  Eye exam current (withinone year): yes  Denies blurry vision   reports that she has never smoked. She has never used smokeless tobacco.  DailyAspirin? Yes  Statin Therapy? Yes  ACEI/ARB Therapy? Yes    Hypertension:  Home blood pressure monitoring: No.  Patient  is adherent to a low sodium diet. Patient denies chest pain, headache, lightheadedness, blurred vision, peripheral edema, dry cough and fatigue. Antihypertensive medication side effects: no medication side effects noted. Use of agents associated withhypertension: none. BP Readings from Last 3 Encounters:   01/15/20 138/72   11/20/19 (!) 114/48   10/10/19 118/68       Hyperlipidemia:  No newmyalgias or GI upset on rosuvastatin (Crestor). The 10-year ASCVD risk score (Robles Castellon, et al., 2013) is: 30.9%    Values used to calculate the score:      Age: 69 years      Sex: Female      Is Non- : No      Diabetic: Yes      Tobacco smoker: No      Systolic Blood Pressure: 709 mmHg      Is BP treated: Yes      HDL Cholesterol: 67 mg/dL      Total Cholesterol: 194 mg/dL    Lab Results   Component Value Date    LABA1C 6.3 06/03/2020    LABA1C 5.0 01/15/2020    LABA1C 6.1 10/10/2019     Lab Results   Component Value Date    LABMICR <1.20 06/03/2020    LDLCALC 92 06/03/2020    CREATININE 0.7 05/04/2020       Vitamin D Deficiency  Patient with vitamin d deficiencyand currently on supplement without adverse reactions.    Lab Results   Component Value Date    VITD25 27.0 (L) 01/09/2020     B12 Deficiency--never started b12 injections  Lab Results   Component Value Date    FFQSQBLT76 327 04/15/2019         Hypothyroidism: Recent symptoms: palpitations. She denies fatigue, weight gain, weight loss, cold intolerance, heat intolerance, dry skin, constipation, diarrhea, edema, anxiety and dysphagia. Patient is  taking her medication consistently on an empty stomach. Lab Results   Component Value Date    TSHREFLEX 4.80 06/03/2020    TSHREFLEX 0.45 02/24/2020    TSHREFLEX 0.76 09/19/2019     Lab Results   Component Value Date    TSH 4.84 (H) 01/09/2020    TSH 1.41 04/15/2019    TSH 1.11 12/06/2018     Mood Disorder:  Patient presents for follow-up of depression and anxiety disorder. Current complaints include: See PHQ9 below . She denies any other symptoms. Symptoms/signs of awa: none. External stressors: nothing new. Current treatment includes: none. PHQ-9  7/16/2020 4/15/2020 4/15/2019 1/31/2019 10/25/2018 4/25/2018 4/13/2017   Little interest or pleasure in doing things 0 0 2 1 3 0 1   Feeling down, depressed, or hopeless 1 0 3 1 3 0 0   Trouble falling or staying asleep, or sleeping too much 0 0 1 1 1 - -   Feeling tired or having little energy 3 3 3 3 3 - -   Poor appetite or overeating 0 0 1 0 1 - -   Feeling bad about yourself - or that you are a failure or have let yourself or your family down 0 0 0 0 0 - -   Trouble concentrating on things, such as reading the newspaper or watching television 0 0 1 0 0 - -   Moving or speaking so slowly that other people could have noticed.  Or the opposite - being so fidgety or restless that you have been moving around a lot more than usual 0 0 1 0 0 - -   Thoughts that you would be better off dead, or of hurting yourself in some way 0 0 0 0 0 - -   PHQ-2 Score 1 0 5 2 6 0 1   PHQ-9 Total Score 4 3 12 6 11 0 1   If you checked off any problems, how difficult have these problems made it for you to do your work, take care of things at home, or get along with of great toe, left 7/11/2017    Pneumonia     infant    Prolonged emergence from general anesthesia     Type 2 diabetes mellitus without complication ([facility] [address]     Type II or unspecified type diabetes mellitus without mention of complication, not stated as uncontrolled     Unspecified sleep apnea     Vitamin B12 deficiency     Vitamin D deficiency      Family History   Problem Relation Age of Onset    Cancer Mother         colon    COPD Father     Heart Disease Father     Cancer Father         NMSC, skin     Past Surgical History:   Procedure Laterality Date    COLONOSCOPY      COLONOSCOPY  07/14/2016    COLONOSCOPY      CYSTOSCOPY  10/18/2017    Cystoscopy with Urethral dilation    CYSTOSCOPY Bilateral 10/18/2017    Cystoscopy with bilateral retrograde pyelograms    DIAGNOSTIC CARDIAC CATH LAB PROCEDURE  1/12/2005    One Dr Elvie Clemons, No CAD.     DILATION AND CURETTAGE OF UTERUS      ENDOSCOPY, COLON, DIAGNOSTIC      HYSTERECTOMY  1/28/2013    due to uterine cancer    OTHER SURGICAL HISTORY  10/18/2017    cystoscopy, bilateral retrogrades    OTHER SURGICAL HISTORY Left 03/02/2017    port removal    SALIVARY GLAND SURGERY      KINGSLEY AND BSO  1/28/13    THYROID SURGERY      right thyroidectomy for benign reasons    UPPER GASTROINTESTINAL ENDOSCOPY  07/14/2016    UPPER GASTROINTESTINAL ENDOSCOPY       Social History     Socioeconomic History    Marital status:      Spouse name: Not on file    Number of children: Not on file    Years of education: Not on file    Highest education level: Not on file   Occupational History    Not on file   Social Needs    Financial resource strain: Not on file    Food insecurity     Worry: Not on file     Inability: Not on file    Transportation needs     Medical: Not on file     Non-medical: Not on file   Tobacco Use    Smoking status: Never Smoker    Smokeless tobacco: Never Used   Substance and Sexual Activity    Alcohol use: No     Alcohol/week: 0.0 medicalhistory, past surgical history, social history, or family history were noted during the patient encounter unless specifically listed above. All updates of past medical history, past surgical history, social history, orfamily history were reviewed personally by me during the office visit. All problems listed in the assessment are stable unless noted otherwise. Medication profile reviewed personally by me during the office visit. Medication side effects and possible impairments from medications were discussed as applicable. Objective:       Physical Exam  Not applicable due to telephone visit    There were no vitals taken for this visit. There is no height or weight on file to calculate BMI. Lab Review   Hospital Outpatient Visit on 06/03/2020   Component Date Value    TSH 06/03/2020 4.80*    Cholesterol, Total 06/03/2020 194     Triglycerides 06/03/2020 174*    HDL 06/03/2020 67*    LDL Calculated 06/03/2020 92     VLDL Cholesterol Calcula* 06/03/2020 35     Total Protein 06/03/2020 7.2     Alb 06/03/2020 4.4     Alkaline Phosphatase 06/03/2020 91     ALT 06/03/2020 13     AST 06/03/2020 17     Total Bilirubin 06/03/2020 0.4     Bilirubin, Direct 06/03/2020 <0.2     Bilirubin, Indirect 06/03/2020 see below     Hemoglobin A1C 06/03/2020 6.3     eAG 06/03/2020 134.1     Microalbumin, Random Uri* 06/03/2020 <1.20     Creatinine, Ur 06/03/2020 114.2     Microalbumin Creatinine * 06/03/2020 see below     T4 Free 06/03/2020 1.0               Assessment:       1. Type 2 diabetes mellitus with diabetic polyneuropathy, without long-term current use of insulin ([facility] [address]    2. Moderate episode of recurrent major depressive disorder ([facility] [address]    3. Essential hypertension    4. Mixed hyperlipidemia    5. Postsurgical hypothyroidism    6. Vitamin B12 deficiency    7. Vitamin D deficiency        No results found for this visit on 07/16/20.          Plan:       Joselito Karen was seen today for anxiety and diabetes. Diagnoses and all orders for this visit:    Type 2 diabetes mellitus with diabetic polyneuropathy, without long-term current use of insulin (HCC)  -     metFORMIN (GLUCOPHAGE-XR) 500 MG extended release tablet; Take 1 tablet by mouth 2 times daily    Moderate episode of recurrent major depressive disorder (HCC)  Stable w/o medication  Patient has Ativan rarely for anxiety  Chronic benzodiazepine treatment protocol was discussed with the patient again including taking medications only as prescribed , using one pharmacy and getting all controlled substances from one physician unless okayed with me. Proper safeguarding and disposal of medications were also discussed with the patient.     The current treatment regimen is needed to decrease the patient's anxiety symptoms, improve the quality of life and ability to function and improve sleep and mood symptoms.        Patient given following instructions - You are on medications which could impair your senses, you are at risk of weakness, falls, dizziness, or drowsiness. You should be careful during activities which could place you at risk of harm, such as climbing, using stairs, operating machinery, or driving vehicles. If you feel you cannot safely do these activities, you should request others to help you, or avoid the activities altogether. If you are drowsy for any other reason, you should use the same precautions as listed above.     The patient is made aware of the potential of drowsiness with the prescribed medications, and that care should be exercised in operating machinery, vehicles, or placing oneself in situations of risk to their health, ie heights and climbing and stairs.     The patient denies nausea, vomiting, diarrhea and constipation. No respiratory problems. No increased sleepiness, drowsiness or confusion. The patient states that the medications and treatment have helped improve the quality of life and helped in psychosocial functioning. There are no indicators for possible drug abuse, addiction or diversion problems. Periodic Controlled Substance Monitoring: Possible medication side effects, risk of tolerance/dependence & alternative treatments discussed., No signs of potential drug abuse or diversion identified. (Marleny Lyon, APRN - CNP)    Essential hypertension  Hypertension, Blood pressure is  well controlled on current medication regimen. Medication: no change. Dietary sodium restriction. Regular aerobic exercise. Check blood pressures monthly and record. Mixed hyperlipidemia     1. Start/Continue dietary measures. Reduce saturated fat, \"trans\" monounsaturated fatty acids, and cholesterol  Consume a diet that emphasizes vegetables, fruits, whole grains, low-fat diary, poultry, fish, legumes, nuts and limited in sweets, sugar-sweetened beverages, and red meat. Increase soluble fiber  Consider Plant sterols 2g/d (e.g. Benecol)  2. Start/Continue regular exercise. Advised to engage in regular physical activity with frequency goal being  3-4 times a week  Discussed healthy lifestyle changes to improve lipids. Stressed the importance of weight control to improve cholesterol. Postsurgical hypothyroidism  Patient does not want to change dose despite symptoms. Last time her Synthroid was increased she felt \"worse\"    Vitamin B12 deficiency  The patient never started B12 injections. Will check at next visit    Vitamin D deficiency  Discussed with patient that we make vitamin D from the sun and get it from some food sources, but it is very common to be deficient. Discussed the need for vitamin D replacement because low vitamin D can cause fatigue, joint aches and has been implicated in heart disease, bone disease like osteoporosis, and some other chronic illnesses. Patient will start/continue vitamin D supplement as per order. Recommend vitamin D to be rechecked in 6 months.           Patient has been instructed call the office immediately with new symptoms, change in symptoms or worseningof symptoms. If this is not feasible, patient is instructed to report to the emergency room. Medication profile reviewed. Medication side effects and possible impairments from medications were discussed as applicable. Allergies were reviewed. Health maintenance was reviewed and updated as appropriate. Return in about 3 months (around 10/16/2020) for Anxiety/Depression, Controlled mediction management. (Comment: Please note this report has been produced using a combination of typing and speech recognition software and may contain errors related to that system including errors in grammar, punctuation, and spelling, as well as words and phrases that may be inappropriate. If there are any questions or concerns please feel free to contact the dictating provider for clarification.)          Documentation:  I communicated with the patient and/or health care decision maker about above conditions. Details of this discussion including any medical advice provided: as noted above      I affirm this is a Patient Initiated Episode with a Patient who has not had a related appointment within my department in the past 7 days or scheduled within the next 24 hours.     Patient identification was verified at the start of the visit: Yes    Total Time: minutes: 11-20 minutes    Note: not billable if this call serves to triage the patient into an appointment for the relevant concern      Luis M Almonte

## 2020-07-17 ASSESSMENT — ENCOUNTER SYMPTOMS
ALLERGIC/IMMUNOLOGIC NEGATIVE: 1
RESPIRATORY NEGATIVE: 1
SHORTNESS OF BREATH: 0
ANAL BLEEDING: 0
EYES NEGATIVE: 1
ABDOMINAL PAIN: 0
BLOOD IN STOOL: 0
GASTROINTESTINAL NEGATIVE: 1
NAUSEA: 0

## 2020-07-24 ENCOUNTER — TELEPHONE (OUTPATIENT)
Dept: PULMONOLOGY | Age: 75
End: 2020-07-24

## 2020-07-24 NOTE — TELEPHONE ENCOUNTER
Within this Telehealth Consent, the terms you and yours refer to the person using the Telehealth Service (Service), or in the case of a use of the Service by or on behalf of a minor, you and yours refer to and include (i) the parent or legal guardian who provides consent to the use of the Service by such minor or uses the Service on behalf of such minor, and (ii) the minor for whom consent is being provided or on whose behalf the Service is being utilized. When using Service, you will be consulting with your health care providers via the use of Telehealth.   Telehealth involves the delivery of healthcare services using electronic communications, information technology or other means between a healthcare provider and a patient who are not in the same physical location. Telehealth may be used for diagnosis, treatment, follow-up and/or patient education, and may include, but is not limited to, one or more of the following:    Electronic transmission of medical records, photo images, personal health information or other data between a patient and a healthcare provider    Interactions between a patient and healthcare provider via audio, video and/or data communications    Use of output data from medical devices, sound and video files    Anticipated Benefits   The use of Telehealth by your Provider(s) through the Service may have the following possible benefits:    Making it easier and more efficient for you to access medical care and treatment for the conditions treated by such Provider(s) utilizing the Service    Allowing you to obtain medical care and treatment by Provider(s) at times that are convenient for you    Enabling you to interact with Provider(s) without the necessity of an in-office appointment     Possible Risks   While the use of Telehealth can provide potential benefits for you, there are also potential risks associated with the use of Telehealth.  These risks include, but may not be limited to the following:    Your Provider(s) may not able to provide medical treatment for your particular condition and you may be required to seek alternative healthcare or emergency care services.  The electronic systems or other security protocols or safeguards used in the Service could fail, causing a breach of privacy of your medical or other information.  Given regulatory requirements in certain jurisdictions, your Provider(s) diagnosis and/or treatment options, especially pertaining to certain prescriptions, may be limited. Acceptance   1. You understand that Services will be provided via Telehealth. This process involves the use of HIPAA compliant and secure, real-time audio-visual interfacing with a qualified and appropriately trained provider located at [facility]. 2. You understand that, under no circumstances, will this session be recorded. 3. You understand that the Provider(s) at [facility] and other clinical participants will be party to the information obtained during the Telehealth session in accordance with best medical practices. 4. You understand that the information obtained during the Telehealth session will be used to help determine the most appropriate treatment options. 5. You understand that You have the right to revoke this consent at any point in time. 6. You understand that Telehealth is voluntary, and that continued treatment is not dependent upon consent. 7. You understand that, in the event of non-consent to Telehealth services and/or technical difficulties, you will obtain services as typically provided in the absence of Telehealth technology. 8. You understand that this consent will be kept in Your medical record. 9. No potential benefits from the use of Telehealth or specific results can be guaranteed. Your condition may not be cured or improved and, in some cases, may get worse.    10. There are limitations in the provision of medical care and treatment via Telehealth and the Service and you may not be able to receive diagnosis and/or treatment through the Service for every condition for which you seek diagnosis and/or treatment. 11. There are potential risks to the use of Telehealth, including but not limited to the risks described in this Telehealth Consent. 12. Your Provider(s) have discussed the use of Telehealth and the Service with you, including the benefits and risks of such and you have provided oral consent to your Provider(s) for the use of Telehealth and the Service. 15. You understand that it is your duty to provide your Provider(s) truthful, accurate and complete information, including all relevant information regarding care that you may have received or may be receiving from other healthcare providers outside of the Service. 14. You understand that each of your Provider(s) may determine in his or sole discretion that your condition is not suitable for diagnosis and/or treatment using the Service, and that you may need to seek medical care and treatment a specialist or other healthcare provider, outside of the Service. 15. You understand that you are fully responsible for payment for all services provided by Provider(s) or through use of the Service and that you may not be able to use third-party insurance. 16. You represent that (a) you have read this Telehealth Consent carefully, (b) you understand the risks and benefits of the Service and the use of Telehealth in the medical care and treatment provided to you by Provider(s) using the Service, and (c) you have the legal capacity and authority to provide this consent for yourself and/or the minor for which you are consenting under applicable federal and state laws, including laws relating to the age of [de-identified] and/or parental/guardian consent.    17. You give your informed consent to the use of Telehealth by Provider(s) using the Service under the terms described in the Terms of Service and this Telehealth Consent. The patient was read the following statement and has consented to the visit as of 7/24/20. The patient has been scheduled for their first telehealth visit on 7/29/20 with Dr Sherry Cedeno.

## 2020-07-29 ENCOUNTER — VIRTUAL VISIT (OUTPATIENT)
Dept: PULMONOLOGY | Age: 75
End: 2020-07-29
Payer: MEDICARE

## 2020-07-29 PROCEDURE — 99441 PR PHYS/QHP TELEPHONE EVALUATION 5-10 MIN: CPT | Performed by: INTERNAL MEDICINE

## 2020-07-29 ASSESSMENT — SLEEP AND FATIGUE QUESTIONNAIRES
HOW LIKELY ARE YOU TO NOD OFF OR FALL ASLEEP IN A CAR, WHILE STOPPED FOR A FEW MINUTES IN TRAFFIC: 0
HOW LIKELY ARE YOU TO NOD OFF OR FALL ASLEEP WHILE WATCHING TV: 3
HOW LIKELY ARE YOU TO NOD OFF OR FALL ASLEEP WHILE SITTING INACTIVE IN A PUBLIC PLACE: 0
HOW LIKELY ARE YOU TO NOD OFF OR FALL ASLEEP WHILE SITTING AND TALKING TO SOMEONE: 0
HOW LIKELY ARE YOU TO NOD OFF OR FALL ASLEEP WHILE SITTING QUIETLY AFTER LUNCH WITHOUT ALCOHOL: 0
ESS TOTAL SCORE: 7
HOW LIKELY ARE YOU TO NOD OFF OR FALL ASLEEP WHILE SITTING AND READING: 1
HOW LIKELY ARE YOU TO NOD OFF OR FALL ASLEEP WHILE LYING DOWN TO REST IN THE AFTERNOON WHEN CIRCUMSTANCES PERMIT: 3
HOW LIKELY ARE YOU TO NOD OFF OR FALL ASLEEP WHEN YOU ARE A PASSENGER IN A CAR FOR AN HOUR WITHOUT A BREAK: 0

## 2020-08-06 NOTE — TELEPHONE ENCOUNTER
Next appt 10/14/2020. Pt wanting to know if she's able to get the 50mg 1x daily instead of the 25mg 2x daily. Pt only has a few days left and requesting 10 day supply be sent to [facility]. Please advise. Will place request for 90 day supply to mail order once I know if Rx needs to be changed.

## 2020-08-12 ENCOUNTER — HOSPITAL ENCOUNTER (OUTPATIENT)
Age: 75
Discharge: HOME | End: 2020-08-12
Payer: MEDICARE

## 2020-08-12 LAB
BASOPHILS ABSOLUTE: 0.1 K/UL (ref 0–0.2)
BASOPHILS RELATIVE PERCENT: 1.4 %
EOSINOPHILS ABSOLUTE: 0.1 K/UL (ref 0–0.6)
EOSINOPHILS RELATIVE PERCENT: 1.8 %
HCT VFR BLD CALC: 33.8 % (ref 36–48)
HEMOGLOBIN: 11 G/DL (ref 12–16)
LYMPHOCYTES ABSOLUTE: 1.3 K/UL (ref 1–5.1)
LYMPHOCYTES RELATIVE PERCENT: 23.9 %
MCH RBC QN AUTO: 27 PG (ref 26–34)
MCHC RBC AUTO-ENTMCNC: 32.5 G/DL (ref 31–36)
MCV RBC AUTO: 82.9 FL (ref 80–100)
MONOCYTES ABSOLUTE: 0.4 K/UL (ref 0–1.3)
MONOCYTES RELATIVE PERCENT: 7.8 %
NEUTROPHILS ABSOLUTE: 3.6 K/UL (ref 1.7–7.7)
NEUTROPHILS RELATIVE PERCENT: 65.1 %
PDW BLD-RTO: 15.2 % (ref 12.4–15.4)
PLATELET # BLD: 204 K/UL (ref 135–450)
PMV BLD AUTO: 9.3 FL (ref 5–10.5)
RBC # BLD: 4.08 M/UL (ref 4–5.2)
WBC # BLD: 5.6 K/UL (ref 4–11)

## 2020-08-12 PROCEDURE — 36415 COLL VENOUS BLD VENIPUNCTURE: CPT

## 2020-08-12 PROCEDURE — 85025 COMPLETE CBC W/AUTO DIFF WBC: CPT

## 2020-08-12 PROCEDURE — 82955 ASSAY OF G6PD ENZYME: CPT

## 2020-08-15 LAB — G-6-PD, QUANT: 12.2 U/G HB (ref 9.9–16.6)

## 2020-09-23 ENCOUNTER — OFFICE VISIT (OUTPATIENT)
Dept: FAMILY MEDICINE CLINIC | Age: 75
End: 2020-09-23
Payer: MEDICARE

## 2020-09-23 ENCOUNTER — NURSE TRIAGE (OUTPATIENT)
Dept: OTHER | Facility: CLINIC | Age: 75
End: 2020-09-23

## 2020-09-23 ENCOUNTER — HOSPITAL ENCOUNTER (OUTPATIENT)
Dept: GENERAL RADIOLOGY | Age: 75
Discharge: HOME | End: 2020-09-23
Payer: MEDICARE

## 2020-09-23 ENCOUNTER — HOSPITAL ENCOUNTER (OUTPATIENT)
Age: 75
Discharge: HOME | End: 2020-09-23
Payer: MEDICARE

## 2020-09-23 VITALS
HEART RATE: 67 BPM | DIASTOLIC BLOOD PRESSURE: 78 MMHG | OXYGEN SATURATION: 95 % | TEMPERATURE: 97.3 F | SYSTOLIC BLOOD PRESSURE: 138 MMHG | WEIGHT: 159 LBS | BODY MASS INDEX: 29.08 KG/M2

## 2020-09-23 PROCEDURE — G8399 PT W/DXA RESULTS DOCUMENT: HCPCS | Performed by: NURSE PRACTITIONER

## 2020-09-23 PROCEDURE — 96372 THER/PROPH/DIAG INJ SC/IM: CPT | Performed by: NURSE PRACTITIONER

## 2020-09-23 PROCEDURE — 4040F PNEUMOC VAC/ADMIN/RCVD: CPT | Performed by: NURSE PRACTITIONER

## 2020-09-23 PROCEDURE — 1036F TOBACCO NON-USER: CPT | Performed by: NURSE PRACTITIONER

## 2020-09-23 PROCEDURE — G8427 DOCREV CUR MEDS BY ELIG CLIN: HCPCS | Performed by: NURSE PRACTITIONER

## 2020-09-23 PROCEDURE — 73502 X-RAY EXAM HIP UNI 2-3 VIEWS: CPT

## 2020-09-23 PROCEDURE — G8417 CALC BMI ABV UP PARAM F/U: HCPCS | Performed by: NURSE PRACTITIONER

## 2020-09-23 PROCEDURE — 1123F ACP DISCUSS/DSCN MKR DOCD: CPT | Performed by: NURSE PRACTITIONER

## 2020-09-23 PROCEDURE — 99213 OFFICE O/P EST LOW 20 MIN: CPT | Performed by: NURSE PRACTITIONER

## 2020-09-23 PROCEDURE — 1090F PRES/ABSN URINE INCON ASSESS: CPT | Performed by: NURSE PRACTITIONER

## 2020-09-23 PROCEDURE — 3017F COLORECTAL CA SCREEN DOC REV: CPT | Performed by: NURSE PRACTITIONER

## 2020-09-23 RX ORDER — PREDNISONE 10 MG/1
TABLET ORAL
Qty: 30 TABLET | Refills: 0 | Status: SHIPPED | OUTPATIENT
Start: 2020-09-23 | End: 2020-10-14

## 2020-09-23 RX ORDER — METHYLPREDNISOLONE SODIUM SUCCINATE 125 MG/2ML
125 INJECTION, POWDER, LYOPHILIZED, FOR SOLUTION INTRAMUSCULAR; INTRAVENOUS ONCE
Status: COMPLETED | OUTPATIENT
Start: 2020-09-23 | End: 2020-09-23

## 2020-09-23 RX ADMIN — METHYLPREDNISOLONE SODIUM SUCCINATE 125 MG: 125 INJECTION, POWDER, LYOPHILIZED, FOR SOLUTION INTRAMUSCULAR; INTRAVENOUS at 16:00

## 2020-09-23 NOTE — PROGRESS NOTES
pr  [address]  FAMILY MEDICINE  [address] [place] [id number]  Dept: [phone]  Dept Fax: [phone]  Loc: [phone]    Pratik Bell is a 69 y.o. female who presents today for her medical conditions/complaints as noted below. Pratik Bell is c/o of Hip Pain (left hip pt said the pain started last thursday pt had no injuries )       Subjective:     Chief Complaint   Patient presents with    Hip Pain     left hip pt said the pain started last thursday pt had no injuries        Hip Pain    There was no injury mechanism. The pain is present in the left hip. The quality of the pain is described as aching, cramping, shooting and stabbing. The pain is at a severity of 8/10. The pain has been worsening since onset. Associated symptoms include a loss of motion. Pertinent negatives include no inability to bear weight, loss of sensation, muscle weakness, numbness or tingling. She reports no foreign bodies present. The symptoms are aggravated by movement and weight bearing. She has tried immobilization, heat, ice and non-weight bearing for the symptoms. The treatment provided no relief.   pain will radiate into groin and into left anterior upper thigh at times  Unable to cross legs due to discomfort. Past Medical History:   Diagnosis Date    Allergic     Anemia     Brain condition     pt states a cluster of blood vessels left side of brain    Cancer [facility]) 1/2013    endometrial cancer/ Dr. Alycia Sherman and Dr. Terra Chowdhury Cerebral cavernoma     followed by Dr. Eliane Romero at [facility] [address] Complication of anesthesia     Takes a long time to wake up    Diverticulosis     Endometrial cancer ([facility] [address]     Fungus infection     Fungus infection on bilat toes.  GERD (gastroesophageal reflux disease)     Hyperlipidemia     Hypertension     Hypomagnesemia 1/2016    consult with Dr. Izzy Pederson ([facility]) and no renal wasting.  felt most likely d/t HCTz and PPI,  HCTZ discontinued,  increased mag ox to 400 mg 2 tabs BID    Hypothyroidism     Irritable bowel syndrome     MVP (mitral valve prolapse)     JOAQUIN (obstructive sleep apnea)     Paronychia of great toe, left 7/11/2017    Pneumonia     infant    Prolonged emergence from general anesthesia     Type 2 diabetes mellitus without complication ([facility] [address]     Type II or unspecified type diabetes mellitus without mention of complication, not stated as uncontrolled     Unspecified sleep apnea     Vitamin B12 deficiency     Vitamin D deficiency          Review of Systems   Constitutional: Negative. Negative for appetite change, fatigue and unexpected weight change. HENT: Negative. Eyes: Negative. Respiratory: Negative. Negative for shortness of breath. Cardiovascular: Negative. Negative for chest pain, palpitations and leg swelling. Gastrointestinal: Negative. Negative for abdominal pain, anal bleeding, blood in stool and nausea. Endocrine: Negative. Genitourinary: Negative. Negative for hematuria. Musculoskeletal: Negative. Skin: Negative. Negative for rash. Allergic/Immunologic: Negative. Neurological: Negative. Negative for dizziness, tingling, syncope, light-headedness and numbness. Hematological: Negative. Does not bruise/bleed easily. Psychiatric/Behavioral: Negative. All other systems reviewed and are negative. Past Medical History:   Diagnosis Date    Allergic     Anemia     Brain condition     pt states a cluster of blood vessels left side of brain    Cancer [facility]) 1/2013    endometrial cancer/ Dr. Kasey Dunn and Dr. Nina Miller Cerebral cavernoma     followed by Dr. Aiden Walsh at [place] [id number] Complication of anesthesia     Takes a long time to wake up    Diverticulosis     Endometrial cancer ([facility] [address]     Fungus infection     Fungus infection on bilat toes.      GERD (gastroesophageal reflux disease)     Hyperlipidemia     Hypertension     Hypomagnesemia 1/2016    consult with Dr. Rafa Street ([facility]) and no renal wasting. felt most likely d/t HCTz and PPI,  HCTZ discontinued,  increased mag ox to 400 mg 2 tabs BID    Hypothyroidism     Irritable bowel syndrome     MVP (mitral valve prolapse)     JOAQUIN (obstructive sleep apnea)     Paronychia of great toe, left 7/11/2017    Pneumonia     infant    Prolonged emergence from general anesthesia     Type 2 diabetes mellitus without complication ([facility] [address]     Type II or unspecified type diabetes mellitus without mention of complication, not stated as uncontrolled     Unspecified sleep apnea     Vitamin B12 deficiency     Vitamin D deficiency      Family History   Problem Relation Age of Onset    Cancer Mother         colon    COPD Father     Heart Disease Father     Cancer Father         NMSC, skin     Past Surgical History:   Procedure Laterality Date    COLONOSCOPY      COLONOSCOPY  07/14/2016    COLONOSCOPY      CYSTOSCOPY  10/18/2017    Cystoscopy with Urethral dilation    CYSTOSCOPY Bilateral 10/18/2017    Cystoscopy with bilateral retrograde pyelograms    DIAGNOSTIC CARDIAC CATH LAB PROCEDURE  1/12/2005    [facility], Dr Hillary Rodriguez, No CAD.     DILATION AND CURETTAGE OF UTERUS      ENDOSCOPY, COLON, DIAGNOSTIC      HYSTERECTOMY  1/28/2013    due to uterine cancer    OTHER SURGICAL HISTORY  10/18/2017    cystoscopy, bilateral retrogrades    OTHER SURGICAL HISTORY Left 03/02/2017    port removal    SALIVARY GLAND SURGERY      KINGSLEY AND BSO  1/28/13    THYROID SURGERY      right thyroidectomy for benign reasons    UPPER GASTROINTESTINAL ENDOSCOPY  07/14/2016    UPPER GASTROINTESTINAL ENDOSCOPY       Social History     Socioeconomic History    Marital status:      Spouse name: Not on file    Number of children: Not on file    Years of education: Not on file    Highest education level: Not on file   Occupational History    Not on file   Social Needs    Financial resource strain: Not on file   [place] insecurity     Worry: Not on file     Inability: Not on file    Transportation needs     Medical: Not on file     Non-medical: Not on file   Tobacco Use    Smoking status: Never Smoker    Smokeless tobacco: Never Used   Substance and Sexual Activity    Alcohol use: No     Alcohol/week: 0.0 standard drinks    Drug use: No    Sexual activity: Never   Lifestyle    Physical activity     Days per week: Not on file     Minutes per session: Not on file    Stress: Not on file   Relationships    Social connections     Talks on phone: Not on file     Gets together: Not on file     Attends Hinduism service: Not on file     Active member of club or organization: Not on file     Attends meetings of clubs or organizations: Not on file     Relationship status: Not on file    Intimate partner violence     Fear of current or ex partner: Not on file     Emotionally abused: Not on file     Physically abused: Not on file     Forced sexual activity: Not on file   Other Topics Concern    Not on file   Social History Narrative    Not on file     Current Outpatient Medications   Medication Sig Dispense Refill    CONTOUR NEXT TEST strip TEST ONCE DAILY DX: 250.00 100 strip 11    metoprolol tartrate (LOPRESSOR) 25 MG tablet Take 1 tablet by mouth 2 times daily 60 tablet 1    metFORMIN (GLUCOPHAGE-XR) 500 MG extended release tablet Take 1 tablet by mouth 2 times daily 180 tablet 1    amLODIPine (NORVASC) 5 MG tablet TAKE 1 TABLET DAILY 90 tablet 0    levothyroxine (SYNTHROID) 50 MCG tablet Take 1 tablet by mouth daily 90 tablet 1    famotidine (PEPCID) 40 MG tablet Take 1 tablet by mouth 2 times daily 180 tablet 1    rosuvastatin (CRESTOR) 20 MG tablet TAKE 1 TABLET BY MOUTH NIGHTLY 90 tablet 1    losartan (COZAAR) 100 MG tablet TAKE ONE TABLET BY MOUTH EVERY DAY 90 tablet 1    esomeprazole Magnesium (NEXIUM) 20 MG PACK Take 20 mg by mouth      clobetasol (TEMOVATE) 0.05 % external solution       Cholecalciferol (VITAMIN D3) 25 MCG (1000 UT) TABS Take 1 tablet by mouth daily      magnesium oxide (MAG-OX) 400 (241.3 Mg) MG TABS tablet Take 3 tablets by mouth 2 times daily For Hypomagnesemia 180 tablet 5     No current facility-administered medications for this visit. No changes in past medical history, past surgical history, social history, orfamily history were noted during the patient encounter unless specifically listed above. All updates of past medical history, past surgical history, social history, or family history were reviewed personally by me duringthe office visit. All problems listed in the assessment are stable unless noted otherwise. Medication profile reviewed personally by me during the office visit. Medication side effects and possible impairments frommedications were discussed as applicable. Objective:     Physical Exam  Vitals signs and nursing note reviewed. Constitutional:       General: She is not in acute distress. Appearance: Normal appearance. She is well-developed. HENT:      Head: Normocephalic and atraumatic. Right Ear: Tympanic membrane, ear canal and external ear normal.      Left Ear: Tympanic membrane, ear canal and external ear normal.      Nose: Nose normal.      Mouth/Throat:      Pharynx: Uvula midline. No oropharyngeal exudate. Eyes:      General: Lids are normal.      Conjunctiva/sclera: Conjunctivae normal.      Pupils: Pupils are equal, round, and reactive to light. Neck:      Musculoskeletal: Normal range of motion and neck supple. Thyroid: No thyromegaly. Vascular: No carotid bruit or JVD. Cardiovascular:      Rate and Rhythm: Normal rate and regular rhythm. Pulses: Normal pulses. Radial pulses are 2+ on the right side and 2+ on the left side. Dorsalis pedis pulses are 2+ on the right side and 2+ on the left side. Posterior tibial pulses are 2+ on the right side and 2+ on the left side.       Heart sounds: Normal heart sounds. No murmur. No friction rub. No gallop. Pulmonary:      Effort: Pulmonary effort is normal.      Breath sounds: Normal breath sounds. Abdominal:      General: Bowel sounds are normal.      Palpations: Abdomen is soft. There is no mass. Tenderness: There is no abdominal tenderness. Musculoskeletal:      Left hip: She exhibits decreased range of motion, decreased strength and tenderness (mostly over greater trochanter). She exhibits no bony tenderness, no swelling and no deformity. Lumbar back: She exhibits tenderness. She exhibits normal range of motion, no bony tenderness, no swelling, no edema, no pain, no spasm and normal pulse. Lymphadenopathy:      Head:      Right side of head: No submandibular adenopathy. Left side of head: No submandibular adenopathy. Cervical: No cervical adenopathy. Skin:     General: Skin is warm and dry. Findings: No lesion or rash. Neurological:      Mental Status: She is alert and oriented to person, place, and time. Gait: Gait normal.   Psychiatric:         Speech: Speech normal.         Behavior: Behavior normal.         Thought Content: Thought content normal.         Judgment: Judgment normal.         /78 (Site: Left Upper Arm, Position: Sitting, Cuff Size: Medium Adult)   Pulse 67   Temp 97.3 °F (36.3 °C) (Temporal)   Wt 159 lb (72.1 kg)   SpO2 95%   BMI 29.08 kg/m²   Body mass index is 29.08 kg/m².     BP Readings from Last 2 Encounters:   09/23/20 138/78   01/15/20 138/72       Wt Readings from Last 3 Encounters:   09/23/20 159 lb (72.1 kg)   01/15/20 147 lb (66.7 kg)   11/20/19 145 lb 12.8 oz (66.1 kg)       Lab Review   Hospital Outpatient Visit on 08/12/2020   Component Date Value    WBC 08/12/2020 5.6     RBC 08/12/2020 4.08     Hemoglobin 08/12/2020 11.0*    Hematocrit 08/12/2020 33.8*    MCV 08/12/2020 82.9     MCH 08/12/2020 27.0     MCHC 08/12/2020 32.5     RDW 08/12/2020 15.2     Platelets 08/12/2020 204     MPV 08/12/2020 9.3     Neutrophils % 08/12/2020 65.1     Lymphocytes % 08/12/2020 23.9     Monocytes % 08/12/2020 7.8     Eosinophils % 08/12/2020 1.8     Basophils % 08/12/2020 1.4     Neutrophils Absolute 08/12/2020 3.6     Lymphocytes Absolute 08/12/2020 1.3     Monocytes Absolute 08/12/2020 0.4     Eosinophils Absolute 08/12/2020 0.1     Basophils Absolute 08/12/2020 0.1     G-6-PD, Quant 08/12/2020 12.2        No results found for this visit on 09/23/20. Assessment:       1. Left hip pain    2. Flu vaccine need        No results found for this visit on 09/23/20. Plan:       Nicolette Alfred was seen today for hip pain. Diagnoses and all orders for this visit:    Left hip pain  Pain mostly over greater trochanter,  Probable bursitis vs arthritis  Unable to check for bursitis due to pain when trying to position during exam  She does have history of cancer so will check  -     XR HIP LEFT (2-3 VIEWS); Future  -     methylPREDNISolone sodium (SOLU-MEDROL) injection 125 mg in office  Then tomorrow start  -     predniSONE (DELTASONE) 10 MG tablet; Take 40 mg for 3 days then 30 mg for 3 days then 20 mg for 3 days then 10 mg for 3 days  Exercises given to do at home  Follow up in a few weeks, sooner for worsening    Flu vaccine need  Patient will RTO for flu vaccine      Patient has been instructed call the office immediately with new symptoms, change in symptoms or worseningof symptoms. If this is not feasible, patient is instructed to report to the emergency room. Medication profile reviewed. Medication side effects and possible impairments from medications were discussed as applicable. Allergies were reviewed. Health maintenance was reviewed and updated as appropriate.      (Comment: Please note this report has been produced using a combination of typing and speech recognition software and may contain errors related to that system including errors in grammar, punctuation, and spelling, as well as words and phrases that may be inappropriate.  If there are any questions or concerns please feel free to contact the dictating provider for clarification.)

## 2020-09-23 NOTE — TELEPHONE ENCOUNTER
Received call from [address] [place] [address]. Reason for Disposition   SEVERE pain (e.g., excruciating, unable to do any normal activities)    Answer Assessment - Initial Assessment Questions  1. LOCATION and RADIATION: \"Where is the pain located? \"       Left hip, radiates to front of leg at times    2. QUALITY: \"What does the pain feel like? \"  (e.g., sharp, dull, aching, burning)      Pt is unable to describe     3. SEVERITY: \"How bad is the pain? \" \"What does it keep you from doing? \"   (Scale 1-10; or mild, moderate, severe)    -  MILD (1-3): doesn't interfere with normal activities     -  MODERATE (4-7): interferes with normal activities (e.g., work or school) or awakens from sleep, limping     -  SEVERE (8-10): excruciating pain, unable to do any normal activities, unable to walk      9-10 out of 10    4. ONSET: \"When did the pain start? \" \"Does it come and go, or is it there all the time? \"      Almost a week ago    5. WORK OR EXERCISE: \"Has there been any recent work or exercise that involved this part of the body? \"       Denies     6. CAUSE: \"What do you think is causing the hip pain? \"       Pt is not sure     7. AGGRAVATING FACTORS: \"What makes the hip pain worse? \" (e.g., walking, climbing stairs, running)      Standing or sitting down makes the pain worse    8. OTHER SYMPTOMS: \"Do you have any other symptoms? \" (e.g., back pain, pain shooting down leg,  fever, rash)      Pain shoots to front of her left leg at times    Protocols used: HIP PAIN-ADULT-[place]    Pt is calling with c/o severe left hip pain. Pt denies injury. Recommended that pt be seen today. Provided pt with care advice. Call soft transferred to [place] [address] to schedule appointment. Please do not reply to the triage nurse through this encounter. Any subsequent communication should be directly with the patient.

## 2020-09-29 ASSESSMENT — ENCOUNTER SYMPTOMS
SHORTNESS OF BREATH: 0
RESPIRATORY NEGATIVE: 1
ALLERGIC/IMMUNOLOGIC NEGATIVE: 1
NAUSEA: 0
ANAL BLEEDING: 0
GASTROINTESTINAL NEGATIVE: 1
BLOOD IN STOOL: 0
ABDOMINAL PAIN: 0
EYES NEGATIVE: 1

## 2020-10-06 ENCOUNTER — HOSPITAL ENCOUNTER (OUTPATIENT)
Age: 75
Discharge: HOME | End: 2020-10-06
Payer: MEDICARE

## 2020-10-06 LAB
A/G RATIO: 1.7 (ref 1.1–2.2)
ALBUMIN SERPL-MCNC: 4 G/DL (ref 3.4–5)
ALP BLD-CCNC: 61 U/L (ref 40–129)
ALT SERPL-CCNC: 10 U/L (ref 10–40)
ANION GAP SERPL CALCULATED.3IONS-SCNC: 10 MMOL/L (ref 3–16)
AST SERPL-CCNC: 11 U/L (ref 15–37)
BASOPHILS ABSOLUTE: 0.1 K/UL (ref 0–0.2)
BASOPHILS RELATIVE PERCENT: 0.7 %
BILIRUB SERPL-MCNC: 0.3 MG/DL (ref 0–1)
BUN BLDV-MCNC: 18 MG/DL (ref 7–20)
CALCIUM SERPL-MCNC: 9 MG/DL (ref 8.3–10.6)
CHLORIDE BLD-SCNC: 102 MMOL/L (ref 99–110)
CO2: 27 MMOL/L (ref 21–32)
CREAT SERPL-MCNC: 0.8 MG/DL (ref 0.6–1.2)
EOSINOPHILS ABSOLUTE: 0.2 K/UL (ref 0–0.6)
EOSINOPHILS RELATIVE PERCENT: 1.5 %
GFR AFRICAN AMERICAN: >60
GFR NON-AFRICAN AMERICAN: >60
GLOBULIN: 2.4 G/DL
GLUCOSE BLD-MCNC: 106 MG/DL (ref 70–99)
HCT VFR BLD CALC: 37.6 % (ref 36–48)
HEMOGLOBIN: 12.2 G/DL (ref 12–16)
LYMPHOCYTES ABSOLUTE: 1.6 K/UL (ref 1–5.1)
LYMPHOCYTES RELATIVE PERCENT: 12 %
MAGNESIUM: 1.8 MG/DL (ref 1.8–2.4)
MCH RBC QN AUTO: 27.1 PG (ref 26–34)
MCHC RBC AUTO-ENTMCNC: 32.4 G/DL (ref 31–36)
MCV RBC AUTO: 83.6 FL (ref 80–100)
MONOCYTES ABSOLUTE: 0.8 K/UL (ref 0–1.3)
MONOCYTES RELATIVE PERCENT: 6 %
NEUTROPHILS ABSOLUTE: 10.7 K/UL (ref 1.7–7.7)
NEUTROPHILS RELATIVE PERCENT: 79.8 %
PDW BLD-RTO: 15.6 % (ref 12.4–15.4)
PLATELET # BLD: 198 K/UL (ref 135–450)
PMV BLD AUTO: 9.3 FL (ref 5–10.5)
POTASSIUM SERPL-SCNC: 4.3 MMOL/L (ref 3.5–5.1)
RBC # BLD: 4.5 M/UL (ref 4–5.2)
SODIUM BLD-SCNC: 139 MMOL/L (ref 136–145)
TOTAL PROTEIN: 6.4 G/DL (ref 6.4–8.2)
WBC # BLD: 13.4 K/UL (ref 4–11)

## 2020-10-06 PROCEDURE — 85025 COMPLETE CBC W/AUTO DIFF WBC: CPT

## 2020-10-06 PROCEDURE — 83550 IRON BINDING TEST: CPT

## 2020-10-06 PROCEDURE — 82728 ASSAY OF FERRITIN: CPT

## 2020-10-06 PROCEDURE — 36415 COLL VENOUS BLD VENIPUNCTURE: CPT

## 2020-10-06 PROCEDURE — 84443 ASSAY THYROID STIM HORMONE: CPT

## 2020-10-06 PROCEDURE — 83540 ASSAY OF IRON: CPT

## 2020-10-06 PROCEDURE — 83735 ASSAY OF MAGNESIUM: CPT

## 2020-10-06 PROCEDURE — 80053 COMPREHEN METABOLIC PANEL: CPT

## 2020-10-07 LAB
FERRITIN: 27.1 NG/ML (ref 15–150)
IRON: 48 UG/DL (ref 37–145)
TOTAL IRON BINDING CAPACITY: 303 UG/DL (ref 260–445)
TSH SERPL DL<=0.05 MIU/L-ACNC: 4.44 UIU/ML (ref 0.27–4.2)

## 2020-10-14 ENCOUNTER — OFFICE VISIT (OUTPATIENT)
Dept: FAMILY MEDICINE CLINIC | Age: 75
End: 2020-10-14
Payer: MEDICARE

## 2020-10-14 VITALS
OXYGEN SATURATION: 95 % | DIASTOLIC BLOOD PRESSURE: 74 MMHG | SYSTOLIC BLOOD PRESSURE: 122 MMHG | WEIGHT: 159 LBS | TEMPERATURE: 97.2 F | HEART RATE: 74 BPM | BODY MASS INDEX: 29.08 KG/M2

## 2020-10-14 PROCEDURE — 4040F PNEUMOC VAC/ADMIN/RCVD: CPT | Performed by: NURSE PRACTITIONER

## 2020-10-14 PROCEDURE — 3017F COLORECTAL CA SCREEN DOC REV: CPT | Performed by: NURSE PRACTITIONER

## 2020-10-14 PROCEDURE — 99214 OFFICE O/P EST MOD 30 MIN: CPT | Performed by: NURSE PRACTITIONER

## 2020-10-14 PROCEDURE — 90686 IIV4 VACC NO PRSV 0.5 ML IM: CPT | Performed by: NURSE PRACTITIONER

## 2020-10-14 PROCEDURE — G0008 ADMIN INFLUENZA VIRUS VAC: HCPCS | Performed by: NURSE PRACTITIONER

## 2020-10-14 PROCEDURE — G8427 DOCREV CUR MEDS BY ELIG CLIN: HCPCS | Performed by: NURSE PRACTITIONER

## 2020-10-14 PROCEDURE — 1036F TOBACCO NON-USER: CPT | Performed by: NURSE PRACTITIONER

## 2020-10-14 PROCEDURE — 1123F ACP DISCUSS/DSCN MKR DOCD: CPT | Performed by: NURSE PRACTITIONER

## 2020-10-14 PROCEDURE — G8399 PT W/DXA RESULTS DOCUMENT: HCPCS | Performed by: NURSE PRACTITIONER

## 2020-10-14 PROCEDURE — G8482 FLU IMMUNIZE ORDER/ADMIN: HCPCS | Performed by: NURSE PRACTITIONER

## 2020-10-14 PROCEDURE — G8417 CALC BMI ABV UP PARAM F/U: HCPCS | Performed by: NURSE PRACTITIONER

## 2020-10-14 PROCEDURE — 1090F PRES/ABSN URINE INCON ASSESS: CPT | Performed by: NURSE PRACTITIONER

## 2020-10-14 RX ORDER — LEVOTHYROXINE SODIUM 0.07 MG/1
75 TABLET ORAL DAILY
Qty: 90 TABLET | Refills: 1 | Status: SHIPPED | OUTPATIENT
Start: 2020-10-14 | End: 2021-07-14 | Stop reason: SDUPTHER

## 2020-10-14 RX ORDER — LORAZEPAM 0.5 MG/1
0.5 TABLET ORAL DAILY PRN
Qty: 15 TABLET | Refills: 0 | Status: SHIPPED | OUTPATIENT
Start: 2020-10-14 | End: 2021-05-05 | Stop reason: SDUPTHER

## 2020-10-14 RX ORDER — OCTISALATE, AVOBENZONE, HOMOSALATE, AND OCTOCRYLENE 29.4; 29.4; 49; 25.48 MG/ML; MG/ML; MG/ML; MG/ML
LOTION TOPICAL
COMMUNITY
End: 2021-05-05

## 2020-10-14 RX ORDER — FAMOTIDINE 40 MG/1
40 TABLET, FILM COATED ORAL 2 TIMES DAILY
Qty: 180 TABLET | Refills: 1 | Status: SHIPPED | OUTPATIENT
Start: 2020-10-14 | End: 2021-05-05

## 2020-10-23 ASSESSMENT — ENCOUNTER SYMPTOMS
GASTROINTESTINAL NEGATIVE: 1
NAUSEA: 0
EYES NEGATIVE: 1
ALLERGIC/IMMUNOLOGIC NEGATIVE: 1
SHORTNESS OF BREATH: 0
BLOOD IN STOOL: 0
RESPIRATORY NEGATIVE: 1
ABDOMINAL PAIN: 0
ANAL BLEEDING: 0

## 2020-10-23 NOTE — PROGRESS NOTES
[address] [facility]  [address] [facility] [address]  Dept: [phone]  Dept Fax: [phone]  Loc: [phone]    Tejas Aguilar is a 70 y.o. female who presents today for her medical conditions/complaints as noted below. Tejas Aguilar is c/o of Hip Pain (pt said she is still having a lot of pain ) and Anxiety (3 month check up for ativan last drug screen was 1/20)       Subjective:     Chief Complaint   Patient presents with    Hip Pain     pt said she is still having a lot of pain     Anxiety     3 month check up for ativan last drug screen was 1/20       HPI  Pain patient continues to have a lot of left hip pain. She is concerned that her hip pain is due to a recurrence of her cancer. The patient has had an x-ray of her left hip on 9/23/2020 showing mild arthritis otherwise normal    She presents today for 3 month follow up on chronic benzodiazepine management. The patient is on chronic benzodiazepine therapy for the diagnosis of:  Anxiety:   The patient complains of fatigue and palpitations, but denies any other symptoms. Current pharmacologic treatment: benzodiazepines ativan 0.5 mg on rare occasion. Medication side effects:  none. The patient denies nausea, vomiting, diarrhea and constipation. Denies respiratory problems/depression. Denies increased sleepiness, drowsiness or confusion. The patient states that the medications and treatment have helped improve the quality of life and helped in psychosocial functioning. She is not participating in counselling/therapy. The patient states they are taking medications only as prescribed , using one pharmacy and getting all controlled substances from one provider unless okayed with me. The patient states they are following proper safeguarding and disposal of medications.     Potential Aberrant Drug-Related Behavior:      Controlled Substances Monitoring: Periodic Controlled Substance Monitoring: Possible medication side effects, risk of tolerance/dependence & alternative treatments discussed., No signs of potential drug abuse or diversion identified. , Assessed functional status. (Alexa Lyons, APRN - CNP)      KAYCE Rashid is here for follow up of heartburn. Symptoms have been present for awhile and is a chronic condition. Currently treated with prescription H2 blocker: pepcid 40 mg bid, which has been effective. She has been using this therapy daily. Patient denies dysphagia, cough, hoarseness, chest pain, unintentional weight loss, N/V, bloating, early satiety, abdominal pain or melena, hematochezia, hematemesis, and coffee ground emesis. .          Past Medical History:   Diagnosis Date    Allergic     Anemia     Brain condition     pt states a cluster of blood vessels left side of brain    Cancer [facility]) 1/2013    endometrial cancer/ Dr. Hussain Chanel and Dr. Twila Ryan Cerebral cavernoma     followed by Dr. Nan Leiva at [place] [id number] Complication of anesthesia     Takes a long time to wake up    Diverticulosis     Endometrial cancer [address] [address]     Fungus infection     Fungus infection on bilat toes.  GERD (gastroesophageal reflux disease)     Hyperlipidemia     Hypertension     Hypomagnesemia 1/2016    consult with Dr. Francois Burroughs [facility] and no renal wasting. felt most likely d/t HCTz and PPI,  HCTZ discontinued,  increased mag ox to 400 mg 2 tabs BID    Hypothyroidism     Irritable bowel syndrome     MVP (mitral valve prolapse)     JOAQUIN (obstructive sleep apnea)     Paronychia of great toe, left 7/11/2017    Pneumonia     infant    Prolonged emergence from general anesthesia     Type 2 diabetes mellitus without complication ([facility] [address]     Type II or unspecified type diabetes mellitus without mention of complication, not stated as uncontrolled     Unspecified sleep apnea     Vitamin B12 deficiency     Vitamin D deficiency          Review of Systems   Constitutional: Negative. Negative for appetite change, fatigue and unexpected weight change. HENT: Negative. Eyes: Negative. Respiratory: Negative. Negative for shortness of breath. Cardiovascular: Negative. Negative for chest pain, palpitations and leg swelling. Gastrointestinal: Negative. Negative for abdominal pain, anal bleeding, blood in stool and nausea. Endocrine: Negative. Genitourinary: Negative. Negative for hematuria. Musculoskeletal: Positive for arthralgias. Skin: Negative. Negative for rash. Allergic/Immunologic: Negative. Neurological: Negative. Negative for dizziness, syncope, light-headedness and numbness. Hematological: Negative. Does not bruise/bleed easily. Psychiatric/Behavioral: The patient is nervous/anxious. All other systems reviewed and are negative. Past Medical History:   Diagnosis Date    Allergic     Anemia     Brain condition     pt states a cluster of blood vessels left side of brain    Cancer [facility]) 1/2013    endometrial cancer/ Dr. Landy Ernst and Dr. Adriana Marie Cerebral cavernoma     followed by Dr. Shahrzad Mendes at [facility] [address] Complication of anesthesia     Takes a long time to wake up    Diverticulosis     Endometrial cancer ([facility] [address]     Fungus infection     Fungus infection on bilat toes.  GERD (gastroesophageal reflux disease)     Hyperlipidemia     Hypertension     Hypomagnesemia 1/2016    consult with Dr. Ana Sams ([facility]) and no renal wasting.  felt most likely d/t HCTz and PPI,  HCTZ discontinued,  increased mag ox to 400 mg 2 tabs BID    Hypothyroidism     Irritable bowel syndrome     MVP (mitral valve prolapse)     JOAQUIN (obstructive sleep apnea)     Paronychia of great toe, left 7/11/2017    Pneumonia     infant    Prolonged emergence from general anesthesia     Type 2 diabetes mellitus without complication ([facility] [address]     Type II or unspecified type diabetes mellitus without mention of complication, not stated as uncontrolled     Unspecified sleep apnea     Vitamin B12 deficiency     Vitamin D deficiency      Family History   Problem Relation Age of Onset   [facility] Mother         colon    COPD Father     Heart Disease Father     Cancer Father         NMSC, skin     Past Surgical History:   Procedure Laterality Date    COLONOSCOPY      COLONOSCOPY  07/14/2016    COLONOSCOPY      CYSTOSCOPY  10/18/2017    Cystoscopy with Urethral dilation    CYSTOSCOPY Bilateral 10/18/2017    Cystoscopy with bilateral retrograde pyelograms    DIAGNOSTIC CARDIAC CATH LAB PROCEDURE  1/12/2005    [facility] [address] [facility], Dr Tatyana Diaz, No CAD.     DILATION AND CURETTAGE OF UTERUS      ENDOSCOPY, COLON, DIAGNOSTIC      HYSTERECTOMY  1/28/2013    due to uterine cancer    OTHER SURGICAL HISTORY  10/18/2017    cystoscopy, bilateral retrogrades    OTHER SURGICAL HISTORY Left 03/02/2017    port removal    SALIVARY GLAND SURGERY      KINGSLEY AND BSO  1/28/13    THYROID SURGERY      right thyroidectomy for benign reasons    UPPER GASTROINTESTINAL ENDOSCOPY  07/14/2016    UPPER GASTROINTESTINAL ENDOSCOPY       Social History     Socioeconomic History    Marital status:      Spouse name: Not on file    Number of children: Not on file    Years of education: Not on file    Highest education level: Not on file   Occupational History    Not on file   Social Needs    Financial resource strain: Not on file    Food insecurity     Worry: Not on file     Inability: Not on file    Transportation needs     Medical: Not on file     Non-medical: Not on file   Tobacco Use    Smoking status: Never Smoker    Smokeless tobacco: Never Used   Substance and Sexual Activity    Alcohol use: No     Alcohol/week: 0.0 standard drinks    Drug use: No    Sexual activity: Never   Lifestyle    Physical activity     Days per week: Not on file     Minutes per session: Not on file    Stress: Not on file   Relationships    Social connections     Talks on phone: Not on file     Gets together: Not on file     Attends Latter-day service: Not on file     Active member of club or organization: Not on file     Attends meetings of clubs or organizations: Not on file     Relationship status: Not on file    Intimate partner violence     Fear of current or ex partner: Not on file     Emotionally abused: Not on file     Physically abused: Not on file     Forced sexual activity: Not on file   Other Topics Concern    Not on file   Social History Narrative    Not on file     Current Outpatient Medications   Medication Sig Dispense Refill    Probiotic Product (ALIGN) 4 MG CAPS Take by mouth      famotidine (PEPCID) 40 MG tablet Take 1 tablet by mouth 2 times daily 180 tablet 1    LORazepam (ATIVAN) 0.5 MG tablet Take 1 tablet by mouth daily as needed for Anxiety for up to 30 days. 15 tablet 0    levothyroxine (SYNTHROID) 75 MCG tablet Take 1 tablet by mouth daily 90 tablet 1    metoprolol tartrate (LOPRESSOR) 25 MG tablet Take 1 tablet by mouth 2 times daily 180 tablet 0    CONTOUR NEXT TEST strip TEST ONCE DAILY DX: 250.00 100 strip 11    metFORMIN (GLUCOPHAGE-XR) 500 MG extended release tablet Take 1 tablet by mouth 2 times daily 180 tablet 1    amLODIPine (NORVASC) 5 MG tablet TAKE 1 TABLET DAILY 90 tablet 0    levothyroxine (SYNTHROID) 50 MCG tablet Take 1 tablet by mouth daily 90 tablet 1    rosuvastatin (CRESTOR) 20 MG tablet TAKE 1 TABLET BY MOUTH NIGHTLY 90 tablet 1    losartan (COZAAR) 100 MG tablet TAKE ONE TABLET BY MOUTH EVERY DAY 90 tablet 1    esomeprazole Magnesium (NEXIUM) 20 MG PACK Take 20 mg by mouth      clobetasol (TEMOVATE) 0.05 % external solution       Cholecalciferol (VITAMIN D3) 25 MCG (1000 UT) TABS Take 1 tablet by mouth daily      magnesium oxide (MAG-OX) 400 (241.3 Mg) MG TABS tablet Take 3 tablets by mouth 2 times daily For Hypomagnesemia 180 tablet 5     No current facility-administered medications for this visit.          No changes in past medical history, past surgical history, social history, orfamily history were noted during the patient encounter unless specifically listed above. All updates of past medical history, past surgical history, social history, or family history were reviewed personally by me duringthe office visit. All problems listed in the assessment are stable unless noted otherwise. Medication profile reviewed personally by me during the office visit. Medication side effects and possible impairments frommedications were discussed as applicable. Objective:     Physical Exam  Vitals signs and nursing note reviewed. Constitutional:       General: She is not in acute distress. Appearance: Normal appearance. She is well-developed. HENT:      Head: Normocephalic and atraumatic. Right Ear: Tympanic membrane, ear canal and external ear normal.      Left Ear: Tympanic membrane, ear canal and external ear normal.      Nose: Nose normal.      Mouth/Throat:      Pharynx: Uvula midline. No oropharyngeal exudate. Eyes:      General: Lids are normal.      Conjunctiva/sclera: Conjunctivae normal.      Pupils: Pupils are equal, round, and reactive to light. Neck:      Musculoskeletal: Normal range of motion and neck supple. Thyroid: No thyromegaly. Vascular: No carotid bruit or JVD. Cardiovascular:      Rate and Rhythm: Normal rate and regular rhythm. Pulses: Normal pulses. Radial pulses are 2+ on the right side and 2+ on the left side. Dorsalis pedis pulses are 2+ on the right side and 2+ on the left side. Posterior tibial pulses are 2+ on the right side and 2+ on the left side. Heart sounds: Normal heart sounds. No murmur. No friction rub. No gallop. Pulmonary:      Effort: Pulmonary effort is normal.      Breath sounds: Normal breath sounds. Abdominal:      General: Bowel sounds are normal.      Palpations: Abdomen is soft. There is no mass. Tenderness:  There is no abdominal % 10/06/2020 6.0     Eosinophils % 10/06/2020 1.5     Basophils % 10/06/2020 0.7     Neutrophils Absolute 10/06/2020 10.7*    Lymphocytes Absolute 10/06/2020 1.6     Monocytes Absolute 10/06/2020 0.8     Eosinophils Absolute 10/06/2020 0.2     Basophils Absolute 10/06/2020 0.1     Iron 10/06/2020 48     Sodium 10/06/2020 139     Potassium 10/06/2020 4.3     Chloride 10/06/2020 102     CO2 10/06/2020 27     Anion Gap 10/06/2020 10     Glucose 10/06/2020 106*    BUN 10/06/2020 18     CREATININE 10/06/2020 0.8     GFR Non- 10/06/2020 >60     GFR  10/06/2020 >60     Calcium 10/06/2020 9.0     Total Protein 10/06/2020 6.4     Alb 10/06/2020 4.0     Albumin/Globulin Ratio 10/06/2020 1.7     Total Bilirubin 10/06/2020 0.3     Alkaline Phosphatase 10/06/2020 61     ALT 10/06/2020 10     AST 10/06/2020 11*    Globulin 10/06/2020 2.4     TIBC 10/06/2020 303     Ferritin 10/06/2020 27.1     Magnesium 10/06/2020 1.80        No results found for this visit on 10/14/20. Assessment:       1. Anxiety    2. Left hip pain    3. Gastroesophageal reflux disease without esophagitis    4. Flu vaccine need        No results found for this visit on 10/14/20. Plan:       Omar Winter was seen today for hip pain and anxiety. Diagnoses and all orders for this visit:    Anxiety  -     LORazepam (ATIVAN) 0.5 MG tablet; Take 1 tablet by mouth daily as needed for Anxiety for up to 30 days. Chronic benzodiazepine treatment protocol was discussed with the patient again including taking medications only as prescribed , using one pharmacy and getting all controlled substances from one physician unless okayed with me.  Proper safeguarding and disposal of medications were also discussed with the patient.     The current treatment regimen is needed to decrease the patient's anxiety symptoms, improve the quality of life and ability to function and improve sleep and mood symptoms.        Patient given following instructions - You are on medications which could impair your senses, you are at risk of weakness, falls, dizziness, or drowsiness. You should be careful during activities which could place you at risk of harm, such as climbing, using stairs, operating machinery, or driving vehicles. If you feel you cannot safely do these activities, you should request others to help you, or avoid the activities altogether. If you are drowsy for any other reason, you should use the same precautions as listed above.     The patient is made aware of the potential of drowsiness with the prescribed medications, and that care should be exercised in operating machinery, vehicles, or placing oneself in situations of risk to their health, ie heights and climbing and stairs.     The patient denies nausea, vomiting, diarrhea and constipation. No respiratory problems. No increased sleepiness, drowsiness or confusion. The patient states that the medications and treatment have helped improve the quality of life and helped in psychosocial functioning. There are no indicators for possible drug abuse, addiction or diversion problems. Periodic Controlled Substance Monitoring: Possible medication side effects, risk of tolerance/dependence & alternative treatments discussed., No signs of potential drug abuse or diversion identified. , Assessed functional status. (Brandy Brunner Conn, APRN - CNP)    Left hip pain  Refer to-     [address] [place] [address] [place], Eduar Nichols DO, Orthopedic Surgery, [facility]    Gastroesophageal reflux disease without esophagitis  -     famotidine (PEPCID) 40 MG tablet; Take 1 tablet by mouth 2 times daily    Flu vaccine need  -     INFLUENZA, QUADV, 3 YRS AND OLDER, IM PF, PREFILL SYR OR SDV, 0.5ML (AFLURIA QUADV, PF)    Other orders  -     levothyroxine (SYNTHROID) 75 MCG tablet;  Take 1 tablet by mouth daily    Patient has been instructed call the office immediately with new symptoms, change in symptoms or worseningof symptoms. If this is not feasible, patient is instructed to report to the emergency room. Medication profile reviewed. Medication side effects and possible impairments from medications were discussed as applicable. Allergies were reviewed. Health maintenance was reviewed and updated as appropriate. Return in about 3 months (around 1/14/2021) for [facility]. (Comment: Please note this report has been produced using a combination of typing and speech recognition software and may contain errors related to that system including errors in grammar, punctuation, and spelling, as well as words and phrases that may be inappropriate.  If there are any questions or concerns please feel free to contact the dictating provider for clarification.)

## 2020-10-28 RX ORDER — AMLODIPINE BESYLATE 5 MG/1
TABLET ORAL
Qty: 90 TABLET | Refills: 0 | Status: SHIPPED | OUTPATIENT
Start: 2020-10-28 | End: 2021-01-20 | Stop reason: SDUPTHER

## 2020-11-06 ENCOUNTER — OFFICE VISIT (OUTPATIENT)
Dept: CARDIOLOGY CLINIC | Age: 75
End: 2020-11-06
Payer: MEDICARE

## 2020-11-06 VITALS
HEIGHT: 62 IN | SYSTOLIC BLOOD PRESSURE: 130 MMHG | OXYGEN SATURATION: 98 % | HEART RATE: 62 BPM | WEIGHT: 158 LBS | BODY MASS INDEX: 29.08 KG/M2 | DIASTOLIC BLOOD PRESSURE: 60 MMHG

## 2020-11-06 PROCEDURE — G8417 CALC BMI ABV UP PARAM F/U: HCPCS | Performed by: INTERNAL MEDICINE

## 2020-11-06 PROCEDURE — G8482 FLU IMMUNIZE ORDER/ADMIN: HCPCS | Performed by: INTERNAL MEDICINE

## 2020-11-06 PROCEDURE — 3017F COLORECTAL CA SCREEN DOC REV: CPT | Performed by: INTERNAL MEDICINE

## 2020-11-06 PROCEDURE — 1090F PRES/ABSN URINE INCON ASSESS: CPT | Performed by: INTERNAL MEDICINE

## 2020-11-06 PROCEDURE — G8399 PT W/DXA RESULTS DOCUMENT: HCPCS | Performed by: INTERNAL MEDICINE

## 2020-11-06 PROCEDURE — 1123F ACP DISCUSS/DSCN MKR DOCD: CPT | Performed by: INTERNAL MEDICINE

## 2020-11-06 PROCEDURE — G8427 DOCREV CUR MEDS BY ELIG CLIN: HCPCS | Performed by: INTERNAL MEDICINE

## 2020-11-06 PROCEDURE — 99213 OFFICE O/P EST LOW 20 MIN: CPT | Performed by: INTERNAL MEDICINE

## 2020-11-06 PROCEDURE — 4040F PNEUMOC VAC/ADMIN/RCVD: CPT | Performed by: INTERNAL MEDICINE

## 2020-11-06 PROCEDURE — 1036F TOBACCO NON-USER: CPT | Performed by: INTERNAL MEDICINE

## 2020-11-06 RX ORDER — ROSUVASTATIN CALCIUM 10 MG/1
10 TABLET, COATED ORAL DAILY
Qty: 90 TABLET | Refills: 3 | Status: SHIPPED | OUTPATIENT
Start: 2020-11-06 | End: 2021-12-02 | Stop reason: SDUPTHER

## 2020-11-06 RX ORDER — ASPIRIN 81 MG/1
81 TABLET ORAL DAILY
COMMUNITY
End: 2021-05-05

## 2020-11-06 NOTE — PATIENT INSTRUCTIONS
Plan:  1. Resume Crestor 10 mg daily. 2. Resume Aspirin 81 mg daily. 3. Continue all other medications as prescribe. 4. The patient was seen for 25 minutes. >50% of the time was devoted to giving the patient detailed instructions instructions on addressing diet, regular exercise, weight control, smoking abstention, medication compliance, and stress minimization. The patient was provided written and verbal instructions regarding risk factor modification. 5. Follow up with ALEKSANDAR Manjarrez CNP regularly. 6. Follow up with me in 1 year.

## 2020-11-06 NOTE — PROGRESS NOTES
[address] [facility]   Cardiovascular Evaluation    PATIENT: Olga Franco  DATE: 2020  MRN: <[id number]>  CSN: [id number]  : 1945    Primary Care Doctor: ALEKSANDAR Mnuroe CNP    Reason for evaluation:   1 Year Follow Up and Hypertension    Subjective:     History of present illness on initial date of evaluation:   Olga Franco is a 70 y.o. patient who presents for follow up. Today she reports that she has been feeling well from a cardiac standpoint. She did have an episode of significant palpitations. This occurred while she was taking Prednisone for hip pain. She has not been taking Aspirin or Crestor. She stopped her Crestor as this caused cramps in her feet at night. She is willing to resume this. Fabi Sharif does not exercise regularly.      Patient Active Problem List   Diagnosis    Chest pain    Heart palpitations    GERD (gastroesophageal reflux disease)    Diverticulitis    MVP (mitral valve prolapse)    Fasciitis    Hypothyroidism    JOAQUIN (obstructive sleep apnea)    DM type 2 (diabetes mellitus, type 2) ([address]    B12 deficiency    Vitamin D deficiency    Hypertension    Hyperlipidemia    Vitamin B12 deficiency    Cerebral cavernoma    Abnormal cardiovascular stress test    Postmenopausal atrophic vaginitis    Rectocele    Cystocele    Encounter for central line care    Vaginal atrophy    History of endometrial cancer    Hypomagnesemia    Anxiety    Endometrial cancer (HCC)    Cancer (HCC)    Elevated CA-125    Urethral syndrome    Hematuria    Hydronephrosis    Pelvic prolapse    Bilateral senile cataracts    Chronic lymphocytic thyroiditis    Diverticulitis of intestine    Goiter, nontoxic, multinodular    H/O total hysterectomy with removal of both tubes and ovaries    Paronychia of great toe, left    Post-menopause    Postsurgical hypothyroidism    Right upper quadrant abdominal pain    Status post chemotherapy    Dizziness  Endometrial carcinoma (HCC)    Fibrohistiocytic proliferation of the skin    Moderate episode of recurrent major depressive disorder ([address]         Cardiac Testing: I have reviewed the findings below. EKG:  ECHO:   STRESS TEST:  CATH:  BYPASS:  VASCULAR:      Past Medical History:   has a past medical history of Allergic, Anemia, Brain condition, Cancer ([address] Cerebral cavernoma, Complication of anesthesia, Diverticulosis, Endometrial cancer ([address] Fungus infection, GERD (gastroesophageal reflux disease), Hyperlipidemia, Hypertension, Hypomagnesemia, Hypothyroidism, Irritable bowel syndrome, MVP (mitral valve prolapse), JOAQUIN (obstructive sleep apnea), Paronychia of great toe, left, Pneumonia, Prolonged emergence from general anesthesia, Type 2 diabetes mellitus without complication ([address] Type II or unspecified type diabetes mellitus without mention of complication, not stated as uncontrolled, Unspecified sleep apnea, Vitamin B12 deficiency, and Vitamin D deficiency. Surgical History:   has a past surgical history that includes Thyroid surgery; Dilation and curettage of uterus; Salivary gland surgery; Hysterectomy (1/28/2013); venus and bso (cervix removed) (1/28/13); Colonoscopy; Endoscopy, colon, diagnostic; Diagnostic Cardiac Cath Lab Procedure (1/12/2005); Upper gastrointestinal endoscopy (07/14/2016); Colonoscopy (07/14/2016); Colonoscopy; Upper gastrointestinal endoscopy; other surgical history (10/18/2017); Cystoscopy (10/18/2017); Cystoscopy (Bilateral, 10/18/2017); and other surgical history (Left, 03/02/2017). Social History:   reports that she has never smoked. She has never used smokeless tobacco. She reports that she does not drink alcohol or use drugs. Family History:  No evidence for sudden cardiac death or premature CAD    Home Medications:  Reviewed and are listed in nursing record.  and/or listed below  Current Outpatient Medications   Medication Sig Dispense Refill    rosuvastatin (CRESTOR) 10 MG tablet Take 1 tablet by mouth daily TAKE 1 TABLET BY MOUTH NIGHTLY 90 tablet 3    aspirin 81 MG EC tablet Take 81 mg by mouth daily      amLODIPine (NORVASC) 5 MG tablet TAKE 1 TABLET DAILY (Patient taking differently: 2.5 mg ) 90 tablet 0    Probiotic Product (ALIGN) 4 MG CAPS Take by mouth      famotidine (PEPCID) 40 MG tablet Take 1 tablet by mouth 2 times daily 180 tablet 1    LORazepam (ATIVAN) 0.5 MG tablet Take 1 tablet by mouth daily as needed for Anxiety for up to 30 days. 15 tablet 0    levothyroxine (SYNTHROID) 75 MCG tablet Take 1 tablet by mouth daily 90 tablet 1    metoprolol tartrate (LOPRESSOR) 25 MG tablet Take 1 tablet by mouth 2 times daily 180 tablet 0    CONTOUR NEXT TEST strip TEST ONCE DAILY DX: 250.00 100 strip 11    metFORMIN (GLUCOPHAGE-XR) 500 MG extended release tablet Take 1 tablet by mouth 2 times daily 180 tablet 1    losartan (COZAAR) 100 MG tablet TAKE ONE TABLET BY MOUTH EVERY DAY 90 tablet 1    esomeprazole Magnesium (NEXIUM) 20 MG PACK Take 20 mg by mouth      clobetasol (TEMOVATE) 0.05 % external solution       Cholecalciferol (VITAMIN D3) 25 MCG (1000 UT) TABS Take 1 tablet by mouth daily      magnesium oxide (MAG-OX) 400 (241.3 Mg) MG TABS tablet Take 3 tablets by mouth 2 times daily For Hypomagnesemia 180 tablet 5    levothyroxine (SYNTHROID) 50 MCG tablet Take 1 tablet by mouth daily (Patient not taking: Reported on 11/6/2020) 90 tablet 1     No current facility-administered medications for this visit. Allergies:  Aspirin; Lipitor; Other; Sodium hydroxide; Stadol [butorphanol tartrate]; Vicodin [hydrocodone-acetaminophen]; Lamisil [terbinafine]; and Prilosec [omeprazole]     Review of Systems:   All 14 point review of symptoms completed. Pertinent positives identified in the HPI, all other review of symptoms negative as below.     Review of Systems - History obtained from the patient  General ROS: negative for - chills, fever or night sweats  Psychological ROS: negative for - disorientation or hallucinations  Ophthalmic ROS: negative for - dry eyes, eye pain or loss of vision  ENT ROS: negative for - nasal discharge or sore throat  Allergy and Immunology ROS: negative for - hives or itchy/watery eyes  Hematological and Lymphatic ROS: negative for - jaundice or night sweats  Endocrine ROS: negative for - mood swings or temperature intolerance  Breast ROS: deferred  Respiratory ROS: negative for - hemoptysis or stridor  Cardiovascular ROS: negative for - chest pain, dyspnea on exertion or palpitations  Gastrointestinal ROS: no abdominal pain, change in bowel habits, or black or bloody stools  Genito-Urinary ROS: no dysuria, trouble voiding, or hematuria  Musculoskeletal ROS: negative for - gait disturbance, joint pain or joint stiffness  Neurological ROS: negative for - seizures or speech problems  Dermatological ROS: negative for - rash or skin lesion changes     Physical Examination:    Vitals:    11/06/20 1043   BP: 130/60   Pulse: 62   SpO2: 98%    Weight: 158 lb (71.7 kg)     Wt Readings from Last 3 Encounters:   11/06/20 158 lb (71.7 kg)   10/14/20 159 lb (72.1 kg)   09/23/20 159 lb (72.1 kg)         General Appearance:  Alert, cooperative, no distress, appears stated age   Head:  Normocephalic, without obvious abnormality, atraumatic   Eyes:  PERRL, conjunctiva/corneas clear       Nose: Nares normal, no drainage or sinus tenderness   Throat: Lips, mucosa, and tongue normal   Neck: Supple, symmetrical, trachea midline, no adenopathy, thyroid: not enlarged, symmetric, no tenderness/mass/nodules, no carotid bruit or JVD       Lungs:   Clear to auscultation bilaterally, respirations unlabored   Chest Wall:  No tenderness or deformity   Heart:  Regular rhythm and normal rate; S1, S2 are normal; no murmur noted; no rub or gallop   Abdomen:   Soft, non-tender, bowel sounds active all four quadrants,  no masses, no organomegaly Extremities: Extremities normal, atraumatic, no cyanosis or edema   Pulses: 2+ and symmetric   Skin: Skin color, texture, turgor normal, no rashes or lesions   Pysch: Normal mood and affect   Neurologic: Normal gross motor and sensory exam.         Labs  CBC:   Lab Results   Component Value Date    WBC 13.4 10/06/2020    RBC 4.50 10/06/2020    RBC 4.09 06/07/2017    HGB 12.2 10/06/2020    HCT 37.6 10/06/2020    MCV 83.6 10/06/2020    RDW 15.6 10/06/2020     10/06/2020     CMP:    Lab Results   Component Value Date     10/06/2020    K 4.3 10/06/2020    K 4.6 03/02/2018     10/06/2020    CO2 27 10/06/2020    BUN 18 10/06/2020    CREATININE 0.8 10/06/2020    GFRAA >60 10/06/2020    GFRAA >60 03/18/2013    AGRATIO 1.7 10/06/2020    LABGLOM >60 10/06/2020    GLUCOSE 106 10/06/2020    GLUCOSE 96 06/01/2016    PROT 6.4 10/06/2020    PROT 7.1 06/01/2016    CALCIUM 9.0 10/06/2020    BILITOT 0.3 10/06/2020    ALKPHOS 61 10/06/2020    AST 11 10/06/2020    ALT 10 10/06/2020     PT/INR:    No components found for: PTPATIENT,  PTINR  Lab Results   Component Value Date    CKTOTAL 45 08/28/2015     No components found for: CHLPL  Lab Results   Component Value Date    TRIG 174 (H) 06/03/2020    TRIG 214 (H) 04/15/2019    TRIG 166 (H) 10/25/2018     Lab Results   Component Value Date    HDL 67 (H) 06/03/2020    HDL 68 (H) 04/15/2019    HDL 73 (H) 10/25/2018     Lab Results   Component Value Date    LDLCALC 92 06/03/2020    LDLCALC 78 04/15/2019    LDLCALC 178 (H) 10/25/2018     Lab Results   Component Value Date    LABVLDL 35 06/03/2020    LABVLDL 43 04/15/2019    LABVLDL 33 10/25/2018     Lab Results   Component Value Date    ALT 10 10/06/2020    AST 11 (L) 10/06/2020    ALKPHOS 61 10/06/2020    BILITOT 0.3 10/06/2020       Imaging:  I have reviewed the below testing personally and my interpretation is below. EKG:  CXR:      Assessment:  70 y.o. patient with:  1. Palpitations   ~stable  2.  Hyperlipidemia   ~ sub-optimal 7/2016  3. Hypertension    ~BP: (130)/(60)     ~stable  4. Chest pain    ~resolved   ~normal cardiac cath 2014    The 10-year ASCVD risk score (Lorene Girard, et al., 2013) is: 30.9%    Values used to calculate the score:      Age: 70 years      Sex: Female      Is Non- : No      Diabetic: Yes      Tobacco smoker: No      Systolic Blood Pressure: 637 mmHg      Is BP treated: Yes      HDL Cholesterol: 67 mg/dL      Total Cholesterol: 194 mg/dL     Plan:  1. Resume Crestor 10 mg daily. 2. Resume Aspirin 81 mg daily. 3. Continue all other medications as prescribe. 4. The patient was seen for 25 minutes. >50% of the time was devoted to giving the patient detailed instructions instructions on addressing diet, regular exercise, weight control, smoking abstention, medication compliance, and stress minimization. The patient was provided written and verbal instructions regarding risk factor modification. 5. Follow up with ALEKSANDAR Slater CNP regularly. 6. Follow up with me in 1 year. This note was scribed in the presence of Dr. Nisreen Ge MD by Diego Ly RN.      I, Dr. Nisreen Ge, personally performed the services described in this documentation, as scribed by the above signed scribe in my presence. It is both accurate and complete to my knowledge. I agree with the details independently gathered by the clinical support staff, while the remaining scribed note accurately describes my personal service to the patient. All questions and concerns were addressed to the patient/family. Alternatives to my treatment were discussed. The note was completed using EMR. Every effort was made to ensure accuracy; however, inadvertent computerized transcription errors may be present.     Nisreen Ge MD, [address], [facility]  [phone] [place] office  [phone] [facility]  11/6/2020  3:21 PM

## 2020-11-06 NOTE — LETTER
 Dizziness    Endometrial carcinoma (HCC)    Fibrohistiocytic proliferation of the skin    Moderate episode of recurrent major depressive disorder ([address]         Cardiac Testing: I have reviewed the findings below. EKG:  ECHO:   STRESS TEST:  CATH:  BYPASS:  VASCULAR:      Past Medical History:   has a past medical history of Allergic, Anemia, Brain condition, Cancer ([address] Cerebral cavernoma, Complication of anesthesia, Diverticulosis, Endometrial cancer ([address] Fungus infection, GERD (gastroesophageal reflux disease), Hyperlipidemia, Hypertension, Hypomagnesemia, Hypothyroidism, Irritable bowel syndrome, MVP (mitral valve prolapse), JOAQUIN (obstructive sleep apnea), Paronychia of great toe, left, Pneumonia, Prolonged emergence from general anesthesia, Type 2 diabetes mellitus without complication ([address] Type II or unspecified type diabetes mellitus without mention of complication, not stated as uncontrolled, Unspecified sleep apnea, Vitamin B12 deficiency, and Vitamin D deficiency. Surgical History:   has a past surgical history that includes Thyroid surgery; Dilation and curettage of uterus; Salivary gland surgery; Hysterectomy (1/28/2013); venus and bso (cervix removed) (1/28/13); Colonoscopy; Endoscopy, colon, diagnostic; Diagnostic Cardiac Cath Lab Procedure (1/12/2005); Upper gastrointestinal endoscopy (07/14/2016); Colonoscopy (07/14/2016); Colonoscopy; Upper gastrointestinal endoscopy; other surgical history (10/18/2017); Cystoscopy (10/18/2017); Cystoscopy (Bilateral, 10/18/2017); and other surgical history (Left, 03/02/2017). Social History:   reports that she has never smoked. She has never used smokeless tobacco. She reports that she does not drink alcohol or use drugs. Family History:  No evidence for sudden cardiac death or premature CAD    Home Medications:  Reviewed and are listed in nursing record.  and/or listed below  Current Outpatient Medications   Medication Sig Dispense Refill  rosuvastatin (CRESTOR) 10 MG tablet Take 1 tablet by mouth daily TAKE 1 TABLET BY MOUTH NIGHTLY 90 tablet 3    aspirin 81 MG EC tablet Take 81 mg by mouth daily      amLODIPine (NORVASC) 5 MG tablet TAKE 1 TABLET DAILY (Patient taking differently: 2.5 mg ) 90 tablet 0    Probiotic Product (ALIGN) 4 MG CAPS Take by mouth      famotidine (PEPCID) 40 MG tablet Take 1 tablet by mouth 2 times daily 180 tablet 1    LORazepam (ATIVAN) 0.5 MG tablet Take 1 tablet by mouth daily as needed for Anxiety for up to 30 days. 15 tablet 0    levothyroxine (SYNTHROID) 75 MCG tablet Take 1 tablet by mouth daily 90 tablet 1    metoprolol tartrate (LOPRESSOR) 25 MG tablet Take 1 tablet by mouth 2 times daily 180 tablet 0    CONTOUR NEXT TEST strip TEST ONCE DAILY DX: 250.00 100 strip 11    metFORMIN (GLUCOPHAGE-XR) 500 MG extended release tablet Take 1 tablet by mouth 2 times daily 180 tablet 1    losartan (COZAAR) 100 MG tablet TAKE ONE TABLET BY MOUTH EVERY DAY 90 tablet 1    esomeprazole Magnesium (NEXIUM) 20 MG PACK Take 20 mg by mouth      clobetasol (TEMOVATE) 0.05 % external solution       Cholecalciferol (VITAMIN D3) 25 MCG (1000 UT) TABS Take 1 tablet by mouth daily      magnesium oxide (MAG-OX) 400 (241.3 Mg) MG TABS tablet Take 3 tablets by mouth 2 times daily For Hypomagnesemia 180 tablet 5    levothyroxine (SYNTHROID) 50 MCG tablet Take 1 tablet by mouth daily (Patient not taking: Reported on 11/6/2020) 90 tablet 1     No current facility-administered medications for this visit. Allergies:  Aspirin; Lipitor; Other; Sodium hydroxide; Stadol [butorphanol tartrate]; Vicodin [hydrocodone-acetaminophen]; Lamisil [terbinafine]; and Prilosec [omeprazole]     Review of Systems:   All 14 point review of symptoms completed. Pertinent positives identified in the HPI, all other review of symptoms negative as below.     Review of Systems - History obtained from the patient Abdomen:   Soft, non-tender, bowel sounds active all four quadrants,  no masses, no organomegaly           Extremities: Extremities normal, atraumatic, no cyanosis or edema   Pulses: 2+ and symmetric   Skin: Skin color, texture, turgor normal, no rashes or lesions   Pysch: Normal mood and affect   Neurologic: Normal gross motor and sensory exam.         Labs  CBC:   Lab Results   Component Value Date    WBC 13.4 10/06/2020    RBC 4.50 10/06/2020    RBC 4.09 06/07/2017    HGB 12.2 10/06/2020    HCT 37.6 10/06/2020    MCV 83.6 10/06/2020    RDW 15.6 10/06/2020     10/06/2020     CMP:    Lab Results   Component Value Date     10/06/2020    K 4.3 10/06/2020    K 4.6 03/02/2018     10/06/2020    CO2 27 10/06/2020    BUN 18 10/06/2020    CREATININE 0.8 10/06/2020    GFRAA >60 10/06/2020    GFRAA >60 03/18/2013    AGRATIO 1.7 10/06/2020    LABGLOM >60 10/06/2020    GLUCOSE 106 10/06/2020    GLUCOSE 96 06/01/2016    PROT 6.4 10/06/2020    PROT 7.1 06/01/2016    CALCIUM 9.0 10/06/2020    BILITOT 0.3 10/06/2020    ALKPHOS 61 10/06/2020    AST 11 10/06/2020    ALT 10 10/06/2020     PT/INR:    No components found for: PTPATIENT,  PTINR  Lab Results   Component Value Date    CKTOTAL 45 08/28/2015     No components found for: CHLPL  Lab Results   Component Value Date    TRIG 174 (H) 06/03/2020    TRIG 214 (H) 04/15/2019    TRIG 166 (H) 10/25/2018     Lab Results   Component Value Date    HDL 67 (H) 06/03/2020    HDL 68 (H) 04/15/2019    HDL 73 (H) 10/25/2018     Lab Results   Component Value Date    LDLCALC 92 06/03/2020    LDLCALC 78 04/15/2019    LDLCALC 178 (H) 10/25/2018     Lab Results   Component Value Date    LABVLDL 35 06/03/2020    LABVLDL 43 04/15/2019    LABVLDL 33 10/25/2018     Lab Results   Component Value Date    ALT 10 10/06/2020    AST 11 (L) 10/06/2020    ALKPHOS 61 10/06/2020    BILITOT 0.3 10/06/2020       Imaging:  I have reviewed the below testing personally and my interpretation is below.  EKG:  CXR:      Assessment:  70 y.o. patient with:  1. Palpitations   ~stable  2. Hyperlipidemia   ~ sub-optimal 7/2016  3. Hypertension    ~BP: (130)/(60)     ~stable  4. Chest pain    ~resolved   ~normal cardiac cath 2014    The 10-year ASCVD risk score (Rl Joya, et al., 2013) is: 30.9%    Values used to calculate the score:      Age: 70 years      Sex: Female      Is Non- : No      Diabetic: Yes      Tobacco smoker: No      Systolic Blood Pressure: 213 mmHg      Is BP treated: Yes      HDL Cholesterol: 67 mg/dL      Total Cholesterol: 194 mg/dL     Plan:  1. Resume Crestor 10 mg daily. 2. Resume Aspirin 81 mg daily. 3. Continue all other medications as prescribe. 4. The patient was seen for 25 minutes. >50% of the time was devoted to giving the patient detailed instructions instructions on addressing diet, regular exercise, weight control, smoking abstention, medication compliance, and stress minimization. The patient was provided written and verbal instructions regarding risk factor modification. 5. Follow up with ALEKSANDAR Bertrand CNP regularly. 6. Follow up with me in 1 year. This note was scribed in the presence of Dr. Mazin Shipley MD by Jin George RN.      I, Dr. Mazin Shipley, personally performed the services described in this documentation, as scribed by the above signed scribe in my presence. It is both accurate and complete to my knowledge. I agree with the details independently gathered by the clinical support staff, while the remaining scribed note accurately describes my personal service to the patient. All questions and concerns were addressed to the patient/family. Alternatives to my treatment were discussed. The note was completed using EMR. Every effort was made to ensure accuracy; however, inadvertent computerized transcription errors may be present.     Mazin Shipley MD, [place], [facility] [phone] [facility] office  [phone] [facility]  11/6/2020  3:21 PM

## 2020-11-10 ENCOUNTER — OFFICE VISIT (OUTPATIENT)
Dept: ORTHOPEDIC SURGERY | Age: 75
End: 2020-11-10
Payer: MEDICARE

## 2020-11-10 VITALS — HEIGHT: 62 IN | WEIGHT: 158 LBS | BODY MASS INDEX: 29.08 KG/M2

## 2020-11-10 PROBLEM — M16.12 ARTHRITIS OF LEFT HIP: Status: ACTIVE | Noted: 2020-11-10

## 2020-11-10 PROBLEM — M70.62 GREATER TROCHANTERIC BURSITIS, LEFT: Status: ACTIVE | Noted: 2020-11-10

## 2020-11-10 PROBLEM — M62.830 LUMBAR PARASPINAL MUSCLE SPASM: Status: ACTIVE | Noted: 2020-11-10

## 2020-11-10 PROBLEM — M76.32 IT BAND SYNDROME, LEFT: Status: ACTIVE | Noted: 2020-11-10

## 2020-11-10 PROCEDURE — 1090F PRES/ABSN URINE INCON ASSESS: CPT | Performed by: ORTHOPAEDIC SURGERY

## 2020-11-10 PROCEDURE — 99203 OFFICE O/P NEW LOW 30 MIN: CPT | Performed by: ORTHOPAEDIC SURGERY

## 2020-11-10 PROCEDURE — G8482 FLU IMMUNIZE ORDER/ADMIN: HCPCS | Performed by: ORTHOPAEDIC SURGERY

## 2020-11-10 PROCEDURE — G8428 CUR MEDS NOT DOCUMENT: HCPCS | Performed by: ORTHOPAEDIC SURGERY

## 2020-11-10 PROCEDURE — G8417 CALC BMI ABV UP PARAM F/U: HCPCS | Performed by: ORTHOPAEDIC SURGERY

## 2020-11-10 NOTE — PROGRESS NOTES
I am evaluating this patient as a consult at the request of Pop Ramirez, ALEKSANDAR - CNP  [address]  [address], [address] [place] [address]        Chief Complaint:  New Patient (LEFT HIP PAIN SINCE 9/2020)      History of Present Illness:  Stefania Chandler is a 70 y.o. female here regarding left hip and back pain. Patient states she began to have lateral sided hip pain when getting up or sitting down into a chair in September. She denies any specific injury. Patient admits she is not as active as she used to be and wonders if this is related to sitting for long periods of time. She endorses low back and lateral sided hip pain that radiates down the leg but denies numbness and tingling. Her symptoms improved slightly with steroid pack and a Decadron injection. She also takes Tylenol and ibuprofen. She states overall her symptoms are improving she continues to have 10 out of 10 pain when it hits. Lives with her , does not use any assistive devices for ambulation. Medical History:  Past Medical History:   Diagnosis Date    Allergic     Anemia     Arthritis of left hip 11/10/2020    Brain condition     pt states a cluster of blood vessels left side of brain    Cancer [facility]) 1/2013    endometrial cancer/ Dr. Deniz Del Angel and Dr. Maritza Yu Cerebral cavernoma     followed by Dr. Hamzah Fitzgerald at [place] [id number] Complication of anesthesia     Takes a long time to wake up    Diverticulosis     Endometrial cancer ([facility] [address]     Fungus infection     Fungus infection on bilat toes.  GERD (gastroesophageal reflux disease)     Hyperlipidemia     Hypertension     Hypomagnesemia 1/2016    consult with Dr. Rl Barbour ([facility]) and no renal wasting.  felt most likely d/t HCTz and PPI,  HCTZ discontinued,  increased mag ox to 400 mg 2 tabs BID    Hypothyroidism     Irritable bowel syndrome     MVP (mitral valve prolapse)     JOAQUIN (obstructive sleep apnea)     Paronychia of great toe, left 7/11/2017    Pneumonia infant    Prolonged emergence from general anesthesia     Type 2 diabetes mellitus without complication ([facility] [address]     Type II or unspecified type diabetes mellitus without mention of complication, not stated as uncontrolled     Unspecified sleep apnea     Vitamin B12 deficiency     Vitamin D deficiency      Past Surgical History:   Procedure Laterality Date    COLONOSCOPY      COLONOSCOPY  07/14/2016    COLONOSCOPY      CYSTOSCOPY  10/18/2017    Cystoscopy with Urethral dilation    CYSTOSCOPY Bilateral 10/18/2017    Cystoscopy with bilateral retrograde pyelograms    DIAGNOSTIC CARDIAC CATH LAB PROCEDURE  1/12/2005    [facility] Clemons, No CAD.     DILATION AND CURETTAGE OF UTERUS      ENDOSCOPY, COLON, DIAGNOSTIC      HYSTERECTOMY  1/28/2013    due to uterine cancer    OTHER SURGICAL HISTORY  10/18/2017    cystoscopy, bilateral retrogrades    OTHER SURGICAL HISTORY Left 03/02/2017    port removal    SALIVARY GLAND SURGERY      KINGSLEY AND BSO  1/28/13    THYROID SURGERY      right thyroidectomy for benign reasons    UPPER GASTROINTESTINAL ENDOSCOPY  07/14/2016    UPPER GASTROINTESTINAL ENDOSCOPY       Social History     Socioeconomic History    Marital status:      Spouse name: Not on file    Number of children: Not on file    Years of education: Not on file    Highest education level: Not on file   Occupational History    Not on file   Social Needs    Financial resource strain: Not on file    Food insecurity     Worry: Not on file     Inability: Not on file    Transportation needs     Medical: Not on file     Non-medical: Not on file   Tobacco Use    Smoking status: Never Smoker    Smokeless tobacco: Never Used   Substance and Sexual Activity    Alcohol use: No     Alcohol/week: 0.0 standard drinks    Drug use: No    Sexual activity: Never   Lifestyle    Physical activity     Days per week: Not on file     Minutes per session: Not on file    Stress: Not on file   Relationships    Social connections     Talks on phone: Not on file     Gets together: Not on file     Attends Advent service: Not on file     Active member of club or organization: Not on file     Attends meetings of clubs or organizations: Not on file     Relationship status: Not on file    Intimate partner violence     Fear of current or ex partner: Not on file     Emotionally abused: Not on file     Physically abused: Not on file     Forced sexual activity: Not on file   Other Topics Concern    Not on file   Social History Narrative    Not on file     Current Outpatient Medications   Medication Sig Dispense Refill    rosuvastatin (CRESTOR) 10 MG tablet Take 1 tablet by mouth daily TAKE 1 TABLET BY MOUTH NIGHTLY 90 tablet 3    aspirin 81 MG EC tablet Take 81 mg by mouth daily      amLODIPine (NORVASC) 5 MG tablet TAKE 1 TABLET DAILY (Patient taking differently: 2.5 mg ) 90 tablet 0    Probiotic Product (ALIGN) 4 MG CAPS Take by mouth      famotidine (PEPCID) 40 MG tablet Take 1 tablet by mouth 2 times daily 180 tablet 1    LORazepam (ATIVAN) 0.5 MG tablet Take 1 tablet by mouth daily as needed for Anxiety for up to 30 days.  15 tablet 0    levothyroxine (SYNTHROID) 75 MCG tablet Take 1 tablet by mouth daily 90 tablet 1    metoprolol tartrate (LOPRESSOR) 25 MG tablet Take 1 tablet by mouth 2 times daily 180 tablet 0    CONTOUR NEXT TEST strip TEST ONCE DAILY DX: 250.00 100 strip 11    metFORMIN (GLUCOPHAGE-XR) 500 MG extended release tablet Take 1 tablet by mouth 2 times daily 180 tablet 1    levothyroxine (SYNTHROID) 50 MCG tablet Take 1 tablet by mouth daily (Patient not taking: Reported on 11/6/2020) 90 tablet 1    losartan (COZAAR) 100 MG tablet TAKE ONE TABLET BY MOUTH EVERY DAY 90 tablet 1    esomeprazole Magnesium (NEXIUM) 20 MG PACK Take 20 mg by mouth      clobetasol (TEMOVATE) 0.05 % external solution       Cholecalciferol (VITAMIN D3) 25 MCG (1000 UT) TABS Take 1 tablet by mouth daily      magnesium oxide (MAG-OX) 400 (241.3 Mg) MG TABS tablet Take 3 tablets by mouth 2 times daily For Hypomagnesemia 180 tablet 5     No current facility-administered medications for this visit. Allergies   Allergen Reactions    Aspirin Other (See Comments)     Brain cavernoma - avoid all blood thinners    Lipitor Other (See Comments)     Muscle cramps    Other      Avoid blood thinners    Sodium Hydroxide Other (See Comments)     Blisters    Stadol [Butorphanol Tartrate] Other (See Comments)     Increased BP    Vicodin [Hydrocodone-Acetaminophen]      Elevates blood pressure    Lamisil [Terbinafine] Rash    Prilosec [Omeprazole] Rash       Review of Systems:  Constitutional: negative  Respiratory: negative  Cardiovascular: negative  Musculoskeletal:negative except for New Patient (LEFT HIP PAIN SINCE 9/2020)    Relevant review of systems reviewed and available in the patient's chart    Vital Signs:  Vitals:    11/10/20 1035   Weight: 158 lb (71.7 kg)   Height: 5' 2.01\" (1.575 m)       Pain Assessment:         General Exam:   Constitutional: Patient is adequately groomed with no evidence of malnutrition  Mental Status: The patient is oriented to time, place and person. The patient's mood and affect are appropriate. Vascular: Examination reveals no swelling or calf tenderness. Peripheral pulses are palpable and 2+. Hip Examination  Inspection:   No gross deformities noted. no swelling noted. No erythema or ecchymosis. Skin is intact. Palpation:  positive Tenderness on greater trochanter and piriformis, tightness along paraspinal musculature    Rang of Motion:  no pain in groin with ROM     Strength:  5/5  Flexion, extension, abduction, adduction    Special Tests:    positive, Sammies test, Piriformis tightness     Skin: There are no rashes, ulcerations or lesions.     Gait: Normal gait, no assistive devices    Reflex: not tested    Additional Examinations:  Right Lower Extremity: Examination of the right lower extremity does not show any tenderness, deformity or injury. Range of motion is unremarkable. There is no gross instability. There are no rashes, ulcerations or lesions. Strength and tone are normal.      LUMBAR SPINE: The skin is warm and dry. There is no swelling, warmth, or erythema. Range of motion is within normal limits. There is no paraspinal or spinous process tenderness. Ipsilateral and contralateral straight leg raising tests are negative. The distal neurovascular exam is grossly intact and symmetric. X-RAYS: 2 views weightbearing Pelvis, and lateral of the left hip were reviewed, they show no periosteal reaction, medullary lesions, or osteopenia. mild osteoarthritis of the hip with joint space narrowing, subchondral sclerosis noted. No evidence of fracture or dislocation. Assessment : Left greater trochanteric bursitis, IT band syndrome, lumbar strain    Impression:  Encounter Diagnoses   Name Primary?  Greater trochanteric bursitis, left Yes    It band syndrome, left     Lumbar paraspinal muscle spasm     Arthritis of left hip        Office Procedures:  Orders Placed This Encounter   Procedures    [facility] PT - [facility] ([facility]) Physical Therapy     Referral Priority:   Routine     Referral Type:   Eval and Treat     Referral Reason:   Specialty Services Required     Requested Specialty:   Physical Therapy     Number of Visits Requested:   1     No orders of the defined types were placed in this encounter. Treatment Plan: We reviewed her physical exam findings, I think she has symptomatic lumbar strain and IT band syndrome, I recommend over-the-counter Voltaren cream and physical therapy. If no improvement follow-up over the next 4 to 6 weeks. Patient agrees with this plan, all of their questions were answered best of our ability and to their satisfaction.           Clau Wells

## 2020-11-16 ENCOUNTER — HOSPITAL ENCOUNTER (OUTPATIENT)
Dept: PHYSICAL THERAPY | Age: 75
Setting detail: THERAPIES SERIES
Discharge: HOME | End: 2020-11-16
Payer: MEDICARE

## 2020-11-16 NOTE — FLOWSHEET NOTE
[facility], [facility] ([facility])    Physical Therapy  Cancellation/No-show Note  Patient Name:  Gabriel Powers  :  1945   Date:  2020    Cancelled visits to date: 1  No-shows to date: 0    For today's appointment patient:  [x]  Cancelled  [x]  Rescheduled appointment  []  No-show     Reason given by patient:  []  Patient ill  []  Conflicting appointment  []  No transportation    []  Conflict with work  []  No reason given  []  Other:     Comments: Thought appt was tomorrow    Phone call information:   []  Phone to be call made today to patient. []  Patient answered, conversation as follows:    []  Patient did not answer. []  Phone call not made today  [x]  Phone call not needed - pt contacted us to cancel and provided reason for cancellation.      Electronically signed by:  Phani Leung

## 2020-11-17 ENCOUNTER — HOSPITAL ENCOUNTER (OUTPATIENT)
Dept: MAMMOGRAPHY | Age: 75
Discharge: HOME | End: 2020-11-17
Payer: MEDICARE

## 2020-11-17 ENCOUNTER — TELEPHONE (OUTPATIENT)
Dept: MAMMOGRAPHY | Age: 75
End: 2020-11-17

## 2020-11-17 PROCEDURE — 77063 BREAST TOMOSYNTHESIS BI: CPT

## 2020-12-07 RX ORDER — LOSARTAN POTASSIUM 100 MG/1
TABLET ORAL
Qty: 90 TABLET | Refills: 0 | Status: SHIPPED | OUTPATIENT
Start: 2020-12-07 | End: 2021-03-10

## 2020-12-07 RX ORDER — METFORMIN HYDROCHLORIDE 500 MG/1
500 TABLET, EXTENDED RELEASE ORAL 2 TIMES DAILY
Qty: 180 TABLET | Refills: 0 | Status: SHIPPED | OUTPATIENT
Start: 2020-12-07 | End: 2021-03-31

## 2020-12-07 RX ORDER — LOSARTAN POTASSIUM 100 MG/1
TABLET ORAL
Qty: 15 TABLET | Refills: 0 | Status: SHIPPED | OUTPATIENT
Start: 2020-12-07 | End: 2020-12-07 | Stop reason: SDUPTHER

## 2021-01-13 ENCOUNTER — OFFICE VISIT (OUTPATIENT)
Dept: FAMILY MEDICINE CLINIC | Age: 76
End: 2021-01-13
Payer: MEDICARE

## 2021-01-13 VITALS
DIASTOLIC BLOOD PRESSURE: 84 MMHG | OXYGEN SATURATION: 93 % | WEIGHT: 160 LBS | BODY MASS INDEX: 29.26 KG/M2 | TEMPERATURE: 96.4 F | SYSTOLIC BLOOD PRESSURE: 187 MMHG | HEART RATE: 75 BPM

## 2021-01-13 DIAGNOSIS — E83.42 HYPOMAGNESEMIA: ICD-10-CM

## 2021-01-13 DIAGNOSIS — E89.0 POSTSURGICAL HYPOTHYROIDISM: ICD-10-CM

## 2021-01-13 DIAGNOSIS — R00.2 PALPITATIONS: ICD-10-CM

## 2021-01-13 DIAGNOSIS — E11.42 TYPE 2 DIABETES MELLITUS WITH DIABETIC POLYNEUROPATHY, WITHOUT LONG-TERM CURRENT USE OF INSULIN (HCC): Primary | ICD-10-CM

## 2021-01-13 DIAGNOSIS — I10 ESSENTIAL HYPERTENSION: ICD-10-CM

## 2021-01-13 DIAGNOSIS — E55.9 VITAMIN D DEFICIENCY: ICD-10-CM

## 2021-01-13 DIAGNOSIS — F41.9 ANXIETY: ICD-10-CM

## 2021-01-13 DIAGNOSIS — E53.8 VITAMIN B12 DEFICIENCY: ICD-10-CM

## 2021-01-13 DIAGNOSIS — E78.2 MIXED HYPERLIPIDEMIA: ICD-10-CM

## 2021-01-13 PROCEDURE — G8427 DOCREV CUR MEDS BY ELIG CLIN: HCPCS | Performed by: NURSE PRACTITIONER

## 2021-01-13 PROCEDURE — 36415 COLL VENOUS BLD VENIPUNCTURE: CPT | Performed by: NURSE PRACTITIONER

## 2021-01-13 PROCEDURE — G8482 FLU IMMUNIZE ORDER/ADMIN: HCPCS | Performed by: NURSE PRACTITIONER

## 2021-01-13 PROCEDURE — 99213 OFFICE O/P EST LOW 20 MIN: CPT | Performed by: NURSE PRACTITIONER

## 2021-01-13 PROCEDURE — 3017F COLORECTAL CA SCREEN DOC REV: CPT | Performed by: NURSE PRACTITIONER

## 2021-01-13 PROCEDURE — 3046F HEMOGLOBIN A1C LEVEL >9.0%: CPT | Performed by: NURSE PRACTITIONER

## 2021-01-13 PROCEDURE — G8417 CALC BMI ABV UP PARAM F/U: HCPCS | Performed by: NURSE PRACTITIONER

## 2021-01-13 PROCEDURE — 2022F DILAT RTA XM EVC RTNOPTHY: CPT | Performed by: NURSE PRACTITIONER

## 2021-01-13 PROCEDURE — 1123F ACP DISCUSS/DSCN MKR DOCD: CPT | Performed by: NURSE PRACTITIONER

## 2021-01-13 PROCEDURE — 1090F PRES/ABSN URINE INCON ASSESS: CPT | Performed by: NURSE PRACTITIONER

## 2021-01-13 PROCEDURE — 4040F PNEUMOC VAC/ADMIN/RCVD: CPT | Performed by: NURSE PRACTITIONER

## 2021-01-13 PROCEDURE — 1036F TOBACCO NON-USER: CPT | Performed by: NURSE PRACTITIONER

## 2021-01-13 PROCEDURE — G8399 PT W/DXA RESULTS DOCUMENT: HCPCS | Performed by: NURSE PRACTITIONER

## 2021-01-13 RX ORDER — ZINC GLUCONATE 50 MG
50 TABLET ORAL DAILY
COMMUNITY
End: 2021-05-05

## 2021-01-13 RX ORDER — ASCORBIC ACID 500 MG
500 TABLET ORAL DAILY
COMMUNITY
End: 2021-05-05

## 2021-01-13 NOTE — PROGRESS NOTES
[address] [facility]  [address] [facility] [address]  Dept: [phone]  Dept Fax: [phone]  Loc: [phone]    Deshawn Chaparro is a 70 y.o. female who presents today for her medical conditions/complaints as noted below. Deshawn Chaparro is c/o of Hypertension (pt said her bp has been running high and every a lot of palpiations ), Palpitations, and Diabetes         Subjective:     Chief Complaint   Patient presents with    Hypertension     pt said her bp has been running high and every a lot of palpiations     Palpitations    Diabetes       HPI    Diabetes Mellitus Type 2:  Patient is here for follow up on diabetes. Treatment Adherence:   Medication compliance:  Taking medication as prescribed. Diet compliance:  Trying to eat a healthy, diabetic diet. Weight trend: Wt Readings from Last 3 Encounters:   01/13/21 160 lb (72.6 kg)   11/10/20 158 lb (71.7 kg)   11/06/20 158 lb (71.7 kg)     Denies any hypoglycemia episodes. Current symptoms/problems include:  Neuropathy is  present  Denies any open areas on feet/ulcerations  Denies any polyuria, polydipsia, or polyphagia, nausea, vomiting or diarrhea. Home blood sugar records: patient tests 1 time(s) per day  Eye exam current (withinone year): yes  Denies blurry vision   reports that she has never smoked. She has never used smokeless tobacco.  DailyAspirin? Yes  Statin Therapy? Yes  ACEI/ARB Therapy? Yes    Hypertension:  Home blood pressure monitoring: Yes - fluctuating. Patient  is adherent to a low sodium diet. Patient denies chest pain, shortness of breath, headache, lightheadedness, blurred vision, peripheral edema and dry cough. Patient complains of palpitations and has dyspnea only with palpitations. Antihypertensive medication side effects: no medication side effects noted. Use of agents associated withhypertension: none.    At /80's last week  This morning it was 138/74 pulse 72 Pulse 70-90's  Having palpitations daily--past 2 weeks  Now lasting almost all day   Worsening with eating and rest  BP Readings from Last 3 Encounters:   01/13/21 (!) 187/84   11/06/20 130/60   10/14/20 122/74     Synthroid changed from 50 mcg to 75 mcg daily in 10/2020 due to elevated TSH  Has been more stressed due to COVID and political issues        Hyperlipidemia:  No newmyalgias or GI upset on rosuvastatin (Crestor). The 10-year ASCVD risk score (Gisele Shone., et al., 2013) is: 53.7%    Values used to calculate the score:      Age: 70 years      Sex: Female      Is Non- : No      Diabetic: Yes      Tobacco smoker: No      Systolic Blood Pressure: 864 mmHg      Is BP treated: Yes      HDL Cholesterol: 67 mg/dL      Total Cholesterol: 194 mg/dL    Lab Results   Component Value Date    LABA1C 6.3 06/03/2020    LABA1C 5.0 01/15/2020    LABA1C 6.1 10/10/2019     Lab Results   Component Value Date    LABMICR <1.20 06/03/2020    LDLCALC 92 06/03/2020    CREATININE 0.8 10/06/2020       Vitamin D Deficiency  Patient with vitamin d deficiencyand currently on supplement without adverse reactions. Lab Results   Component Value Date    VITD25 27.0 (L) 01/09/2020     She presents today for 3 month follow up on chronic benzodiazepine management. The patient is on chronic benzodiazepine therapy for the diagnosis of:  Anxiety:   The patient complains of fatigue and palpitations, but denies any other symptoms. Current pharmacologic treatment: benzodiazepines ativan 0.5 mg on rare occasion. Medication side effects:  none. The patient denies nausea, vomiting, diarrhea and constipation. Denies respiratory problems/depression. Denies increased sleepiness, drowsiness or confusion. The patient states that the medications and treatment have helped improve the quality of life and helped in psychosocial functioning. She is not participating in counselling/therapy. The patient states they are taking medications only as prescribed , using one pharmacy and getting all controlled substances from one provider unless okayed with me. The patient states they are following proper safeguarding and disposal of medications. Review of Systems   Constitutional: Negative. Negative for appetite change, fatigue and unexpected weight change. HENT: Negative. Eyes: Negative. Respiratory: Negative. Negative for shortness of breath. Cardiovascular: Positive for palpitations. Negative for chest pain and leg swelling. Gastrointestinal: Negative. Negative for abdominal pain, anal bleeding, blood in stool and nausea. Endocrine: Negative. Genitourinary: Negative. Negative for hematuria. Musculoskeletal: Negative. Skin: Negative. Negative for rash. Allergic/Immunologic: Negative. Neurological: Negative. Negative for dizziness, syncope, light-headedness and numbness. Hematological: Negative. Does not bruise/bleed easily. Psychiatric/Behavioral: Negative. All other systems reviewed and are negative. Past Medical History:   Diagnosis Date    Allergic     Anemia     Arthritis of left hip 11/10/2020    Brain condition     pt states a cluster of blood vessels left side of brain    Cancer [facility]) 1/2013    endometrial cancer/ Dr. Tab Edwards and Dr. Willie Do Cerebral cavernoma     followed by Dr. Guanaco [place] at [facility] [address] Complication of anesthesia     Takes a long time to wake up    Diverticulosis     Endometrial cancer ([facility] [address]     Fungus infection     Fungus infection on bilat toes.  GERD (gastroesophageal reflux disease)     Hyperlipidemia     Hypertension     Hypomagnesemia 1/2016    consult with Dr. Grace Landis ([facility]) and no renal wasting.  felt most likely d/t HCTz and PPI,  HCTZ discontinued,  increased mag ox to 400 mg 2 tabs BID    Hypothyroidism     Irritable bowel syndrome     MVP (mitral valve prolapse) Substance and Sexual Activity    Alcohol use: No     Alcohol/week: 0.0 standard drinks    Drug use: No    Sexual activity: Never   Lifestyle    Physical activity     Days per week: Not on file     Minutes per session: Not on file    Stress: Not on file   Relationships    Social connections     Talks on phone: Not on file     Gets together: Not on file     Attends Ten[employer] service: Not on file     Active member of club or organization: Not on file     Attends meetings of clubs or organizations: Not on file     Relationship status: Not on file    Intimate partner violence     Fear of current or ex partner: Not on file     Emotionally abused: Not on file     Physically abused: Not on file     Forced sexual activity: Not on file   Other Topics Concern    Not on file   Social History Narrative    Not on file     Current Outpatient Medications   Medication Sig Dispense Refill    zinc gluconate 50 MG tablet Take 50 mg by mouth daily      ascorbic acid (VITAMIN C) 500 MG tablet Take 500 mg by mouth daily      metoprolol tartrate (LOPRESSOR) 25 MG tablet Take 1 tablet by mouth 2 times daily 180 tablet 0    metFORMIN (GLUCOPHAGE-XR) 500 MG extended release tablet Take 1 tablet by mouth 2 times daily 180 tablet 0    losartan (COZAAR) 100 MG tablet TAKE ONE TABLET BY MOUTH EVERY DAY 90 tablet 0    rosuvastatin (CRESTOR) 10 MG tablet Take 1 tablet by mouth daily TAKE 1 TABLET BY MOUTH NIGHTLY 90 tablet 3    aspirin 81 MG EC tablet Take 81 mg by mouth daily      amLODIPine (NORVASC) 5 MG tablet TAKE 1 TABLET DAILY (Patient taking differently: 2.5 mg ) 90 tablet 0    Probiotic Product (ALIGN) 4 MG CAPS Take by mouth      famotidine (PEPCID) 40 MG tablet Take 1 tablet by mouth 2 times daily 180 tablet 1    levothyroxine (SYNTHROID) 75 MCG tablet Take 1 tablet by mouth daily 90 tablet 1    CONTOUR NEXT TEST strip TEST ONCE DAILY DX: 250.00 100 strip 11  levothyroxine (SYNTHROID) 50 MCG tablet Take 1 tablet by mouth daily 90 tablet 1    esomeprazole Magnesium (NEXIUM) 20 MG PACK Take 20 mg by mouth      clobetasol (TEMOVATE) 0.05 % external solution       Cholecalciferol (VITAMIN D3) 25 MCG (1000 UT) TABS Take 1 tablet by mouth daily      magnesium oxide (MAG-OX) 400 (241.3 Mg) MG TABS tablet Take 3 tablets by mouth 2 times daily For Hypomagnesemia 180 tablet 5     No current facility-administered medications for this visit. No changes in past medicalhistory, past surgical history, social history, or family history were noted during the patient encounter unless specifically listed above. All updates of past medical history, past surgical history, social history, orfamily history were reviewed personally by me during the office visit. All problems listed in the assessment are stable unless noted otherwise. Medication profile reviewed personally by me during the office visit. Medication side effects and possible impairments from medications were discussed as applicable. Objective:       Physical Exam  Vitals signs and nursing note reviewed. Constitutional:       General: She is not in acute distress. Appearance: Normal appearance. She is well-developed. HENT:      Head: Normocephalic and atraumatic. Right Ear: Tympanic membrane, ear canal and external ear normal.      Left Ear: Tympanic membrane, ear canal and external ear normal.      Nose: Nose normal.      Mouth/Throat:      Pharynx: Uvula midline. No oropharyngeal exudate. Eyes:      General: Lids are normal.      Conjunctiva/sclera: Conjunctivae normal.      Pupils: Pupils are equal, round, and reactive to light. Neck:      Musculoskeletal: Normal range of motion and neck supple. Thyroid: No thyromegaly. Vascular: No carotid bruit or JVD. Cardiovascular:      Rate and Rhythm: Normal rate and regular rhythm. Pulses: Normal pulses. 2.  Education: Reviewed ABCs of diabetes management (respective goals in parentheses):  A1C (<7), blood pressure (<130/80), and cholesterol (LDL <100). 3.  Compliance at present is estimated to be good. Efforts to improve compliance (if necessary) will be directed at dietary modifications, increased exercise and regular blood sugar monitoring. Diabetes education provided today:  Metabolic syndrome: association of diabetes with dyslipidemia, HTN and obesity. Diabetic Neuropathy: signs and therapy. Foot care: advised to wash feet daily, pat dry and apply lotion at night, avoiding between toes. Need to look at feet daily and report to a physician any signs of inflammation or skin damage. Discussed diabetes shoes and socks. Diabetic retinopathy: the most frequent cause of blindness in [place] currently. Measures to prevent that. Diabetic nephropathy: Kidney function, microalbumin as a sign of diabetic nephropathy. Stages of kidney disease. Nutrition as a mainstream of diabetes therapy. Carbs: good carbs and bad carbs, importance of carb counting, incorporation of protein with each meal to reduce Glycemic index, importance of portions,   Fats: Good fats and bad fats, meal planning and supplements. Physical activity: advised to exercise 5-7 days a week 30-60 mins at least. Discussed how it affects BS readings. Postsurgical hypothyroidism  -     TSH with Reflex    Hypomagnesemia  -     MAGNESIUM    Vitamin B12 deficiency  -     Vitamin B12 & Folate    Palpitations  Maybe r/t increased synthroid dose after last lab result  Maybe other medication side effect or due to prior reduction of metoprolol  -     EKG 12 lead; Future  Asymptomatic at office visit  Patient will have ekg done while she is symptomatic (to look for PAF)    Essential hypertension  Hypertension, Blood pressure is  well controlled on current medication regimen. Medication: no change. Dietary sodium restriction. Regular aerobic exercise. The patient denies nausea, vomiting, diarrhea and constipation. No respiratory problems. No increased sleepiness, drowsiness or confusion. The patient states that the medications and treatment have helped improve the quality of life and helped in psychosocial functioning. There are no indicators for possible drug abuse, addiction or diversion problems. Periodic Controlled Substance Monitoring: Possible medication side effects, risk of tolerance/dependence & alternative treatments discussed., No signs of potential drug abuse or diversion identified. , Random urine drug screen sent today. (Rosalinda Lyon, APRN - CNP)    Vitamin D deficiency  Discussed with patient that we make vitamin D from the sun and get it from some food sources, but it is very common to be deficient. Discussed the need for vitamin D replacement because low vitamin D can cause fatigue, joint aches and has been implicated in heart disease, bone disease like osteoporosis, and some other chronic illnesses. Patient will start/continue vitamin D supplement as per order. Recommend vitamin D to be rechecked in 6 months. Patient has been instructed call the office immediately with new symptoms, change in symptoms or worseningof symptoms. If this is not feasible, patient is instructed to report to the emergency room. Medication profile reviewed. Medication side effects and possible impairments from medications were discussed as applicable. Allergies were reviewed. Health maintenance was reviewed and updated as appropriate. Return in about 3 months (around 4/13/2021) for Anxiety/Depression, Controlled mediction management. (Comment: Please note this report has been produced using a combination of typing and speech recognition software and may contain errors related to that system including errors in grammar, punctuation, and spelling, as well as words and phrases that may be inappropriate.  If there are any questions or concerns please feel free to contact the dictating provider for clarification.)

## 2021-01-14 LAB
FOLATE: 18.2 NG/ML (ref 4.78–24.2)
MAGNESIUM: 1.9 MG/DL (ref 1.8–2.4)
TSH REFLEX: 2.17 UIU/ML (ref 0.27–4.2)
VITAMIN B-12: 259 PG/ML (ref 211–911)

## 2021-01-15 ASSESSMENT — ENCOUNTER SYMPTOMS
ABDOMINAL PAIN: 0
ANAL BLEEDING: 0
SHORTNESS OF BREATH: 0
RESPIRATORY NEGATIVE: 1
ALLERGIC/IMMUNOLOGIC NEGATIVE: 1
NAUSEA: 0
EYES NEGATIVE: 1
GASTROINTESTINAL NEGATIVE: 1
BLOOD IN STOOL: 0

## 2021-01-19 ENCOUNTER — TELEPHONE (OUTPATIENT)
Dept: FAMILY MEDICINE CLINIC | Age: 76
End: 2021-01-19

## 2021-01-19 NOTE — TELEPHONE ENCOUNTER
Pt called stating that she got a call yesterday to call back regarding her BP. Not seeing a message regarding this. Please advise.

## 2021-01-20 RX ORDER — AMLODIPINE BESYLATE 5 MG/1
TABLET ORAL
Qty: 90 TABLET | Refills: 1 | Status: SHIPPED | OUTPATIENT
Start: 2021-01-20 | End: 2021-05-05 | Stop reason: SDUPTHER

## 2021-01-20 NOTE — TELEPHONE ENCOUNTER
Spoke with pt   She said that she thinks she needs a heart monitor she said she had an episode on Friday but by the time she thought about going to the ER to get the EKG pt said it had gone away.  Pt is not sure what to do

## 2021-01-26 ENCOUNTER — NURSE ONLY (OUTPATIENT)
Dept: FAMILY MEDICINE CLINIC | Age: 76
End: 2021-01-26

## 2021-01-26 DIAGNOSIS — F41.9 ANXIETY: ICD-10-CM

## 2021-01-29 DIAGNOSIS — E11.9 TYPE 2 DIABETES MELLITUS WITHOUT COMPLICATION, WITHOUT LONG-TERM CURRENT USE OF INSULIN (HCC): ICD-10-CM

## 2021-01-29 LAB
6-ACETYLMORPHINE: NOT DETECTED
7-AMINOCLONAZEPAM: NOT DETECTED
ALPHA-OH-ALPRAZOLAM: NOT DETECTED
ALPRAZOLAM: NOT DETECTED
AMPHETAMINE: NOT DETECTED
BARBITURATES: NOT DETECTED
BENZOYLECGONINE: NOT DETECTED
BUPRENORPHINE: NOT DETECTED
CARISOPRODOL: NOT DETECTED
CLONAZEPAM: NOT DETECTED
CODEINE: NOT DETECTED
CREATININE URINE: 84 MG/DL (ref 20–400)
DIAZEPAM: NOT DETECTED
DRUGS EXPECTED: NORMAL
EER PAIN MGT DRUG PANEL, HIGH RES/EMIT U: NORMAL
ETHYL GLUCURONIDE: NOT DETECTED
FENTANYL: NOT DETECTED
HYDROCODONE: NOT DETECTED
HYDROMORPHONE: NOT DETECTED
LORAZEPAM: NOT DETECTED
MARIJUANA METABOLITE: NOT DETECTED
MDA: NOT DETECTED
MDEA: NOT DETECTED
MDMA URINE: NOT DETECTED
MEPERIDINE: NOT DETECTED
METHADONE: NOT DETECTED
METHAMPHETAMINE: NOT DETECTED
METHYLPHENIDATE: NOT DETECTED
MIDAZOLAM: NOT DETECTED
MORPHINE: NOT DETECTED
NORBUPRENORPHINE, FREE: NOT DETECTED
NORDIAZEPAM: NOT DETECTED
NORFENTANYL: NOT DETECTED
NORHYDROCODONE, URINE: NOT DETECTED
NOROXYCODONE: NOT DETECTED
NOROXYMORPHONE, URINE: NOT DETECTED
OXAZEPAM: NOT DETECTED
OXYCODONE: NOT DETECTED
OXYMORPHONE: NOT DETECTED
PAIN MANAGEMENT DRUG PANEL: NORMAL
PAIN MANAGEMENT DRUG PANEL: NORMAL
PCP: NOT DETECTED
PHENTERMINE: NOT DETECTED
PROPOXYPHENE: NOT DETECTED
TAPENTADOL, URINE: NOT DETECTED
TAPENTADOL-O-SULFATE, URINE: NOT DETECTED
TEMAZEPAM: NOT DETECTED
TRAMADOL: NOT DETECTED
ZOLPIDEM: NOT DETECTED

## 2021-01-29 RX ORDER — PERPHENAZINE 16 MG/1
TABLET, FILM COATED ORAL
Qty: 100 STRIP | Refills: 11 | Status: SHIPPED | OUTPATIENT
Start: 2021-01-29 | End: 2021-02-19 | Stop reason: SDUPTHER

## 2021-01-29 NOTE — TELEPHONE ENCOUNTER
Next appt 4/21/2021    Subject: Refill Request     QUESTIONS   Name of Medication? CONTOUR NEXT TEST strip   Patient-reported dosage and instructions? 0   How many days do you have left? 1   Preferred Pharmacy? [facility] phone number (if available)? [phone]   ---------------------------------------------------------------------------   --------------   Maximiliano HAHN   What is the best way for the office to contact you? OK to leave message on   voicemail   Preferred Call Back Phone Number?  [phone]

## 2021-02-19 DIAGNOSIS — E11.9 TYPE 2 DIABETES MELLITUS WITHOUT COMPLICATION, WITHOUT LONG-TERM CURRENT USE OF INSULIN (HCC): ICD-10-CM

## 2021-02-19 RX ORDER — PERPHENAZINE 16 MG/1
TABLET, FILM COATED ORAL
Qty: 100 STRIP | Refills: 11 | Status: SHIPPED | OUTPATIENT
Start: 2021-02-19 | End: 2022-04-08

## 2021-03-03 ENCOUNTER — HOSPITAL ENCOUNTER (OUTPATIENT)
Age: 76
Discharge: HOME | End: 2021-03-03
Payer: MEDICARE

## 2021-03-03 LAB — MAGNESIUM: 1.7 MG/DL (ref 1.8–2.4)

## 2021-03-03 PROCEDURE — 36415 COLL VENOUS BLD VENIPUNCTURE: CPT

## 2021-03-03 PROCEDURE — 83735 ASSAY OF MAGNESIUM: CPT

## 2021-03-04 ENCOUNTER — TELEPHONE (OUTPATIENT)
Dept: CARDIOLOGY CLINIC | Age: 76
End: 2021-03-04

## 2021-03-04 NOTE — TELEPHONE ENCOUNTER
Called and left a message for the NP. I think the magnesium supplementation and levels are not of concern at this time.

## 2021-03-04 NOTE — TELEPHONE ENCOUNTER
Joaquina Malagon from [address] [place] [address] called stating pt informed them today that [facility] and another physician of hers is monitoring her magnesium and that she is already taking a high dose of magnesium. Joaquina Malagon stated the NP Yoana Goss had the pt do bloodwork and it showed her magnesium level is low. They want to be on the same page with [facility] and see what needs to be done. Please advise and contact Joaquina Malagon at [phone].

## 2021-03-09 DIAGNOSIS — I10 ESSENTIAL HYPERTENSION: ICD-10-CM

## 2021-03-10 RX ORDER — LOSARTAN POTASSIUM 100 MG/1
TABLET ORAL
Qty: 90 TABLET | Refills: 0 | Status: SHIPPED | OUTPATIENT
Start: 2021-03-10 | End: 2021-05-26

## 2021-03-30 DIAGNOSIS — E11.42 TYPE 2 DIABETES MELLITUS WITH DIABETIC POLYNEUROPATHY, WITHOUT LONG-TERM CURRENT USE OF INSULIN (HCC): ICD-10-CM

## 2021-03-31 RX ORDER — METFORMIN HYDROCHLORIDE 500 MG/1
TABLET, EXTENDED RELEASE ORAL
Qty: 180 TABLET | Refills: 0 | Status: SHIPPED | OUTPATIENT
Start: 2021-03-31 | End: 2021-05-05 | Stop reason: SDUPTHER

## 2021-04-22 ENCOUNTER — HOSPITAL ENCOUNTER (OUTPATIENT)
Dept: NUCLEAR MEDICINE | Age: 76
Discharge: HOME | End: 2021-04-22
Payer: MEDICARE

## 2021-04-22 DIAGNOSIS — R68.81 EARLY SATIETY: ICD-10-CM

## 2021-04-22 PROCEDURE — 78264 GASTRIC EMPTYING IMG STUDY: CPT

## 2021-04-22 PROCEDURE — A9541 TC99M SULFUR COLLOID: HCPCS | Performed by: FAMILY MEDICINE

## 2021-04-22 PROCEDURE — 3430000000 HC RX DIAGNOSTIC RADIOPHARMACEUTICAL: Performed by: FAMILY MEDICINE

## 2021-04-22 RX ORDER — SUCRALFATE 1 G/1
TABLET ORAL
COMMUNITY
Start: 2021-02-22 | End: 2022-07-14 | Stop reason: ALTCHOICE

## 2021-04-22 RX ADMIN — Medication 1.1 MILLICURIE: at 09:48

## 2021-05-05 ENCOUNTER — OFFICE VISIT (OUTPATIENT)
Dept: FAMILY MEDICINE CLINIC | Age: 76
End: 2021-05-05
Payer: MEDICARE

## 2021-05-05 VITALS
HEIGHT: 62 IN | SYSTOLIC BLOOD PRESSURE: 138 MMHG | DIASTOLIC BLOOD PRESSURE: 72 MMHG | WEIGHT: 159 LBS | BODY MASS INDEX: 29.26 KG/M2 | OXYGEN SATURATION: 98 % | HEART RATE: 57 BPM

## 2021-05-05 DIAGNOSIS — C54.1 ENDOMETRIAL CANCER (HCC): ICD-10-CM

## 2021-05-05 DIAGNOSIS — I10 ESSENTIAL HYPERTENSION: ICD-10-CM

## 2021-05-05 DIAGNOSIS — F41.9 ANXIETY: ICD-10-CM

## 2021-05-05 DIAGNOSIS — E11.42 TYPE 2 DIABETES MELLITUS WITH DIABETIC POLYNEUROPATHY, WITHOUT LONG-TERM CURRENT USE OF INSULIN (HCC): Primary | ICD-10-CM

## 2021-05-05 DIAGNOSIS — E55.9 VITAMIN D DEFICIENCY: ICD-10-CM

## 2021-05-05 DIAGNOSIS — F33.1 MODERATE EPISODE OF RECURRENT MAJOR DEPRESSIVE DISORDER (HCC): ICD-10-CM

## 2021-05-05 DIAGNOSIS — E53.8 VITAMIN B12 DEFICIENCY: ICD-10-CM

## 2021-05-05 DIAGNOSIS — E78.2 MIXED HYPERLIPIDEMIA: ICD-10-CM

## 2021-05-05 DIAGNOSIS — E83.42 HYPOMAGNESEMIA: ICD-10-CM

## 2021-05-05 DIAGNOSIS — E03.9 HYPOTHYROIDISM, UNSPECIFIED TYPE: ICD-10-CM

## 2021-05-05 PROCEDURE — 1036F TOBACCO NON-USER: CPT | Performed by: NURSE PRACTITIONER

## 2021-05-05 PROCEDURE — 1123F ACP DISCUSS/DSCN MKR DOCD: CPT | Performed by: NURSE PRACTITIONER

## 2021-05-05 PROCEDURE — 99214 OFFICE O/P EST MOD 30 MIN: CPT | Performed by: NURSE PRACTITIONER

## 2021-05-05 PROCEDURE — 3046F HEMOGLOBIN A1C LEVEL >9.0%: CPT | Performed by: NURSE PRACTITIONER

## 2021-05-05 PROCEDURE — 4040F PNEUMOC VAC/ADMIN/RCVD: CPT | Performed by: NURSE PRACTITIONER

## 2021-05-05 PROCEDURE — 2022F DILAT RTA XM EVC RTNOPTHY: CPT | Performed by: NURSE PRACTITIONER

## 2021-05-05 PROCEDURE — 3017F COLORECTAL CA SCREEN DOC REV: CPT | Performed by: NURSE PRACTITIONER

## 2021-05-05 PROCEDURE — G8417 CALC BMI ABV UP PARAM F/U: HCPCS | Performed by: NURSE PRACTITIONER

## 2021-05-05 PROCEDURE — G8399 PT W/DXA RESULTS DOCUMENT: HCPCS | Performed by: NURSE PRACTITIONER

## 2021-05-05 PROCEDURE — G8427 DOCREV CUR MEDS BY ELIG CLIN: HCPCS | Performed by: NURSE PRACTITIONER

## 2021-05-05 PROCEDURE — 1090F PRES/ABSN URINE INCON ASSESS: CPT | Performed by: NURSE PRACTITIONER

## 2021-05-05 RX ORDER — AMLODIPINE BESYLATE 5 MG/1
TABLET ORAL
Qty: 90 TABLET | Refills: 1 | Status: SHIPPED | OUTPATIENT
Start: 2021-05-05 | End: 2021-12-02 | Stop reason: SDUPTHER

## 2021-05-05 RX ORDER — LORAZEPAM 0.5 MG/1
0.5 TABLET ORAL DAILY PRN
Qty: 15 TABLET | Refills: 0 | Status: SHIPPED | OUTPATIENT
Start: 2021-05-05 | End: 2021-08-26 | Stop reason: SDUPTHER

## 2021-05-05 RX ORDER — METFORMIN HYDROCHLORIDE 500 MG/1
TABLET, EXTENDED RELEASE ORAL
Qty: 180 TABLET | Refills: 1 | Status: SHIPPED | OUTPATIENT
Start: 2021-05-05 | End: 2021-07-14 | Stop reason: SDUPTHER

## 2021-05-05 ASSESSMENT — PATIENT HEALTH QUESTIONNAIRE - PHQ9
SUM OF ALL RESPONSES TO PHQ QUESTIONS 1-9: 6
10. IF YOU CHECKED OFF ANY PROBLEMS, HOW DIFFICULT HAVE THESE PROBLEMS MADE IT FOR YOU TO DO YOUR WORK, TAKE CARE OF THINGS AT HOME, OR GET ALONG WITH OTHER PEOPLE: 0
7. TROUBLE CONCENTRATING ON THINGS, SUCH AS READING THE NEWSPAPER OR WATCHING TELEVISION: 0
9. THOUGHTS THAT YOU WOULD BE BETTER OFF DEAD, OR OF HURTING YOURSELF: 0
1. LITTLE INTEREST OR PLEASURE IN DOING THINGS: 1

## 2021-05-05 NOTE — PROGRESS NOTES
[address] [place] [address]  [address] [facility] [address]  Dept: [phone]  Dept Fax: [phone]  Loc: [phone]    Jayde Dozier is a 70 y.o. female who presents today for her medical conditions/complaints as noted below. Jayde Dozier is c/o of Diabetes, Hyperlipidemia (pt is not fasting ), Hypertension, and Gastroesophageal Reflux (pt had a hard time for the past two weeks with this )       Subjective:     Chief Complaint   Patient presents with    Diabetes    Hyperlipidemia     pt is not fasting     Hypertension    Gastroesophageal Reflux     pt had a hard time for the past two weeks with this        HPI  She presents today for 3 month follow up on chronic benzodiazepine management. The patient is on chronic benzodiazepine therapy for the diagnosis of:  Anxiety:   The patient complains of racing thoughts, but denies other symptoms. Current pharmacologic treatment: Ativan. Medication side effects:  none. The patient denies nausea, vomiting, diarrhea and constipation. Denies respiratory problems/depression. Denies increased sleepiness, drowsiness or confusion. The patient states that the medications and treatment have helped improve the quality of life and helped in psychosocial functioning. She is not participating in counselling/therapy. The patient states they are taking medications only as prescribed , using one pharmacy and getting all controlled substances from one provider unless okayed with me. The patient states they are following proper safeguarding and disposal of medications. Diabetes Mellitus Type 2:  Patient is here for follow up on diabetes. Treatment Adherence:   Medication compliance:  Taking medication as prescribed. Diet compliance:  Trying to eat a healthy, diabetic diet. Weight trend:   Wt Readings from Last 3 Encounters:   05/05/21 159 lb (72.1 kg)   01/13/21 160 lb (72.6 kg)   11/10/20 158 lb (71.7 kg) Denies any hypoglycemia episodes. Current symptoms/problems include:  Neuropathy is  present  Denies any open areas on feet/ulcerations  Denies any polyuria, polydipsia, or polyphagia, nausea, vomiting or diarrhea. Home blood sugar records: fasting range: 107-114, patient tests 1 time(s) per day  Eye exam current (withinone year): yes  Denies blurry vision   reports that she has never smoked. She has never used smokeless tobacco.  DailyAspirin? Yes  Statin Therapy? Yes  ACEI/ARB Therapy? Yes    Hypertension:  Home blood pressure monitoring: Yes - stable. Patient   is adherent to a low sodium diet. Patient denies chest pain, shortness of breath, headache, lightheadedness, blurred vision, , dry cough and fatigue. Antihypertensive medication side effects: no medication side effects noted. Use of agents associated withhypertension: none. + palpitations have decreased and last seconds. Improved with decreasing the magnesium  BP Readings from Last 3 Encounters:   05/05/21 138/72   01/13/21 (!) 187/84   11/06/20 130/60     Hypothyroidism: Recent symptoms: palpitations. She denies fatigue, weight gain, weight loss, cold intolerance, heat intolerance, dry skin, edema, palpitations and dysphagia. Patient is  taking her medication consistently on an empty stomach. Lab Results   Component Value Date    TSHREFLEX 2.17 01/13/2021    TSHREFLEX 4.80 06/03/2020    TSHREFLEX 0.45 02/24/2020     Lab Results   Component Value Date    TSH 4.44 (H) 10/06/2020    TSH 4.84 (H) 01/09/2020    TSH 1.41 04/15/2019     Hyperlipidemia:  No newmyalgias or GI upset on rosuvastatin (Crestor).      The 10-year ASCVD risk score (Deepthi Weber, et al., 2013) is: 34.1%    Values used to calculate the score:      Age: 70 years      Sex: Female      Is Non- : No      Diabetic: Yes      Tobacco smoker: No      Systolic Blood Pressure: 465 mmHg      Is BP treated: Yes      HDL Cholesterol: 67 mg/dL      Total Cholesterol: 194 mg/dL    Lab Results   Component Value Date    LABA1C 6.3 06/03/2020    LABA1C 5.0 01/15/2020    LABA1C 6.1 10/10/2019     Lab Results   Component Value Date    LABMICR <1.20 06/03/2020    LDLCALC 92 06/03/2020    CREATININE 0.8 10/06/2020       Vitamin D Deficiency  Patient with vitamin d deficiency and currently on supplement without adverse reactions. Lab Results   Component Value Date    VITD25 27.0 (L) 01/09/2020     Taking 1600 mg of magnesium per day    Using Metamucil and miralax    Review of Systems   Constitutional: Negative. Negative for appetite change, fatigue and unexpected weight change. HENT: Negative. Eyes: Negative. Respiratory: Negative. Negative for shortness of breath. Cardiovascular: Negative. Negative for chest pain, palpitations and leg swelling. Gastrointestinal: Negative. Negative for abdominal pain, anal bleeding, blood in stool and nausea. Endocrine: Negative. Genitourinary: Negative. Negative for hematuria. Musculoskeletal: Negative. Skin: Negative. Negative for rash. Allergic/Immunologic: Negative. Neurological: Negative. Negative for dizziness, syncope, light-headedness and numbness. Hematological: Negative. Does not bruise/bleed easily. Psychiatric/Behavioral: The patient is nervous/anxious. All other systems reviewed and are negative. Past Medical History:   Diagnosis Date    Allergic     Anemia     Arthritis of left hip 11/10/2020    Brain condition     pt states a cluster of blood vessels left side of brain    Cancer [facility]) 1/2013    endometrial cancer/ Dr. Shannon Castillo and Dr. Trevor Melchor Cerebral cavernoma     followed by Dr. Angela Bello at [place] [address] Complication of anesthesia     Takes a long time to wake up    Diverticulosis     Endometrial cancer ([facility] [address]     Fungus infection     Fungus infection on bilat toes.      GERD (gastroesophageal reflux disease)     Hyperlipidemia     Hypertension     Hypomagnesemia 1/2016    consult with Dr. Aniyah Wilson ([facility]) and no renal wasting. felt most likely d/t HCTz and PPI,  HCTZ discontinued,  increased mag ox to 400 mg 2 tabs BID    Hypothyroidism     Irritable bowel syndrome     MVP (mitral valve prolapse)     JOAQUIN (obstructive sleep apnea)     Paronychia of great toe, left 7/11/2017    Pneumonia     infant    Prolonged emergence from general anesthesia     Type 2 diabetes mellitus without complication ([facility] [address]     Type II or unspecified type diabetes mellitus without mention of complication, not stated as uncontrolled     Unspecified sleep apnea     Vitamin B12 deficiency     Vitamin D deficiency      Family History   Problem Relation Age of Onset    Cancer Mother         colon    COPD Father     Heart Disease Father     Cancer Father         NMSC, skin     Past Surgical History:   Procedure Laterality Date    COLONOSCOPY      COLONOSCOPY  07/14/2016    COLONOSCOPY      CYSTOSCOPY  10/18/2017    Cystoscopy with Urethral dilation    CYSTOSCOPY Bilateral 10/18/2017    Cystoscopy with bilateral retrograde pyelograms    DIAGNOSTIC CARDIAC CATH LAB PROCEDURE  1/12/2005    [facility], Dr Nica Canales, No CAD.     DILATION AND CURETTAGE OF UTERUS      ENDOSCOPY, COLON, DIAGNOSTIC      HYSTERECTOMY  1/28/2013    due to uterine cancer    OTHER SURGICAL HISTORY  10/18/2017    cystoscopy, bilateral retrogrades    OTHER SURGICAL HISTORY Left 03/02/2017    port removal    SALIVARY GLAND SURGERY      KINGSLEY AND BSO  1/28/13    THYROID SURGERY      right thyroidectomy for benign reasons    UPPER GASTROINTESTINAL ENDOSCOPY  07/14/2016    UPPER GASTROINTESTINAL ENDOSCOPY       Social History     Socioeconomic History    Marital status:      Spouse name: Not on file    Number of children: Not on file    Years of education: Not on file    Highest education level: Not on file   Occupational History    Not on file   Social Needs    Financial resource strain: Not on file  Food insecurity     Worry: Not on file     Inability: Not on file    Transportation needs     Medical: Not on file     Non-medical: Not on file   Tobacco Use    Smoking status: Never Smoker    Smokeless tobacco: Never Used   Substance and Sexual Activity    Alcohol use: No     Alcohol/week: 0.0 standard drinks    Drug use: No    Sexual activity: Never   Lifestyle    Physical activity     Days per week: Not on file     Minutes per session: Not on file    Stress: Not on file   Relationships    Social connections     Talks on phone: Not on file     Gets together: Not on file     Attends Faith service: Not on file     Active member of club or organization: Not on file     Attends meetings of clubs or organizations: Not on file     Relationship status: Not on file    Intimate partner violence     Fear of current or ex partner: Not on file     Emotionally abused: Not on file     Physically abused: Not on file     Forced sexual activity: Not on file   Other Topics Concern    Not on file   Social History Narrative    Not on file     Current Outpatient Medications   Medication Sig Dispense Refill    Alum Hydroxide-Mag Carbonate (GAVISCON PO) Take by mouth      sucralfate (CARAFATE) 1 GM tablet       metFORMIN (GLUCOPHAGE-XR) 500 MG extended release tablet TAKE 1 TABLET TWICE A  tablet 0    metoprolol tartrate (LOPRESSOR) 25 MG tablet TAKE 1 TABLET TWICE A  tablet 0    losartan (COZAAR) 100 MG tablet TAKE 1 TABLET DAILY 90 tablet 0    blood glucose test strips (CONTOUR NEXT TEST) strip TEST ONCE DAILY DX: 250.00 100 strip 11    amLODIPine (NORVASC) 5 MG tablet TAKE 1 TABLET DAILY 90 tablet 1    rosuvastatin (CRESTOR) 10 MG tablet Take 1 tablet by mouth daily TAKE 1 TABLET BY MOUTH NIGHTLY 90 tablet 3    levothyroxine (SYNTHROID) 75 MCG tablet Take 1 tablet by mouth daily 90 tablet 1    clobetasol (TEMOVATE) 0.05 % external solution       Cholecalciferol (VITAMIN D3) 25 MCG (1000 UT) TABS Take 1 tablet by mouth daily      magnesium oxide (MAG-OX) 400 (241.3 Mg) MG TABS tablet Take 3 tablets by mouth 2 times daily For Hypomagnesemia (Patient taking differently: Take 800 mg by mouth 2 times daily For Hypomagnesemia) 180 tablet 5     No current facility-administered medications for this visit. No changes in past medicalhistory, past surgical history, social history, or family history were noted during the patient encounter unless specifically listed above. All updates of past medical history, past surgical history, social history, orfamily history were reviewed personally by me during the office visit. All problems listed in the assessment are stable unless noted otherwise. Medication profile reviewed personally by me during the office visit. Medication side effects and possible impairments from medications were discussed as applicable. Objective:       Physical Exam  Vitals signs and nursing note reviewed. Constitutional:       General: She is not in acute distress. Appearance: Normal appearance. She is well-developed. HENT:      Head: Normocephalic and atraumatic. Right Ear: Tympanic membrane, ear canal and external ear normal.      Left Ear: Tympanic membrane, ear canal and external ear normal.      Nose: Nose normal.      Mouth/Throat:      Pharynx: Uvula midline. No oropharyngeal exudate. Eyes:      General: Lids are normal.      Conjunctiva/sclera: Conjunctivae normal.      Pupils: Pupils are equal, round, and reactive to light. Neck:      Musculoskeletal: Normal range of motion and neck supple. Thyroid: No thyromegaly. Vascular: No carotid bruit or JVD. Cardiovascular:      Rate and Rhythm: Normal rate and regular rhythm. Pulses: Normal pulses. Radial pulses are 2+ on the right side and 2+ on the left side. Dorsalis pedis pulses are 2+ on the right side and 2+ on the left side.         Posterior tibial pulses are 2+ on the right side and 2+ on the left side. Heart sounds: Normal heart sounds. No murmur. No friction rub. No gallop. Pulmonary:      Effort: Pulmonary effort is normal.      Breath sounds: Normal breath sounds. Abdominal:      General: Bowel sounds are normal.      Palpations: Abdomen is soft. There is no mass. Tenderness: There is no abdominal tenderness. Musculoskeletal: Normal range of motion. Lymphadenopathy:      Head:      Right side of head: No submandibular adenopathy. Left side of head: No submandibular adenopathy. Cervical: No cervical adenopathy. Skin:     General: Skin is warm and dry. Findings: No lesion or rash. Neurological:      Mental Status: She is alert and oriented to person, place, and time. Motor: Tremor present. Gait: Gait normal.   Psychiatric:         Speech: Speech normal.         Behavior: Behavior normal.         Thought Content: Thought content normal.         Judgment: Judgment normal.         /72 (Site: Left Upper Arm, Position: Sitting, Cuff Size: Large Adult)   Pulse 57   Ht 5' 2\" (1.575 m)   Wt 159 lb (72.1 kg)   SpO2 98%   BMI 29.08 kg/m²   Body mass index is 29.08 kg/m². Lab Review   No visits with results within 2 Month(s) from this visit. Latest known visit with results is:   Hospital Outpatient Visit on 03/03/2021   Component Date Value    Magnesium 03/03/2021 1.70*              Assessment:       1. Type 2 diabetes mellitus with diabetic polyneuropathy, without long-term current use of insulin ([address]    2. Moderate episode of recurrent major depressive disorder ([address]    3. Endometrial cancer ([address]    4. Anxiety    5. Essential hypertension    6. Mixed hyperlipidemia    7. Vitamin B12 deficiency    8. Vitamin D deficiency    9. Hypothyroidism, unspecified type    10. Hypomagnesemia        No results found for this visit on 05/05/21.          Plan:       Courtney Jackson was seen today for diabetes, hyperlipidemia, hypertension and gastroesophageal reflux. Diagnoses and all orders for this visit:    Type 2 diabetes mellitus with diabetic polyneuropathy, without long-term current use of insulin (HCC)Condition appears stable with current medication regimen. No reported or noted side effects of medication. Will continue to monitor at routine intervals as appropriate. Continue current medications as follows:  -     metFORMIN (GLUCOPHAGE-XR) 500 MG extended release tablet; TAKE 1 TABLET TWICE A DAY    Moderate episode of recurrent major depressive disorder (HCC)  Educated if patient develops SI/HI/awa to call 911 or go to ER. Discussed use, benefit, risks and side effects of prescribed medications. Barriers to compliance discussed. All patient questions answered. Pt voiced understanding. Endometrial cancer ([facility] [address]  Stable  Continues with oncology and has pelvic exams with [facility]    Anxiety  Condition appears stable with current medication regimen. No reported or noted side effects of medication. Will continue to monitor at routine intervals as appropriate. Continue current medications as follows:  -     LORazepam (ATIVAN) 0.5 MG tablet; Take 1 tablet by mouth daily as needed for Anxiety for up to 30 days. Chronic benzodiazepine treatment protocol was discussed with the patient again including taking medications only as prescribed , using one pharmacy and getting all controlled substances from one physician unless okayed with me. Proper safeguarding and disposal of medications were also discussed with the patient.     The current treatment regimen is needed to decrease the patient's anxiety symptoms, improve the quality of life and ability to function and improve sleep and mood symptoms.        Patient given following instructions - You are on medications which could impair your senses, you are at risk of weakness, falls, dizziness, or drowsiness.  You should be careful during activities which could place you at risk of harm, such as climbing, using stairs, operating machinery, or driving vehicles. If you feel you cannot safely do these activities, you should request others to help you, or avoid the activities altogether. If you are drowsy for any other reason, you should use the same precautions as listed above.     The patient is made aware of the potential of drowsiness with the prescribed medications, and that care should be exercised in operating machinery, vehicles, or placing oneself in situations of risk to their health, ie heights and climbing and stairs.     The patient denies nausea, vomiting, diarrhea and constipation. No respiratory problems. No increased sleepiness, drowsiness or confusion. The patient states that the medications and treatment have helped improve the quality of life and helped in psychosocial functioning. There are no indicators for possible drug abuse, addiction or diversion problems. Periodic Controlled Substance Monitoring: Possible medication side effects, risk of tolerance/dependence & alternative treatments discussed., No signs of potential drug abuse or diversion identified. , Assessed functional status. (Yong Lyon, APRN - CNP)     Essential hypertension  -     CBC Auto Differential; Future  Condition appears stable with current medication regimen. No reported or noted side effects of medication. Will continue to monitor at routine intervals as appropriate. Continue current medications as follows:  -     amLODIPine (NORVASC) 5 MG tablet; TAKE 1 TABLET DAILY    Mixed hyperlipidemia  The patient is asked to make an attempt to improve diet and exercise patterns to aid in medical management of this problem. Vitamin B12 deficiency  -     VITAMIN B12 & FOLATE; Future    Vitamin D deficiency  -     Vitamin D 25 Hydroxy; Future  Discussed with patient that we make vitamin D from the sun and get it from some food sources, but it is very common to be deficient.   Discussed the need for vitamin D replacement because low vitamin D can cause fatigue, joint aches and has been implicated in heart disease, bone disease like osteoporosis, and some other chronic illnesses. Patient will start/continue vitamin D supplement as per order. Recommend vitamin D to be rechecked in 6 months. Hypothyroidism, unspecified type  Condition appears stable with current medication regimen. Continue current medications. No reported or noted side effects of medication. Will continue to monitor at routine intervals as appropriate. Hypomagnesemia  -     MAGNESIUM; Future        Patient has been instructed call the office immediately with new symptoms, change in symptoms or worseningof symptoms. If this is not feasible, patient is instructed to report to the emergency room. Medication profile reviewed. Medication side effects and possible impairments from medications were discussed as applicable. Allergies were reviewed. Health maintenance was reviewed and updated as appropriate. Return in about 3 months (around 8/5/2021) for Controlled mediction management. (Comment: Please note this report has been produced using a combination of typing and speech recognition software and may contain errors related to that system including errors in grammar, punctuation, and spelling, as well as words and phrases that may be inappropriate.  If there are any questions or concerns please feel free to contact the dictating provider for clarification.)

## 2021-05-06 NOTE — TELEPHONE ENCOUNTER
[facility] with Lizeth LAZO called stating that Amanda Morrell CNP is wanting to switch the amlodipine to a different drug class. States that the amlodipine has a calcium soraya blocker that causes reflux and LES hypotention. Call back [facility] with recommendations [phone] ext [phone].

## 2021-05-11 ENCOUNTER — NURSE ONLY (OUTPATIENT)
Dept: FAMILY MEDICINE CLINIC | Age: 76
End: 2021-05-11
Payer: MEDICARE

## 2021-05-11 DIAGNOSIS — E83.42 HYPOMAGNESEMIA: ICD-10-CM

## 2021-05-11 DIAGNOSIS — E11.42 TYPE 2 DIABETES MELLITUS WITH DIABETIC POLYNEUROPATHY, WITHOUT LONG-TERM CURRENT USE OF INSULIN (HCC): ICD-10-CM

## 2021-05-11 DIAGNOSIS — E55.9 VITAMIN D DEFICIENCY: ICD-10-CM

## 2021-05-11 DIAGNOSIS — E53.8 VITAMIN B12 DEFICIENCY: ICD-10-CM

## 2021-05-11 DIAGNOSIS — I10 ESSENTIAL HYPERTENSION: ICD-10-CM

## 2021-05-11 LAB
BASOPHILS ABSOLUTE: 0.1 K/UL (ref 0–0.2)
BASOPHILS RELATIVE PERCENT: 1 %
EOSINOPHILS ABSOLUTE: 0.2 K/UL (ref 0–0.6)
EOSINOPHILS RELATIVE PERCENT: 3.6 %
HCT VFR BLD CALC: 34.6 % (ref 36–48)
HEMOGLOBIN: 11.2 G/DL (ref 12–16)
LYMPHOCYTES ABSOLUTE: 1.6 K/UL (ref 1–5.1)
LYMPHOCYTES RELATIVE PERCENT: 23.2 %
MCH RBC QN AUTO: 27 PG (ref 26–34)
MCHC RBC AUTO-ENTMCNC: 32.5 G/DL (ref 31–36)
MCV RBC AUTO: 83.1 FL (ref 80–100)
MONOCYTES ABSOLUTE: 0.4 K/UL (ref 0–1.3)
MONOCYTES RELATIVE PERCENT: 6.5 %
NEUTROPHILS ABSOLUTE: 4.5 K/UL (ref 1.7–7.7)
NEUTROPHILS RELATIVE PERCENT: 65.7 %
PDW BLD-RTO: 15.6 % (ref 12.4–15.4)
PLATELET # BLD: 221 K/UL (ref 135–450)
PMV BLD AUTO: 9.8 FL (ref 5–10.5)
RBC # BLD: 4.16 M/UL (ref 4–5.2)
WBC # BLD: 6.9 K/UL (ref 4–11)

## 2021-05-11 PROCEDURE — 36415 COLL VENOUS BLD VENIPUNCTURE: CPT | Performed by: NURSE PRACTITIONER

## 2021-05-11 ASSESSMENT — ENCOUNTER SYMPTOMS
NAUSEA: 0
RESPIRATORY NEGATIVE: 1
ABDOMINAL PAIN: 0
SHORTNESS OF BREATH: 0
ANAL BLEEDING: 0
BLOOD IN STOOL: 0
EYES NEGATIVE: 1
GASTROINTESTINAL NEGATIVE: 1
ALLERGIC/IMMUNOLOGIC NEGATIVE: 1

## 2021-05-12 LAB
A/G RATIO: 2.2 (ref 1.1–2.2)
ALBUMIN SERPL-MCNC: 4.6 G/DL (ref 3.4–5)
ALP BLD-CCNC: 76 U/L (ref 40–129)
ALT SERPL-CCNC: 12 U/L (ref 10–40)
ANION GAP SERPL CALCULATED.3IONS-SCNC: 13 MMOL/L (ref 3–16)
AST SERPL-CCNC: 15 U/L (ref 15–37)
BILIRUB SERPL-MCNC: <0.2 MG/DL (ref 0–1)
BUN BLDV-MCNC: 15 MG/DL (ref 7–20)
CALCIUM SERPL-MCNC: 9.4 MG/DL (ref 8.3–10.6)
CHLORIDE BLD-SCNC: 100 MMOL/L (ref 99–110)
CHOLESTEROL, TOTAL: 169 MG/DL (ref 0–199)
CO2: 27 MMOL/L (ref 21–32)
CREAT SERPL-MCNC: 0.8 MG/DL (ref 0.6–1.2)
ESTIMATED AVERAGE GLUCOSE: 131.2 MG/DL
FOLATE: 11.11 NG/ML (ref 4.78–24.2)
GFR AFRICAN AMERICAN: >60
GFR NON-AFRICAN AMERICAN: >60
GLOBULIN: 2.1 G/DL
GLUCOSE BLD-MCNC: 87 MG/DL (ref 70–99)
HBA1C MFR BLD: 6.2 %
HDLC SERPL-MCNC: 66 MG/DL (ref 40–60)
LDL CHOLESTEROL CALCULATED: 73 MG/DL
MAGNESIUM: 1.8 MG/DL (ref 1.8–2.4)
POTASSIUM SERPL-SCNC: 4.4 MMOL/L (ref 3.5–5.1)
SODIUM BLD-SCNC: 140 MMOL/L (ref 136–145)
TOTAL PROTEIN: 6.7 G/DL (ref 6.4–8.2)
TRIGL SERPL-MCNC: 152 MG/DL (ref 0–150)
VITAMIN B-12: 312 PG/ML (ref 211–911)
VITAMIN D 25-HYDROXY: 56.3 NG/ML
VLDLC SERPL CALC-MCNC: 30 MG/DL

## 2021-05-17 ENCOUNTER — TELEPHONE (OUTPATIENT)
Dept: FAMILY MEDICINE CLINIC | Age: 76
End: 2021-05-17

## 2021-05-26 DIAGNOSIS — I10 ESSENTIAL HYPERTENSION: ICD-10-CM

## 2021-05-26 RX ORDER — LOSARTAN POTASSIUM 100 MG/1
TABLET ORAL
Qty: 90 TABLET | Refills: 0 | Status: SHIPPED | OUTPATIENT
Start: 2021-05-26 | End: 2021-09-01

## 2021-07-08 RX ORDER — LIDOCAINE HYDROCHLORIDE 10 MG/ML
0.1 INJECTION, SOLUTION EPIDURAL; INFILTRATION; INTRACAUDAL; PERINEURAL
Status: CANCELLED | OUTPATIENT
Start: 2021-07-08 | End: 2021-07-08

## 2021-07-08 RX ORDER — SODIUM CHLORIDE, SODIUM LACTATE, POTASSIUM CHLORIDE, CALCIUM CHLORIDE 600; 310; 30; 20 MG/100ML; MG/100ML; MG/100ML; MG/100ML
INJECTION, SOLUTION INTRAVENOUS ONCE
Status: CANCELLED | OUTPATIENT
Start: 2021-07-08 | End: 2021-07-08

## 2021-07-08 NOTE — PROGRESS NOTES
Unable to reach patient or leave message. Called Dr. Garcia Divers office on 7/7/21 but no other phone numbers listed.

## 2021-07-09 ENCOUNTER — ANESTHESIA EVENT (OUTPATIENT)
Dept: ENDOSCOPY | Age: 76
End: 2021-07-09
Payer: MEDICARE

## 2021-07-09 ENCOUNTER — ANESTHESIA (OUTPATIENT)
Dept: ENDOSCOPY | Age: 76
End: 2021-07-09
Payer: MEDICARE

## 2021-07-09 ENCOUNTER — HOSPITAL ENCOUNTER (OUTPATIENT)
Age: 76
Setting detail: OUTPATIENT SURGERY
Discharge: HOME | End: 2021-07-09
Attending: INTERNAL MEDICINE | Admitting: INTERNAL MEDICINE
Payer: MEDICARE

## 2021-07-09 VITALS
WEIGHT: 155 LBS | HEIGHT: 62 IN | OXYGEN SATURATION: 94 % | DIASTOLIC BLOOD PRESSURE: 64 MMHG | BODY MASS INDEX: 28.52 KG/M2 | SYSTOLIC BLOOD PRESSURE: 134 MMHG | RESPIRATION RATE: 12 BRPM | TEMPERATURE: 97.4 F | HEART RATE: 76 BPM

## 2021-07-09 VITALS — SYSTOLIC BLOOD PRESSURE: 120 MMHG | DIASTOLIC BLOOD PRESSURE: 54 MMHG | OXYGEN SATURATION: 95 %

## 2021-07-09 DIAGNOSIS — K21.00 GASTROESOPHAGEAL REFLUX DISEASE WITH ESOPHAGITIS, UNSPECIFIED WHETHER HEMORRHAGE: ICD-10-CM

## 2021-07-09 LAB — SARS-COV-2, NAAT: NOT DETECTED

## 2021-07-09 PROCEDURE — 88305 TISSUE EXAM BY PATHOLOGIST: CPT

## 2021-07-09 PROCEDURE — 3609012400 HC EGD TRANSORAL BIOPSY SINGLE/MULTIPLE: Performed by: INTERNAL MEDICINE

## 2021-07-09 PROCEDURE — 2580000003 HC RX 258: Performed by: INTERNAL MEDICINE

## 2021-07-09 PROCEDURE — 3609017100 HC EGD: Performed by: INTERNAL MEDICINE

## 2021-07-09 PROCEDURE — 2720000010 HC SURG SUPPLY STERILE: Performed by: INTERNAL MEDICINE

## 2021-07-09 PROCEDURE — 6360000002 HC RX W HCPCS: Performed by: NURSE ANESTHETIST, CERTIFIED REGISTERED

## 2021-07-09 PROCEDURE — 7100000011 HC PHASE II RECOVERY - ADDTL 15 MIN: Performed by: INTERNAL MEDICINE

## 2021-07-09 PROCEDURE — 2709999900 HC NON-CHARGEABLE SUPPLY: Performed by: INTERNAL MEDICINE

## 2021-07-09 PROCEDURE — 7100000010 HC PHASE II RECOVERY - FIRST 15 MIN: Performed by: INTERNAL MEDICINE

## 2021-07-09 PROCEDURE — 87635 SARS-COV-2 COVID-19 AMP PRB: CPT

## 2021-07-09 PROCEDURE — 2500000003 HC RX 250 WO HCPCS: Performed by: NURSE ANESTHETIST, CERTIFIED REGISTERED

## 2021-07-09 PROCEDURE — 3700000000 HC ANESTHESIA ATTENDED CARE: Performed by: INTERNAL MEDICINE

## 2021-07-09 RX ORDER — SODIUM CHLORIDE, SODIUM LACTATE, POTASSIUM CHLORIDE, CALCIUM CHLORIDE 600; 310; 30; 20 MG/100ML; MG/100ML; MG/100ML; MG/100ML
INJECTION, SOLUTION INTRAVENOUS ONCE
Status: COMPLETED | OUTPATIENT
Start: 2021-07-09 | End: 2021-07-09

## 2021-07-09 RX ORDER — PROPOFOL 10 MG/ML
INJECTION, EMULSION INTRAVENOUS PRN
Status: DISCONTINUED | OUTPATIENT
Start: 2021-07-09 | End: 2021-07-09 | Stop reason: SDUPTHER

## 2021-07-09 RX ORDER — LIDOCAINE HYDROCHLORIDE 20 MG/ML
INJECTION, SOLUTION INFILTRATION; PERINEURAL PRN
Status: DISCONTINUED | OUTPATIENT
Start: 2021-07-09 | End: 2021-07-09 | Stop reason: SDUPTHER

## 2021-07-09 RX ORDER — LIDOCAINE HYDROCHLORIDE 10 MG/ML
0.1 INJECTION, SOLUTION EPIDURAL; INFILTRATION; INTRACAUDAL; PERINEURAL
Status: DISCONTINUED | OUTPATIENT
Start: 2021-07-09 | End: 2021-07-09 | Stop reason: HOSPADM

## 2021-07-09 RX ADMIN — PROPOFOL 40 MG: 10 INJECTION, EMULSION INTRAVENOUS at 13:39

## 2021-07-09 RX ADMIN — PROPOFOL 50 MG: 10 INJECTION, EMULSION INTRAVENOUS at 13:32

## 2021-07-09 RX ADMIN — PROPOFOL 20 MG: 10 INJECTION, EMULSION INTRAVENOUS at 13:35

## 2021-07-09 RX ADMIN — PROPOFOL 40 MG: 10 INJECTION, EMULSION INTRAVENOUS at 13:37

## 2021-07-09 RX ADMIN — LIDOCAINE HYDROCHLORIDE 50 MG: 20 INJECTION, SOLUTION INFILTRATION; PERINEURAL at 13:31

## 2021-07-09 RX ADMIN — SODIUM CHLORIDE, POTASSIUM CHLORIDE, SODIUM LACTATE AND CALCIUM CHLORIDE: 600; 310; 30; 20 INJECTION, SOLUTION INTRAVENOUS at 13:22

## 2021-07-09 RX ADMIN — SODIUM CHLORIDE, POTASSIUM CHLORIDE, SODIUM LACTATE AND CALCIUM CHLORIDE: 600; 310; 30; 20 INJECTION, SOLUTION INTRAVENOUS at 13:28

## 2021-07-09 RX ADMIN — PROPOFOL 50 MG: 10 INJECTION, EMULSION INTRAVENOUS at 13:33

## 2021-07-09 ASSESSMENT — PAIN SCALES - GENERAL
PAINLEVEL_OUTOF10: 0

## 2021-07-09 ASSESSMENT — PAIN - FUNCTIONAL ASSESSMENT: PAIN_FUNCTIONAL_ASSESSMENT: 0-10

## 2021-07-09 NOTE — ANESTHESIA POSTPROCEDURE EVALUATION
Department of Anesthesiology  Postprocedure Note    Patient: Helen Tillman  MRN: [id number]  YOB: 1945  Date of evaluation: 7/9/2021  Time:  2:39 PM     Procedure Summary     Date: 07/09/21 Room / Location: [address] [place] [address] [address] / [facility]    Anesthesia Start: 6722 Anesthesia Stop: 1347    Procedures:       EGD WITH BRAVO (N/A )      EGD BIOPSY Diagnosis:       Gastroesophageal reflux disease with esophagitis, unspecified whether hemorrhage      (GERD)    Surgeons: Lynne Chaudhari MD Responsible Provider: Zuleyma Hyatt MD    Anesthesia Type: MAC ASA Status: 3          Anesthesia Type: MAC    Lary Phase I: Lary Score: 10    Lary Phase II: Lary Score: 8    Last vitals: Reviewed and per EMR flowsheets.        Anesthesia Post Evaluation    Comments: Postoperative Anesthesia Note    Name:    Helen Tillman  MRN:      [id number]    Patient Vitals in the past 12 hrs:  07/09/21 1400, BP:134/64, Pulse:76, Resp:12, SpO2:94 %  07/09/21 1357, BP:(!) 118/56, Pulse:77, Resp:14, SpO2:94 %  07/09/21 1347, BP:(!) 118/56, Pulse:85, Resp:12, SpO2:94 %  07/09/21 1316, BP:(!) 179/71, Temp:97.4 °F (36.3 °C), Temp src:Temporal, Pulse:87, Resp:14, SpO2:98 %, Height:5' 2\" (1.575 m), Weight:155 lb (70.3 kg)     LABS:    CBC  Lab Results       Component                Value               Date/Time                  WBC                      6.9                 05/11/2021 10:28 AM        HGB                      11.2 (L)            05/11/2021 10:28 AM        HCT                      34.6 (L)            05/11/2021 10:28 AM        PLT                      221                 05/11/2021 10:28 AM   RENAL  Lab Results       Component                Value               Date/Time                  NA                       140                 05/11/2021 10:28 AM        K                        4.4                 05/11/2021 10:28 AM        K                        4.6                 03/02/2018 09:34 AM CL                       100                 05/11/2021 10:28 AM        CO2                      27                  05/11/2021 10:28 AM        BUN                      15                  05/11/2021 10:28 AM        CREATININE               0.8                 05/11/2021 10:28 AM        GLUCOSE                  87                  05/11/2021 10:28 AM        GLUCOSE                  96                  06/01/2016 11:08 AM   COAGS  Lab Results       Component                Value               Date/Time                  PROTIME                  11.3                10/24/2014 08:40 AM        INR                      1.04                10/24/2014 08:40 AM        APTT                     26.0                04/22/2014 09:21 AM     Intake & Output: In: 400 (I.V.:400)  Out: -     Nausea & Vomiting:  No    Level of Consciousness:  Awake    Pain Assessment:  Adequate analgesia    Anesthesia Complications:  No apparent anesthetic complications    SUMMARY      Vital signs stable  OK to discharge from Stage I post anesthesia care.   Care transferred from Anesthesiology department on discharge from perioperative area

## 2021-07-09 NOTE — OP NOTE
Esophagogastroduodenoscopy Note    Patient:   Helen Tillman    YOB: 1945    Facility:   [facility] [Outpatient]   Referring/PCP: ALEKSANDAR Crawford CNP    Procedure:   Esophagogastroduodenoscopy with Corbin  Date:     7/9/2021   Endoscopist:  Lynne Chaudhari MD     Preoperative Diagnosis:   81yo F with h/o GERD intolerant of PPIs(hypomagnesemia) managed with Pepcid and Carafate presents for evaluation of regurgitation, pyrosis and early satiety. GES is April was unremarkable. Postoperative Diagnosis:  1cm hiatal hernia, gastric polyps    Anesthesia: per anesthesia    Estimated blood loss: Minimal    Complications: None    Description of Procedure:  Informed consent was obtained from the patient after explanation of the procedure including indications, description of the procedure,  benefits and possible risks and complications of the procedure, and alternatives. Questions were answered. The patient's history was reviewed and a directed physical examination was performed prior to the procedure. Patient was monitored throughout the procedure with pulse oximetry and periodic assessment of vital signs. Patient was sedated as noted above. The Nursing staff and I performed a time out. With the patient in the left lateral decubitus position, the Olympus videoendoscope was placed in the patient's mouth and under direct visualization passed into the esophagus. The scope was ultimately passed to the third portion of the duodenum. Visualization was performed during both introduction and withdrawal of the endoscope and retroflexed view of the proximal stomach was obtained. Findings[de-identified]   Esophagus: normal. The findings do not support a diagnosis of Fam's Esophagus.   Stomach: hiatal hernia (36-35cm), s/p Bravo at 29cm, many 3-8mm FGPs, biopsied  Duodenum: normal    Recommendations:   - await Bravo and path results  - continue outpatient meds including Pepcid and Carafate  - resume a regular diet    Pascual Pulido MD, MD

## 2021-07-09 NOTE — ANESTHESIA PRE PROCEDURE
Department of Anesthesiology  Preprocedure Note       Name:  Savannah Donald   Age:  70 y.o.  :  1945                                          MRN:  [id number]         Date:  2021      Surgeon: Anshul Chinchilla):  Talia Villarreal MD    Procedure: Procedure(s):  EGD WITH BRAVO    Medications prior to admission:   Prior to Admission medications    Medication Sig Start Date End Date Taking?  Authorizing Provider   losartan (COZAAR) 100 MG tablet TAKE 1 TABLET DAILY 21  Yes ALEKSANDAR Yoder CNP   amLODIPine (NORVASC) 5 MG tablet TAKE 1 TABLET DAILY 21  Yes ALEKSANDAR Yoder CNP   metFORMIN (GLUCOPHAGE-XR) 500 MG extended release tablet TAKE 1 TABLET TWICE A DAY 21  Yes ALEKSANDAR Yoder CNP   sucralfate (CARAFATE) 1 GM tablet  21  Yes Historical Provider, MD   metoprolol tartrate (LOPRESSOR) 25 MG tablet TAKE 1 TABLET TWICE A DAY 3/31/21  Yes ALEKSANDAR Yoder CNP   rosuvastatin (CRESTOR) 10 MG tablet Take 1 tablet by mouth daily TAKE 1 TABLET BY MOUTH NIGHTLY 20  Yes Braulio Obrien MD   levothyroxine (SYNTHROID) 75 MCG tablet Take 1 tablet by mouth daily 10/14/20  Yes ALEKSANDAR Yoder CNP   clobetasol (TEMOVATE) 0.05 % external solution  20  Yes Historical Provider, MD   Cholecalciferol (VITAMIN D3) 25 MCG (1000 UT) TABS Take 1 tablet by mouth daily 1/15/20  Yes ALEKSANDAR Yoder CNP   magnesium oxide (MAG-OX) 400 (241.3 Mg) MG TABS tablet Take 3 tablets by mouth 2 times daily For Hypomagnesemia  Patient taking differently: Take 800 mg by mouth 2 times daily For Hypomagnesemia 19  Yes ALEKSANDAR Hart CNP   Alum Hydroxide-Mag Carbonate (GAVISCON PO) Take by mouth    Historical Provider, MD   blood glucose test strips (CONTOUR NEXT TEST) strip TEST ONCE DAILY DX: 250.00 21   ALEKSANDAR Yoder CNP       Current medications:    Current Facility-Administered Medications   Medication Dose Route Frequency Provider Last Rate Last Admin    lactated ringers infusion   Intravenous Once Gutierrez Reyes MD        lidocaine PF 1 % injection 0.1 mL  0.1 mL Intradermal Once PRN Gutierrez Reyes MD           Allergies:     Allergies   Allergen Reactions    Aspirin Other (See Comments)     Brain cavernoma - avoid all blood thinners    Lipitor Other (See Comments)     Muscle cramps    Other      Avoid blood thinners    Sodium Hydroxide Other (See Comments)     Blisters    Stadol [Butorphanol Tartrate] Other (See Comments)     Increased BP    Vicodin [Hydrocodone-Acetaminophen]      Elevates blood pressure    Lamisil [Terbinafine] Rash    Prilosec [Omeprazole] Rash       Problem List:    Patient Active Problem List   Diagnosis Code    Chest pain R07.9    Palpitations R00.2    GERD (gastroesophageal reflux disease) K21.9    Diverticulitis K57.92    MVP (mitral valve prolapse) I34.1    Fasciitis M72.9    Hypothyroidism E03.9    JOAQUIN (obstructive sleep apnea) G47.33    DM type 2 (diabetes mellitus, type 2) ([facility]) E11.9    B12 deficiency E53.8    Vitamin D deficiency E55.9    Hypertension I10    Hyperlipidemia E78.5    Vitamin B12 deficiency E53.8    Cerebral cavernoma Q28.3    Abnormal cardiovascular stress test R94.39    Postmenopausal atrophic vaginitis N95.2    Rectocele N81.6    Cystocele [id number]    Encounter for central line care Z45.2    Vaginal atrophy N95.2    History of endometrial cancer Z85.42    Hypomagnesemia E83.42    Anxiety F41.9    Endometrial cancer (HCC) C54.1    Cancer (HCC) C80.1    Elevated CA-125 R97.1    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    Hematuria R31.9    Hydronephrosis N13.30    Pelvic prolapse N81.9    Bilateral senile cataracts H25.9    Chronic lymphocytic thyroiditis E06.3    Diverticulitis of intestine K57.92    Goiter, nontoxic, multinodular E04.2    H/O total hysterectomy with removal of both tubes and ovaries Z90.710, Z90.79, Z90.722    Paronychia of great toe, left L03.032    Post-menopause Z78.0    Postsurgical hypothyroidism E89.0    Right upper quadrant abdominal pain R10.11    Status post chemotherapy Z92.21    Dizziness R42    Endometrial carcinoma (HCC) C54.1    Fibrohistiocytic proliferation of the skin L98.9    Moderate episode of recurrent major depressive disorder (HCC) F33.1    Greater trochanteric bursitis, left M70.62    It band syndrome, left M76.32    Lumbar paraspinal muscle spasm M62.830    Arthritis of left hip M16.12       Past Medical History:        Diagnosis Date    Allergic     Anemia     Arthritis of left hip 11/10/2020    Brain condition     pt states a cluster of blood vessels left side of brain    Cancer [facility]) 1/2013    endometrial cancer/ Dr. Israel Isaacs and Dr. Zachary Silva Cerebral cavernoma     followed by Dr. Jono [facility]dez at [facility]    Complication of anesthesia     Takes a long time to wake up    Diverticulosis     Endometrial cancer ([facility] [address]     Fungus infection     Fungus infection on bilat toes.  GERD (gastroesophageal reflux disease)     Hyperlipidemia     Hypertension     Hypomagnesemia 1/2016    consult with Dr. Maurizio Trevino ([facility] and no renal wasting.  felt most likely d/t HCTz and PPI,  HCTZ discontinued,  increased mag ox to 400 mg 2 tabs BID    Hypothyroidism     Irritable bowel syndrome     MVP (mitral valve prolapse)     JOAQUIN (obstructive sleep apnea)     Paronychia of great toe, left 7/11/2017    Pneumonia     infant    Prolonged emergence from general anesthesia     Type 2 diabetes mellitus without complication ([facility] [address]     Type II or unspecified type diabetes mellitus without mention of complication, not stated as uncontrolled     Unspecified sleep apnea     Vitamin B12 deficiency     Vitamin D deficiency        Past Surgical History:        Procedure Laterality Date    COLONOSCOPY      COLONOSCOPY  07/14/2016    COLONOSCOPY      CYSTOSCOPY  10/18/2017    Cystoscopy with Urethral dilation    CYSTOSCOPY Bilateral 10/18/2017    Cystoscopy with bilateral retrograde pyelograms    DIAGNOSTIC CARDIAC CATH LAB PROCEDURE  1/12/2005    One Dr Emeli Clemons, No CAD.  DILATION AND CURETTAGE OF UTERUS      ENDOSCOPY, COLON, DIAGNOSTIC      HYSTERECTOMY  1/28/2013    due to uterine cancer    OTHER SURGICAL HISTORY  10/18/2017    cystoscopy, bilateral retrogrades    OTHER SURGICAL HISTORY Left 03/02/2017    port removal    SALIVARY GLAND SURGERY      KINGSLEY AND BSO  1/28/13    THYROID SURGERY      right thyroidectomy for benign reasons    UPPER GASTROINTESTINAL ENDOSCOPY  07/14/2016    UPPER GASTROINTESTINAL ENDOSCOPY         Social History:    Social History     Tobacco Use    Smoking status: Never Smoker    Smokeless tobacco: Never Used   Substance Use Topics    Alcohol use: No     Alcohol/week: 0.0 standard drinks                                Counseling given: Not Answered      Vital Signs (Current): There were no vitals filed for this visit.                                            BP Readings from Last 3 Encounters:   05/05/21 138/72   01/13/21 (!) 187/84   11/06/20 130/60       NPO Status:                                                                                 BMI:   Wt Readings from Last 3 Encounters:   05/05/21 159 lb (72.1 kg)   01/13/21 160 lb (72.6 kg)   11/10/20 158 lb (71.7 kg)     There is no height or weight on file to calculate BMI.    CBC:   Lab Results   Component Value Date    WBC 6.9 05/11/2021    RBC 4.16 05/11/2021    RBC 4.09 06/07/2017    HGB 11.2 05/11/2021    HCT 34.6 05/11/2021    MCV 83.1 05/11/2021    RDW 15.6 05/11/2021     05/11/2021       CMP:   Lab Results   Component Value Date     05/11/2021    K 4.4 05/11/2021    K 4.6 03/02/2018     05/11/2021    CO2 27 05/11/2021    BUN 15 05/11/2021    CREATININE 0.8 05/11/2021    GFRAA >60 05/11/2021    GFRAA >60 03/18/2013    AGRATIO 2.2 05/11/2021    LABGLOM >60 05/11/2021    GLUCOSE 87 05/11/2021    GLUCOSE 96 06/01/2016    PROT 6.7 05/11/2021    PROT 7.1 06/01/2016    CALCIUM 9.4 05/11/2021    BILITOT <0.2 05/11/2021    ALKPHOS 76 05/11/2021    AST 15 05/11/2021    ALT 12 05/11/2021       POC Tests: No results for input(s): POCGLU, POCNA, POCK, POCCL, POCBUN, POCHEMO, POCHCT in the last 72 hours. Coags:   Lab Results   Component Value Date    PROTIME 11.3 10/24/2014    INR 1.04 10/24/2014    APTT 26.0 04/22/2014       HCG (If Applicable): No results found for: PREGTESTUR, PREGSERUM, HCG, HCGQUANT     ABGs: No results found for: PHART, PO2ART, HHW8GCE, KOZ3HDE, BEART, M8ZWHEIN     Type & Screen (If Applicable):  No results found for: LABABO, LABRH    Drug/Infectious Status (If Applicable):  No results found for: HIV, HEPCAB    COVID-19 Screening (If Applicable): No results found for: COVID19        Anesthesia Evaluation  Patient summary reviewed and Nursing notes reviewed no history of anesthetic complications:   Airway: Mallampati: II     Neck ROM: full   Dental:          Pulmonary:   (+) pneumonia:  sleep apnea:                             Cardiovascular:    (+) hypertension:,                   Neuro/Psych:   (+) psychiatric history:            GI/Hepatic/Renal:   (+) GERD:,           Endo/Other:    (+) Diabetes, hypothyroidism::., .                 Abdominal:             Vascular: Other Findings:             Anesthesia Plan      MAC     ASA 3     (Medications & allergies reviewed  All available lab & EKG data reviewed)  Induction: intravenous. Anesthetic plan and risks discussed with patient. Plan discussed with CRNA.                   Rivera Victor MD   7/9/2021

## 2021-07-09 NOTE — H&P
Pre-sedation Assessment    History and Physical / Pre-Sedation Assessment  Patient:  Erin Oreilly   :   1945     Intended Procedure: EGD w Bravo      HPI: 81yo F with h/o GERD intolerant of PPIs(hypomagnesemia) managed with Pepcid and Carafate presents for evaluation of regurgitation, pyrosis and early satiety. GES is April was unremarkable. Past Medical History:   Diagnosis Date    Allergic     Anemia     Arthritis of left hip 11/10/2020    Brain condition     pt states a cluster of blood vessels left side of brain    Cancer [facility]) 2013    endometrial cancer/ Dr. Israel Isaacs and Dr. Zachary Silva Cerebral cavernoma     followed by Dr. Jono Hernández at [place] [id number] Complication of anesthesia     Takes a long time to wake up    Diverticulosis     [facility]omet[facility] cancer ([facility] [address]     Fungus infection     Fungus infection on bilat toes.  GERD (gastroesophageal reflux disease)     Hyperlipidemia     Hypertension     Hypomagnesemia 2016    consult with Dr. Maurizio Trevino ([facility]) and no renal wasting. felt most likely d/t HCTz and PPI,  HCTZ discontinued,  increased mag ox to 400 mg 2 tabs BID    Hypothyroidism     Irritable bowel syndrome     MVP (mitral valve prolapse)     JOAQUIN (obstructive sleep apnea)     Paronychia of great toe, left 2017    Pneumonia     infant    Prolonged emergence from general anesthesia     Type 2 diabetes mellitus without complication ([facility] [address]     Type II or unspecified type diabetes mellitus without mention of complication, not stated as uncontrolled     Unspecified sleep apnea     Vitamin B12 deficiency     Vitamin D deficiency      No current facility-administered medications on file prior to encounter.      Current Outpatient Medications on File Prior to Encounter   Medication Sig Dispense Refill    losartan (COZAAR) 100 MG tablet TAKE 1 TABLET DAILY 90 tablet 0    amLODIPine (NORVASC) 5 MG tablet TAKE 1 TABLET DAILY 90 tablet 1    metFORMIN (GLUCOPHAGE-XR) 500 MG extended release tablet TAKE 1 TABLET TWICE A  tablet 1    sucralfate (CARAFATE) 1 GM tablet       metoprolol tartrate (LOPRESSOR) 25 MG tablet TAKE 1 TABLET TWICE A  tablet 0    rosuvastatin (CRESTOR) 10 MG tablet Take 1 tablet by mouth daily TAKE 1 TABLET BY MOUTH NIGHTLY 90 tablet 3    levothyroxine (SYNTHROID) 75 MCG tablet Take 1 tablet by mouth daily 90 tablet 1    clobetasol (TEMOVATE) 0.05 % external solution       Cholecalciferol (VITAMIN D3) 25 MCG (1000 UT) TABS Take 1 tablet by mouth daily      magnesium oxide (MAG-OX) 400 (241.3 Mg) MG TABS tablet Take 3 tablets by mouth 2 times daily For Hypomagnesemia (Patient taking differently: Take 800 mg by mouth 2 times daily For Hypomagnesemia) 180 tablet 5    Alum Hydroxide-Mag Carbonate (GAVISCON PO) Take by mouth      blood glucose test strips (CONTOUR NEXT TEST) strip TEST ONCE DAILY DX: 250.00 100 strip 11       Nurses notes reviewed and agreed. Medications reviewed  Allergies: Allergies   Allergen Reactions    Aspirin Other (See Comments)     Brain cavernoma - avoid all blood thinners    Lipitor Other (See Comments)     Muscle cramps    Other      Avoid blood thinners    Sodium Hydroxide Other (See Comments)     Blisters    Stadol [Butorphanol Tartrate] Other (See Comments)     Increased BP    Vicodin [Hydrocodone-Acetaminophen]      Elevates blood pressure    Lamisil [Terbinafine] Rash    Prilosec [Omeprazole] Rash           Physical Exam:  Vital Signs: BP (!) 179/71   Pulse 87   Temp 97.4 °F (36.3 °C) (Temporal)   Resp 14   Ht 5' 2\" (1.575 m)   Wt 155 lb (70.3 kg)   SpO2 98%   BMI 28.35 kg/m²  Body mass index is 28.35 kg/m².   Airway:Normal  Cardiac:Normal  Pulmonary:Normal  Abdomen:Normal  Specific to procedure: Mallampati 3      Pre-Procedure Assessment/Plan:  ASA 3 - Patient with moderate systemic disease with functional limitations    Level of Sedation Plan:Deep sedation    Post Procedure plan: Return to same level of care    I assessed the patient and find that the patient is in satisfactory condition to proceed with the planned procedure and sedation plan. I have explained the risk, benefits, and alternatives to the procedure. The patient understands and agrees to proceed.   Yes    Chadwick Strickland MD  1:21 PM 7/9/2021

## 2021-07-14 DIAGNOSIS — K21.9 GASTROESOPHAGEAL REFLUX DISEASE WITHOUT ESOPHAGITIS: ICD-10-CM

## 2021-07-14 DIAGNOSIS — E11.42 TYPE 2 DIABETES MELLITUS WITH DIABETIC POLYNEUROPATHY, WITHOUT LONG-TERM CURRENT USE OF INSULIN (HCC): ICD-10-CM

## 2021-07-15 RX ORDER — METFORMIN HYDROCHLORIDE 500 MG/1
TABLET, EXTENDED RELEASE ORAL
Qty: 180 TABLET | Refills: 3 | Status: SHIPPED | OUTPATIENT
Start: 2021-07-15 | End: 2022-07-14 | Stop reason: DRUGHIGH

## 2021-07-15 RX ORDER — FAMOTIDINE 40 MG/1
40 TABLET, FILM COATED ORAL 2 TIMES DAILY
Qty: 180 TABLET | Refills: 3 | Status: SHIPPED | OUTPATIENT
Start: 2021-07-15 | End: 2022-06-27

## 2021-07-15 RX ORDER — LEVOTHYROXINE SODIUM 0.07 MG/1
75 TABLET ORAL DAILY
Qty: 90 TABLET | Refills: 0 | Status: SHIPPED | OUTPATIENT
Start: 2021-07-15 | End: 2021-10-11

## 2021-07-15 NOTE — TELEPHONE ENCOUNTER
Chart indicates that patient's Pepcid has been discontinued.   Please call the patient and confirm if she is taking this medication or not

## 2021-08-09 ENCOUNTER — HOSPITAL ENCOUNTER (OUTPATIENT)
Dept: GENERAL RADIOLOGY | Age: 76
Discharge: HOME | End: 2021-08-09
Payer: MEDICARE

## 2021-08-09 DIAGNOSIS — K21.00 GASTROESOPHAGEAL REFLUX DISEASE WITH ESOPHAGITIS WITHOUT HEMORRHAGE: ICD-10-CM

## 2021-08-09 PROCEDURE — 74220 X-RAY XM ESOPHAGUS 1CNTRST: CPT

## 2021-08-24 ENCOUNTER — OFFICE VISIT (OUTPATIENT)
Dept: PULMONOLOGY | Age: 76
End: 2021-08-24
Payer: MEDICARE

## 2021-08-24 ENCOUNTER — TELEPHONE (OUTPATIENT)
Dept: PULMONOLOGY | Age: 76
End: 2021-08-24

## 2021-08-24 VITALS
SYSTOLIC BLOOD PRESSURE: 128 MMHG | HEIGHT: 62 IN | HEART RATE: 60 BPM | WEIGHT: 158 LBS | OXYGEN SATURATION: 97 % | BODY MASS INDEX: 29.08 KG/M2 | DIASTOLIC BLOOD PRESSURE: 62 MMHG | RESPIRATION RATE: 18 BRPM | TEMPERATURE: 96.7 F

## 2021-08-24 DIAGNOSIS — G47.33 OSA (OBSTRUCTIVE SLEEP APNEA): Primary | ICD-10-CM

## 2021-08-24 PROCEDURE — G8399 PT W/DXA RESULTS DOCUMENT: HCPCS | Performed by: INTERNAL MEDICINE

## 2021-08-24 PROCEDURE — 1090F PRES/ABSN URINE INCON ASSESS: CPT | Performed by: INTERNAL MEDICINE

## 2021-08-24 PROCEDURE — G8417 CALC BMI ABV UP PARAM F/U: HCPCS | Performed by: INTERNAL MEDICINE

## 2021-08-24 PROCEDURE — 3017F COLORECTAL CA SCREEN DOC REV: CPT | Performed by: INTERNAL MEDICINE

## 2021-08-24 PROCEDURE — 99213 OFFICE O/P EST LOW 20 MIN: CPT | Performed by: INTERNAL MEDICINE

## 2021-08-24 PROCEDURE — 4040F PNEUMOC VAC/ADMIN/RCVD: CPT | Performed by: INTERNAL MEDICINE

## 2021-08-24 PROCEDURE — 1123F ACP DISCUSS/DSCN MKR DOCD: CPT | Performed by: INTERNAL MEDICINE

## 2021-08-24 PROCEDURE — G8427 DOCREV CUR MEDS BY ELIG CLIN: HCPCS | Performed by: INTERNAL MEDICINE

## 2021-08-24 PROCEDURE — 1036F TOBACCO NON-USER: CPT | Performed by: INTERNAL MEDICINE

## 2021-08-24 ASSESSMENT — SLEEP AND FATIGUE QUESTIONNAIRES
HOW LIKELY ARE YOU TO NOD OFF OR FALL ASLEEP WHILE SITTING AND READING: 2
HOW LIKELY ARE YOU TO NOD OFF OR FALL ASLEEP WHILE SITTING INACTIVE IN A PUBLIC PLACE: 0
ESS TOTAL SCORE: 8
HOW LIKELY ARE YOU TO NOD OFF OR FALL ASLEEP WHEN YOU ARE A PASSENGER IN A CAR FOR AN HOUR WITHOUT A BREAK: 0
HOW LIKELY ARE YOU TO NOD OFF OR FALL ASLEEP IN A CAR, WHILE STOPPED FOR A FEW MINUTES IN TRAFFIC: 0
HOW LIKELY ARE YOU TO NOD OFF OR FALL ASLEEP WHILE SITTING QUIETLY AFTER LUNCH WITHOUT ALCOHOL: 0
NECK CIRCUMFERENCE (INCHES): 13
HOW LIKELY ARE YOU TO NOD OFF OR FALL ASLEEP WHILE WATCHING TV: 3
HOW LIKELY ARE YOU TO NOD OFF OR FALL ASLEEP WHILE SITTING AND TALKING TO SOMEONE: 0
HOW LIKELY ARE YOU TO NOD OFF OR FALL ASLEEP WHILE LYING DOWN TO REST IN THE AFTERNOON WHEN CIRCUMSTANCES PERMIT: 3

## 2021-08-24 NOTE — PROGRESS NOTES
Chief Complaint: Sleep Apnea     Referring Provider: Spike Puente NP     HPI: JOAQUIN treated with benefit with PAP 6-11 . PAP use was reviewed and is used 6:20 hours on average, compliance with greater than 4 hour nightly use is 28/30, residual AHI is 4.9     PRESENTING HISTORY  Several year history of loud snoring associated with witnessed apnea and excessive daytime sleepiness. Had PSG at [address] on 2/27/06 that showed moderate sleep apnea with AHI of 16. She has been wearing CPAP with nasal pillow mask majority of nights since with some improvement, no longer wakes up gasping for air. Now having worse daytime sleepiness and fatigue. She reports she has had her pressure adjusted down multiple times from 11 cm H20 now down to 7 cm H20. Compliance report reviewed by me and showed average use greater than 5 hours; greater than 4 hours on 27/30days. [place] sleepiness score:  8    Physical Exam:  Blood pressure 128/62, pulse 60, temperature 96.7 °F (35.9 °C), resp. rate 18, height 5' 2\" (1.575 m), weight 158 lb (71.7 kg), SpO2 97 %.'  Constitutional:  No acute distress. HENT:  Oropharynx is clear and moist. Mallampati Class IV   Neck: . No tracheal deviation present. No obvious masses. Neck Circumference: 13  Pulmonary/Chest:   No accessory muscle usage or stridor. Musculoskeletal:  No clubbing. Psychiatric: Normal mood and affect. Data:   PSG at [facility] 2/27/14: AHI 16    CPAP titration 9-30-14 with AHI of 5 on CPAP 10    Assessment:  · Moderate Obstructive Sleep Apnea    · Hypertension     Plan:   Change settings to APAP 7 to 11  Download in 30 days. Needs new machine, current machine is not turning on properly, power button will not work at times.   30-90 day f/u   Very clear recommendation for COVID vaccination

## 2021-08-26 ENCOUNTER — OFFICE VISIT (OUTPATIENT)
Dept: FAMILY MEDICINE CLINIC | Age: 76
End: 2021-08-26
Payer: MEDICARE

## 2021-08-26 VITALS
DIASTOLIC BLOOD PRESSURE: 72 MMHG | WEIGHT: 160 LBS | HEART RATE: 74 BPM | BODY MASS INDEX: 29.26 KG/M2 | SYSTOLIC BLOOD PRESSURE: 126 MMHG | OXYGEN SATURATION: 97 %

## 2021-08-26 DIAGNOSIS — F41.9 ANXIETY: ICD-10-CM

## 2021-08-26 DIAGNOSIS — E11.42 TYPE 2 DIABETES MELLITUS WITH DIABETIC POLYNEUROPATHY, WITHOUT LONG-TERM CURRENT USE OF INSULIN (HCC): Primary | ICD-10-CM

## 2021-08-26 PROCEDURE — G8427 DOCREV CUR MEDS BY ELIG CLIN: HCPCS | Performed by: NURSE PRACTITIONER

## 2021-08-26 PROCEDURE — 3044F HG A1C LEVEL LT 7.0%: CPT | Performed by: NURSE PRACTITIONER

## 2021-08-26 PROCEDURE — 3017F COLORECTAL CA SCREEN DOC REV: CPT | Performed by: NURSE PRACTITIONER

## 2021-08-26 PROCEDURE — 4040F PNEUMOC VAC/ADMIN/RCVD: CPT | Performed by: NURSE PRACTITIONER

## 2021-08-26 PROCEDURE — 1036F TOBACCO NON-USER: CPT | Performed by: NURSE PRACTITIONER

## 2021-08-26 PROCEDURE — 1090F PRES/ABSN URINE INCON ASSESS: CPT | Performed by: NURSE PRACTITIONER

## 2021-08-26 PROCEDURE — G8417 CALC BMI ABV UP PARAM F/U: HCPCS | Performed by: NURSE PRACTITIONER

## 2021-08-26 PROCEDURE — 2022F DILAT RTA XM EVC RTNOPTHY: CPT | Performed by: NURSE PRACTITIONER

## 2021-08-26 PROCEDURE — G8399 PT W/DXA RESULTS DOCUMENT: HCPCS | Performed by: NURSE PRACTITIONER

## 2021-08-26 PROCEDURE — 99214 OFFICE O/P EST MOD 30 MIN: CPT | Performed by: NURSE PRACTITIONER

## 2021-08-26 PROCEDURE — 1123F ACP DISCUSS/DSCN MKR DOCD: CPT | Performed by: NURSE PRACTITIONER

## 2021-08-26 RX ORDER — LORAZEPAM 0.5 MG/1
0.5 TABLET ORAL DAILY PRN
Qty: 15 TABLET | Refills: 0 | Status: SHIPPED | OUTPATIENT
Start: 2021-08-26 | End: 2021-09-25

## 2021-08-26 SDOH — ECONOMIC STABILITY: FOOD INSECURITY: WITHIN THE PAST 12 MONTHS, THE FOOD YOU BOUGHT JUST DIDN'T LAST AND YOU DIDN'T HAVE MONEY TO GET MORE.: NEVER TRUE

## 2021-08-26 SDOH — ECONOMIC STABILITY: FOOD INSECURITY: WITHIN THE PAST 12 MONTHS, YOU WORRIED THAT YOUR FOOD WOULD RUN OUT BEFORE YOU GOT MONEY TO BUY MORE.: NEVER TRUE

## 2021-08-26 ASSESSMENT — SOCIAL DETERMINANTS OF HEALTH (SDOH): HOW HARD IS IT FOR YOU TO PAY FOR THE VERY BASICS LIKE FOOD, HOUSING, MEDICAL CARE, AND HEATING?: NOT HARD AT ALL

## 2021-08-26 NOTE — PROGRESS NOTES
[address] [place] [address]  [address] [facility] [address]  Dept: [phone]  Dept Fax: [phone]  Loc: [phone]    Daniel Shin is a 70 y.o. female who presents today for her medical conditions/complaints as noted below. Daniel Shin is c/o of Other (3 month f/u anxiety on benzo)       Subjective:     Chief Complaint   Patient presents with    Diabetes    Hypertension    Hyperlipidemia    Hypothyroidism       HPI  She presents today for 3 month follow up on chronic benzodiazepine management. The patient is on chronic benzodiazepine therapy for the diagnosis of:  Anxiety:   The patient complains of racing thoughts, but denies chest pain, difficulty concentrating, dizziness, feelings of losing control, insomnia, irritable, palpitations, paresthesias, psychomotor agitation, shortness of breath and sweating. Current pharmacologic treatment: Ativan. Medication side effects:  none. The patient denies nausea, vomiting, diarrhea and constipation. Denies respiratory problems/depression. Denies increased sleepiness, drowsiness or confusion. The patient states that the medications and treatment have helped improve the quality of life and helped in psychosocial functioning. She is not participating in counselling/therapy. The patient states they are taking medications only as prescribed , using one pharmacy and getting all controlled substances from one provider unless okayed with me. The patient states they are following proper safeguarding and disposal of medications. No polyuria, polydipsia, blurry vision, chest pain, dyspnea or claudication. No foot burning, numbness or pain. Taking medication compliantly without noted sided effects. Follows diet fairly well. Home glucose monitoring in the range of below 120. Has seen diabetic educator. Last eye exam within the year was reportedly negative.  Last foot exam negative within the past year.      Past Medical History:   Diagnosis Date    Allergic     Anemia     Arthritis of left hip 11/10/2020    Brain condition     pt states a cluster of blood vessels left side of brain    Cancer [facility]) 1/2013    endometrial cancer/ Dr. CASTLE and Dr. Rose Marr Cerebral cavernoma     followed by Dr. Ariella Rojas at [place] [id number] Complication of anesthesia     Takes a long time to wake up    Diverticulosis     [facility]     Fungus infection     Fungus infection on bilat toes.  GERD (gastroesophageal reflux disease)     Hyperlipidemia     Hypertension     Hypomagnesemia 1/2016    consult with Dr. Nori Barbour ([facility]) and no renal wasting. felt most likely d/t HCTz and PPI,  HCTZ discontinued,  increased mag ox to 400 mg 2 tabs BID    Hypothyroidism     Irritable bowel syndrome     MVP (mitral valve prolapse)     JOAQUIN (obstructive sleep apnea)     Paronychia of great toe, left 7/11/2017    Pneumonia     infant    Prolonged emergence from general anesthesia     Type 2 diabetes mellitus without complication ([facility]     Type II or unspecified type diabetes mellitus without mention of complication, not stated as uncontrolled     Unspecified sleep apnea     Vitamin B12 deficiency     Vitamin D deficiency          Review of Systems   Constitutional: Negative. Negative for appetite change, fatigue and unexpected weight change. HENT: Negative. Eyes: Negative. Respiratory: Negative. Negative for shortness of breath. Cardiovascular: Negative. Negative for chest pain, palpitations and leg swelling. Gastrointestinal: Negative. Negative for abdominal pain, anal bleeding, blood in stool and nausea. Endocrine: Negative. Genitourinary: Negative. Negative for hematuria. Musculoskeletal: Negative. Skin: Negative. Negative for rash. Allergic/Immunologic: Negative. Neurological: Negative. Negative for dizziness, syncope, light-headedness and numbness. Hematological: Negative. Does not bruise/bleed easily. Psychiatric/Behavioral: The patient is nervous/anxious. All other systems reviewed and are negative. Past Medical History:   Diagnosis Date    Allergic     Anemia     Arthritis of left hip 11/10/2020    Brain condition     pt states a cluster of blood vessels left side of brain    Cancer [facility]) 1/2013    endometrial cancer/ Dr. Duncan Deluca and Dr. Mira Verdin Cerebral cavernoma     followed by Dr. Joan Arenas at [place] [id number] Complication of anesthesia     Takes a long time to wake up    Diverticulosis     [facility]ometrial cancer ([facility]     Fungus infection     Fungus infection on bilat toes.  GERD (gastroesophageal reflux disease)     Hyperlipidemia     Hypertension     Hypomagnesemia 1/2016    consult with Dr. Juan R Scales ([facility]) and no renal wasting. felt most likely d/t HCTz and PPI,  HCTZ discontinued,  increased mag ox to 400 mg 2 tabs BID    Hypothyroidism     Irritable bowel syndrome     MVP (mitral valve prolapse)     JOAQUIN (obstructive sleep apnea)     Paronychia of great toe, left 7/11/2017    Pneumonia     infant    Prolonged emergence from general anesthesia     Type 2 diabetes mellitus without complication ([facility]     Type II or unspecified type diabetes mellitus without mention of complication, not stated as uncontrolled     Unspecified sleep apnea     Vitamin B12 deficiency     Vitamin D deficiency      Family History   Problem Relation Age of Onset    Cancer Mother         colon    COPD Father     Heart Disease Father     Cancer Father         NMSC, skin     Past Surgical History:   Procedure Laterality Date    COLONOSCOPY      COLONOSCOPY  07/14/2016    COLONOSCOPY      CYSTOSCOPY  10/18/2017    Cystoscopy with Urethral dilation    CYSTOSCOPY Bilateral 10/18/2017    Cystoscopy with bilateral retrograde pyelograms    DIAGNOSTIC CARDIAC CATH LAB PROCEDURE  1/12/2005    [facility], Dr Ansley Randle, No CAD.     DILATION AND CURETTAGE OF UTERUS      ENDOSCOPY, COLON, DIAGNOSTIC      HYSTERECTOMY  1/28/2013    due to uterine cancer    OTHER SURGICAL HISTORY  10/18/2017    cystoscopy, bilateral retrogrades    OTHER SURGICAL HISTORY Left 03/02/2017    port removal    SALIVARY GLAND SURGERY      KINGSLEY AND BSO  1/28/13    THYROID SURGERY      right thyroidectomy for benign reasons    UPPER GASTROINTESTINAL ENDOSCOPY  07/14/2016    UPPER GASTROINTESTINAL ENDOSCOPY      UPPER GASTROINTESTINAL ENDOSCOPY N/A 7/9/2021    EGD WITH DIANE performed by Kylie Vela MD at [address]  7/9/2021    EGD BIOPSY performed by Kylie Vela MD at [facility] History     Socioeconomic History    Marital status:      Spouse name: Not on file    Number of children: Not on file    Years of education: Not on file    Highest education level: Not on file   Occupational History    Not on file   Tobacco Use    Smoking status: Never Smoker    Smokeless tobacco: Never Used   Vaping Use    Vaping Use: Never used   Substance and Sexual Activity    Alcohol use: No     Alcohol/week: 0.0 standard drinks    Drug use: No    Sexual activity: Never   Other Topics Concern    Not on file   Social History Narrative    Not on file     Social Determinants of Health     Financial Resource Strain: Low Risk     Difficulty of Paying Living Expenses: Not hard at all   Food Insecurity: No Food Insecurity    Worried About [facility] in the Last Year: Never true    Matilde of Food in the Last Year: Never true   Transportation Needs:     Lack of Transportation (Medical):      Lack of Transportation (Non-Medical):    Physical Activity:     Days of Exercise per Week:     Minutes of Exercise per Session:    Stress:     Feeling of Stress :    Social Connections:     Frequency of Communication with Friends and Family:     Frequency of Social Gatherings with Friends and Family:  Attends [employer] Services:     Active Member of Clubs or Organizations:     Attends Club or Organization Meetings:     Marital Status:    Intimate Partner Violence:     Fear of Current or Ex-Partner:     Emotionally Abused:     Physically Abused:     Sexually Abused:      Current Outpatient Medications   Medication Sig Dispense Refill    famotidine (PEPCID) 40 MG tablet Take 1 tablet by mouth 2 times daily 180 tablet 3    levothyroxine (SYNTHROID) 75 MCG tablet Take 1 tablet by mouth daily 90 tablet 0    metoprolol tartrate (LOPRESSOR) 25 MG tablet Take 1 tablet by mouth 2 times daily 180 tablet 3    metFORMIN (GLUCOPHAGE-XR) 500 MG extended release tablet TAKE 1 TABLET TWICE A  tablet 3    losartan (COZAAR) 100 MG tablet TAKE 1 TABLET DAILY 90 tablet 0    amLODIPine (NORVASC) 5 MG tablet TAKE 1 TABLET DAILY 90 tablet 1    sucralfate (CARAFATE) 1 GM tablet       blood glucose test strips (CONTOUR NEXT TEST) strip TEST ONCE DAILY DX: 250.00 100 strip 11    rosuvastatin (CRESTOR) 10 MG tablet Take 1 tablet by mouth daily TAKE 1 TABLET BY MOUTH NIGHTLY 90 tablet 3    clobetasol (TEMOVATE) 0.05 % external solution       Cholecalciferol (VITAMIN D3) 25 MCG (1000 UT) TABS Take 1 tablet by mouth daily      magnesium oxide (MAG-OX) 400 (241.3 Mg) MG TABS tablet Take 3 tablets by mouth 2 times daily For Hypomagnesemia (Patient taking differently: Take 800 mg by mouth 2 times daily For Hypomagnesemia) 180 tablet 5    Alum Hydroxide-Mag Carbonate (GAVISCON PO) Take by mouth (Patient not taking: Reported on 8/24/2021)       No current facility-administered medications for this visit. No changes in past medical history, past surgical history, social history, orfamily history were noted during the patient encounter unless specifically listed above.   All updates of past medical history, past surgical history, social history, or family history were reviewed personally by me duringthe office and dry. Findings: No lesion or rash. Neurological:      Mental Status: She is alert and oriented to person, place, and time. Gait: Gait normal.   Psychiatric:         Speech: Speech normal.         Behavior: Behavior normal.         Thought Content: Thought content normal.         Judgment: Judgment normal.         /72   Pulse 74   Wt 160 lb (72.6 kg)   SpO2 97%   BMI 29.26 kg/m²   Body mass index is 29.26 kg/m². BP Readings from Last 2 Encounters:   08/26/21 126/72   08/24/21 128/62       Wt Readings from Last 3 Encounters:   08/26/21 160 lb (72.6 kg)   08/24/21 158 lb (71.7 kg)   07/09/21 155 lb (70.3 kg)       Lab Review   Orders Only on 08/03/2021   Component Date Value    Visual Acuity Distance R* 08/03/2021 20/30     Visual Acuity Distance L* 08/03/2021 20/30     Intraocular Pressure Eye 08/03/2021                      Value:18  22      Diabetic Retinopathy 08/03/2021 NEGATIVE     Cataracts 08/03/2021 POSITIVE     Glaucoma 08/03/2021 NEGATIVE    Admission on 07/09/2021, Discharged on 07/09/2021   Component Date Value    SARS-CoV-2, NAAT 07/09/2021 Not Detected        No results found for this visit on 08/26/21. Assessment:       1. Type 2 diabetes mellitus with diabetic polyneuropathy, without long-term current use of insulin ([facility]    2. Anxiety        No results found for this visit on 08/26/21. Plan:       Coty Valero was seen today for other. Diagnoses and all orders for this visit:    Type 2 diabetes mellitus with diabetic polyneuropathy, without long-term current use of insulin ([facility])  -     MICROALBUMIN / CREATININE URINE RATIO  Condition appears stable with current medication regimen. Continue current medications. No reported or noted side effects of medication. Will continue to monitor at routine intervals as appropriate. Anxiety  Condition appears stable with current medication regimen. No reported or noted side effects of medication.  Will continue to Possible medication side effects, risk of tolerance/dependence & alternative treatments discussed. , Assessed functional status. (Alexa Lyons, APRN - CNP)    Patient has been instructed call the office immediately with new symptoms, change in symptoms or worseningof symptoms. If this is not feasible, patient is instructed to report to the emergency room. Medication profile reviewed. Medication side effects and possible impairments from medications were discussed as applicable. Allergies were reviewed. Health maintenance was reviewed and updated as appropriate. No follow-ups on file. (Comment: Please note this report has been produced using a combination of typing and speech recognition software and may contain errors related to that system including errors in grammar, punctuation, and spelling, as well as words and phrases that may be inappropriate.  If there are any questions or concerns please feel free to contact the dictating provider for clarification.)

## 2021-08-27 LAB
CREATININE URINE: 27.2 MG/DL (ref 28–259)
MICROALBUMIN UR-MCNC: <1.2 MG/DL
MICROALBUMIN/CREAT UR-RTO: ABNORMAL MG/G (ref 0–30)

## 2021-09-01 DIAGNOSIS — I10 ESSENTIAL HYPERTENSION: ICD-10-CM

## 2021-09-01 RX ORDER — LOSARTAN POTASSIUM 100 MG/1
TABLET ORAL
Qty: 90 TABLET | Refills: 0 | Status: SHIPPED | OUTPATIENT
Start: 2021-09-01 | End: 2021-10-11

## 2021-09-01 ASSESSMENT — ENCOUNTER SYMPTOMS
SHORTNESS OF BREATH: 0
ANAL BLEEDING: 0
RESPIRATORY NEGATIVE: 1
NAUSEA: 0
GASTROINTESTINAL NEGATIVE: 1
EYES NEGATIVE: 1
ABDOMINAL PAIN: 0
BLOOD IN STOOL: 0
ALLERGIC/IMMUNOLOGIC NEGATIVE: 1

## 2021-09-01 NOTE — TELEPHONE ENCOUNTER
Last visit was 8/26/21  Future Appointments   Date Time Provider Temo Alvarado   11/16/2021 10:30 AM Seamus Staton MD [facility]   12/2/2021  9:20 AM ALEKSANDAR Radford - CNP [place]  [place]   12/8/2021 10:15 [facility]

## 2021-10-11 DIAGNOSIS — I10 ESSENTIAL HYPERTENSION: ICD-10-CM

## 2021-10-11 RX ORDER — LEVOTHYROXINE SODIUM 0.07 MG/1
TABLET ORAL
Qty: 90 TABLET | Refills: 0 | Status: SHIPPED | OUTPATIENT
Start: 2021-10-11 | End: 2021-12-27

## 2021-10-11 RX ORDER — LOSARTAN POTASSIUM 100 MG/1
TABLET ORAL
Qty: 90 TABLET | Refills: 0 | Status: SHIPPED | OUTPATIENT
Start: 2021-10-11 | End: 2022-03-29

## 2021-10-11 NOTE — TELEPHONE ENCOUNTER
Last appointment 8/26/21  Next appointment 12/2/21  Pt only needs the 15 till she gets her mail order

## 2021-11-11 ENCOUNTER — TELEPHONE (OUTPATIENT)
Dept: PULMONOLOGY | Age: 76
End: 2021-11-11

## 2021-11-11 NOTE — TELEPHONE ENCOUNTER
[facility] until [facility]/21. Patient can receive new machine from [facility]. Patient would like to have orders refaxed 12/2/21. Assessment: 8/24/21  · Moderate Obstructive Sleep Apnea    · Hypertension      Plan:   · Change settings to APAP 7 to 11  Download in 30 days. Needs new machine, current machine is not turning on properly, power button will not work at times.   30-90 day f/u   · Very clear recommendation for COVID vaccination

## 2021-11-16 ENCOUNTER — OFFICE VISIT (OUTPATIENT)
Dept: CARDIOLOGY CLINIC | Age: 76
End: 2021-11-16
Payer: MEDICARE

## 2021-11-16 VITALS
DIASTOLIC BLOOD PRESSURE: 72 MMHG | HEART RATE: 64 BPM | OXYGEN SATURATION: 98 % | SYSTOLIC BLOOD PRESSURE: 136 MMHG | WEIGHT: 159 LBS | BODY MASS INDEX: 29.26 KG/M2 | HEIGHT: 62 IN

## 2021-11-16 DIAGNOSIS — E78.2 MIXED HYPERLIPIDEMIA: ICD-10-CM

## 2021-11-16 DIAGNOSIS — K21.9 GASTROESOPHAGEAL REFLUX DISEASE WITHOUT ESOPHAGITIS: ICD-10-CM

## 2021-11-16 DIAGNOSIS — R06.02 SOB (SHORTNESS OF BREATH): ICD-10-CM

## 2021-11-16 DIAGNOSIS — I10 BENIGN ESSENTIAL HYPERTENSION: Primary | ICD-10-CM

## 2021-11-16 DIAGNOSIS — R60.0 LOCALIZED EDEMA: ICD-10-CM

## 2021-11-16 PROCEDURE — 1090F PRES/ABSN URINE INCON ASSESS: CPT | Performed by: INTERNAL MEDICINE

## 2021-11-16 PROCEDURE — G8417 CALC BMI ABV UP PARAM F/U: HCPCS | Performed by: INTERNAL MEDICINE

## 2021-11-16 PROCEDURE — 3017F COLORECTAL CA SCREEN DOC REV: CPT | Performed by: INTERNAL MEDICINE

## 2021-11-16 PROCEDURE — 99213 OFFICE O/P EST LOW 20 MIN: CPT | Performed by: INTERNAL MEDICINE

## 2021-11-16 PROCEDURE — 4040F PNEUMOC VAC/ADMIN/RCVD: CPT | Performed by: INTERNAL MEDICINE

## 2021-11-16 PROCEDURE — G8484 FLU IMMUNIZE NO ADMIN: HCPCS | Performed by: INTERNAL MEDICINE

## 2021-11-16 PROCEDURE — 1036F TOBACCO NON-USER: CPT | Performed by: INTERNAL MEDICINE

## 2021-11-16 PROCEDURE — G8427 DOCREV CUR MEDS BY ELIG CLIN: HCPCS | Performed by: INTERNAL MEDICINE

## 2021-11-16 PROCEDURE — 1123F ACP DISCUSS/DSCN MKR DOCD: CPT | Performed by: INTERNAL MEDICINE

## 2021-11-16 PROCEDURE — G8399 PT W/DXA RESULTS DOCUMENT: HCPCS | Performed by: INTERNAL MEDICINE

## 2021-11-16 NOTE — PROGRESS NOTES
[address] [facility]   Cardiovascular Evaluation    PATIENT: Femi Estevez  DATE: 2021  MRN: <[id number]>  CSN: [id number]  : 1945    Primary Care Doctor: ALEKSANDAR Lama - CNP    Reason for evaluation:   1 Year Follow Up, Hyperlipidemia, Hypertension, Palpitations (on going), and Edema (feet and legs while on vacation for 2 weeks this happened a month ago)    Subjective:     History of present illness on initial date of evaluation:   Femi Estevez is a 78 y.o. patient who presents for follow up. Today she states she states she is dealing with aches and pains. She states she is following with gastroenterology for GERD, she is scheduled for a linx  procedure. Patient currently denies any weight gain, palpitations, chest pain, shortness of breath, dizziness, and syncope. She is taking all medications as prescribed, tolerating well. She returned from driving from [place] on vacation and developed swelling to both legs.      Patient Active Problem List   Diagnosis    Chest pain    Palpitations    GERD (gastroesophageal reflux disease)    Diverticulitis    MVP (mitral valve prolapse)    Fasciitis    Hypothyroidism    JOAQUIN (obstructive sleep apnea)    DM type 2 (diabetes mellitus, type 2) ([address]    B12 deficiency    Vitamin D deficiency    Hypertension    Hyperlipidemia    Vitamin B12 deficiency    Cerebral cavernoma    Abnormal cardiovascular stress test    Postmenopausal atrophic vaginitis    Rectocele    Cystocele    Encounter for central line care    Vaginal atrophy    History of endometrial cancer    Hypomagnesemia    Anxiety    Endometrial cancer ([address]    Cancer (HCC)    Elevated CA-125    Urethral syndrome    Hematuria    Hydronephrosis    Pelvic prolapse    Bilateral senile cataracts    Chronic lymphocytic thyroiditis    Diverticulitis of intestine    Goiter, nontoxic, multinodular    H/O total hysterectomy with removal of both tubes and ovaries    Paronychia of great toe, left    Post-menopause    Postsurgical hypothyroidism    Right upper quadrant abdominal pain    Status post chemotherapy    Dizziness    Endometrial carcinoma (HCC)    Fibrohistiocytic proliferation of the skin    Moderate episode of recurrent major depressive disorder (HCC)    Greater trochanteric bursitis, left    It band syndrome, left    Lumbar paraspinal muscle spasm    Arthritis of left hip    Benign essential hypertension    S/P partial thyroidectomy    Localized edema         Cardiac Testing: I have reviewed the findings below. EKG:  ECHO:   STRESS TEST:  CATH:  BYPASS:  VASCULAR:      Past Medical History:   has a past medical history of Allergic, Anemia, Arthritis of left hip, Brain condition, Cancer ([address] Cerebral cavernoma, Complication of anesthesia, Diverticulosis, Endometrial cancer ([address] Fungus infection, GERD (gastroesophageal reflux disease), Hyperlipidemia, Hypertension, Hypomagnesemia, Hypothyroidism, Irritable bowel syndrome, MVP (mitral valve prolapse), JOAQUIN (obstructive sleep apnea), Paronychia of great toe, left, Pneumonia, Prolonged emergence from general anesthesia, Type 2 diabetes mellitus without complication ([address] Type II or unspecified type diabetes mellitus without mention of complication, not stated as uncontrolled, Unspecified sleep apnea, Vitamin B12 deficiency, and Vitamin D deficiency. Surgical History:   has a past surgical history that includes Thyroid surgery; Dilation and curettage of uterus; Salivary gland surgery; Hysterectomy (1/28/2013); venus and bso (cervix removed) (1/28/13); Colonoscopy; Endoscopy, colon, diagnostic; Diagnostic Cardiac Cath Lab Procedure (1/12/2005); Upper gastrointestinal endoscopy (07/14/2016); Colonoscopy (07/14/2016); Colonoscopy; Upper gastrointestinal endoscopy; other surgical history (10/18/2017); Cystoscopy (10/18/2017);  Cystoscopy (Bilateral, 10/18/2017); other surgical history (Left, 03/02/2017); Upper gastrointestinal endoscopy (N/A, 7/9/2021); and Upper gastrointestinal endoscopy (7/9/2021). Social History:   reports that she has never smoked. She has never used smokeless tobacco. She reports that she does not drink alcohol and does not use drugs. Family History:  No evidence for sudden cardiac death or premature CAD    Home Medications:  Reviewed and are listed in nursing record. and/or listed below  Current Outpatient Medications   Medication Sig Dispense Refill    metoprolol tartrate (LOPRESSOR) 25 MG tablet Take 1 tablet by mouth 2 times daily 15 tablet 0    losartan (COZAAR) 100 MG tablet TAKE 1 TABLET DAILY 90 tablet 0    levothyroxine (SYNTHROID) 75 MCG tablet TAKE 1 TABLET DAILY 90 tablet 0    famotidine (PEPCID) 40 MG tablet Take 1 tablet by mouth 2 times daily 180 tablet 3    metFORMIN (GLUCOPHAGE-XR) 500 MG extended release tablet TAKE 1 TABLET TWICE A  tablet 3    Alum Hydroxide-Mag Carbonate (GAVISCON PO) Take by mouth       amLODIPine (NORVASC) 5 MG tablet TAKE 1 TABLET DAILY 90 tablet 1    sucralfate (CARAFATE) 1 GM tablet       blood glucose test strips (CONTOUR NEXT TEST) strip TEST ONCE DAILY DX: 250.00 100 strip 11    rosuvastatin (CRESTOR) 10 MG tablet Take 1 tablet by mouth daily TAKE 1 TABLET BY MOUTH NIGHTLY 90 tablet 3    clobetasol (TEMOVATE) 0.05 % external solution       Cholecalciferol (VITAMIN D3) 25 MCG (1000 UT) TABS Take 1 tablet by mouth daily      magnesium oxide (MAG-OX) 400 (241.3 Mg) MG TABS tablet Take 3 tablets by mouth 2 times daily For Hypomagnesemia (Patient taking differently: Take 800 mg by mouth 2 times daily For Hypomagnesemia) 180 tablet 5     No current facility-administered medications for this visit.         Allergies:  Aspirin, Lipitor, Other, Sodium hydroxide, Stadol [butorphanol tartrate], Vicodin [hydrocodone-acetaminophen], Lamisil [terbinafine], and Prilosec [omeprazole]     Review of Systems:   All 14 point review of symptoms completed. Pertinent positives identified in the HPI, all other review of symptoms negative as below.     Review of Systems - History obtained from the patient  General ROS: negative for - chills, fever or night sweats  Psychological ROS: negative for - disorientation or hallucinations  Ophthalmic ROS: negative for - dry eyes, eye pain or loss of vision  ENT ROS: negative for - nasal discharge or sore throat  Allergy and Immunology ROS: negative for - hives or itchy/watery eyes  Hematological and Lymphatic ROS: negative for - jaundice or night sweats  Endocrine ROS: negative for - mood swings or temperature intolerance  Breast ROS: deferred  Respiratory ROS: negative for - hemoptysis or stridor  Cardiovascular ROS: negative for - chest pain, dyspnea on exertion or palpitations  Gastrointestinal ROS: no abdominal pain, change in bowel habits, or black or bloody stools  Genito-Urinary ROS: no dysuria, trouble voiding, or hematuria  Musculoskeletal ROS: negative for - gait disturbance, joint pain or joint stiffness  Neurological ROS: negative for - seizures or speech problems  Dermatological ROS: negative for - rash or skin lesion changes     Physical Examination:    Vitals:    11/16/21 1016   BP: 136/72   Pulse: 64   SpO2: 98%    Weight: 159 lb (72.1 kg)     Wt Readings from Last 3 Encounters:   11/16/21 159 lb (72.1 kg)   08/26/21 160 lb (72.6 kg)   08/24/21 158 lb (71.7 kg)         General Appearance:  Alert, cooperative, no distress, appears stated age   Head:  Normocephalic, without obvious abnormality, atraumatic   Eyes:  PERRL, conjunctiva/corneas clear       Nose: Nares normal, no drainage or sinus tenderness   Throat: Lips, mucosa, and tongue normal   Neck: Supple, symmetrical, trachea midline, no adenopathy, thyroid: not enlarged, symmetric, no tenderness/mass/nodules, no carotid bruit or JVD       Lungs:   Clear to auscultation bilaterally, respirations unlabored   Chest Wall:  No tenderness or deformity   Heart:  Regular rhythm and normal rate; S1, S2 are normal; no murmur noted; no rub or gallop   Abdomen:   Soft, non-tender, bowel sounds active all four quadrants,  no masses, no organomegaly           Extremities: Extremities normal, atraumatic, no cyanosis or edema   Pulses: 2+ and symmetric   Skin: Skin color, texture, turgor normal, no rashes or lesions   Pysch: Normal mood and affect   Neurologic: Normal gross motor and sensory exam.         Labs  CBC:   Lab Results   Component Value Date    WBC 6.9 05/11/2021    RBC 4.16 05/11/2021    RBC 4.09 06/07/2017    HGB 11.2 05/11/2021    HCT 34.6 05/11/2021    MCV 83.1 05/11/2021    RDW 15.6 05/11/2021     05/11/2021     CMP:    Lab Results   Component Value Date     05/11/2021    K 4.4 05/11/2021    K 4.6 03/02/2018     05/11/2021    CO2 27 05/11/2021    BUN 15 05/11/2021    CREATININE 0.8 05/11/2021    GFRAA >60 05/11/2021    GFRAA >60 03/18/2013    AGRATIO 2.2 05/11/2021    LABGLOM >60 05/11/2021    GLUCOSE 87 05/11/2021    GLUCOSE 96 06/01/2016    PROT 6.7 05/11/2021    PROT 7.1 06/01/2016    CALCIUM 9.4 05/11/2021    BILITOT <0.2 05/11/2021    ALKPHOS 76 05/11/2021    AST 15 05/11/2021    ALT 12 05/11/2021     PT/INR:    No components found for: PTPATIENT,  PTINR  Lab Results   Component Value Date    CKTOTAL 45 08/28/2015     No components found for: CHLPL  Lab Results   Component Value Date    TRIG 152 (H) 05/11/2021    TRIG 174 (H) 06/03/2020    TRIG 214 (H) 04/15/2019     Lab Results   Component Value Date    HDL 66 (H) 05/11/2021    HDL 67 (H) 06/03/2020    HDL 68 (H) 04/15/2019     Lab Results   Component Value Date    LDLCALC 73 05/11/2021    LDLCALC 92 06/03/2020    LDLCALC 78 04/15/2019     Lab Results   Component Value Date    LABVLDL 30 05/11/2021    LABVLDL 35 06/03/2020    LABVLDL 43 04/15/2019     Lab Results   Component Value Date    ALT 12 05/11/2021    AST 15 05/11/2021    ALKPHOS 76 05/11/2021 BILITOT <0.2 05/11/2021       Imaging:  I have reviewed the below testing personally and my interpretation is below. EKG:  CXR:      Assessment:  78 y.o. patient with:  1. Palpitations   ~stable  2. Hyperlipidemia   ~ sub-optimal 7/2016  3. Hypertension    ~BP: (136)/(72)     ~stable  4. Chest pain    ~resolved   ~normal cardiac cath 2014    The 10-year ASCVD risk score (Jeralene Brunner., et al., 2013) is: 36.2%    Values used to calculate the score:      Age: 78 years      Sex: Female      Is Non- : No      Diabetic: Yes      Tobacco smoker: No      Systolic Blood Pressure: 495 mmHg      Is BP treated: Yes      HDL Cholesterol: 66 mg/dL      Total Cholesterol: 169 mg/dL     Plan:  1. I recommend that the patient continue their currently prescribed medications. Their drug modifiable risk factors appear to be well controlled. I will continue to address the need/dosing of medications in future visits. 2. Echocardiogram ~ evaluate heart structure   ~A test that records movement of your heart valves and chambers by ultrasound. Evaluates heart valves, any chamber enlargement, abnormal openings, or any fluid in the sac surrounding the heart. 3. Follow up in one year, we will call you with test results. Scribe's attestation: This note was scribed in the presence of Anai Webb by Elsa River RN       I, Dr. Faylene Riedel, personally performed the services described in this documentation, as scribed by the above signed scribe in my presence. It is both accurate and complete to my knowledge. I agree with the details independently gathered by the clinical support staff, while the remaining scribed note accurately describes my personal service to the patient. Medical Decision Making:   The following items were considered in medical decision making:  Independent review of images  Review / order clinical lab tests  Review / order radiology tests  Decision to obtain old records  Review and summation of old records as accessed through [employer] (a summary of my findings in these old records)      Time Based Itemization  A total of 30 minutes was spent on today's patient encounter. If applicable, non-patient-facing activities:  (X)Preparing to see the patient and reviewing records  (X) Individual interpretation of results  ( ) Discussion or coordination of care with other health care professionals  () Ordering of unique tests, medications, or procedures  (X) Documentation within the EHR             All questions and concerns were addressed to the patient/family. Alternatives to my treatment were discussed. The note was completed using EMR. Every effort was made to ensure accuracy; however, inadvertent computerized transcription errors may be present.     Zakiya Gasca MD, [address], [facility]  [phone] [place] office  [phone] [facility]  11/16/2021  11:01 AM

## 2021-11-16 NOTE — LETTER
Jud Wilson    1945        [address]   Cardiovascular Evaluation    PATIENT: Jud Wilson  DATE: 2021  MRN: <[id number]>  CSN: [id number]  : 1945    Primary Care Doctor: ALEKSANDAR So CNP    Reason for evaluation:   1 Year Follow Up, Hyperlipidemia, Hypertension, Palpitations (on going), and Edema (feet and legs while on vacation for 2 weeks this happened a month ago)    Subjective:     History of present illness on initial date of evaluation:   Jud Wilson is a 78 y.o. patient who presents for follow up. Today she states she states she is dealing with aches and pains. She states she is following with gastroenterology for GERD, she is scheduled for a linx  procedure. Patient currently denies any weight gain, palpitations, chest pain, shortness of breath, dizziness, and syncope. She is taking all medications as prescribed, tolerating well. She returned from driving from [place] on vacation and developed swelling to both legs.      Patient Active Problem List   Diagnosis    Chest pain    Palpitations    GERD (gastroesophageal reflux disease)    Diverticulitis    MVP (mitral valve prolapse)    Fasciitis    Hypothyroidism    JOAQUIN (obstructive sleep apnea)    DM type 2 (diabetes mellitus, type 2) ([address]    B12 deficiency    Vitamin D deficiency    Hypertension    Hyperlipidemia    Vitamin B12 deficiency    Cerebral cavernoma    Abnormal cardiovascular stress test    Postmenopausal atrophic vaginitis    Rectocele    Cystocele    Encounter for central line care    Vaginal atrophy    History of endometrial cancer    Hypomagnesemia    Anxiety    Endometrial cancer ([address]    Cancer (HCC)    Elevated CA-125    Urethral syndrome    Hematuria    Hydronephrosis    Pelvic prolapse    Bilateral senile cataracts    Chronic lymphocytic thyroiditis    Diverticulitis of intestine    Goiter, nontoxic, multinodular    H/O total hysterectomy with removal of both tubes and ovaries    Paronychia of great toe, left    Post-menopause    Postsurgical hypothyroidism    Right upper quadrant abdominal pain    Status post chemotherapy    Dizziness    Endometrial carcinoma (HCC)    Fibrohistiocytic proliferation of the skin    Moderate episode of recurrent major depressive disorder (HCC)    Greater trochanteric bursitis, left    It band syndrome, left    Lumbar paraspinal muscle spasm    Arthritis of left hip    Benign essential hypertension    S/P partial thyroidectomy    Localized edema         Cardiac Testing: I have reviewed the findings below. EKG:  ECHO:   STRESS TEST:  CATH:  BYPASS:  VASCULAR:      Past Medical History:   has a past medical history of Allergic, Anemia, Arthritis of left hip, Brain condition, Cancer ([address] Cerebral cavernoma, Complication of anesthesia, Diverticulosis, Endometrial cancer ([address] Fungus infection, GERD (gastroesophageal reflux disease), Hyperlipidemia, Hypertension, Hypomagnesemia, Hypothyroidism, Irritable bowel syndrome, MVP (mitral valve prolapse), JOAQUIN (obstructive sleep apnea), Paronychia of great toe, left, Pneumonia, Prolonged emergence from general anesthesia, Type 2 diabetes mellitus without complication ([address] Type II or unspecified type diabetes mellitus without mention of complication, not stated as uncontrolled, Unspecified sleep apnea, Vitamin B12 deficiency, and Vitamin D deficiency. Surgical History:   has a past surgical history that includes Thyroid surgery; Dilation and curettage of uterus; Salivary gland surgery; Hysterectomy (1/28/2013); venus and bso (cervix removed) (1/28/13); Colonoscopy; Endoscopy, colon, diagnostic; Diagnostic Cardiac Cath Lab Procedure (1/12/2005); Upper gastrointestinal endoscopy (07/14/2016); Colonoscopy (07/14/2016); Colonoscopy; Upper gastrointestinal endoscopy; other surgical history (10/18/2017); Cystoscopy (10/18/2017);  Cystoscopy (Bilateral, [omeprazole]     Review of Systems:   All 14 point review of symptoms completed. Pertinent positives identified in the HPI, all other review of symptoms negative as below.     Review of Systems - History obtained from the patient  General ROS: negative for - chills, fever or night sweats  Psychological ROS: negative for - disorientation or hallucinations  Ophthalmic ROS: negative for - dry eyes, eye pain or loss of vision  ENT ROS: negative for - nasal discharge or sore throat  Allergy and Immunology ROS: negative for - hives or itchy/watery eyes  Hematological and Lymphatic ROS: negative for - jaundice or night sweats  Endocrine ROS: negative for - mood swings or temperature intolerance  Breast ROS: deferred  Respiratory ROS: negative for - hemoptysis or stridor  Cardiovascular ROS: negative for - chest pain, dyspnea on exertion or palpitations  Gastrointestinal ROS: no abdominal pain, change in bowel habits, or black or bloody stools  Genito-Urinary ROS: no dysuria, trouble voiding, or hematuria  Musculoskeletal ROS: negative for - gait disturbance, joint pain or joint stiffness  Neurological ROS: negative for - seizures or speech problems  Dermatological ROS: negative for - rash or skin lesion changes     Physical Examination:    Vitals:    11/16/21 1016   BP: 136/72   Pulse: 64   SpO2: 98%    Weight: 159 lb (72.1 kg)     Wt Readings from Last 3 Encounters:   11/16/21 159 lb (72.1 kg)   08/26/21 160 lb (72.6 kg)   08/24/21 158 lb (71.7 kg)         General Appearance:  Alert, cooperative, no distress, appears stated age   Head:  Normocephalic, without obvious abnormality, atraumatic   Eyes:  PERRL, conjunctiva/corneas clear       Nose: Nares normal, no drainage or sinus tenderness   Throat: Lips, mucosa, and tongue normal   Neck: Supple, symmetrical, trachea midline, no adenopathy, thyroid: not enlarged, symmetric, no tenderness/mass/nodules, no carotid bruit or JVD       Lungs:   Clear to auscultation bilaterally, respirations unlabored   Chest Wall:  No tenderness or deformity   Heart:  Regular rhythm and normal rate; S1, S2 are normal; no murmur noted; no rub or gallop   Abdomen:   Soft, non-tender, bowel sounds active all four quadrants,  no masses, no organomegaly           Extremities: Extremities normal, atraumatic, no cyanosis or edema   Pulses: 2+ and symmetric   Skin: Skin color, texture, turgor normal, no rashes or lesions   Pysch: Normal mood and affect   Neurologic: Normal gross motor and sensory exam.         Labs  CBC:   Lab Results   Component Value Date    WBC 6.9 05/11/2021    RBC 4.16 05/11/2021    RBC 4.09 06/07/2017    HGB 11.2 05/11/2021    HCT 34.6 05/11/2021    MCV 83.1 05/11/2021    RDW 15.6 05/11/2021     05/11/2021     CMP:    Lab Results   Component Value Date     05/11/2021    K 4.4 05/11/2021    K 4.6 03/02/2018     05/11/2021    CO2 27 05/11/2021    BUN 15 05/11/2021    CREATININE 0.8 05/11/2021    GFRAA >60 05/11/2021    GFRAA >60 03/18/2013    AGRATIO 2.2 05/11/2021    LABGLOM >60 05/11/2021    GLUCOSE 87 05/11/2021    GLUCOSE 96 06/01/2016    PROT 6.7 05/11/2021    PROT 7.1 06/01/2016    CALCIUM 9.4 05/11/2021    BILITOT <0.2 05/11/2021    ALKPHOS 76 05/11/2021    AST 15 05/11/2021    ALT 12 05/11/2021     PT/INR:    No components found for: PTPATIENT,  PTINR  Lab Results   Component Value Date    CKTOTAL 45 08/28/2015     No components found for: CHLPL  Lab Results   Component Value Date    TRIG 152 (H) 05/11/2021    TRIG 174 (H) 06/03/2020    TRIG 214 (H) 04/15/2019     Lab Results   Component Value Date    HDL 66 (H) 05/11/2021    HDL 67 (H) 06/03/2020    HDL 68 (H) 04/15/2019     Lab Results   Component Value Date    LDLCALC 73 05/11/2021    LDLCALC 92 06/03/2020    LDLCALC 78 04/15/2019     Lab Results   Component Value Date    LABVLDL 30 05/11/2021    LABVLDL 35 06/03/2020    LABVLDL 43 04/15/2019     Lab Results   Component Value Date    ALT 12 05/11/2021    AST 15 05/11/2021    ALKPHOS 76 05/11/2021    BILITOT <0.2 05/11/2021       Imaging:  I have reviewed the below testing personally and my interpretation is below. EKG:  CXR:      Assessment:  78 y.o. patient with:  1. Palpitations   ~stable  2. Hyperlipidemia   ~ sub-optimal 7/2016  3. Hypertension    ~BP: (136)/(72)     ~stable  4. Chest pain    ~resolved   ~normal cardiac cath 2014    The 10-year ASCVD risk score (Odell Duenas et al., 2013) is: 36.2%    Values used to calculate the score:      Age: 78 years      Sex: Female      Is Non- : No      Diabetic: Yes      Tobacco smoker: No      Systolic Blood Pressure: 990 mmHg      Is BP treated: Yes      HDL Cholesterol: 66 mg/dL      Total Cholesterol: 169 mg/dL     Plan:  1. I recommend that the patient continue their currently prescribed medications. Their drug modifiable risk factors appear to be well controlled. I will continue to address the need/dosing of medications in future visits. 2. Echocardiogram ~ evaluate heart structure   ~A test that records movement of your heart valves and chambers by ultrasound. Evaluates heart valves, any chamber enlargement, abnormal openings, or any fluid in the sac surrounding the heart. 3. Follow up in one year, we will call you with test results. Scribe's attestation: This note was scribed in the presence of Diane Chung by Dasha Liu RN       I, Dr. Christina Hines, personally performed the services described in this documentation, as scribed by the above signed scribe in my presence. It is both accurate and complete to my knowledge. I agree with the details independently gathered by the clinical support staff, while the remaining scribed note accurately describes my personal service to the patient. Medical Decision Making:   The following items were considered in medical decision making:  Independent review of images  Review / order clinical lab tests  Review / order radiology tests  Decision to obtain old records  Review and summation of old records as accessed through [employer] (a summary of my findings in these old records)      Time Based Itemization  A total of 30 minutes was spent on today's patient encounter. If applicable, non-patient-facing activities:  (X)Preparing to see the patient and reviewing records  (X) Individual interpretation of results  ( ) Discussion or coordination of care with other health care professionals  () Ordering of unique tests, medications, or procedures  (X) Documentation within the EHR             All questions and concerns were addressed to the patient/family. Alternatives to my treatment were discussed. The note was completed using EMR. Every effort was made to ensure accuracy; however, inadvertent computerized transcription errors may be present.     Dion Goyal MD, [address], [place], [place]  [phone] formerly [facility] office  [phone] [facility]  11/16/2021  11:01 AM

## 2021-11-16 NOTE — PATIENT INSTRUCTIONS
Plan:  1. I recommend that the patient continue their currently prescribed medications. Their drug modifiable risk factors appear to be well controlled. I will continue to address the need/dosing of medications in future visits. 2. Echocardiogram ~ evaluate heart structure   ~A test that records movement of your heart valves and chambers by ultrasound. Evaluates heart valves, any chamber enlargement, abnormal openings, or any fluid in the sac surrounding the heart. 3. Follow up in one year, we will call you with test results.

## 2021-11-19 ENCOUNTER — HOSPITAL ENCOUNTER (OUTPATIENT)
Dept: MAMMOGRAPHY | Age: 76
Discharge: HOME | End: 2021-11-19
Payer: MEDICARE

## 2021-11-19 ENCOUNTER — TELEPHONE (OUTPATIENT)
Dept: PULMONOLOGY | Age: 76
End: 2021-11-19

## 2021-11-19 DIAGNOSIS — Z12.31 BREAST CANCER SCREENING BY MAMMOGRAM: ICD-10-CM

## 2021-11-19 PROCEDURE — 77063 BREAST TOMOSYNTHESIS BI: CPT

## 2021-11-20 ENCOUNTER — TELEPHONE (OUTPATIENT)
Dept: MAMMOGRAPHY | Age: 76
End: 2021-11-20

## 2021-11-22 ENCOUNTER — TELEPHONE (OUTPATIENT)
Dept: CARDIOLOGY CLINIC | Age: 76
End: 2021-11-22

## 2021-11-22 ENCOUNTER — HOSPITAL ENCOUNTER (OUTPATIENT)
Dept: NON INVASIVE DIAGNOSTICS | Age: 76
Discharge: HOME | End: 2021-11-22
Payer: MEDICARE

## 2021-11-22 DIAGNOSIS — R60.0 LOCALIZED EDEMA: ICD-10-CM

## 2021-11-22 DIAGNOSIS — R06.02 SOB (SHORTNESS OF BREATH): ICD-10-CM

## 2021-11-22 LAB
LV EF: 58 %
LVEF MODALITY: NORMAL

## 2021-11-22 PROCEDURE — 93306 TTE W/DOPPLER COMPLETE: CPT

## 2021-11-22 NOTE — TELEPHONE ENCOUNTER
Re: echo    Left message for patient to call office back. Relay VSP's message regarding echo results.

## 2021-12-02 ENCOUNTER — OFFICE VISIT (OUTPATIENT)
Dept: FAMILY MEDICINE CLINIC | Age: 76
End: 2021-12-02
Payer: MEDICARE

## 2021-12-02 VITALS
OXYGEN SATURATION: 98 % | WEIGHT: 158 LBS | HEIGHT: 62 IN | DIASTOLIC BLOOD PRESSURE: 70 MMHG | HEART RATE: 74 BPM | SYSTOLIC BLOOD PRESSURE: 136 MMHG | BODY MASS INDEX: 29.08 KG/M2

## 2021-12-02 DIAGNOSIS — E78.2 MIXED HYPERLIPIDEMIA: ICD-10-CM

## 2021-12-02 DIAGNOSIS — Z79.899 HIGH RISK MEDICATION USE: ICD-10-CM

## 2021-12-02 DIAGNOSIS — I10 ESSENTIAL HYPERTENSION: ICD-10-CM

## 2021-12-02 DIAGNOSIS — F41.9 ANXIETY: ICD-10-CM

## 2021-12-02 DIAGNOSIS — E11.42 TYPE 2 DIABETES MELLITUS WITH DIABETIC POLYNEUROPATHY, WITHOUT LONG-TERM CURRENT USE OF INSULIN (HCC): Primary | ICD-10-CM

## 2021-12-02 DIAGNOSIS — E83.42 HYPOMAGNESEMIA: ICD-10-CM

## 2021-12-02 LAB
A/G RATIO: 1.9 (ref 1.1–2.2)
ALBUMIN SERPL-MCNC: 4.8 G/DL (ref 3.4–5)
ALP BLD-CCNC: 86 U/L (ref 40–129)
ALT SERPL-CCNC: 12 U/L (ref 10–40)
ANION GAP SERPL CALCULATED.3IONS-SCNC: 16 MMOL/L (ref 3–16)
AST SERPL-CCNC: 14 U/L (ref 15–37)
BILIRUB SERPL-MCNC: 0.3 MG/DL (ref 0–1)
BUN BLDV-MCNC: 23 MG/DL (ref 7–20)
CALCIUM SERPL-MCNC: 9.7 MG/DL (ref 8.3–10.6)
CHLORIDE BLD-SCNC: 98 MMOL/L (ref 99–110)
CHOLESTEROL, TOTAL: 160 MG/DL (ref 0–199)
CO2: 25 MMOL/L (ref 21–32)
CREAT SERPL-MCNC: 0.9 MG/DL (ref 0.6–1.2)
GFR AFRICAN AMERICAN: >60
GFR NON-AFRICAN AMERICAN: >60
GLUCOSE BLD-MCNC: 106 MG/DL (ref 70–99)
HCT VFR BLD CALC: 35.6 % (ref 36–48)
HDLC SERPL-MCNC: 64 MG/DL (ref 40–60)
HEMOGLOBIN: 11.6 G/DL (ref 12–16)
LDL CHOLESTEROL CALCULATED: 58 MG/DL
MAGNESIUM: 1.9 MG/DL (ref 1.8–2.4)
MCH RBC QN AUTO: 27.1 PG (ref 26–34)
MCHC RBC AUTO-ENTMCNC: 32.7 G/DL (ref 31–36)
MCV RBC AUTO: 82.8 FL (ref 80–100)
PDW BLD-RTO: 15 % (ref 12.4–15.4)
PLATELET # BLD: 247 K/UL (ref 135–450)
PMV BLD AUTO: 9.8 FL (ref 5–10.5)
POTASSIUM SERPL-SCNC: 4.4 MMOL/L (ref 3.5–5.1)
RBC # BLD: 4.3 M/UL (ref 4–5.2)
SODIUM BLD-SCNC: 139 MMOL/L (ref 136–145)
TOTAL PROTEIN: 7.3 G/DL (ref 6.4–8.2)
TRIGL SERPL-MCNC: 190 MG/DL (ref 0–150)
VLDLC SERPL CALC-MCNC: 38 MG/DL
WBC # BLD: 7.8 K/UL (ref 4–11)

## 2021-12-02 PROCEDURE — 4040F PNEUMOC VAC/ADMIN/RCVD: CPT | Performed by: NURSE PRACTITIONER

## 2021-12-02 PROCEDURE — 99214 OFFICE O/P EST MOD 30 MIN: CPT | Performed by: NURSE PRACTITIONER

## 2021-12-02 PROCEDURE — G8417 CALC BMI ABV UP PARAM F/U: HCPCS | Performed by: NURSE PRACTITIONER

## 2021-12-02 PROCEDURE — 3017F COLORECTAL CA SCREEN DOC REV: CPT | Performed by: NURSE PRACTITIONER

## 2021-12-02 PROCEDURE — 36415 COLL VENOUS BLD VENIPUNCTURE: CPT | Performed by: NURSE PRACTITIONER

## 2021-12-02 PROCEDURE — G8427 DOCREV CUR MEDS BY ELIG CLIN: HCPCS | Performed by: NURSE PRACTITIONER

## 2021-12-02 PROCEDURE — G8484 FLU IMMUNIZE NO ADMIN: HCPCS | Performed by: NURSE PRACTITIONER

## 2021-12-02 PROCEDURE — 1090F PRES/ABSN URINE INCON ASSESS: CPT | Performed by: NURSE PRACTITIONER

## 2021-12-02 PROCEDURE — 1123F ACP DISCUSS/DSCN MKR DOCD: CPT | Performed by: NURSE PRACTITIONER

## 2021-12-02 PROCEDURE — 2022F DILAT RTA XM EVC RTNOPTHY: CPT | Performed by: NURSE PRACTITIONER

## 2021-12-02 PROCEDURE — 3044F HG A1C LEVEL LT 7.0%: CPT | Performed by: NURSE PRACTITIONER

## 2021-12-02 PROCEDURE — G8399 PT W/DXA RESULTS DOCUMENT: HCPCS | Performed by: NURSE PRACTITIONER

## 2021-12-02 PROCEDURE — 1036F TOBACCO NON-USER: CPT | Performed by: NURSE PRACTITIONER

## 2021-12-02 RX ORDER — LORAZEPAM 0.5 MG/1
0.5 TABLET ORAL NIGHTLY PRN
COMMUNITY
End: 2022-03-09 | Stop reason: SDUPTHER

## 2021-12-02 RX ORDER — ATENOLOL 25 MG/1
25 TABLET ORAL NIGHTLY
COMMUNITY
End: 2021-12-02 | Stop reason: ALTCHOICE

## 2021-12-02 RX ORDER — ROSUVASTATIN CALCIUM 10 MG/1
10 TABLET, COATED ORAL DAILY
Qty: 90 TABLET | Refills: 3 | Status: SHIPPED | OUTPATIENT
Start: 2021-12-02 | End: 2022-10-17 | Stop reason: SDUPTHER

## 2021-12-02 RX ORDER — AMLODIPINE BESYLATE 5 MG/1
TABLET ORAL
Qty: 90 TABLET | Refills: 1 | Status: SHIPPED | OUTPATIENT
Start: 2021-12-02 | End: 2022-05-24

## 2021-12-02 ASSESSMENT — ENCOUNTER SYMPTOMS
GASTROINTESTINAL NEGATIVE: 1
SHORTNESS OF BREATH: 1
ANAL BLEEDING: 0
CHEST TIGHTNESS: 1
NAUSEA: 0
EYES NEGATIVE: 1
ALLERGIC/IMMUNOLOGIC NEGATIVE: 1
BLOOD IN STOOL: 0
ABDOMINAL PAIN: 0

## 2021-12-02 NOTE — TELEPHONE ENCOUNTER
Patient called with message left for patient to call back to office.        Orders faxed to [facility]

## 2021-12-02 NOTE — PROGRESS NOTES
[address] [facility]  [address] [facility] [address]  Dept: [phone]  Dept Fax: [phone]  Loc: [phone]    Alley Stovall is a 78 y.o. female who presents today for her medical conditions/complaints as noted below. Alley Stovall is c/o of Hypothyroidism, Hyperlipidemia (Patient is fasting this morning ), and Hypertension (Patient does not monitor BP reguarly. Had some chest pains about a week ago and it woke her up out of her sleep, no edema, some SOB walking long distance )       Subjective:     Chief Complaint   Patient presents with    Hypothyroidism    Hyperlipidemia     Patient is fasting this morning     Hypertension     Patient does not monitor BP reguarly. Had some chest pains about a week ago and it woke her up out of her sleep, no edema, some SOB walking long distance        HPI    Diabetes Mellitus Type 2:  Patient is here for follow up on diabetes. Treatment Adherence:   Medication compliance:  Taking medication as prescribed. Diet compliance:  Trying to eat a healthy, diabetic diet. Weight trend: Wt Readings from Last 3 Encounters:   12/02/21 158 lb (71.7 kg)   11/16/21 159 lb (72.1 kg)   08/26/21 160 lb (72.6 kg)     Denies any hypoglycemia episodes. Current symptoms/problems include:  Neuropathy is  present  Denies any open areas on feet/ulcerations  Denies any polyuria, polydipsia, or polyphagia, nausea, vomiting or diarrhea. Home blood sugar records: fasting range: 102-111  Eye exam current (withinone year): yes  Denies blurry vision   reports that she has never smoked. She has never used smokeless tobacco.  DailyAspirin? No:   Statin Therapy? Yes  ACEI/ARB Therapy? Yes    Hypertension:  Home blood pressure monitoring: No.  Patient   is adherent to a low sodium diet. Patient denies headache, lightheadedness, blurred vision, palpitations, dry cough and fatigue.    Patient complains of chest pain, shortness of breath and peripheral edema. Peripheral edema  BP Readings from Last 3 Encounters:   12/02/21 136/70   11/16/21 136/72   08/26/21 126/72       Hyperlipidemia:  No new myalgias or GI upset on rosuvastatin (Crestor). The 10-year ASCVD risk score (Aundra Denver., et al., 2013) is: 36.2%    Values used to calculate the score:      Age: 78 years      Sex: Female      Is Non- : No      Diabetic: Yes      Tobacco smoker: No      Systolic Blood Pressure: 064 mmHg      Is BP treated: Yes      HDL Cholesterol: 66 mg/dL      Total Cholesterol: 169 mg/dL    Lab Results   Component Value Date    LABA1C 6.2 05/11/2021    LABA1C 6.3 06/03/2020    LABA1C 5.0 01/15/2020     Lab Results   Component Value Date    LABMICR <1.20 08/26/2021    LDLCALC 73 05/11/2021    CREATININE 0.8 05/11/2021     Hypothyroidism: Recent symptoms: edema. She denies fatigue, weight gain, weight loss, cold intolerance, heat intolerance, hair loss, dry skin, constipation, diarrhea and dysphagia. Patient is  taking her medication consistently on an empty stomach. Lab Results   Component Value Date    TSHREFLEX 2.17 01/13/2021    TSHREFLEX 4.80 06/03/2020    TSHREFLEX 0.45 02/24/2020     Lab Results   Component Value Date    TSH 4.44 (H) 10/06/2020    TSH 4.84 (H) 01/09/2020    TSH 1.41 04/15/2019     She presents today for 3 month follow up on chronic benzodiazepine management. The patient is on chronic benzodiazepine therapy for the diagnosis of:  Anxiety:   The patient complains of racing thoughts, but denies any other symptoms. Current pharmacologic treatment: Ativan PRN. Medication side effects:  none. The patient denies nausea, vomiting, diarrhea and constipation. Denies respiratory problems/depression. Denies increased sleepiness, drowsiness or confusion. The patient states that the medications and treatment have helped improve the quality of life and helped in psychosocial functioning.    She is not participating in counselling/therapy. The patient states they are taking medications only as prescribed , using one pharmacy and getting all controlled substances from one provider unless okayed with me. The patient states they are following proper safeguarding and disposal of medications. Vitamin D Deficiency  Patient with vitamin d deficiencyand currently on supplement without adverse reactions. Lab Results   Component Value Date    VITD25 56.3 05/11/2021         Review of Systems   Constitutional: Negative. Negative for appetite change, fatigue and unexpected weight change. HENT: Negative. Eyes: Negative. Respiratory: Positive for chest tightness (Evaluated by cardiology and thought to possibly be related to patient's GERD) and shortness of breath (But was just evaluated by cardiology). Cardiovascular: Positive for palpitations and leg swelling (Only when she was on vacation but this has since resolved). Negative for chest pain. Gastrointestinal: Negative. Negative for abdominal pain, anal bleeding, blood in stool and nausea. Endocrine: Negative. Genitourinary: Negative. Negative for hematuria. Musculoskeletal: Negative. Skin: Negative. Negative for rash. Allergic/Immunologic: Negative. Neurological: Positive for tremors (Chronic and stable). Negative for dizziness, syncope, light-headedness and numbness. Hematological: Negative. Does not bruise/bleed easily. Psychiatric/Behavioral: Negative. All other systems reviewed and are negative.       Past Medical History:   Diagnosis Date    Allergic     Anemia     Arthritis of left hip 11/10/2020    Brain condition     pt states a cluster of blood vessels left side of brain    Cancer [facility]) 1/2013    endometrial cancer/ Dr. Emanuel Reed and Dr. Tera Santana Cerebral cavernoma     followed by Dr. Venora Hatchet at [place] [address] Complication of anesthesia     Takes a long time to wake up    Diverticulosis     Endometrial cancer ([facility] [address]     Fungus infection     Fungus infection on bilat toes.  GERD (gastroesophageal reflux disease)     Hyperlipidemia     Hypertension     Hypomagnesemia 1/2016    consult with Dr. Prabha Gaitan ([facility]) and no renal wasting. felt most likely d/t HCTz and PPI,  HCTZ discontinued,  increased mag ox to 400 mg 2 tabs BID    Hypothyroidism     Irritable bowel syndrome     MVP (mitral valve prolapse)     JOAQUIN (obstructive sleep apnea)     Paronychia of great toe, left 7/11/2017    Pneumonia     infant    Prolonged emergence from general anesthesia     Type 2 diabetes mellitus without complication ([facility] [address]     Type II or unspecified type diabetes mellitus without mention of complication, not stated as uncontrolled     Unspecified sleep apnea     Vitamin B12 deficiency     Vitamin D deficiency      Family History   Problem Relation Age of Onset    Cancer Mother         colon    COPD Father     Heart Disease Father     Cancer Father         NMSC, skin     Past Surgical History:   Procedure Laterality Date    COLONOSCOPY      COLONOSCOPY  07/14/2016    COLONOSCOPY      CYSTOSCOPY  10/18/2017    Cystoscopy with Urethral dilation    CYSTOSCOPY Bilateral 10/18/2017    Cystoscopy with bilateral retrograde pyelograms    DIAGNOSTIC CARDIAC CATH LAB PROCEDURE  1/12/2005    [facility], Dr Anat Gandhi, No CAD.     DILATION AND CURETTAGE OF UTERUS      ENDOSCOPY, COLON, DIAGNOSTIC      HYSTERECTOMY  1/28/2013    due to uterine cancer    OTHER SURGICAL HISTORY  10/18/2017    cystoscopy, bilateral retrogrades    OTHER SURGICAL HISTORY Left 03/02/2017    port removal    SALIVARY GLAND SURGERY      KINGSLEY AND BSO  1/28/13    THYROID SURGERY      right thyroidectomy for benign reasons    UPPER GASTROINTESTINAL ENDOSCOPY  07/14/2016    UPPER GASTROINTESTINAL ENDOSCOPY      UPPER GASTROINTESTINAL ENDOSCOPY N/A 7/9/2021    EGD WITH DIANE performed by Triston Dillard MD at [address] [place] [address] 7/9/2021    [facility] [address] [facility] History     Socioeconomic History    Marital status:      Spouse name: Not on file    Number of children: Not on file    Years of education: Not on file    Highest education level: Not on file   Occupational History    Not on file   Tobacco Use    Smoking status: Never Smoker    Smokeless tobacco: Never Used   Vaping Use    Vaping Use: Never used   Substance and Sexual Activity    Alcohol use: No     Alcohol/week: 0.0 standard drinks    Drug use: No    Sexual activity: Never   Other Topics Concern    Not on file   Social History Narrative    Not on file     Social Determinants of Health     Financial Resource Strain: Low Risk     Difficulty of Paying Living Expenses: Not hard at all   Food Insecurity: No Food Insecurity    Worried About [address] in the Last Year: Never true    [address] in the Last Year: Never true   Transportation Needs:     Lack of Transportation (Medical): Not on file    Lack of Transportation (Non-Medical):  Not on file   Physical Activity:     Days of Exercise per Week: Not on file    Minutes of Exercise per Session: Not on file   Stress:     Feeling of Stress : Not on file   Social Connections:     Frequency of Communication with Friends and Family: Not on file    Frequency of Social Gatherings with Friends and Family: Not on file    Attends Christianity Services: Not on file    Active Member of Clubs or Organizations: Not on file    Attends Club or Organization Meetings: Not on file    Marital Status: Not on file   Intimate Partner Violence:     Fear of Current or Ex-Partner: Not on file    Emotionally Abused: Not on file    Physically Abused: Not on file    Sexually Abused: Not on file   Housing Stability:     Unable to Pay for Housing in the Last Year: Not on file    Number of [place] in the Last Year: Not on file    Unstable Housing in the Last Year: Not on file     Current Outpatient Medications   Medication Sig Dispense Refill    atenolol (TENORMIN) 25 MG tablet Take 25 mg by mouth nightly      metoprolol tartrate (LOPRESSOR) 25 MG tablet Take 1 tablet by mouth 2 times daily 15 tablet 0    losartan (COZAAR) 100 MG tablet TAKE 1 TABLET DAILY 90 tablet 0    levothyroxine (SYNTHROID) 75 MCG tablet TAKE 1 TABLET DAILY 90 tablet 0    famotidine (PEPCID) 40 MG tablet Take 1 tablet by mouth 2 times daily 180 tablet 3    metFORMIN (GLUCOPHAGE-XR) 500 MG extended release tablet TAKE 1 TABLET TWICE A  tablet 3    Alum Hydroxide-Mag Carbonate (GAVISCON PO) Take by mouth       amLODIPine (NORVASC) 5 MG tablet TAKE 1 TABLET DAILY 90 tablet 1    sucralfate (CARAFATE) 1 GM tablet       blood glucose test strips (CONTOUR NEXT TEST) strip TEST ONCE DAILY DX: 250.00 100 strip 11    rosuvastatin (CRESTOR) 10 MG tablet Take 1 tablet by mouth daily TAKE 1 TABLET BY MOUTH NIGHTLY 90 tablet 3    clobetasol (TEMOVATE) 0.05 % external solution       Cholecalciferol (VITAMIN D3) 25 MCG (1000 UT) TABS Take 1 tablet by mouth daily (Patient taking differently: Take by mouth daily Taking 2,000 units daily)      magnesium oxide (MAG-OX) 400 (241.3 Mg) MG TABS tablet Take 3 tablets by mouth 2 times daily For Hypomagnesemia (Patient taking differently: Take 800 mg by mouth 2 times daily For Hypomagnesemia) 180 tablet 5     No current facility-administered medications for this visit. No changes in past medicalhistory, past surgical history, social history, or family history were noted during the patient encounter unless specifically listed above. All updates of past medical history, past surgical history, social history, orfamily history were reviewed personally by me during the office visit. All problems listed in the assessment are stable unless noted otherwise. Medication profile reviewed personally by me during the office visit.  Medication side effects and possible impairments from medications were discussed as applicable. Objective:       Physical Exam  Vitals and nursing note reviewed. Constitutional:       General: She is not in acute distress. Appearance: Normal appearance. She is well-developed. HENT:      Head: Normocephalic and atraumatic. Right Ear: Tympanic membrane, ear canal and external ear normal.      Left Ear: Tympanic membrane, ear canal and external ear normal.      Nose: Nose normal.      Mouth/Throat:      Pharynx: Uvula midline. No oropharyngeal exudate. Eyes:      General: Lids are normal.      Conjunctiva/sclera: Conjunctivae normal.      Pupils: Pupils are equal, round, and reactive to light. Neck:      Thyroid: No thyromegaly. Vascular: No carotid bruit or JVD. Cardiovascular:      Rate and Rhythm: Normal rate and regular rhythm. Pulses: Normal pulses. Radial pulses are 2+ on the right side and 2+ on the left side. Dorsalis pedis pulses are 2+ on the right side and 2+ on the left side. Posterior tibial pulses are 2+ on the right side and 2+ on the left side. Heart sounds: Normal heart sounds. No murmur heard. No friction rub. No gallop. Pulmonary:      Effort: Pulmonary effort is normal.      Breath sounds: Normal breath sounds. Abdominal:      General: Bowel sounds are normal.      Palpations: Abdomen is soft. There is no mass. Tenderness: There is no abdominal tenderness. Musculoskeletal:         General: Normal range of motion. Cervical back: Normal range of motion and neck supple. Feet:      Right foot:      Protective Sensation: 10 sites tested. 10 sites sensed. Skin integrity: Callus present. Toenail Condition: Right toenails are normal.      Left foot:      Protective Sensation: 10 sites tested. 10 sites sensed. Skin integrity: Callus present.       Toenail Condition: Left toenails are normal.   Lymphadenopathy:      Head: Right side of head: No submandibular adenopathy. Left side of head: No submandibular adenopathy. Cervical: No cervical adenopathy. Skin:     General: Skin is warm and dry. Findings: No lesion or rash. Neurological:      Mental Status: She is alert and oriented to person, place, and time. Motor: Tremor present. Gait: Gait normal.   Psychiatric:         Speech: Speech normal.         Behavior: Behavior normal.         Thought Content: Thought content normal.         Judgment: Judgment normal.         /70 (Site: Left Upper Arm, Position: Sitting, Cuff Size: Medium Adult)   Ht 5' 2\" (1.575 m)   Wt 158 lb (71.7 kg)   BMI 28.90 kg/m²   Body mass index is 28.9 kg/m². Lab Review   No visits with results within 2 Month(s) from this visit. Latest known visit with results is:   Office Visit on 08/26/2021   Component Date Value    Microalbumin, Random Uri* 08/26/2021 <1.20     Creatinine, Ur 08/26/2021 27.2*    Microalbumin Creatinine * 08/26/2021 see below               Assessment:       1. Type 2 diabetes mellitus with diabetic polyneuropathy, without long-term current use of insulin ([facility] [address]    2. Hypertension, unspecified type    3. Mixed hyperlipidemia    4. Hypomagnesemia    5. Anxiety    6. High risk medication use        No results found for this visit on 12/02/21. Plan:       Lani Mitchell was seen today for hypothyroidism, hyperlipidemia and hypertension. Diagnoses and all orders for this visit:    Type 2 diabetes mellitus with diabetic polyneuropathy, without long-term current use of insulin ([facility])  -     Hemoglobin A1C  -     Lipid Panel  -      DIABETES FOOT EXAM  Diabetes Mellitus type II, under good control   1. Rx changes: Wait on A1c  2.  Education: Reviewed ABCs of diabetes management (respective goals in parentheses):  A1C (<7), blood pressure (<130/80), and cholesterol (LDL <100). 3.  Compliance at present is estimated to be good.  Efforts to improve compliance (if necessary) will be directed at dietary modifications, increased exercise and regular blood sugar monitoring. Diabetes education provided today:  Metabolic syndrome: association of diabetes with dyslipidemia, HTN and obesity. Diabetic Neuropathy: signs and therapy. Foot care: advised to wash feet daily, pat dry and apply lotion at night, avoiding between toes. Need to look at feet daily and report to a physician any signs of inflammation or skin damage. Discussed diabetes shoes and socks. Diabetic retinopathy: the most frequent cause of blindness in [place] currently. Measures to prevent that. Diabetic nephropathy: Kidney function, microalbumin as a sign of diabetic nephropathy. Stages of kidney disease. Nutrition as a mainstream of diabetes therapy. Carbs: good carbs and bad carbs, importance of carb counting, incorporation of protein with each meal to reduce Glycemic index, importance of portions,   Fats: Good fats and bad fats, meal planning and supplements. Physical activity: advised to exercise 5-7 days a week 30-60 mins at least. Discussed how it affects BS readings. Her order for diabetic shoes was completed at today's visit--able to sign due to primary care first office    Essential hypertension  -     Comprehensive Metabolic Panel  -     CBC  Condition appears stable with current medication regimen. No reported or noted side effects of medication. Will continue to monitor at routine intervals as appropriate. Continue current medications as follows:  -     amLODIPine (NORVASC) 5 MG tablet; TAKE 1 TABLET DAILY    Mixed hyperlipidemia  -     Lipid Panel  -     Comprehensive Metabolic Panel  Condition appears stable with current medication regimen. No reported or noted side effects of medication. Will continue to monitor at routine intervals as appropriate. Continue current medications as follows:  -     rosuvastatin (CRESTOR) 10 MG tablet;  Take 1 tablet by mouth daily TAKE 1 TABLET BY MOUTH NIGHTLY  The patient is asked to make an attempt to improve diet and exercise patterns to aid in medical management of this problem. Hypomagnesemia  -     MAGNESIUM    Anxiety  -     Drug Panel-PM-HI Res-UR Interp-A  Chronic benzodiazepine treatment protocol was discussed with the patient again including taking medications only as prescribed , using one pharmacy and getting all controlled substances from one physician unless okayed with me. Proper safeguarding and disposal of medications were also discussed with the patient.     The current treatment regimen is needed to decrease the patient's anxiety symptoms, improve the quality of life and ability to function and improve sleep and mood symptoms.        Patient given following instructions - You are on medications which could impair your senses, you are at risk of weakness, falls, dizziness, or drowsiness. You should be careful during activities which could place you at risk of harm, such as climbing, using stairs, operating machinery, or driving vehicles. If you feel you cannot safely do these activities, you should request others to help you, or avoid the activities altogether. If you are drowsy for any other reason, you should use the same precautions as listed above.     The patient is made aware of the potential of drowsiness with the prescribed medications, and that care should be exercised in operating machinery, vehicles, or placing oneself in situations of risk to their health, ie heights and climbing and stairs.     The patient denies nausea, vomiting, diarrhea and constipation. No respiratory problems. No increased sleepiness, drowsiness or confusion. The patient states that the medications and treatment have helped improve the quality of life and helped in psychosocial functioning. There are no indicators for possible drug abuse, addiction or diversion problems.     Periodic Controlled Substance Monitoring: Possible medication side effects, risk of tolerance/dependence & alternative treatments discussed., No signs of potential drug abuse or diversion identified. , Assessed functional status. , Random urine drug screen sent today. (Kaden Lyon, APRN - CNP)     High risk medication use  -     Drug Panel-PM-HI Res-UR Interp-A    Patient has been instructed call the office immediately with new symptoms, change in symptoms or worseningof symptoms. If this is not feasible, patient is instructed to report to the emergency room. Medication profile reviewed. Medication side effects and possible impairments from medications were discussed as applicable. Allergies were reviewed. Health maintenance was reviewed and updated as appropriate. Return in about 3 months (around 3/2/2022) for Controlled mediction management. (Comment: Please note this report has been produced using a combination of typing and speech recognition software and may contain errors related to that system including errors in grammar, punctuation, and spelling, as well as words and phrases that may be inappropriate.  If there are any questions or concerns please feel free to contact the dictating provider for clarification.)

## 2021-12-03 LAB
ESTIMATED AVERAGE GLUCOSE: 137 MG/DL
HBA1C MFR BLD: 6.4 %

## 2021-12-06 NOTE — TELEPHONE ENCOUNTER
Patient notified to contact [facility] for Cpap set up. Patient verbalized understanding. Will f/u to make sure patient is scheduled.   Patient will need 31-90 fu.

## 2021-12-07 LAB
6-ACETYLMORPHINE: NOT DETECTED
7-AMINOCLONAZEPAM: NOT DETECTED
ALPHA-OH-ALPRAZOLAM: NOT DETECTED
ALPHA-OH-MIDAZOLAM, URINE: NOT DETECTED
ALPRAZOLAM: NOT DETECTED
AMPHETAMINE: NOT DETECTED
BARBITURATES: NOT DETECTED
BENZOYLECGONINE: NOT DETECTED
BUPRENORPHINE: NOT DETECTED
CARISOPRODOL: NOT DETECTED
CLONAZEPAM: NOT DETECTED
CODEINE: NOT DETECTED
CREATININE URINE: 165.8 MG/DL (ref 20–400)
DIAZEPAM: NOT DETECTED
DRUGS EXPECTED: NORMAL
EER PAIN MGT DRUG PANEL, HIGH RES/EMIT U: NORMAL
ETHYL GLUCURONIDE: NOT DETECTED
FENTANYL: NOT DETECTED
GABAPENTIN: NOT DETECTED
HYDROCODONE: NOT DETECTED
HYDROMORPHONE: NOT DETECTED
LORAZEPAM: NOT DETECTED
MARIJUANA METABOLITE: NOT DETECTED
MDA: NOT DETECTED
MDEA: NOT DETECTED
MDMA URINE: NOT DETECTED
MEPERIDINE: NOT DETECTED
METHADONE: NOT DETECTED
METHAMPHETAMINE: NOT DETECTED
METHYLPHENIDATE: NOT DETECTED
MIDAZOLAM: NOT DETECTED
MORPHINE: NOT DETECTED
NALOXONE: NOT DETECTED
NORBUPRENORPHINE, FREE: NOT DETECTED
NORDIAZEPAM: NOT DETECTED
NORFENTANYL: NOT DETECTED
NORHYDROCODONE, URINE: NOT DETECTED
NOROXYCODONE: NOT DETECTED
NOROXYMORPHONE, URINE: NOT DETECTED
OXAZEPAM: NOT DETECTED
OXYCODONE: NOT DETECTED
OXYMORPHONE: NOT DETECTED
PAIN MANAGEMENT DRUG PANEL: NORMAL
PAIN MANAGEMENT DRUG PANEL: NORMAL
PCP: NOT DETECTED
PHENTERMINE: NOT DETECTED
PREGABALIN: NOT DETECTED
TAPENTADOL, URINE: NOT DETECTED
TAPENTADOL-O-SULFATE, URINE: NOT DETECTED
TEMAZEPAM: NOT DETECTED
TRAMADOL: NOT DETECTED
ZOLPIDEM: NOT DETECTED

## 2021-12-08 ENCOUNTER — NURSE ONLY (OUTPATIENT)
Dept: FAMILY MEDICINE CLINIC | Age: 76
End: 2021-12-08
Payer: MEDICARE

## 2021-12-08 DIAGNOSIS — Z23 NEED FOR INFLUENZA VACCINATION: Primary | ICD-10-CM

## 2021-12-08 PROCEDURE — 90674 CCIIV4 VAC NO PRSV 0.5 ML IM: CPT | Performed by: NURSE PRACTITIONER

## 2021-12-08 PROCEDURE — G0008 ADMIN INFLUENZA VIRUS VAC: HCPCS | Performed by: NURSE PRACTITIONER

## 2021-12-27 RX ORDER — LEVOTHYROXINE SODIUM 0.07 MG/1
TABLET ORAL
Qty: 90 TABLET | Refills: 0 | Status: SHIPPED | OUTPATIENT
Start: 2021-12-27 | End: 2022-03-21

## 2021-12-28 NOTE — TELEPHONE ENCOUNTER
Pt returned call and was informed. Pt states that she got her replacement machine through [employer], is not sure of date.

## 2021-12-28 NOTE — TELEPHONE ENCOUNTER
Per [employer] patient is not eligible for new machine due to cpap recall. Patient called with message left for patient to call back to office.

## 2022-01-14 ENCOUNTER — TELEPHONE (OUTPATIENT)
Dept: CARDIOLOGY CLINIC | Age: 77
End: 2022-01-14

## 2022-01-14 NOTE — TELEPHONE ENCOUNTER
Last OV 11/16/2021, does not appear patient has a history of mechanical valve replacement. Please confirm patient does not need antibiotics prior to dental appointment.

## 2022-01-14 NOTE — TELEPHONE ENCOUNTER
Pt will be having a dental cleaning on 01/19/2022 with Dr. Awilda Gomez. Following the cleaning pt will be having a crown replaced. Does pt need to take an antibiotic prior to these dental visits?  If so preferred pharmacy is [facility] @ [phone]

## 2022-02-14 ENCOUNTER — TELEPHONE (OUTPATIENT)
Dept: PULMONOLOGY | Age: 77
End: 2022-02-14

## 2022-02-14 DIAGNOSIS — G47.33 OSA (OBSTRUCTIVE SLEEP APNEA): Primary | ICD-10-CM

## 2022-02-14 NOTE — TELEPHONE ENCOUNTER
Pt got new eid machine through recall and is in need of new supplies. Pt uses Brevida nasal pillows mask. Supply order pending if appropriate. OV 8/24/21:      Assessment:  · Moderate Obstructive Sleep Apnea    · Hypertension      Plan:   · Change settings to APAP 7 to 11  Download in 30 days. Needs new machine, current machine is not turning on properly, power button will not work at times.   30-90 day f/u   · Very clear recommendation for COVID vaccination

## 2022-02-14 NOTE — TELEPHONE ENCOUNTER
Patient cancelled appointment on 2/16/22 with Dr. Keanu Del Rio for 31-90 day compliance. Reason: pt got machine replaced through recall    Patient did not reschedule appointment. Please advise when pt needs to follow up as 31-90 is not required for recall replacement. Appointment rescheduled for . Last OV 8/24/21:        Assessment:  · Moderate Obstructive Sleep Apnea    · Hypertension      Plan:   · Change settings to APAP 7 to 11  Download in 30 days. Needs new machine, current machine is not turning on properly, power button will not work at times.   30-90 day f/u   · Very clear recommendation for COVID vaccination

## 2022-02-23 NOTE — TELEPHONE ENCOUNTER
Does not need ABX Refill request received for controlled substance (opioid, ADD/ADHD medication, anxiety medication, etc) - No refill protocol    Last Physician/APC office visit date in department: V-Visit 12/14/21    Any future office visit scheduled? (if yes, list date): 3/25/22 with JT    • If Opioid medication pending and last visit is over 3 months ago patient is REQUIRED to have a visit for refills  • If ADD/ADHD/Anxiety/Insomnia medication pending and last visit is over 6 months ago REQUIRED to have a visit for refills   !!! IF VISIT IS DUE WITHIN 2 MONTHS AND NOT ALREADY SCHEDULED, PERFORM OUTREACH FOR SCHEDULING NOW !!!    For Dr. Emmanuel/Guicho Gomez Patients (out on Medical Leave):  *Utilize Dr. Emmanuel's evening Video/RN only blocked times for these visits - patients must agree to Video Visit to utilize these slots + you must manually change the appointment duration to 10 minutes while scheduling these*    For Rhonda Tam Patients:   Schedule 40 minute follow up visit on her schedule.    If visit is up to date or patient has a future visit scheduled as above, please send refill request to Clinician for processing.    Requested Prescriptions   Pending Prescriptions Disp Refills   • amphetamine-dextroamphetamine (Adderall) 20 MG tablet 30 tablet 0     Sig: Take one tablet daily in the morning.  Take at 4:00-5:00 AM.       There is no refill protocol information for this order

## 2022-03-01 ENCOUNTER — HOSPITAL ENCOUNTER (OUTPATIENT)
Age: 77
Discharge: HOME | End: 2022-03-01
Payer: MEDICARE

## 2022-03-01 ENCOUNTER — HOSPITAL ENCOUNTER (OUTPATIENT)
Dept: GENERAL RADIOLOGY | Age: 77
Discharge: HOME | End: 2022-03-01
Payer: MEDICARE

## 2022-03-01 DIAGNOSIS — M76.62 ACHILLES BURSITIS OF BOTH LOWER EXTREMITIES: ICD-10-CM

## 2022-03-01 DIAGNOSIS — E13.42 DIABETIC POLYNEUROPATHY ASSOCIATED WITH OTHER SPECIFIED DIABETES MELLITUS (HCC): ICD-10-CM

## 2022-03-01 DIAGNOSIS — M79.671 RIGHT FOOT PAIN: ICD-10-CM

## 2022-03-01 DIAGNOSIS — M76.61 ACHILLES BURSITIS OF BOTH LOWER EXTREMITIES: ICD-10-CM

## 2022-03-01 PROCEDURE — 73630 X-RAY EXAM OF FOOT: CPT

## 2022-03-08 NOTE — PROGRESS NOTES
[address]  [address] [place] [id number]  Dept: [phone]  Dept Fax: [phone]  Loc: [phone]    Edwin Mendez is a 78 y.o. female who presents today for her medical conditions/complaints as noted below. Edwin Mendez is c/o of No chief complaint on file. Subjective:     Chief Complaint   Patient presents with    Other     Controlled medication management       HPI  She presents today for 3 month follow up on chronic benzodiazepine management. The patient is on chronic benzodiazepine therapy for the diagnosis of:  Anxiety:   The patient complains of palpitations and shortness of breath but sees cardio d/t these issues, but denies chest pain, difficulty concentrating, dizziness, fatigue, feelings of losing control, insomnia, irritable, panic attacks, paresthesias, psychomotor agitation, racing thoughts and sweating. Current pharmacologic treatment: Ativan. Medication side effects:  none. The patient denies nausea, vomiting, diarrhea and constipation. Denies respiratory problems/depression. Denies increased sleepiness, drowsiness or confusion. The patient states that the medications and treatment have helped improve the quality of life and helped in psychosocial functioning. She is not participating in counselling/therapy. The patient states they are taking medications only as prescribed , using one pharmacy and getting all controlled substances from one provider unless okayed with me. The patient states they are following proper safeguarding and disposal of medications. Hypothyroidism: Recent symptoms: anxiety and palpitations. She denies fatigue, weight gain, weight loss, cold intolerance, heat intolerance, hair loss, dry skin, constipation, diarrhea, edema and dysphagia. Patient is  taking her medication consistently on an empty stomach.     Lab Results   Component Value Date    TSHREFLEX 2.17 01/13/2021    TSHREFLEX 4.80 06/03/2020    TSHREFLEX 0.45 02/24/2020     Lab Results   Component Value Date    TSH 4.44 (H) 10/06/2020    TSH 4.84 (H) 01/09/2020    TSH 1.41 04/15/2019            Past Medical History:   Diagnosis Date    Allergic     Anemia     Arthritis of left hip 11/10/2020    Brain condition     pt states a cluster of blood vessels left side of brain    Cancer [facility]) 1/2013    endometrial cancer/ Dr. Bela Cabral and Dr. Billy Spicer Cerebral cavernoma     followed by Dr. Linda Valdez at [place] [address] Complication of anesthesia     Takes a long time to wake up    Diverticulosis     [facility]ometrial cancer ([facility]     Fungus infection     Fungus infection on bilat toes.  GERD (gastroesophageal reflux disease)     Hyperlipidemia     Hypertension     Hypomagnesemia 1/2016    consult with Dr[facility] and no renal wasting. felt most likely d/t HCTz and PPI,  HCTZ discontinued,  increased mag ox to 400 mg 2 tabs BID    Hypothyroidism     Irritable bowel syndrome     MVP (mitral valve prolapse)     JOAQUIN (obstructive sleep apnea)     Paronychia of great toe, left 7/11/2017    Pneumonia     infant    Prolonged emergence from general anesthesia     Type 2 diabetes mellitus without complication ([facility]     Type II or unspecified type diabetes mellitus without mention of complication, not stated as uncontrolled     Unspecified sleep apnea     Vitamin B12 deficiency     Vitamin D deficiency          Review of Systems   Constitutional: Negative. Negative for appetite change, fatigue and unexpected weight change. HENT: Negative. Eyes: Negative. Respiratory: Positive for shortness of breath. Cardiovascular: Positive for palpitations. Negative for chest pain and leg swelling. Gastrointestinal: Negative. Negative for abdominal pain, anal bleeding, blood in stool and nausea. Endocrine: Negative. Genitourinary: Negative. Negative for hematuria. Musculoskeletal: Negative. Skin: Negative. Negative for rash. Allergic/Immunologic: Negative. Neurological: Negative. Negative for dizziness, syncope, light-headedness and numbness. Hematological: Negative. Does not bruise/bleed easily. Psychiatric/Behavioral: Negative. All other systems reviewed and are negative. Past Medical History:   Diagnosis Date    Allergic     Anemia     Arthritis of left hip 11/10/2020    Brain condition     pt states a cluster of blood vessels left side of brain    Cancer [facility]) 1/2013    endometrial cancer/ Dr. Ivanna Reynolds and Dr. Albino Verde Cerebral cavernoma     followed by Dr. Nell Oneill at [place] [address] Complication of anesthesia     Takes a long time to wake up    Diverticulosis     [facility] cancer ([facility]     Fungus infection     Fungus infection on bilat toes.  GERD (gastroesophageal reflux disease)     Hyperlipidemia     Hypertension     Hypomagnesemia 1/2016    consult with Dr. Allison Cardoza ([facility]) and no renal wasting.  felt most likely d/t HCTz and PPI,  HCTZ discontinued,  increased mag ox to 400 mg 2 tabs BID    Hypothyroidism     Irritable bowel syndrome     MVP (mitral valve prolapse)     JOAQUIN (obstructive sleep apnea)     Paronychia of great toe, left 7/11/2017    Pneumonia     infant    Prolonged emergence from general anesthesia     Type 2 diabetes mellitus without complication ([facility]     Type II or unspecified type diabetes mellitus without mention of complication, not stated as uncontrolled     Unspecified sleep apnea     Vitamin B12 deficiency     Vitamin D deficiency      Family History   Problem Relation Age of Onset    Cancer Mother         colon    COPD Father     Heart Disease Father     Cancer Father         NMSC, skin     Past Surgical History:   Procedure Laterality Date    COLONOSCOPY      COLONOSCOPY  07/14/2016    COLONOSCOPY      CYSTOSCOPY  10/18/2017    Cystoscopy with Urethral dilation    CYSTOSCOPY Bilateral 10/18/2017    Cystoscopy with bilateral retrograde pyelograms    DIAGNOSTIC CARDIAC CATH LAB PROCEDURE  1/12/2005    [facility], Dr Jami Weaver, No CAD.  DILATION AND CURETTAGE OF UTERUS      ENDOSCOPY, COLON, DIAGNOSTIC      HYSTERECTOMY  1/28/2013    due to uterine cancer    OTHER SURGICAL HISTORY  10/18/2017    cystoscopy, bilateral retrogrades    OTHER SURGICAL HISTORY Left 03/02/2017    port removal    SALIVARY GLAND SURGERY      KINGSLEY AND BSO  1/28/13    THYROID SURGERY      right thyroidectomy for benign reasons    UPPER GASTROINTESTINAL ENDOSCOPY  07/14/2016    UPPER GASTROINTESTINAL ENDOSCOPY      UPPER GASTROINTESTINAL ENDOSCOPY N/A 7/9/2021    EGD WITH BRAVO performed by Colleen Mckeon MD at [facility]  7/9/2021    EGD BIOPSY performed by Colleen Mckeon MD at [address] [place] [address]     Socioeconomic History    Marital status:      Spouse name: Not on file    Number of children: Not on file    Years of education: Not on file    Highest education level: Not on file   Occupational History    Not on file   Tobacco Use    Smoking status: Never Smoker    Smokeless tobacco: Never Used   Vaping Use    Vaping Use: Never used   Substance and Sexual Activity    Alcohol use: No     Alcohol/week: 0.0 standard drinks    Drug use: No    Sexual activity: Never   Other Topics Concern    Not on file   Social History Narrative    Not on file     Social Determinants of Health     Financial Resource Strain: Low Risk     Difficulty of Paying Living Expenses: Not hard at all   Food Insecurity: No Food Insecurity    Worried About [facility] in the Last Year: Never true    Matilde of Food in the Last Year: Never true   Transportation Needs:     Lack of Transportation (Medical): Not on file    Lack of Transportation (Non-Medical):  Not on file   Physical Activity:     Days of Exercise per Week: Not on file    Minutes of Exercise per Session: Not on file Stress:     Feeling of Stress : Not on file   Social Connections:     Frequency of Communication with Friends and Family: Not on file    Frequency of Social Gatherings with Friends and Family: Not on file    Attends [employer]ology Services: Not on file    Active Member of [address] [place] [address] or Organizations: Not on file    Attends Club or Organization Meetings: Not on file    Marital Status: Not on file   Intimate Partner Violence:     Fear of Current or Ex-Partner: Not on file    Emotionally Abused: Not on file    Physically Abused: Not on file    Sexually Abused: Not on file   Housing Stability:     Unable to Pay for Housing in the Last Year: Not on file    Number of Uzma in the Last Year: Not on file    Unstable Housing in the Last Year: Not on file     Current Outpatient Medications   Medication Sig Dispense Refill    levothyroxine (SYNTHROID) 75 MCG tablet TAKE 1 TABLET DAILY 90 tablet 0    LORazepam (ATIVAN) 0.5 MG tablet Take 0.5 mg by mouth nightly as needed for Anxiety.       amLODIPine (NORVASC) 5 MG tablet TAKE 1 TABLET DAILY 90 tablet 1    rosuvastatin (CRESTOR) 10 MG tablet Take 1 tablet by mouth daily TAKE 1 TABLET BY MOUTH NIGHTLY 90 tablet 3    metoprolol tartrate (LOPRESSOR) 25 MG tablet Take 1 tablet by mouth 2 times daily 15 tablet 0    losartan (COZAAR) 100 MG tablet TAKE 1 TABLET DAILY 90 tablet 0    famotidine (PEPCID) 40 MG tablet Take 1 tablet by mouth 2 times daily 180 tablet 3    metFORMIN (GLUCOPHAGE-XR) 500 MG extended release tablet TAKE 1 TABLET TWICE A  tablet 3    Alum Hydroxide-Mag Carbonate (GAVISCON PO) Take by mouth       sucralfate (CARAFATE) 1 GM tablet       blood glucose test strips (CONTOUR NEXT TEST) strip TEST ONCE DAILY DX: 250.00 100 strip 11    clobetasol (TEMOVATE) 0.05 % external solution       Cholecalciferol (VITAMIN D3) 25 MCG (1000 UT) TABS Take 1 tablet by mouth daily (Patient taking differently: Take by mouth daily Taking 2,000 units daily)      magnesium oxide (MAG-OX) 400 (241.3 Mg) MG TABS tablet Take 3 tablets by mouth 2 times daily For Hypomagnesemia (Patient taking differently: Take 800 mg by mouth 2 times daily For Hypomagnesemia) 180 tablet 5     No current facility-administered medications for this visit. No changes in past medical history, past surgical history, social history, orfamily history were noted during the patient encounter unless specifically listed above. All updates of past medical history, past surgical history, social history, or family history were reviewed personally by me duringthe office visit. All problems listed in the assessment are stable unless noted otherwise. Medication profile reviewed personally by me during the office visit. Medication side effects and possible impairments frommedications were discussed as applicable. Objective:     Physical Exam  Vitals and nursing note reviewed. Constitutional:       General: She is not in acute distress. Appearance: Normal appearance. She is well-developed. HENT:      Head: Normocephalic and atraumatic. Right Ear: Tympanic membrane, ear canal and external ear normal.      Left Ear: Tympanic membrane, ear canal and external ear normal.      Nose: Nose normal.      Mouth/Throat:      Pharynx: Uvula midline. No oropharyngeal exudate. Eyes:      General: Lids are normal.      Conjunctiva/sclera: Conjunctivae normal.      Pupils: Pupils are equal, round, and reactive to light. Neck:      Thyroid: No thyromegaly. Vascular: No carotid bruit or JVD. Cardiovascular:      Rate and Rhythm: Normal rate and regular rhythm. Pulses: Normal pulses. Radial pulses are 2+ on the right side and 2+ on the left side. Dorsalis pedis pulses are 2+ on the right side and 2+ on the left side. Posterior tibial pulses are 2+ on the right side and 2+ on the left side. Heart sounds: Normal heart sounds. No murmur heard.   No friction rub. No gallop. Pulmonary:      Effort: Pulmonary effort is normal.      Breath sounds: Normal breath sounds. Abdominal:      General: Bowel sounds are normal.      Palpations: Abdomen is soft. There is no mass. Tenderness: There is no abdominal tenderness. Musculoskeletal:         General: Normal range of motion. Cervical back: Normal range of motion and neck supple. Lymphadenopathy:      Head:      Right side of head: No submandibular adenopathy. Left side of head: No submandibular adenopathy. Cervical: No cervical adenopathy. Skin:     General: Skin is warm and dry. Findings: No lesion or rash. Neurological:      Mental Status: She is alert and oriented to person, place, and time. Motor: Tremor present. Gait: Gait normal.   Psychiatric:         Speech: Speech normal.         Behavior: Behavior normal.         Thought Content: Thought content normal.         Judgment: Judgment normal.         /72 (Site: Left Upper Arm, Position: Sitting, Cuff Size: Medium Adult)   Pulse 74   Ht 5' 2\" (1.575 m)   Wt 158 lb (71.7 kg)   SpO2 98%   BMI 28.90 kg/m²   Body mass index is 28.9 kg/m². BP Readings from Last 2 Encounters:   03/09/22 138/72   12/02/21 136/70       Wt Readings from Last 3 Encounters:   03/09/22 158 lb (71.7 kg)   12/02/21 158 lb (71.7 kg)   11/16/21 159 lb (72.1 kg)       Lab Review   No visits with results within 2 Month(s) from this visit.    Latest known visit with results is:   Office Visit on 12/02/2021   Component Date Value    Hemoglobin A1C 12/02/2021 6.4     eAG 12/02/2021 137.0     Cholesterol, Total 12/02/2021 160     Triglycerides 12/02/2021 190*    HDL 12/02/2021 64*    LDL Calculated 12/02/2021 58     VLDL Cholesterol Calcula* 12/02/2021 38     Sodium 12/02/2021 139     Potassium 12/02/2021 4.4     Chloride 12/02/2021 98*    CO2 12/02/2021 25     Anion Gap 12/02/2021 16     Glucose 12/02/2021 106*    BUN 12/02/2021 23*    CREATININE 12/02/2021 0.9     GFR Non- 12/02/2021 >60     GFR  12/02/2021 >60     Calcium 12/02/2021 9.7     Total Protein 12/02/2021 7.3     Albumin 12/02/2021 4.8     Albumin/Globulin Ratio 12/02/2021 1.9     Total Bilirubin 12/02/2021 0.3     Alkaline Phosphatase 12/02/2021 86     ALT 12/02/2021 12     AST 12/02/2021 14*    WBC 12/02/2021 7.8     RBC 12/02/2021 4.30     Hemoglobin 12/02/2021 11.6*    Hematocrit 12/02/2021 35.6*    MCV 12/02/2021 82.8     MCH 12/02/2021 27.1     MCHC 12/02/2021 32.7     RDW 12/02/2021 15.0     Platelets 46/83/2029 247     MPV 12/02/2021 9.8     Magnesium 12/02/2021 1.90     Drugs Expected 12/02/2021 see order     Pain Management Drug Pan* 12/02/2021 Consistent     Creatinine, Ur 12/02/2021 165.8     Codeine 12/02/2021 Not Detected     Morphine 12/02/2021 Not Detected     6-Acetylmorphine 12/02/2021 Not Detected     Oxycodone 12/02/2021 Not Detected     Noroxycodone 12/02/2021 Not Detected     Oxymorphone 12/02/2021 Not Detected     NOROXYMORPHONE, URINE 12/02/2021 Not Detected     Hydrocodone 12/02/2021 Not Detected     NORHYDROCODONE, URINE 12/02/2021 Not Detected     Hydromorphone 12/02/2021 Not Detected     Naloxone 12/02/2021 Not Detected     Buprenorphine 12/02/2021 Not Detected     Norbuprenorphine 12/02/2021 Not Detected     Fentanyl 12/02/2021 Not Detected     Norfentanyl 12/02/2021 Not Detected     Meperidine 12/02/2021 Not Detected     Tapentadol, Urine 12/02/2021 Not Detected     Tapentadol-O-Sulfate, Ur* 12/02/2021 Not Detected     Methadone 12/02/2021 Not Detected     Tramadol 12/02/2021 Not Detected     Amphetamine 12/02/2021 Not Detected     Methamphetamine 12/02/2021 Not Detected     MDMA, Urine 12/02/2021 Not Detected     MDA 12/02/2021 Not Detected     MDEA 12/02/2021 Not Detected     Methylphenidate 12/02/2021 Not Detected     Phentermine 12/02/2021 Not Detected  Benzoylecgonine 12/02/2021 Not Detected     Alprazolam 12/02/2021 Not Detected     Alpha-OH-alprazolam 12/02/2021 Not Detected     Clonazepam 12/02/2021 Not Detected     7-aminoclonazepam 12/02/2021 Not Detected     Diazepam 12/02/2021 Not Detected     NORDIAZEPAM 12/02/2021 Not Detected     OXAZEPAM 12/02/2021 Not Detected     TEMAZEPAM 12/02/2021 Not Detected     Lorazepam 12/02/2021 Not Detected     Midazolam 12/02/2021 Not Detected     Zolpidem 12/02/2021 Not Detected     Gabapentin 12/02/2021 Not Detected     Pregabalin 12/02/2021 Not Detected     Alpha-OH-Midazolam, Urine 12/02/2021 Not Detected     Barbiturates 12/02/2021 Not Detected     Ethyl Glucuronide 12/02/2021 Not Detected     Marijuana Metabolite 12/02/2021 Not Detected     PCP 12/02/2021 Not Detected     CARISOPRODOL 12/02/2021 Not Detected     Pain Management Drug Pan* 12/02/2021 See Below     [facility] Pain Mgt Drug Panel,* 12/02/2021 See Note        No results found for this visit on 03/09/22. Assessment:       1. Anxiety    2. Hypothyroidism, unspecified type        No results found for this visit on 03/09/22. Plan:       Brennan Jennings was seen today for other. Diagnoses and all orders for this visit:    Anxiety  -     LORazepam (ATIVAN) 0.5 MG tablet; Take 1 tablet by mouth nightly as needed for Anxiety for up to 30 days. Chronic benzodiazepine treatment protocol was discussed with the patient again including taking medications only as prescribed , using one pharmacy and getting all controlled substances from one physician unless okayed with me.  Proper safeguarding and disposal of medications were also discussed with the patient.     The current treatment regimen is needed to decrease the patient's anxiety symptoms, improve the quality of life and ability to function and improve sleep and mood symptoms.        Patient given following instructions - You are on medications which could impair your senses, you are at risk of weakness, falls, dizziness, or drowsiness. You should be careful during activities which could place you at risk of harm, such as climbing, using stairs, operating machinery, or driving vehicles. If you feel you cannot safely do these activities, you should request others to help you, or avoid the activities altogether. If you are drowsy for any other reason, you should use the same precautions as listed above.     The patient is made aware of the potential of drowsiness with the prescribed medications, and that care should be exercised in operating machinery, vehicles, or placing oneself in situations of risk to their health, ie heights and climbing and stairs.     The patient denies nausea, vomiting, diarrhea and constipation. No respiratory problems. No increased sleepiness, drowsiness or confusion. The patient states that the medications and treatment have helped improve the quality of life and helped in psychosocial functioning. There are no indicators for possible drug abuse, addiction or diversion problems. Periodic Controlled Substance Monitoring: Possible medication side effects, risk of tolerance/dependence & alternative treatments discussed. ,No signs of potential drug abuse or diversion identified. ,Assessed functional status. (Beverley Lyon, APRN - CNP)    Hypothyroidism, unspecified type  -     TSH with Reflex        Patient has been instructed call the office immediately with new symptoms, change in symptoms or worseningof symptoms. If this is not feasible, patient is instructed to report to the emergency room. Medication profile reviewed. Medication side effects and possible impairments from medications were discussed as applicable. Allergies were reviewed. Health maintenance was reviewed and updated as appropriate. Return in about 3 months (around 6/9/2022) for [facility]     (Comment: Please note this report has been produced using a combination of typing and speech recognition software and may contain errors related to that system including errors in grammar, punctuation, and spelling, as well as words and phrases that may be inappropriate.  If there are any questions or concerns please feel free to contact the dictating provider for clarification.)

## 2022-03-09 ENCOUNTER — OFFICE VISIT (OUTPATIENT)
Dept: FAMILY MEDICINE CLINIC | Age: 77
End: 2022-03-09
Payer: MEDICARE

## 2022-03-09 VITALS
WEIGHT: 158 LBS | OXYGEN SATURATION: 98 % | BODY MASS INDEX: 29.08 KG/M2 | DIASTOLIC BLOOD PRESSURE: 72 MMHG | SYSTOLIC BLOOD PRESSURE: 138 MMHG | HEIGHT: 62 IN | HEART RATE: 74 BPM

## 2022-03-09 DIAGNOSIS — E03.9 HYPOTHYROIDISM, UNSPECIFIED TYPE: ICD-10-CM

## 2022-03-09 DIAGNOSIS — F41.9 ANXIETY: Primary | ICD-10-CM

## 2022-03-09 LAB — TSH REFLEX: 2.23 UIU/ML (ref 0.27–4.2)

## 2022-03-09 PROCEDURE — 99214 OFFICE O/P EST MOD 30 MIN: CPT | Performed by: NURSE PRACTITIONER

## 2022-03-09 PROCEDURE — G8427 DOCREV CUR MEDS BY ELIG CLIN: HCPCS | Performed by: NURSE PRACTITIONER

## 2022-03-09 PROCEDURE — G8399 PT W/DXA RESULTS DOCUMENT: HCPCS | Performed by: NURSE PRACTITIONER

## 2022-03-09 PROCEDURE — 1036F TOBACCO NON-USER: CPT | Performed by: NURSE PRACTITIONER

## 2022-03-09 PROCEDURE — 4040F PNEUMOC VAC/ADMIN/RCVD: CPT | Performed by: NURSE PRACTITIONER

## 2022-03-09 PROCEDURE — G8417 CALC BMI ABV UP PARAM F/U: HCPCS | Performed by: NURSE PRACTITIONER

## 2022-03-09 PROCEDURE — 3017F COLORECTAL CA SCREEN DOC REV: CPT | Performed by: NURSE PRACTITIONER

## 2022-03-09 PROCEDURE — 1123F ACP DISCUSS/DSCN MKR DOCD: CPT | Performed by: NURSE PRACTITIONER

## 2022-03-09 PROCEDURE — G8482 FLU IMMUNIZE ORDER/ADMIN: HCPCS | Performed by: NURSE PRACTITIONER

## 2022-03-09 PROCEDURE — 1090F PRES/ABSN URINE INCON ASSESS: CPT | Performed by: NURSE PRACTITIONER

## 2022-03-09 PROCEDURE — 36415 COLL VENOUS BLD VENIPUNCTURE: CPT | Performed by: NURSE PRACTITIONER

## 2022-03-09 RX ORDER — LORAZEPAM 0.5 MG/1
0.5 TABLET ORAL NIGHTLY PRN
Qty: 30 TABLET | Refills: 0 | Status: SHIPPED | OUTPATIENT
Start: 2022-03-09 | End: 2022-04-08

## 2022-03-09 ASSESSMENT — ENCOUNTER SYMPTOMS
NAUSEA: 0
GASTROINTESTINAL NEGATIVE: 1
BLOOD IN STOOL: 0
SHORTNESS OF BREATH: 1
EYES NEGATIVE: 1
ABDOMINAL PAIN: 0
ANAL BLEEDING: 0
ALLERGIC/IMMUNOLOGIC NEGATIVE: 1

## 2022-03-21 RX ORDER — LEVOTHYROXINE SODIUM 0.07 MG/1
TABLET ORAL
Qty: 90 TABLET | Refills: 0 | Status: SHIPPED | OUTPATIENT
Start: 2022-03-21 | End: 2022-06-27

## 2022-03-28 DIAGNOSIS — I10 ESSENTIAL HYPERTENSION: ICD-10-CM

## 2022-03-29 RX ORDER — LOSARTAN POTASSIUM 100 MG/1
TABLET ORAL
Qty: 90 TABLET | Refills: 1 | Status: SHIPPED | OUTPATIENT
Start: 2022-03-29 | End: 2022-10-12

## 2022-04-08 DIAGNOSIS — E11.9 TYPE 2 DIABETES MELLITUS WITHOUT COMPLICATION, WITHOUT LONG-TERM CURRENT USE OF INSULIN (HCC): ICD-10-CM

## 2022-04-08 RX ORDER — PERPHENAZINE 16 MG/1
TABLET, FILM COATED ORAL
Qty: 50 STRIP | Refills: 11 | Status: SHIPPED | OUTPATIENT
Start: 2022-04-08

## 2022-04-19 NOTE — PROGRESS NOTES
[address] [facility]   Cardiovascular Evaluation    PATIENT: Radha Dong  DATE: 2022  MRN: [id number]  CSN: [id number]  : 1945    Primary Care Doctor: ALEKSANDAR Almendarez CNP    Reason for evaluation:   Follow-up (5 month office visit), Hyperlipidemia, Hypertension, and Other (pain in the right leg w/pain in the vein)    Subjective:     History of present illness on initial date of evaluation:   Radha Dong is a 78 y.o. patient who presents for follow up and pre-operative evaluation. She is planning an upcoming surgery for acid reflux. She has not had any new or acute cardiac complaints. She has excellent functional capacity and able to do ADLs without issue. She states that she is doing well overall. She has varicose veins in her RLE which she has concerns about. She is taking her medications as prescribed. Patient denies current edema, chest pain, sob, palpitations, dizziness or syncope.        Patient Active Problem List   Diagnosis    Chest pain    Palpitations    GERD (gastroesophageal reflux disease)    Diverticulitis    MVP (mitral valve prolapse)    Fasciitis    Hypothyroidism    JOAQUIN (obstructive sleep apnea)    DM type 2 (diabetes mellitus, type 2) ([address]    B12 deficiency    Vitamin D deficiency    Hypertension    Hyperlipidemia    Vitamin B12 deficiency    Cerebral cavernoma    Abnormal cardiovascular stress test    Postmenopausal atrophic vaginitis    Rectocele    Cystocele    Encounter for central line care    Vaginal atrophy    History of endometrial cancer    Hypomagnesemia    Anxiety    Endometrial cancer ([address]    Cancer (HCC)    Elevated CA-125    Urethral syndrome    Hematuria    Hydronephrosis    Pelvic prolapse    Bilateral senile cataracts    Chronic lymphocytic thyroiditis    Diverticulitis of intestine    Goiter, nontoxic, multinodular    H/O total hysterectomy with removal of both tubes and ovaries    Paronychia of great toe, left    Post-menopause    Postsurgical hypothyroidism    Right upper quadrant abdominal pain    Status post chemotherapy    Dizziness    Endometrial carcinoma (HCC)    Fibrohistiocytic proliferation of the skin    Moderate episode of recurrent major depressive disorder (HCC)    Greater trochanteric bursitis, left    It band syndrome, left    Lumbar paraspinal muscle spasm    Arthritis of left hip    Benign essential hypertension    S/P partial thyroidectomy    Localized edema         Cardiac Testing: I have reviewed the findings below. EKG:  ECHO: 11/2021   Summary   Left ventricular systolic function is normal with ejection fraction   estimated at 55-60 %. No regional wall motion abnormalities are noted. Sigmoid septum is present with normal thickness of remaining left   ventricular wall segments. Normal left ventricular diastolic filling pressure. Mild posterior mitral annular calcification is present. Moderate tricuspid regurgitation. Systolic pulmonary artery pressure (SPAP) estimated at 41 mmHg (RA pressure   8 mmHg), consistent with mild pulmonary hypertension. Mild pulmonic regurgitation present.    8- 55%     STRESS TEST:  CATH:  BYPASS:  VASCULAR:      Past Medical History:   has a past medical history of Allergic, Anemia, Arthritis of left hip, Brain condition, Cancer ([address] Cerebral cavernoma, Complication of anesthesia, Diverticulosis, Endometrial cancer ([address] Fungus infection, GERD (gastroesophageal reflux disease), Hyperlipidemia, Hypertension, Hypomagnesemia, Hypothyroidism, Irritable bowel syndrome, MVP (mitral valve prolapse), JOAQUIN (obstructive sleep apnea), Paronychia of great toe, left, Pneumonia, Prolonged emergence from general anesthesia, Type 2 diabetes mellitus without complication ([address] Type II or unspecified type diabetes mellitus without mention of complication, not stated as uncontrolled, Unspecified sleep apnea, Vitamin B12 deficiency, and Vitamin D deficiency. Surgical History:   has a past surgical history that includes Thyroid surgery; Dilation and curettage of uterus; Salivary gland surgery; Hysterectomy (1/28/2013); venus and bso (cervix removed) (1/28/13); Colonoscopy; Endoscopy, colon, diagnostic; Diagnostic Cardiac Cath Lab Procedure (1/12/2005); Upper gastrointestinal endoscopy (07/14/2016); Colonoscopy (07/14/2016); Colonoscopy; Upper gastrointestinal endoscopy; other surgical history (10/18/2017); Cystoscopy (10/18/2017); Cystoscopy (Bilateral, 10/18/2017); other surgical history (Left, 03/02/2017); Upper gastrointestinal endoscopy (N/A, 7/9/2021); and Upper gastrointestinal endoscopy (7/9/2021). Social History:   reports that she has never smoked. She has never used smokeless tobacco. She reports that she does not drink alcohol and does not use drugs. Family History:  No evidence for sudden cardiac death or premature CAD    Home Medications:  Reviewed and are listed in nursing record.  and/or listed below  Current Outpatient Medications   Medication Sig Dispense Refill    aspirin 81 MG EC tablet Take 81 mg by mouth daily      blood glucose test strips (CONTOUR NEXT TEST) strip TEST ONCE DAILY 50 strip 11    losartan (COZAAR) 100 MG tablet TAKE 1 TABLET DAILY 90 tablet 1    levothyroxine (SYNTHROID) 75 MCG tablet TAKE 1 TABLET DAILY 90 tablet 0    amLODIPine (NORVASC) 5 MG tablet TAKE 1 TABLET DAILY 90 tablet 1    rosuvastatin (CRESTOR) 10 MG tablet Take 1 tablet by mouth daily TAKE 1 TABLET BY MOUTH NIGHTLY 90 tablet 3    metoprolol tartrate (LOPRESSOR) 25 MG tablet Take 1 tablet by mouth 2 times daily 15 tablet 0    famotidine (PEPCID) 40 MG tablet Take 1 tablet by mouth 2 times daily 180 tablet 3    metFORMIN (GLUCOPHAGE-XR) 500 MG extended release tablet TAKE 1 TABLET TWICE A  tablet 3    sucralfate (CARAFATE) 1 GM tablet 3- 4 times a day      clobetasol (TEMOVATE) 0.05 % external solution  Cholecalciferol (VITAMIN D3) 25 MCG (1000 UT) TABS Take 1 tablet by mouth daily (Patient taking differently: Take by mouth daily Taking 2,000 units daily)      magnesium oxide (MAG-OX) 400 (241.3 Mg) MG TABS tablet Take 3 tablets by mouth 2 times daily For Hypomagnesemia (Patient taking differently: Take by mouth 2 times daily For Hypomagnesemia) 180 tablet 5    Alum Hydroxide-Mag Carbonate (GAVISCON PO) Take by mouth  (Patient not taking: Reported on 4/20/2022)       No current facility-administered medications for this visit. Allergies:  Aspirin, Lipitor, Other, Sodium hydroxide, Stadol [butorphanol tartrate], Vicodin [hydrocodone-acetaminophen], Lamisil [terbinafine], and Prilosec [omeprazole]     Review of Systems:   All 14 point review of symptoms completed. Pertinent positives identified in the HPI, all other review of symptoms negative as below.     Review of Systems - History obtained from the patient  General ROS: negative for - chills, fever or night sweats  Psychological ROS: negative for - disorientation or hallucinations  Ophthalmic ROS: negative for - dry eyes, eye pain or loss of vision  ENT ROS: negative for - nasal discharge or sore throat  Allergy and Immunology ROS: negative for - hives or itchy/watery eyes  Hematological and Lymphatic ROS: negative for - jaundice or night sweats  Endocrine ROS: negative for - mood swings or temperature intolerance  Breast ROS: deferred  Respiratory ROS: negative for - hemoptysis or stridor  Cardiovascular ROS: negative for - chest pain, dyspnea on exertion or palpitations  Gastrointestinal ROS: no abdominal pain, change in bowel habits, or black or bloody stools  Genito-Urinary ROS: no dysuria, trouble voiding, or hematuria  Musculoskeletal ROS: negative for - gait disturbance, joint pain or joint stiffness  Neurological ROS: negative for - seizures or speech problems  Dermatological ROS: negative for - rash or skin lesion changes     Physical 12/02/2021    AST 14 12/02/2021    ALT 12 12/02/2021     PT/INR:    No components found for: PTPATIENT,  PTINR  Lab Results   Component Value Date    CKTOTAL 45 08/28/2015     No components found for: CHLPL  Lab Results   Component Value Date    TRIG 190 (H) 12/02/2021    TRIG 152 (H) 05/11/2021    TRIG 174 (H) 06/03/2020     Lab Results   Component Value Date    HDL 64 (H) 12/02/2021    HDL 66 (H) 05/11/2021    HDL 67 (H) 06/03/2020     Lab Results   Component Value Date    LDLCALC 58 12/02/2021    LDLCALC 73 05/11/2021    LDLCALC 92 06/03/2020     Lab Results   Component Value Date    LABVLDL 38 12/02/2021    LABVLDL 30 05/11/2021    LABVLDL 35 06/03/2020     Lab Results   Component Value Date    ALT 12 12/02/2021    AST 14 (L) 12/02/2021    ALKPHOS 86 12/02/2021    BILITOT 0.3 12/02/2021       Imaging:  I have reviewed the below testing personally and my interpretation is below. EKG:  CXR:      Assessment:  78 y.o. patient with:  1. Pre-operative evaluation  2. Hyperlipidemia   ~ sub-optimal 7/2016  3. Hypertension    ~BP: (140-160)/(80)     ~stable  4. Chest pain    ~resolved   ~normal cardiac cath 2014  5. MVP   ~not active  6. TR - moderate    The 10-year ASCVD risk score (Delmar Leblanc., et al., 2013) is: 37.7%    Values used to calculate the score:      Age: 78 years      Sex: Female      Is Non- : No      Diabetic: Yes      Tobacco smoker: No      Systolic Blood Pressure: 945 mmHg      Is BP treated: Yes      HDL Cholesterol: 64 mg/dL      Total Cholesterol: 160 mg/dL     Plan:  1. The patient does not have active high risk clinical cardiac predictors to delay surgery . This would including unstable coronary syndrome, decompensated congestive heart failure, severe obstructive valve disease, or malignant/significant ventricular arrhythmia. 2. Continue current medications as prescribed. 3. Follow up in one year, or sooner if needed.    4. Echocardiogram in 1-2 years to assess valve disease      I, Madiha Campbell RN, am scribing for and in the presence of Dr. Nisreen Ge. 04/20/22 11:10 AM   Madiha Campbell RN    I, Dr. Nisreen Ge, personally performed the services described in this documentation, as scribed by the above signed scribe in my presence. It is both accurate and complete to my knowledge. I agree with the details independently gathered by the clinical support staff, while the remaining scribed note accurately describes my personal service to the patient. Medical Decision Making: The following items were considered in medical decision making:  Independent review of images  Review / order clinical lab tests  Review / order radiology tests  Decision to obtain old records  Review and summation of old records as accessed through [facility] (a summary of my findings in these old records)      Time Based Itemization  A total of 30 minutes was spent on today's patient encounter. If applicable, non-patient-facing activities:  (X)Preparing to see the patient and reviewing records  (X) Individual interpretation of results  ( ) Discussion or coordination of care with other health care professionals  () Ordering of unique tests, medications, or procedures  (X) Documentation within the EHR           All questions and concerns were addressed to the patient/family. Alternatives to my treatment were discussed. The note was completed using EMR. Every effort was made to ensure accuracy; however, inadvertent computerized transcription errors may be present.     Nisreen Ge MD, [address], [place], [place]  [phone] [facility] office  [phone] [facility][1]  4/20/2022  11:10 AM

## 2022-04-20 ENCOUNTER — OFFICE VISIT (OUTPATIENT)
Dept: CARDIOLOGY CLINIC | Age: 77
End: 2022-04-20
Payer: MEDICARE

## 2022-04-20 VITALS
TEMPERATURE: 97.9 F | HEART RATE: 70 BPM | OXYGEN SATURATION: 99 % | BODY MASS INDEX: 29 KG/M2 | DIASTOLIC BLOOD PRESSURE: 80 MMHG | HEIGHT: 62 IN | SYSTOLIC BLOOD PRESSURE: 140 MMHG | WEIGHT: 157.6 LBS

## 2022-04-20 DIAGNOSIS — R07.89 OTHER CHEST PAIN: ICD-10-CM

## 2022-04-20 DIAGNOSIS — E78.2 MIXED HYPERLIPIDEMIA: ICD-10-CM

## 2022-04-20 DIAGNOSIS — Z01.810 PRE-OPERATIVE CARDIOVASCULAR EXAMINATION: Primary | ICD-10-CM

## 2022-04-20 DIAGNOSIS — I10 ESSENTIAL HYPERTENSION: ICD-10-CM

## 2022-04-20 DIAGNOSIS — I34.1 MVP (MITRAL VALVE PROLAPSE): ICD-10-CM

## 2022-04-20 DIAGNOSIS — I36.1 NONRHEUMATIC TRICUSPID VALVE REGURGITATION: ICD-10-CM

## 2022-04-20 PROCEDURE — 1090F PRES/ABSN URINE INCON ASSESS: CPT | Performed by: INTERNAL MEDICINE

## 2022-04-20 PROCEDURE — 1123F ACP DISCUSS/DSCN MKR DOCD: CPT | Performed by: INTERNAL MEDICINE

## 2022-04-20 PROCEDURE — G8417 CALC BMI ABV UP PARAM F/U: HCPCS | Performed by: INTERNAL MEDICINE

## 2022-04-20 PROCEDURE — 99214 OFFICE O/P EST MOD 30 MIN: CPT | Performed by: INTERNAL MEDICINE

## 2022-04-20 PROCEDURE — 3017F COLORECTAL CA SCREEN DOC REV: CPT | Performed by: INTERNAL MEDICINE

## 2022-04-20 PROCEDURE — G8427 DOCREV CUR MEDS BY ELIG CLIN: HCPCS | Performed by: INTERNAL MEDICINE

## 2022-04-20 PROCEDURE — G8399 PT W/DXA RESULTS DOCUMENT: HCPCS | Performed by: INTERNAL MEDICINE

## 2022-04-20 PROCEDURE — 4040F PNEUMOC VAC/ADMIN/RCVD: CPT | Performed by: INTERNAL MEDICINE

## 2022-04-20 PROCEDURE — 1036F TOBACCO NON-USER: CPT | Performed by: INTERNAL MEDICINE

## 2022-04-20 RX ORDER — ASPIRIN 81 MG/1
81 TABLET ORAL DAILY
COMMUNITY

## 2022-04-20 NOTE — PATIENT INSTRUCTIONS
Plan:  1. Ok to proceed with reflux procedure from cardiac standpoint. 2. Continue current medications as prescribed. 3. Follow up in one year, or sooner if needed.

## 2022-04-20 NOTE — LETTER
[facility] [address]  Phone: [phone]  Fax: [phone]    Priyanka Saunders MD        April 20, 2022    Shala Garg  1945  Patient is at moderate CV risk for non cardiac procedure. No further testing indicated at this time. Please call with questions or concerns.        Sincerely,        Priyanka Saunders MD

## 2022-04-28 ENCOUNTER — TELEMEDICINE (OUTPATIENT)
Dept: FAMILY MEDICINE CLINIC | Age: 77
End: 2022-04-28
Payer: MEDICARE

## 2022-04-28 ENCOUNTER — HOSPITAL ENCOUNTER (OUTPATIENT)
Age: 77
Discharge: HOME | End: 2022-04-28
Payer: MEDICARE

## 2022-04-28 DIAGNOSIS — M79.10 MYALGIA: ICD-10-CM

## 2022-04-28 DIAGNOSIS — Z20.822 SUSPECTED COVID-19 VIRUS INFECTION: ICD-10-CM

## 2022-04-28 DIAGNOSIS — R05.9 COUGH: ICD-10-CM

## 2022-04-28 DIAGNOSIS — Z20.822 SUSPECTED COVID-19 VIRUS INFECTION: Primary | ICD-10-CM

## 2022-04-28 DIAGNOSIS — R50.9 FEVER, UNSPECIFIED FEVER CAUSE: ICD-10-CM

## 2022-04-28 LAB
RAPID INFLUENZA  B AGN: NEGATIVE
RAPID INFLUENZA A AGN: NEGATIVE

## 2022-04-28 PROCEDURE — 99441 PR PHYS/QHP TELEPHONE EVALUATION 5-10 MIN: CPT | Performed by: NURSE PRACTITIONER

## 2022-04-28 PROCEDURE — U0005 INFEC AGEN DETEC AMPLI PROBE: HCPCS

## 2022-04-28 PROCEDURE — 87804 INFLUENZA ASSAY W/OPTIC: CPT

## 2022-04-28 PROCEDURE — U0003 INFECTIOUS AGENT DETECTION BY NUCLEIC ACID (DNA OR RNA); SEVERE ACUTE RESPIRATORY SYNDROME CORONAVIRUS 2 (SARS-COV-2) (CORONAVIRUS DISEASE [COVID-19]), AMPLIFIED PROBE TECHNIQUE, MAKING USE OF HIGH THROUGHPUT TECHNOLOGIES AS DESCRIBED BY CMS-2020-01-R: HCPCS

## 2022-04-28 ASSESSMENT — ENCOUNTER SYMPTOMS
SINUS PAIN: 0
VOMITING: 0
SWOLLEN GLANDS: 0
RHINORRHEA: 1
ABDOMINAL PAIN: 0
NAUSEA: 1
SORE THROAT: 1
DIARRHEA: 1
COUGH: 1
WHEEZING: 1

## 2022-04-28 NOTE — PROGRESS NOTES
[address] [facility]  [address] [facility] [address]  Dept: [phone]  Dept Fax: [phone]  Loc: [phone]    Alison Billings is a 78 y.o. female evaluated via telephone on 4/28/2022. Consent:  She and/or health care decision maker is aware that that she may receive a bill for this telephone service, which includes applicable co-pays, depending on her insurance coverage, and has provided verbal consent to proceed. [address] [facility]  [address] [facility] [address]  Dept: [phone]  Dept Fax: [phone]  Loc: [phone]    Alison Billings is a 78 y.o. female who presents today for her medical conditions/complaints as noted below. Alison Billings is c/o of Cough (Productive cough), Congestion, and Other (Body aches for the past 2 days)       Subjective:     Chief Complaint   Patient presents with    Cough     Productive cough    Congestion    Other     Body aches for the past 2 days       URI   This is a new problem. The current episode started in the past 7 days (2-3 days). The problem has been gradually worsening. The maximum temperature recorded prior to her arrival was 100.4 - 100.9 F. The fever has been present for 1 to 2 days. Associated symptoms include congestion (slight), coughing (MPC white mucous), diarrhea, headaches, joint pain, nausea, rhinorrhea, sneezing, a sore throat (yesterday) and wheezing (slight). Pertinent negatives include no abdominal pain, chest pain, dysuria, ear pain, joint swelling, neck pain, plugged ear sensation, rash, sinus pain, swollen glands or vomiting. She has tried increased fluids (robitussin) for the symptoms.  The treatment provided no relief.   home covid test negative      Past Medical History:   Diagnosis Date    Allergic     Anemia     Arthritis of left hip 11/10/2020    Brain condition     pt states a cluster of blood vessels left side of brain    Cancer [facility]) 1/2013    endometrial cancer/ Dr. Alex Grady and Dr. Armando Ohara Cerebral cavernoma     followed by Dr. Lamberto Reaves at [place] [address] Complication of anesthesia     Takes a long time to wake up    Diverticulosis     Endometrial cancer ([facility]     Fungus infection     Fungus infection on bilat toes.  GERD (gastroesophageal reflux disease)     Hyperlipidemia     Hypertension     Hypomagnesemia 1/2016    consult with Dr. Reji Alvarado ([facility]) and no renal wasting. felt most likely d/t HCTz and PPI,  HCTZ discontinued,  increased mag ox to 400 mg 2 tabs BID    Hypothyroidism     Irritable bowel syndrome     MVP (mitral valve prolapse)     JOQAUIN (obstructive sleep apnea)     Paronychia of great toe, left 7/11/2017    Pneumonia     infant    Prolonged emergence from general anesthesia     Type 2 diabetes mellitus without complication ([facility]     Type II or unspecified type diabetes mellitus without mention of complication, not stated as uncontrolled     Unspecified sleep apnea     Vitamin B12 deficiency     Vitamin D deficiency          Review of Systems   Constitutional: Positive for appetite change, fatigue and fever. HENT: Positive for congestion (slight), rhinorrhea, sneezing and sore throat (yesterday). Negative for ear pain and sinus pain. Respiratory: Positive for cough (MPC white mucous) and wheezing (slight). Cardiovascular: Negative for chest pain. Gastrointestinal: Positive for diarrhea and nausea. Negative for abdominal pain and vomiting. Genitourinary: Negative for dysuria. Musculoskeletal: Positive for arthralgias, joint pain and myalgias. Negative for neck pain. Skin: Negative for rash. Neurological: Positive for headaches.         Past Medical History:   Diagnosis Date    Allergic     Anemia     Arthritis of left hip 11/10/2020    Brain condition     pt states a cluster of blood vessels left side of brain    Cancer [facility]) 1/2013    endometrial cancer/  [place] and Dr. Chelsea Franco    Cerebral cavernoma     followed by Dr. Laura Villalba at [place] [address] Complication of anesthesia     Takes a long time to wake up    Diverticulosis     Endometrial cancer ([facility]     Fungus infection     Fungus infection on bilat toes.  GERD (gastroesophageal reflux disease)     Hyperlipidemia     Hypertension     Hypomagnesemia 1/2016    consult with Dr. Rachael Alarcon ([facility]) and no renal wasting. felt most likely d/t HCTz and PPI,  HCTZ discontinued,  increased mag ox to 400 mg 2 tabs BID    Hypothyroidism     Irritable bowel syndrome     MVP (mitral valve prolapse)     JOAQUIN (obstructive sleep apnea)     Paronychia of great toe, left 7/11/2017    Pneumonia     infant    Prolonged emergence from general anesthesia     Type 2 diabetes mellitus without complication ([facility]     Type II or unspecified type diabetes mellitus without mention of complication, not stated as uncontrolled     Unspecified sleep apnea     Vitamin B12 deficiency     Vitamin D deficiency      Family History   Problem Relation Age of Onset    Cancer Mother         colon    COPD Father     Heart Disease Father     Cancer Father         NMSC, skin     Past Surgical History:   Procedure Laterality Date    COLONOSCOPY      COLONOSCOPY  07/14/2016    COLONOSCOPY      CYSTOSCOPY  10/18/2017    Cystoscopy with Urethral dilation    CYSTOSCOPY Bilateral 10/18/2017    Cystoscopy with bilateral retrograde pyelograms    DIAGNOSTIC CARDIAC CATH LAB PROCEDURE  1/12/2005    One Dr Wilmer Clemons, No CAD.     DILATION AND CURETTAGE OF UTERUS      ENDOSCOPY, COLON, DIAGNOSTIC      HYSTERECTOMY  1/28/2013    due to uterine cancer    OTHER SURGICAL HISTORY  10/18/2017    cystoscopy, bilateral retrogrades    OTHER SURGICAL HISTORY Left 03/02/2017    port removal    SALIVARY GLAND SURGERY      KINGSLEY AND BSO  1/28/13    THYROID SURGERY      right thyroidectomy for benign reasons    UPPER GASTROINTESTINAL ENDOSCOPY 07/14/2016    UPPER GASTROINTESTINAL ENDOSCOPY      UPPER GASTROINTESTINAL ENDOSCOPY N/A 7/9/2021    EGD WITH DIANE performed by Lillie Donahue MD at [address]  7/9/2021    EGD BIOPSY performed by Lillie Donahue MD at [address] [facility] History     Socioeconomic History    Marital status:      Spouse name: Not on file    Number of children: Not on file    Years of education: Not on file    Highest education level: Not on file   Occupational History    Not on file   Tobacco Use    Smoking status: Never Smoker    Smokeless tobacco: Never Used   Vaping Use    Vaping Use: Never used   Substance and Sexual Activity    Alcohol use: No     Alcohol/week: 0.0 standard drinks    Drug use: No    Sexual activity: Never   Other Topics Concern    Not on file   Social History Narrative    Not on file     Social Determinants of Health     Financial Resource Strain: Low Risk     Difficulty of Paying Living Expenses: Not hard at all   Food Insecurity: No Food Insecurity    Worried About [address] in the Last Year: Never true    [address] in the Last Year: Never true   Transportation Needs:     Lack of Transportation (Medical): Not on file    Lack of Transportation (Non-Medical):  Not on file   Physical Activity:     Days of Exercise per Week: Not on file    Minutes of Exercise per Session: Not on file   Stress:     Feeling of Stress : Not on file   Social Connections:     Frequency of Communication with Friends and Family: Not on file    Frequency of Social Gatherings with Friends and Family: Not on file    Attends Scientologist Services: Not on file    Active Member of Clubs or Organizations: Not on file    Attends Club or Organization Meetings: Not on file    Marital Status: Not on file   Intimate Partner Violence:     Fear of Current or Ex-Partner: Not on file    Emotionally Abused: Not on file    Physically Abused: personally by me duringthe office visit. All problems listed in the assessment are stable unless noted otherwise. Medication profile reviewed personally by me during the office visit. Medication side effects and possible impairments frommedications were discussed as applicable. Objective:     Physical Exam  Not applicable due to telephone visit  There were no vitals taken for this visit. There is no height or weight on file to calculate BMI. BP Readings from Last 2 Encounters:   04/20/22 (!) 140/80   03/09/22 138/72       Wt Readings from Last 3 Encounters:   04/20/22 157 lb 9.6 oz (71.5 kg)   03/09/22 158 lb (71.7 kg)   12/02/21 158 lb (71.7 kg)       Lab Review   Office Visit on 03/09/2022   Component Date Value    TSH 03/09/2022 2.23        No results found for this visit on 04/28/22. Assessment:       1. Suspected COVID-19 virus infection    2. Fever, unspecified fever cause    3. Myalgia    4. Cough        No results found for this visit on 04/28/22. Plan:       Martina Parker was seen today for cough, congestion and other. Diagnoses and all orders for this visit:    Suspected COVID-19 virus infection  Fever, unspecified fever cause  Myalgia  Cough  -     RAPID INFLUENZA A/B ANTIGENS; Future  -     COVID-19; Future    Symptomatic therapy suggested: push fluids, rest, use vaporizer or mist prn, use cough suppressant of choice prn and return office visit prn if symptoms persist or worsen. Lack of antibiotic effectiveness discussed with her. Call or return to clinic prn if these symptoms worsen or fail to improve as anticipated. Patient has been instructed call the office immediately with new symptoms, change in symptoms or worseningof symptoms. If this is not feasible, patient is instructed to report to the emergency room. Medication profile reviewed. Medication side effects and possible impairments from medications were discussed as applicable. Allergies were reviewed.  Health maintenance was reviewed and updated as appropriate. Return if symptoms worsen or fail to improve. (Comment: Please note this report has been produced using a combination of typing and speech recognition software and may contain errors related to that system including errors in grammar, punctuation, and spelling, as well as words and phrases that may be inappropriate. If there are any questions or concerns please feel free to contact the dictating provider for clarification.)      Documentation:  I communicated with the patient and/or health care decision maker about above conditions. Details of this discussion including any medical advice provided: as noted above      I affirm this is a Patient Initiated Episode with a Patient who has not had a related appointment within my department in the past 7 days or scheduled within the next 24 hours. Patient identification was verified at the start of the visit: Yes    Total Time: minutes: 5-10 minutes    Shala Garg, was evaluated through a synchronous (real-time) audio-video encounter. The patient (or guardian if applicable) is aware that this is a billable service, which includes applicable co-pays. This Virtual Visit was conducted with patient's (and/or legal guardian's) consent. The visit was conducted pursuant to the emergency declaration under the [facility], [address] [place] [address] waiver authority and the [employer] General Act. Patient identification was verified, and a caregiver was present when appropriate. The patient was located at home in a state where the provider was licensed to provide care.        Note: not billable if this call serves to triage the patient into an appointment for the relevant concern      ALEKSANDAR Benoit CNP

## 2022-04-29 LAB — SARS-COV-2: NOT DETECTED

## 2022-05-02 ENCOUNTER — TELEPHONE (OUTPATIENT)
Dept: FAMILY MEDICINE CLINIC | Age: 77
End: 2022-05-02

## 2022-05-02 DIAGNOSIS — J40 BRONCHITIS: Primary | ICD-10-CM

## 2022-05-02 RX ORDER — AZITHROMYCIN 250 MG/1
250 TABLET, FILM COATED ORAL SEE ADMIN INSTRUCTIONS
Qty: 6 TABLET | Refills: 0 | Status: SHIPPED | OUTPATIENT
Start: 2022-05-02 | End: 2022-05-07

## 2022-05-02 NOTE — TELEPHONE ENCOUNTER
Pt called stating that she's feeling a little better, but still has a cough and weak, no fever. Pt is wanting to see PCP because she's still not feeling better. Okay to schedule in office?  Call back pt with recommendations [phone]

## 2022-05-02 NOTE — TELEPHONE ENCOUNTER
May send prescription for a Z-Lorenzo use as directed #1 no refill for diagnosis of bronchitis and then inform patient once a prescription has been sent

## 2022-05-18 ENCOUNTER — HOSPITAL ENCOUNTER (OUTPATIENT)
Age: 77
Discharge: HOME | End: 2022-05-18
Payer: MEDICARE

## 2022-05-18 ENCOUNTER — HOSPITAL ENCOUNTER (OUTPATIENT)
Dept: GENERAL RADIOLOGY | Age: 77
Discharge: HOME | End: 2022-05-18
Payer: MEDICARE

## 2022-05-18 ENCOUNTER — OFFICE VISIT (OUTPATIENT)
Dept: FAMILY MEDICINE CLINIC | Age: 77
End: 2022-05-18
Payer: MEDICARE

## 2022-05-18 VITALS
WEIGHT: 159 LBS | SYSTOLIC BLOOD PRESSURE: 165 MMHG | BODY MASS INDEX: 29.26 KG/M2 | HEART RATE: 68 BPM | HEIGHT: 62 IN | DIASTOLIC BLOOD PRESSURE: 71 MMHG | OXYGEN SATURATION: 98 %

## 2022-05-18 DIAGNOSIS — R05.9 COUGH: ICD-10-CM

## 2022-05-18 DIAGNOSIS — R05.9 COUGH: Primary | ICD-10-CM

## 2022-05-18 DIAGNOSIS — R06.89 DECREASED BREATH SOUNDS: ICD-10-CM

## 2022-05-18 PROCEDURE — G8427 DOCREV CUR MEDS BY ELIG CLIN: HCPCS | Performed by: NURSE PRACTITIONER

## 2022-05-18 PROCEDURE — 99213 OFFICE O/P EST LOW 20 MIN: CPT | Performed by: NURSE PRACTITIONER

## 2022-05-18 PROCEDURE — 1090F PRES/ABSN URINE INCON ASSESS: CPT | Performed by: NURSE PRACTITIONER

## 2022-05-18 PROCEDURE — G8399 PT W/DXA RESULTS DOCUMENT: HCPCS | Performed by: NURSE PRACTITIONER

## 2022-05-18 PROCEDURE — 3017F COLORECTAL CA SCREEN DOC REV: CPT | Performed by: NURSE PRACTITIONER

## 2022-05-18 PROCEDURE — G8417 CALC BMI ABV UP PARAM F/U: HCPCS | Performed by: NURSE PRACTITIONER

## 2022-05-18 PROCEDURE — 1036F TOBACCO NON-USER: CPT | Performed by: NURSE PRACTITIONER

## 2022-05-18 PROCEDURE — 71046 X-RAY EXAM CHEST 2 VIEWS: CPT

## 2022-05-18 PROCEDURE — 1123F ACP DISCUSS/DSCN MKR DOCD: CPT | Performed by: NURSE PRACTITIONER

## 2022-05-18 NOTE — PROGRESS NOTES
[address]  [address] [facility] [address]  Dept: [phone]  Dept Fax: [phone]  Loc: [phone]    Navya Avalos is a 78 y.o. female who presents today for her medical conditions/complaints as noted below. Navya Avalos is c/o of Cough (F/U on Bronchitits )       Subjective:     Chief Complaint   Patient presents with    Cough     F/U on Bronchitits        HPI   Patient is here for follow up on 4/28/22 Virtual visit where she was suspected of having COVID   She did have negative covid and influenza testing. She waited a few days and felt her symptoms were not improving so zpak given for probable bronchitis  She states she is doing better but still has a cough. Patient now admits that she had no taste for about 3 days  Reflux has been slightly better since having \"bronchitis\"   She is having some wheezing at night      Past Medical History:   Diagnosis Date    Allergic     Anemia     Arthritis of left hip 11/10/2020    Brain condition     pt states a cluster of blood vessels left side of brain    Cancer [facility]) 1/2013    endometrial cancer/ Dr. Kaykay Sky and Dr. Zoila Chew Cerebral cavernoma     followed by Dr. Hernanedz Franklin at [facility] [address] Complication of anesthesia     Takes a long time to wake up    Diverticulosis     Endometrial cancer ([facility] [address]     Fungus infection     Fungus infection on bilat toes.  GERD (gastroesophageal reflux disease)     Hyperlipidemia     Hypertension     Hypomagnesemia 1/2016    consult with Dr. Alex Aponte ([facility]) and no renal wasting.  felt most likely d/t HCTz and PPI,  HCTZ discontinued,  increased mag ox to 400 mg 2 tabs BID    Hypothyroidism     Irritable bowel syndrome     MVP (mitral valve prolapse)     JOAQUIN (obstructive sleep apnea)     Paronychia of great toe, left 7/11/2017    Pneumonia     infant    Prolonged emergence from general anesthesia     Type 2 diabetes mellitus without complication ([facility]     Type II or unspecified type diabetes mellitus without mention of complication, not stated as uncontrolled     Unspecified sleep apnea     Vitamin B12 deficiency     Vitamin D deficiency          Review of Systems   Constitutional: Negative. Negative for appetite change, fatigue and unexpected weight change. HENT: Negative. Eyes: Negative. Respiratory: Positive for cough, chest tightness and wheezing. Negative for apnea, choking, shortness of breath and stridor. Cardiovascular: Negative. Negative for chest pain, palpitations and leg swelling. Gastrointestinal: Negative. Negative for abdominal pain, anal bleeding, blood in stool and nausea. Endocrine: Negative. Genitourinary: Negative. Negative for hematuria. Musculoskeletal: Negative. Skin: Negative. Negative for rash. Allergic/Immunologic: Negative. Neurological: Negative. Negative for dizziness, syncope, light-headedness and numbness. Hematological: Negative. Does not bruise/bleed easily. Psychiatric/Behavioral: Negative. All other systems reviewed and are negative. Past Medical History:   Diagnosis Date    Allergic     Anemia     Arthritis of left hip 11/10/2020    Brain condition     pt states a cluster of blood vessels left side of brain    Cancer [facility]) 1/2013    endometrial cancer/ Dr. Ronald Chavez and Dr. Rashard Merino Cerebral cavernoma     followed by [facility] Cordelia Kwok at [facility] [address] Complication of anesthesia     Takes a long time to wake up    Diverticulosis     Endometrial cancer ([facility]     Fungus infection     Fungus infection on bilat toes.  GERD (gastroesophageal reflux disease)     Hyperlipidemia     Hypertension     Hypomagnesemia 1/2016    consult with Dr. Lore Owens ([facility]) and no renal wasting.  felt most likely d/t HCTz and PPI,  HCTZ discontinued,  increased mag ox to 400 mg 2 tabs BID    Hypothyroidism     Irritable bowel syndrome     MVP (mitral valve prolapse)     JOAQUIN (obstructive sleep apnea)     Paronychia of great toe, left 7/11/2017    Pneumonia     infant    Prolonged emergence from general anesthesia     Type 2 diabetes mellitus without complication (HCC)     Type II or unspecified type diabetes mellitus without mention of complication, not stated as uncontrolled     Unspecified sleep apnea     Vitamin B12 deficiency     Vitamin D deficiency      Family History   Problem Relation Age of Onset    Cancer Mother         colon    COPD Father     Heart Disease Father     Cancer Father         NMSC, skin     Past Surgical History:   Procedure Laterality Date    COLONOSCOPY      COLONOSCOPY  07/14/2016    COLONOSCOPY      CYSTOSCOPY  10/18/2017    Cystoscopy with Urethral dilation    CYSTOSCOPY Bilateral 10/18/2017    Cystoscopy with bilateral retrograde pyelograms    DIAGNOSTIC CARDIAC CATH LAB PROCEDURE  1/12/2005    One Dr Chrissy Clemons, No CAD.     DILATION AND CURETTAGE OF UTERUS      ENDOSCOPY, COLON, DIAGNOSTIC      HYSTERECTOMY  1/28/2013    due to uterine cancer    OTHER SURGICAL HISTORY  10/18/2017    cystoscopy, bilateral retrogrades    OTHER SURGICAL HISTORY Left 03/02/2017    port removal    SALIVARY GLAND SURGERY      KINGSLEY AND BSO  1/28/13    THYROID SURGERY      right thyroidectomy for benign reasons    UPPER GASTROINTESTINAL ENDOSCOPY  07/14/2016    UPPER GASTROINTESTINAL ENDOSCOPY      UPPER GASTROINTESTINAL ENDOSCOPY N/A 7/9/2021    EGD WITH BRAVO performed by Carroll Swanson MD at [place] [address]  7/9/2021    EGD BIOPSY performed by Carroll Swanson MD at [address] [place] [address] History     Socioeconomic History    Marital status:      Spouse name: Not on file    Number of children: Not on file    Years of education: Not on file    Highest education level: Not on file   Occupational History    Not on file   Tobacco Use    Smoking status: Never Smoker    Smokeless tobacco: Never Used   Vaping Use    Vaping Use: Never used   Substance and Sexual Activity    Alcohol use: No     Alcohol/week: 0.0 standard drinks    Drug use: No    Sexual activity: Never   Other Topics Concern    Not on file   Social History Narrative    Not on file     Social Determinants of Health     Financial Resource Strain: Low Risk     Difficulty of Paying Living Expenses: Not hard at all   Food Insecurity: No Food Insecurity    Worried About Running Out of Food in the Last Year: Never true    [address] in the Last Year: Never true   Transportation Needs:     Lack of Transportation (Medical): Not on file    Lack of Transportation (Non-Medical):  Not on file   Physical Activity:     Days of Exercise per Week: Not on file    Minutes of Exercise per Session: Not on file   Stress:     Feeling of Stress : Not on file   Social Connections:     Frequency of Communication with Friends and Family: Not on file    Frequency of Social Gatherings with Friends and Family: Not on file    Attends Church Services: Not on file    Active Member of [address] [place] [address] or Organizations: Not on file    Attends Club or Organization Meetings: Not on file    Marital Status: Not on file   Intimate Partner Violence:     Fear of Current or Ex-Partner: Not on file    Emotionally Abused: Not on file    Physically Abused: Not on file    Sexually Abused: Not on file   Housing Stability:     Unable to Pay for Housing in the Last Year: Not on file    Number of [place] in the Last Year: Not on file    Unstable Housing in the Last Year: Not on file     Current Outpatient Medications   Medication Sig Dispense Refill    aspirin 81 MG EC tablet Take 81 mg by mouth daily      blood glucose test strips (CONTOUR NEXT TEST) strip TEST ONCE DAILY 50 strip 11    losartan (COZAAR) 100 MG tablet TAKE 1 TABLET DAILY 90 tablet 1    levothyroxine (SYNTHROID) 75 MCG tablet TAKE 1 TABLET DAILY 90 tablet 0    amLODIPine (NORVASC) 5 MG tablet TAKE 1 TABLET DAILY 90 tablet 1    rosuvastatin (CRESTOR) 10 MG tablet Take 1 tablet by mouth daily TAKE 1 TABLET BY MOUTH NIGHTLY 90 tablet 3    metoprolol tartrate (LOPRESSOR) 25 MG tablet Take 1 tablet by mouth 2 times daily 15 tablet 0    famotidine (PEPCID) 40 MG tablet Take 1 tablet by mouth 2 times daily 180 tablet 3    metFORMIN (GLUCOPHAGE-XR) 500 MG extended release tablet TAKE 1 TABLET TWICE A  tablet 3    Alum Hydroxide-Mag Carbonate (GAVISCON PO) Take by mouth       sucralfate (CARAFATE) 1 GM tablet 3- 4 times a day      clobetasol (TEMOVATE) 0.05 % external solution       Cholecalciferol (VITAMIN D3) 25 MCG (1000 UT) TABS Take 1 tablet by mouth daily (Patient taking differently: Take by mouth daily Taking 2,000 units daily)      magnesium oxide (MAG-OX) 400 (241.3 Mg) MG TABS tablet Take 3 tablets by mouth 2 times daily For Hypomagnesemia (Patient taking differently: Take by mouth 2 times daily For Hypomagnesemia) 180 tablet 5     No current facility-administered medications for this visit. No changes in past medical history, past surgical history, social history, orfamily history were noted during the patient encounter unless specifically listed above. All updates of past medical history, past surgical history, social history, or family history were reviewed personally by me duringthe office visit. All problems listed in the assessment are stable unless noted otherwise. Medication profile reviewed personally by me during the office visit. Medication side effects and possible impairments frommedications were discussed as applicable. Objective:     Physical Exam  Constitutional:       General: She is not in acute distress. Appearance: Normal appearance. She is well-developed. She is not toxic-appearing. HENT:      Head: Normocephalic and atraumatic.       Right Ear: Hearing, tympanic membrane and ear canal normal.      Left Ear: Hearing, tympanic membrane and ear canal normal.      Nose: Nose normal.      Mouth/Throat:      Pharynx: Uvula midline. Eyes:      General: Lids are normal.      Conjunctiva/sclera: Conjunctivae normal.   Cardiovascular:      Rate and Rhythm: Normal rate and regular rhythm. Pulses: Normal pulses. Heart sounds: Normal heart sounds. Pulmonary:      Effort: Pulmonary effort is normal. No accessory muscle usage or respiratory distress. Breath sounds: Examination of the right-middle field reveals decreased breath sounds. Examination of the left-middle field reveals decreased breath sounds. Decreased breath sounds present. No wheezing, rhonchi or rales. Abdominal:      Palpations: Abdomen is soft. Tenderness: There is no abdominal tenderness. Musculoskeletal:      Cervical back: Neck supple. Lymphadenopathy:      Head:      Right side of head: No submental or submandibular adenopathy. Left side of head: No submental or submandibular adenopathy. Cervical: No cervical adenopathy. Skin:     General: Skin is warm and dry. Findings: No lesion or rash. Neurological:      Mental Status: She is alert and oriented to person, place, and time. Psychiatric:         Speech: Speech normal.         Behavior: Behavior normal. Behavior is cooperative. BP (!) 161/71 (Site: Left Upper Arm, Position: Sitting, Cuff Size: Large Adult)   Pulse 68   Ht 5' 2\" (1.575 m)   Wt 159 lb (72.1 kg)   SpO2 98%   BMI 29.08 kg/m²   Body mass index is 29.08 kg/m².     BP Readings from Last 2 Encounters:   05/18/22 (!) 161/71   04/20/22 (!) 140/80       Wt Readings from Last 3 Encounters:   05/18/22 159 lb (72.1 kg)   04/20/22 157 lb 9.6 oz (71.5 kg)   03/09/22 158 lb (71.7 kg)       Lab Review   Hospital Outpatient Visit on 04/28/2022   Component Date Value    Rapid Influenza A Ag 04/28/2022 Negative     Rapid Influenza B Ag 04/28/2022 Negative     SARS-CoV-2 04/28/2022 Not Detected        No results found for this visit on 05/18/22. Assessment:       1. Cough    2. Decreased breath sounds        No results found for this visit on 05/18/22. Plan:       Mirlande Bhatt was seen today for cough. Diagnoses and all orders for this visit:    Cough  -     XR CHEST STANDARD (2 VW); Future    Decreased breath sounds  -     XR CHEST STANDARD (2 VW); Future    covid and influenza negative  Still with cough after zpak  Now admits to loosing taste x 3 days. Most likely had covid even though test negative    Patient has been instructed call the office immediately with new symptoms, change in symptoms or worseningof symptoms. If this is not feasible, patient is instructed to report to the emergency room. Medication profile reviewed. Medication side effects and possible impairments from medications were discussed as applicable. Allergies were reviewed. Health maintenance was reviewed and updated as appropriate. Return if symptoms worsen or fail to improve. (Comment: Please note this report has been produced using a combination of typing and speech recognition software and may contain errors related to that system including errors in grammar, punctuation, and spelling, as well as words and phrases that may be inappropriate.  If there are any questions or concerns please feel free to contact the dictating provider for clarification.)

## 2022-05-19 NOTE — PATIENT INSTRUCTIONS
Patient given following instructions - You are on medications which could impair your senses, you are at risk of weakness, falls, dizziness, or drowsiness. You should be careful during activities which could place you at risk of harm, such as climbing, using stairs, operating machinery, or driving vehicles. If you feel you cannot safely do these activities, you should request others to help you, or avoid the activities altogether. If you are drowsy for any other reason, you should use the same precautions as listed above.
Statement Selected

## 2022-05-20 ENCOUNTER — TELEPHONE (OUTPATIENT)
Dept: FAMILY MEDICINE CLINIC | Age: 77
End: 2022-05-20

## 2022-05-20 DIAGNOSIS — J40 BRONCHITIS: Primary | ICD-10-CM

## 2022-05-20 DIAGNOSIS — R06.2 WHEEZING: ICD-10-CM

## 2022-05-20 PROBLEM — Z01.810 PRE-OPERATIVE CARDIOVASCULAR EXAMINATION: Status: RESOLVED | Noted: 2022-04-20 | Resolved: 2022-05-20

## 2022-05-20 RX ORDER — ALBUTEROL SULFATE 90 UG/1
2 AEROSOL, METERED RESPIRATORY (INHALATION) EVERY 6 HOURS PRN
Qty: 18 G | Refills: 0 | Status: SHIPPED | OUTPATIENT
Start: 2022-05-20 | End: 2022-07-14 | Stop reason: ALTCHOICE

## 2022-05-20 ASSESSMENT — ENCOUNTER SYMPTOMS
CHOKING: 0
STRIDOR: 0
COUGH: 1
GASTROINTESTINAL NEGATIVE: 1
WHEEZING: 1
ABDOMINAL PAIN: 0
ANAL BLEEDING: 0
CHEST TIGHTNESS: 1
EYES NEGATIVE: 1
SHORTNESS OF BREATH: 0
APNEA: 0
BLOOD IN STOOL: 0
ALLERGIC/IMMUNOLOGIC NEGATIVE: 1
NAUSEA: 0

## 2022-05-20 NOTE — TELEPHONE ENCOUNTER
----- Message from ALEKSANDAR Radford CNP sent at 5/19/2022  1:07 PM EDT -----  Recommend albuterol MDI 2 inhalations every 6 hours x1 week #1 inhaler with no refill

## 2022-05-24 DIAGNOSIS — I10 ESSENTIAL HYPERTENSION: ICD-10-CM

## 2022-05-24 RX ORDER — AMLODIPINE BESYLATE 5 MG/1
TABLET ORAL
Qty: 90 TABLET | Refills: 1 | Status: SHIPPED | OUTPATIENT
Start: 2022-05-24 | End: 2022-10-17 | Stop reason: SDUPTHER

## 2022-06-09 ENCOUNTER — OFFICE VISIT (OUTPATIENT)
Dept: FAMILY MEDICINE CLINIC | Age: 77
End: 2022-06-09
Payer: MEDICARE

## 2022-06-09 ENCOUNTER — TELEPHONE (OUTPATIENT)
Dept: FAMILY MEDICINE CLINIC | Age: 77
End: 2022-06-09

## 2022-06-09 VITALS
OXYGEN SATURATION: 99 % | WEIGHT: 159 LBS | BODY MASS INDEX: 29.26 KG/M2 | DIASTOLIC BLOOD PRESSURE: 75 MMHG | HEART RATE: 60 BPM | SYSTOLIC BLOOD PRESSURE: 170 MMHG | HEIGHT: 62 IN

## 2022-06-09 DIAGNOSIS — I10 ESSENTIAL HYPERTENSION: Primary | ICD-10-CM

## 2022-06-09 PROCEDURE — G8417 CALC BMI ABV UP PARAM F/U: HCPCS | Performed by: NURSE PRACTITIONER

## 2022-06-09 PROCEDURE — 99213 OFFICE O/P EST LOW 20 MIN: CPT | Performed by: NURSE PRACTITIONER

## 2022-06-09 PROCEDURE — 1090F PRES/ABSN URINE INCON ASSESS: CPT | Performed by: NURSE PRACTITIONER

## 2022-06-09 PROCEDURE — 3017F COLORECTAL CA SCREEN DOC REV: CPT | Performed by: NURSE PRACTITIONER

## 2022-06-09 PROCEDURE — G8427 DOCREV CUR MEDS BY ELIG CLIN: HCPCS | Performed by: NURSE PRACTITIONER

## 2022-06-09 PROCEDURE — 1123F ACP DISCUSS/DSCN MKR DOCD: CPT | Performed by: NURSE PRACTITIONER

## 2022-06-09 PROCEDURE — 1036F TOBACCO NON-USER: CPT | Performed by: NURSE PRACTITIONER

## 2022-06-09 PROCEDURE — G8399 PT W/DXA RESULTS DOCUMENT: HCPCS | Performed by: NURSE PRACTITIONER

## 2022-06-09 RX ORDER — HYDROCHLOROTHIAZIDE 12.5 MG/1
12.5 CAPSULE, GELATIN COATED ORAL EVERY MORNING
Qty: 30 CAPSULE | Refills: 0 | Status: SHIPPED | OUTPATIENT
Start: 2022-06-09 | End: 2022-07-14 | Stop reason: SDUPTHER

## 2022-06-09 ASSESSMENT — PATIENT HEALTH QUESTIONNAIRE - PHQ9
7. TROUBLE CONCENTRATING ON THINGS, SUCH AS READING THE NEWSPAPER OR WATCHING TELEVISION: 0
SUM OF ALL RESPONSES TO PHQ QUESTIONS 1-9: 1
10. IF YOU CHECKED OFF ANY PROBLEMS, HOW DIFFICULT HAVE THESE PROBLEMS MADE IT FOR YOU TO DO YOUR WORK, TAKE CARE OF THINGS AT HOME, OR GET ALONG WITH OTHER PEOPLE: 0
8. MOVING OR SPEAKING SO SLOWLY THAT OTHER PEOPLE COULD HAVE NOTICED. OR THE OPPOSITE, BEING SO FIGETY OR RESTLESS THAT YOU HAVE BEEN MOVING AROUND A LOT MORE THAN USUAL: 0
4. FEELING TIRED OR HAVING LITTLE ENERGY: 1
9. THOUGHTS THAT YOU WOULD BE BETTER OFF DEAD, OR OF HURTING YOURSELF: 0
SUM OF ALL RESPONSES TO PHQ QUESTIONS 1-9: 1
5. POOR APPETITE OR OVEREATING: 0
1. LITTLE INTEREST OR PLEASURE IN DOING THINGS: 0
6. FEELING BAD ABOUT YOURSELF - OR THAT YOU ARE A FAILURE OR HAVE LET YOURSELF OR YOUR FAMILY DOWN: 0
3. TROUBLE FALLING OR STAYING ASLEEP: 0
SUM OF ALL RESPONSES TO PHQ9 QUESTIONS 1 & 2: 0
SUM OF ALL RESPONSES TO PHQ QUESTIONS 1-9: 1
2. FEELING DOWN, DEPRESSED OR HOPELESS: 0
SUM OF ALL RESPONSES TO PHQ QUESTIONS 1-9: 1

## 2022-06-09 ASSESSMENT — ENCOUNTER SYMPTOMS
EYES NEGATIVE: 1
RESPIRATORY NEGATIVE: 1
SHORTNESS OF BREATH: 0
NAUSEA: 0
ALLERGIC/IMMUNOLOGIC NEGATIVE: 1
ANAL BLEEDING: 0
GASTROINTESTINAL NEGATIVE: 1
ABDOMINAL PAIN: 0
BLOOD IN STOOL: 0

## 2022-06-09 NOTE — TELEPHONE ENCOUNTER
Received a fax from [facility] requesting pt's most recent EKG tracing. Printed documentation and faxed to [phone]. Request and fax confirmation attached.

## 2022-06-09 NOTE — PROGRESS NOTES
[address] [facility]  [address] [place] [id number]  Dept: [phone]  Dept Fax: [phone]  Loc: [phone]    Camille Sherwood is a 78 y.o. female who presents today for her medical conditions/complaints as noted below. Camille Sherwood is c/o of Hypertension (BP has been running 130's, 160's over 60's. Denies edema or chest pain. Some SOB)       Subjective:     Chief Complaint   Patient presents with    Hypertension     BP has been running 130's, 160's over 60's. Denies edema or chest pain. Some SOB       HPI   The patient states that she went for preop evaluation/examination yesterday and was told that her blood pressure was elevated. At the last visit patient's systolic blood pressure was also elevated. Patient does bring in her blood pressure readings which do show a variation in her readings however most of the time there is an isolated systolic issue  Patient denies any exertional chest pain, dyspnea, palpitations, syncope, orthopnea, edema or paroxysmal nocturnal dyspnea. BP Readings from Last 3 Encounters:   06/09/22 (!) 167/72   05/18/22 (!) 165/71   04/20/22 (!) 140/80       Past Medical History:   Diagnosis Date    Allergic     Anemia     Arthritis of left hip 11/10/2020    Brain condition     pt states a cluster of blood vessels left side of brain    Cancer [facility]) 1/2013    endometrial cancer/ Dr. Tania Canela and Dr. Robina Quintana Cerebral cavernoma     followed by Dr. Ruiz Peterson at [place] [address] Complication of anesthesia     Takes a long time to wake up    Diverticulosis     Endometrial cancer ([facility] [address]     Fungus infection     Fungus infection on bilat toes.  GERD (gastroesophageal reflux disease)     Hyperlipidemia     Hypertension     Hypomagnesemia 1/2016    consult with Dr. Matthew Dacosta ([facility] and no renal wasting.  felt most likely d/t HCTz and PPI,  HCTZ discontinued,  increased mag ox to 400 mg 2 tabs BID    Hypothyroidism  Irritable bowel syndrome     MVP (mitral valve prolapse)     JOAQUIN (obstructive sleep apnea)     Paronychia of great toe, left 7/11/2017    Pneumonia     infant    Prolonged emergence from general anesthesia     Type 2 diabetes mellitus without complication (HCC)     Type II or unspecified type diabetes mellitus without mention of complication, not stated as uncontrolled     Unspecified sleep apnea     Vitamin B12 deficiency     Vitamin D deficiency          Review of Systems   Constitutional: Negative. Negative for appetite change, fatigue and unexpected weight change. HENT: Negative. Eyes: Negative. Respiratory: Negative. Negative for shortness of breath. Cardiovascular: Negative. Negative for chest pain, palpitations and leg swelling. Gastrointestinal: Negative. Negative for abdominal pain, anal bleeding, blood in stool and nausea. Endocrine: Negative. Genitourinary: Negative. Negative for hematuria. Musculoskeletal: Negative. Skin: Negative. Negative for rash. Allergic/Immunologic: Negative. Neurological: Negative. Negative for dizziness, syncope, light-headedness and numbness. Hematological: Negative. Does not bruise/bleed easily. Psychiatric/Behavioral: Negative. All other systems reviewed and are negative. Past Medical History:   Diagnosis Date    Allergic     Anemia     Arthritis of left hip 11/10/2020    Brain condition     pt states a cluster of blood vessels left side of brain    Cancer [facility]) 1/2013    endometrial cancer/ Dr. Meño Toro and Dr. Karan Ruelas Cerebral cavernoma     followed by Dr. Petra Patel at [facility] [address] Complication of anesthesia     Takes a long time to wake up    Diverticulosis     Endometrial cancer ([facility] [address]     Fungus infection     Fungus infection on bilat toes.      GERD (gastroesophageal reflux disease)     Hyperlipidemia     Hypertension     Hypomagnesemia 1/2016    consult with Dr. Adolfo Peter ([facility]) and no renal wasting. felt most likely d/t HCTz and PPI,  HCTZ discontinued,  increased mag ox to 400 mg 2 tabs BID    Hypothyroidism     Irritable bowel syndrome     MVP (mitral valve prolapse)     JOAQUIN (obstructive sleep apnea)     Paronychia of great toe, left 7/11/2017    Pneumonia     infant    Prolonged emergence from general anesthesia     Type 2 diabetes mellitus without complication ([facility] [address]     Type II or unspecified type diabetes mellitus without mention of complication, not stated as uncontrolled     Unspecified sleep apnea     Vitamin B12 deficiency     Vitamin D deficiency      Family History   Problem Relation Age of Onset    Cancer Mother         colon    COPD Father     Heart Disease Father     Cancer Father         NMSC, skin     Past Surgical History:   Procedure Laterality Date    COLONOSCOPY      COLONOSCOPY  07/14/2016    COLONOSCOPY      CYSTOSCOPY  10/18/2017    Cystoscopy with Urethral dilation    CYSTOSCOPY Bilateral 10/18/2017    Cystoscopy with bilateral retrograde pyelograms    DIAGNOSTIC CARDIAC CATH LAB PROCEDURE  1/12/2005    [address], Dr Phillip Vivas, No CAD.     DILATION AND CURETTAGE OF UTERUS      ENDOSCOPY, COLON, DIAGNOSTIC      HYSTERECTOMY (CERVIX STATUS UNKNOWN)  1/28/2013    due to uterine cancer    OTHER SURGICAL HISTORY  10/18/2017    cystoscopy, bilateral retrogrades    OTHER SURGICAL HISTORY Left 03/02/2017    port removal    SALIVARY GLAND SURGERY      KINGSLEY AND BSO (CERVIX REMOVED)  1/28/13    THYROID SURGERY      right thyroidectomy for benign reasons    UPPER GASTROINTESTINAL ENDOSCOPY  07/14/2016    UPPER GASTROINTESTINAL ENDOSCOPY      UPPER GASTROINTESTINAL ENDOSCOPY N/A 7/9/2021    EGD WITH DIANE performed by Lynne Chaudhari MD at [place] [address]  7/9/2021    EGD BIOPSY performed by Lynne Chaudhari MD at [address] [place] [address] History     Socioeconomic History    Marital status:      Spouse name: Not on file    Number of children: Not on file    Years of education: Not on file    Highest education level: Not on file   Occupational History    Not on file   Tobacco Use    Smoking status: Never Smoker    Smokeless tobacco: Never Used   Vaping Use    Vaping Use: Never used   Substance and Sexual Activity    Alcohol use: No     Alcohol/week: 0.0 standard drinks    Drug use: No    Sexual activity: Never   Other Topics Concern    Not on file   Social History Narrative    Not on file     Social Determinants of Health     Financial Resource Strain: Low Risk     Difficulty of Paying Living Expenses: Not hard at all   Food Insecurity: No Food Insecurity    Worried About [facility] in the Last Year: Never true    [facility] in the Last Year: Never true   Transportation Needs:     Lack of Transportation (Medical): Not on file    Lack of Transportation (Non-Medical):  Not on file   Physical Activity:     Days of Exercise per Week: Not on file    Minutes of Exercise per Session: Not on file   Stress:     Feeling of Stress : Not on file   Social Connections:     Frequency of Communication with Friends and Family: Not on file    Frequency of Social Gatherings with Friends and Family: Not on file    Attends Jehovah's witness Services: Not on file    Active Member of [address] [place] [address] or Organizations: Not on file    Attends Club or Organization Meetings: Not on file    Marital Status: Not on file   Intimate Partner Violence:     Fear of Current or Ex-Partner: Not on file    Emotionally Abused: Not on file    Physically Abused: Not on file    Sexually Abused: Not on file   Housing Stability:     Unable to Pay for Housing in the Last Year: Not on file    Number of [place] in the Last Year: Not on file    Unstable Housing in the Last Year: Not on file     Current Outpatient Medications   Medication Sig Dispense Refill    amLODIPine (NORVASC) 5 MG tablet TAKE 1 TABLET DAILY 90 tablet 1    aspirin 81 MG EC tablet Take 81 mg by mouth daily      blood glucose test strips (CONTOUR NEXT TEST) strip TEST ONCE DAILY 50 strip 11    losartan (COZAAR) 100 MG tablet TAKE 1 TABLET DAILY 90 tablet 1    levothyroxine (SYNTHROID) 75 MCG tablet TAKE 1 TABLET DAILY 90 tablet 0    rosuvastatin (CRESTOR) 10 MG tablet Take 1 tablet by mouth daily TAKE 1 TABLET BY MOUTH NIGHTLY 90 tablet 3    metoprolol tartrate (LOPRESSOR) 25 MG tablet Take 1 tablet by mouth 2 times daily 15 tablet 0    famotidine (PEPCID) 40 MG tablet Take 1 tablet by mouth 2 times daily 180 tablet 3    metFORMIN (GLUCOPHAGE-XR) 500 MG extended release tablet TAKE 1 TABLET TWICE A  tablet 3    Alum Hydroxide-Mag Carbonate (GAVISCON PO) Take by mouth       sucralfate (CARAFATE) 1 GM tablet 3- 4 times a day      clobetasol (TEMOVATE) 0.05 % external solution       Cholecalciferol (VITAMIN D3) 25 MCG (1000 UT) TABS Take 1 tablet by mouth daily (Patient taking differently: Take by mouth daily Taking 2,000 units daily)      magnesium oxide (MAG-OX) 400 (241.3 Mg) MG TABS tablet Take 3 tablets by mouth 2 times daily For Hypomagnesemia (Patient taking differently: Take by mouth 2 times daily For Hypomagnesemia) 180 tablet 5    albuterol sulfate HFA (VENTOLIN HFA) 108 (90 Base) MCG/ACT inhaler Inhale 2 puffs into the lungs every 6 hours as needed for Wheezing (Patient not taking: Reported on 6/9/2022) 18 g 0     No current facility-administered medications for this visit. No changes in past medical history, past surgical history, social history, orfamily history were noted during the patient encounter unless specifically listed above. All updates of past medical history, past surgical history, social history, or family history were reviewed personally by me duringthe office visit. All problems listed in the assessment are stable unless noted otherwise. Medication profile reviewed personally by me during the office visit.   Medication side effects and possible impairments frommedications were discussed as applicable. Objective:     Physical Exam  Constitutional:       General: She is not in acute distress. Appearance: Normal appearance. She is well-developed. She is not toxic-appearing. HENT:      Head: Normocephalic and atraumatic. Right Ear: Hearing, tympanic membrane and ear canal normal.      Left Ear: Hearing, tympanic membrane and ear canal normal.      Nose: Nose normal.      Mouth/Throat:      Pharynx: Uvula midline. Eyes:      General: Lids are normal.      Conjunctiva/sclera: Conjunctivae normal.   Cardiovascular:      Rate and Rhythm: Normal rate and regular rhythm. Pulses: Normal pulses. Heart sounds: Normal heart sounds. Pulmonary:      Effort: Pulmonary effort is normal. No accessory muscle usage or respiratory distress. Breath sounds: Normal breath sounds. Abdominal:      Palpations: Abdomen is soft. Tenderness: There is no abdominal tenderness. Musculoskeletal:      Cervical back: Neck supple. Lymphadenopathy:      Head:      Right side of head: No submental or submandibular adenopathy. Left side of head: No submental or submandibular adenopathy. Cervical: No cervical adenopathy. Skin:     General: Skin is warm and dry. Findings: No lesion or rash. Neurological:      Mental Status: She is alert and oriented to person, place, and time. Psychiatric:         Speech: Speech normal.         Behavior: Behavior normal. Behavior is cooperative. BP (!) 167/72 (Site: Left Upper Arm, Position: Sitting, Cuff Size: Large Adult)   Pulse 64   Ht 5' 2\" (1.575 m)   Wt 159 lb (72.1 kg)   SpO2 99%   BMI 29.08 kg/m²   Body mass index is 29.08 kg/m².     BP Readings from Last 2 Encounters:   06/09/22 (!) 167/72   05/18/22 (!) 165/71       Wt Readings from Last 3 Encounters:   06/09/22 159 lb (72.1 kg)   05/18/22 159 lb (72.1 kg)   04/20/22 157 lb 9.6 oz (71.5 kg)       Lab Review Hospital Outpatient Visit on 04/28/2022   Component Date Value    Rapid Influenza A Ag 04/28/2022 Negative     Rapid Influenza B Ag 04/28/2022 Negative     SARS-CoV-2 04/28/2022 Not Detected        No results found for this visit on 06/09/22. Assessment:       1. Essential hypertension        No results found for this visit on 06/09/22. Plan:       Corey Mishra was seen today for hypertension. Diagnoses and all orders for this visit:    Essential hypertension  Continue current blood pressure medication but add the following  -     hydroCHLOROthiazide (MICROZIDE) 12.5 MG capsule; Take 1 capsule by mouth every morning  Patient previously was on HCTZ but she was taken off a few years ago for concerns regarding hyponatremia as well as potential magnesium issues. It was found that patient's electrolyte imbalance mostly was related to PPI medication. Will go ahead and restart medication as above and then when patient follows up I will recheck her magnesium as well as a BMP    Patient already has follow-up scheduled on 6/16/2022    Patient has been instructed call the office immediately with new symptoms, change in symptoms or worseningof symptoms. If this is not feasible, patient is instructed to report to the emergency room. Medication profile reviewed. Medication side effects and possible impairments from medications were discussed as applicable. Allergies were reviewed. Health maintenance was reviewed and updated as appropriate. (Comment: Please note this report has been produced using a combination of typing and speech recognition software and may contain errors related to that system including errors in grammar, punctuation, and spelling, as well as words and phrases that may be inappropriate.  If there are any questions or concerns please feel free to contact the dictating provider for clarification.)

## 2022-06-10 ENCOUNTER — TELEPHONE (OUTPATIENT)
Dept: FAMILY MEDICINE CLINIC | Age: 77
End: 2022-06-10

## 2022-06-10 NOTE — TELEPHONE ENCOUNTER
Called and spoke w/ Marcial Orr at [facility]. Informed of providers recommendations.  They will be checking her BP the morning of, if it's uncontrolled they will advise pt what to do at that time based on PCP and surgeons recommendations

## 2022-06-10 NOTE — TELEPHONE ENCOUNTER
[id number] called and stated that they were needing to get a clearance for her surgery on Monday. They were waiting on her to have her blood pressure rechecked. Was needing to get the clearance can call them at [phone] or fax something to them at [phone].

## 2022-06-10 NOTE — TELEPHONE ENCOUNTER
I did not do a pre-op on the patient. I only saw her for a mildly elevated BP and started her on HCTZ yesterday. I do not know what her BP is currently since the medication was just started yesterday.   However only her systolic BP was mildly elevated and diastolic was normal.  I do not see any reason why she can not proceed with surgery unless her BP is uncontrolled the morning of surgery

## 2022-06-26 ENCOUNTER — TELEPHONE (OUTPATIENT)
Dept: FAMILY MEDICINE CLINIC | Age: 77
End: 2022-06-26

## 2022-06-26 RX ORDER — PHENAZOPYRIDINE HYDROCHLORIDE 200 MG/1
200 TABLET, FILM COATED ORAL 3 TIMES DAILY PRN
Qty: 15 TABLET | Refills: 0 | Status: SHIPPED | OUTPATIENT
Start: 2022-06-26 | End: 2022-06-29

## 2022-06-26 RX ORDER — SULFAMETHOXAZOLE AND TRIMETHOPRIM 800; 160 MG/1; MG/1
1 TABLET ORAL 2 TIMES DAILY
Qty: 14 TABLET | Refills: 0 | Status: SHIPPED | OUTPATIENT
Start: 2022-06-26 | End: 2022-07-03

## 2022-06-26 NOTE — TELEPHONE ENCOUNTER
Patient called and stated yesterday she started with dysuria and now has hematuria. The patient denies flank pain, f/c/n/v/d. Patient request atb be sent to [facility] pharmacy.     Patient notified if no improvement after 3 days she will need urine culture or if she has/develops abd pain/flank pain, f/c/n/v/d she should report to er   patient agrees with plan

## 2022-06-27 DIAGNOSIS — K21.9 GASTROESOPHAGEAL REFLUX DISEASE WITHOUT ESOPHAGITIS: ICD-10-CM

## 2022-06-27 RX ORDER — LEVOTHYROXINE SODIUM 0.07 MG/1
TABLET ORAL
Qty: 90 TABLET | Refills: 0 | Status: SHIPPED | OUTPATIENT
Start: 2022-06-27 | End: 2022-07-25 | Stop reason: SDUPTHER

## 2022-06-27 RX ORDER — FAMOTIDINE 40 MG/1
TABLET, FILM COATED ORAL
Qty: 180 TABLET | Refills: 0 | Status: SHIPPED | OUTPATIENT
Start: 2022-06-27 | End: 2022-10-17 | Stop reason: ALTCHOICE

## 2022-07-01 ENCOUNTER — OFFICE VISIT (OUTPATIENT)
Dept: FAMILY MEDICINE CLINIC | Age: 77
End: 2022-07-01
Payer: MEDICARE

## 2022-07-01 VITALS
BODY MASS INDEX: 28.52 KG/M2 | SYSTOLIC BLOOD PRESSURE: 149 MMHG | DIASTOLIC BLOOD PRESSURE: 62 MMHG | HEART RATE: 56 BPM | HEIGHT: 62 IN | WEIGHT: 155 LBS | OXYGEN SATURATION: 98 %

## 2022-07-01 DIAGNOSIS — R07.9 CHEST PAIN IN ADULT: Primary | ICD-10-CM

## 2022-07-01 DIAGNOSIS — K21.9 GASTROESOPHAGEAL REFLUX DISEASE WITHOUT ESOPHAGITIS: ICD-10-CM

## 2022-07-01 PROCEDURE — 93000 ELECTROCARDIOGRAM COMPLETE: CPT | Performed by: NURSE PRACTITIONER

## 2022-07-01 PROCEDURE — 1123F ACP DISCUSS/DSCN MKR DOCD: CPT | Performed by: NURSE PRACTITIONER

## 2022-07-01 PROCEDURE — 3017F COLORECTAL CA SCREEN DOC REV: CPT | Performed by: NURSE PRACTITIONER

## 2022-07-01 PROCEDURE — 1036F TOBACCO NON-USER: CPT | Performed by: NURSE PRACTITIONER

## 2022-07-01 PROCEDURE — G8399 PT W/DXA RESULTS DOCUMENT: HCPCS | Performed by: NURSE PRACTITIONER

## 2022-07-01 PROCEDURE — 1090F PRES/ABSN URINE INCON ASSESS: CPT | Performed by: NURSE PRACTITIONER

## 2022-07-01 PROCEDURE — 99214 OFFICE O/P EST MOD 30 MIN: CPT | Performed by: NURSE PRACTITIONER

## 2022-07-01 PROCEDURE — G8417 CALC BMI ABV UP PARAM F/U: HCPCS | Performed by: NURSE PRACTITIONER

## 2022-07-01 PROCEDURE — G8427 DOCREV CUR MEDS BY ELIG CLIN: HCPCS | Performed by: NURSE PRACTITIONER

## 2022-07-01 ASSESSMENT — ENCOUNTER SYMPTOMS
COUGH: 0
SHORTNESS OF BREATH: 1
WHEEZING: 0
APNEA: 0
GASTROINTESTINAL NEGATIVE: 1
STRIDOR: 0
CHOKING: 0
CHEST TIGHTNESS: 0

## 2022-07-01 NOTE — PROGRESS NOTES
[facility] PHYSICIAN PRACTICES  [facility] FAMILY MEDICINE  [address] [place] [address]  [id number]  Dept: [phone]  Dept Fax: [phone]  Loc: [phone]    Lavell Underwood is a 78 y.o. female who presents today for her medical conditions/complaints as noted below. Lavell Underwood is c/o of Other (Acid reflux verses chest pain)        HPI:     Chief Complaint   Patient presents with    Other     Acid reflux verses chest pain       HPI    She is s/p Laparoscopic paraesophageal hernia repair with magnetic sphincter augmentation on 06/13/22. She admits she is suppose to be eating every 2 hours, but her  had an appointment and she was in the car with the windows down waiting on him and was 4 hours without eating. Patient admits that on 06/29/2022 around 1130 am she started with chest pain when she was sitting in a car that last for 20 minutes. The pain started in her bilateral shoulders that radiated up to bilateral neck and ears and then to her chest and back. She states it started in her back and went up into her shoulders, throat and ears. She took a TUMS, burped and felt better. She admits to having more gas that night. She states she called her surgeon who she states told her she may need to see her primary care about an EKG. Won Turcios states she feels fine now. She has not had anymore chest pain. She has been trying to eat every 2 hours as recommended by her surgeon. Past Medical History:   Diagnosis Date    Allergic     Anemia     Arthritis of left hip 11/10/2020    Brain condition     pt states a cluster of blood vessels left side of brain    Cancer [facility]) 1/2013    endometrial cancer/ Dr. Dallin Villegas and Dr. Gifford Letters Cerebral cavernoma     followed by Dr. Ellie Boogie at [facility] [address] Complication of anesthesia     Takes a long time to wake up    Diverticulosis     Endometrial cancer ([facility] [address]     Fungus infection     Fungus infection on bilat toes.      GERD (gastroesophageal reflux disease)     Hyperlipidemia     Hypertension     Hypomagnesemia 1/2016    consult with Dr. Jocelyne Stout ([facility]) and no renal wasting. felt most likely d/t HCTz and PPI,  HCTZ discontinued,  increased mag ox to 400 mg 2 tabs BID    Hypothyroidism     Irritable bowel syndrome     MVP (mitral valve prolapse)     JOAQUIN (obstructive sleep apnea)     Paronychia of great toe, left 7/11/2017    Pneumonia     infant    Prolonged emergence from general anesthesia     Type 2 diabetes mellitus without complication ([address]     Type II or unspecified type diabetes mellitus without mention of complication, not stated as uncontrolled     Unspecified sleep apnea     Vitamin B12 deficiency     Vitamin D deficiency       Past Surgical History:   Procedure Laterality Date    COLONOSCOPY      COLONOSCOPY  07/14/2016    COLONOSCOPY      CYSTOSCOPY  10/18/2017    Cystoscopy with Urethral dilation    CYSTOSCOPY Bilateral 10/18/2017    Cystoscopy with bilateral retrograde pyelograms    DIAGNOSTIC CARDIAC CATH LAB PROCEDURE  1/12/2005    One Dr Tiana Clemons, No CAD.     DILATION AND CURETTAGE OF UTERUS      ENDOSCOPY, COLON, DIAGNOSTIC      HYSTERECTOMY (CERVIX STATUS UNKNOWN)  1/28/2013    due to uterine cancer    OTHER SURGICAL HISTORY  10/18/2017    cystoscopy, bilateral retrogrades    OTHER SURGICAL HISTORY Left 03/02/2017    port removal    SALIVARY GLAND SURGERY      KINGSLEY AND BSO (CERVIX REMOVED)  1/28/13    THYROID SURGERY      right thyroidectomy for benign reasons    UPPER GASTROINTESTINAL ENDOSCOPY  07/14/2016    UPPER GASTROINTESTINAL ENDOSCOPY      UPPER GASTROINTESTINAL ENDOSCOPY N/A 7/9/2021    EGD WITH DIANE performed by Lacey Colin MD at [facility]  7/9/2021    EGD BIOPSY performed by Lacey Colin MD at [facility]       Family History   Problem Relation Age of Onset    Cancer Mother         colon    COPD Father     Heart Disease Father    Cesar Tejeda Cancer Father         NMSC, skin       Social History     Tobacco Use    Smoking status: Never Smoker    Smokeless tobacco: Never Used   Substance Use Topics    Alcohol use: No     Alcohol/week: 0.0 standard drinks      Current Outpatient Medications   Medication Sig Dispense Refill    metoprolol tartrate (LOPRESSOR) 25 MG tablet TAKE 1 TABLET TWICE A  tablet 0    levothyroxine (SYNTHROID) 75 MCG tablet TAKE 1 TABLET DAILY 90 tablet 0    famotidine (PEPCID) 40 MG tablet TAKE 1 TABLET TWICE A  tablet 0    sulfamethoxazole-trimethoprim (BACTRIM DS) 800-160 MG per tablet Take 1 tablet by mouth 2 times daily for 7 days 14 tablet 0    hydroCHLOROthiazide (MICROZIDE) 12.5 MG capsule Take 1 capsule by mouth every morning 30 capsule 0    amLODIPine (NORVASC) 5 MG tablet TAKE 1 TABLET DAILY 90 tablet 1    albuterol sulfate HFA (VENTOLIN HFA) 108 (90 Base) MCG/ACT inhaler Inhale 2 puffs into the lungs every 6 hours as needed for Wheezing 18 g 0    aspirin 81 MG EC tablet Take 81 mg by mouth daily      blood glucose test strips (CONTOUR NEXT TEST) strip TEST ONCE DAILY 50 strip 11    losartan (COZAAR) 100 MG tablet TAKE 1 TABLET DAILY 90 tablet 1    rosuvastatin (CRESTOR) 10 MG tablet Take 1 tablet by mouth daily TAKE 1 TABLET BY MOUTH NIGHTLY 90 tablet 3    metFORMIN (GLUCOPHAGE-XR) 500 MG extended release tablet TAKE 1 TABLET TWICE A  tablet 3    Alum Hydroxide-Mag Carbonate (GAVISCON PO) Take by mouth       sucralfate (CARAFATE) 1 GM tablet 3- 4 times a day      clobetasol (TEMOVATE) 0.05 % external solution       Cholecalciferol (VITAMIN D3) 25 MCG (1000 UT) TABS Take 1 tablet by mouth daily (Patient taking differently: Take by mouth daily Taking 2,000 units daily)      magnesium oxide (MAG-OX) 400 (241.3 Mg) MG TABS tablet Take 3 tablets by mouth 2 times daily For Hypomagnesemia (Patient taking differently: Take by mouth 2 times daily For Hypomagnesemia) 180 tablet 5     No current facility-administered medications for this visit. Allergies   Allergen Reactions    Aspirin Other (See Comments)     Brain cavernoma - avoid all blood thinners    Lipitor Other (See Comments)     Muscle cramps    Other      Avoid blood thinners    Sodium Hydroxide Other (See Comments)     Blisters    Stadol [Butorphanol Tartrate] Other (See Comments)     Increased BP    Vicodin [Hydrocodone-Acetaminophen]      Elevates blood pressure    Lamisil [Terbinafine] Rash    Prilosec [Omeprazole] Rash       :      Review of Systems   Constitutional: Negative. Respiratory: Positive for shortness of breath (During episode of pain ). Negative for apnea, cough, choking, chest tightness, wheezing and stridor. Cardiovascular: Positive for chest pain. Negative for palpitations and leg swelling. Gastrointestinal: Negative. Skin: Negative. Allergic/Immunologic: Negative. Neurological: Negative. Psychiatric/Behavioral: Negative. Objective:     Vitals:    07/01/22 1107 07/01/22 1122   BP: (!) 150/63 (!) 149/62   Site: Left Upper Arm Left Upper Arm   Position: Sitting Sitting   Cuff Size: Medium Adult Medium Adult   Pulse: 60 56   SpO2: 98%    Weight: 155 lb (70.3 kg)    Height: 5' 2\" (1.575 m)      Wt Readings from Last 3 Encounters:   07/01/22 155 lb (70.3 kg)   06/09/22 159 lb (72.1 kg)   05/18/22 159 lb (72.1 kg)     Temp Readings from Last 3 Encounters:   04/20/22 97.9 °F (36.6 °C)   08/24/21 96.7 °F (35.9 °C)   07/09/21 97.4 °F (36.3 °C) (Temporal)     BP Readings from Last 3 Encounters:   07/01/22 (!) 149/62   06/09/22 (!) 170/75   05/18/22 (!) 165/71     Pulse Readings from Last 3 Encounters:   07/01/22 56   06/09/22 60   05/18/22 68     Physical Exam  Vitals and nursing note reviewed. Constitutional:       General: She is not in acute distress. Appearance: Normal appearance. She is well-developed. She is not diaphoretic. HENT:      Head: Normocephalic and atraumatic.       Right Ear: Tympanic membrane, ear canal and external ear normal. There is no impacted cerumen. Left Ear: Tympanic membrane, ear canal and external ear normal. There is no impacted cerumen. Nose: Nose normal. No congestion or rhinorrhea. Mouth/Throat:      Mouth: Mucous membranes are moist.      Pharynx: Oropharynx is clear. No oropharyngeal exudate or posterior oropharyngeal erythema. Eyes:      General: No scleral icterus. Right eye: No discharge. Left eye: No discharge. Extraocular Movements: Extraocular movements intact. Conjunctiva/sclera: Conjunctivae normal.   Neck:      Vascular: No carotid bruit. Trachea: No tracheal deviation. Cardiovascular:      Rate and Rhythm: Normal rate and regular rhythm. Pulses: Normal pulses. Heart sounds: Normal heart sounds. No murmur heard. No friction rub. No gallop. Pulmonary:      Effort: Pulmonary effort is normal. No respiratory distress. Breath sounds: Normal breath sounds. No stridor. No wheezing, rhonchi or rales. Chest:      Chest wall: No tenderness. Abdominal:      General: Bowel sounds are normal. There is no distension. Palpations: Abdomen is soft. There is no mass. Tenderness: There is no abdominal tenderness. There is no guarding or rebound. Hernia: No hernia is present. Musculoskeletal:         General: No swelling, tenderness, deformity or signs of injury. Normal range of motion. Cervical back: Normal range of motion and neck supple. No rigidity. No muscular tenderness. Right lower leg: No edema. Left lower leg: No edema. Lymphadenopathy:      Cervical: No cervical adenopathy. Skin:     General: Skin is warm and dry. Capillary Refill: Capillary refill takes less than 2 seconds. Coloration: Skin is not jaundiced or pale. Findings: No bruising, erythema, lesion or rash. Neurological:      General: No focal deficit present.       Mental Status: She is alert and oriented to person, place, and time. Mental status is at baseline. Cranial Nerves: No cranial nerve deficit. Sensory: No sensory deficit. Motor: No weakness or abnormal muscle tone. Coordination: Coordination normal.      Gait: Gait normal.      Deep Tendon Reflexes: Reflexes are normal and symmetric. Reflexes normal.   Psychiatric:         Mood and Affect: Mood normal.         Behavior: Behavior normal.         Thought Content: Thought content normal.         Judgment: Judgment normal.         Hospital Outpatient Visit on 04/28/2022   Component Date Value Ref Range Status    Rapid Influenza A Ag 04/28/2022 Negative  Negative Final    Rapid Influenza B Ag 04/28/2022 Negative  Negative Final    SARS-CoV-2 04/28/2022 Not Detected  Not detected Final    Comment: This test has been authorized by FDA under an  Emergency Use Authorization (EUA). This test is only authorized for the duration of the  time of declaration that circumstances exist justifying the  authorization of the emergency use of in vitro diagnostic  testing for detection of the SARS-CoV-2 virus  and/or diagnosis of COVID-19 infection under  section 564 (b)(1) of the Act, 21 U. S.C. 586DAY-3 (b) (1),  unless the authorization is terminated or revoked sooner. Patient Fact LiveAppraiser.fi  Provider Fact LiveAppraiser[employer]fi    METHODOLOGY: RT PCR             Assessment & Plan: The following diagnoses and conditions are stable with no further orders unless indicated:  1. Chest pain in adult    2. Gastroesophageal reflux disease without esophagitis        Messi Man was seen today for other. Chest pain may be secondary to her GERD; however, with her chest pain, recommended EKG. EKG showed sinus bradycardia. Discussed she may follow up with cardiology or she may get an echo exercise stress test to evaluate further.  Her chest pain most likely is secondary to esophageal spasms or GERD, but would recommend evaluating chest pain further with her recent episode of chest pain and family history. She verbalizes understanding. She is not wanting to follow up with cardiology. She states she feels like her episode of chest pain was from not eating every 2 hours as instructed by her surgeon and having an episode of GERD. She states she is willing to get echo exercise stress test.     Diagnoses and all orders for this visit:    Chest pain in adult  -     EKG 12 Lead  -     ECHO Exercise Stress Test; Future    Gastroesophageal reflux disease without esophagitis      Prior to Visit Medications    Medication Sig Taking?  Authorizing Provider   metoprolol tartrate (LOPRESSOR) 25 MG tablet TAKE 1 TABLET TWICE A DAY Yes ALEKSANDAR Liu CNP   levothyroxine (SYNTHROID) 75 MCG tablet TAKE 1 TABLET DAILY Yes ALEKSANDAR Liu - CNP   famotidine (PEPCID) 40 MG tablet TAKE 1 TABLET TWICE A DAY Yes ALEKSANDAR Collins CNP   sulfamethoxazole-trimethoprim (BACTRIM DS) 800-160 MG per tablet Take 1 tablet by mouth 2 times daily for 7 days Yes ALEKSANDAR Liu CNP   hydroCHLOROthiazide (MICROZIDE) 12.5 MG capsule Take 1 capsule by mouth every morning Yes ALEKSANDAR Liu CNP   amLODIPine (NORVASC) 5 MG tablet TAKE 1 TABLET DAILY Yes ALEKSANDAR Liu CNP   albuterol sulfate HFA (VENTOLIN HFA) 108 (90 Base) MCG/ACT inhaler Inhale 2 puffs into the lungs every 6 hours as needed for Wheezing Yes ALEKSANDAR Liu CNP   aspirin 81 MG EC tablet Take 81 mg by mouth daily Yes Historical Provider, MD   blood glucose test strips (CONTOUR NEXT TEST) strip TEST ONCE DAILY Yes ALEKSANDAR Liu CNP   losartan (COZAAR) 100 MG tablet TAKE 1 TABLET DAILY Yes ALEKSANDAR Liu - CNP   rosuvastatin (CRESTOR) 10 MG tablet Take 1 tablet by mouth daily TAKE 1 TABLET BY MOUTH NIGHTLY Yes ALEKSANDAR Liu CNP   metFORMIN (GLUCOPHAGE-XR) 500 MG extended release tablet TAKE 1 TABLET TWICE A DAY Yes Clearance ALEKSANDAR De Souza CNP   Alum Hydroxide-Mag Carbonate (GAVISCON PO) Take by mouth  Yes Historical Provider, MD   sucralfate (CARAFATE) 1 GM tablet 3- 4 times a day Yes Historical Provider, MD   clobetasol (TEMOVATE) 0.05 % external solution  Yes Historical Provider, MD   Cholecalciferol (VITAMIN D3) 25 MCG (1000 UT) TABS Take 1 tablet by mouth daily  Patient taking differently: Take by mouth daily Taking 2,000 units daily Yes ALEKSANDAR Madsen CNP   magnesium oxide (MAG-OX) 400 (241.3 Mg) MG TABS tablet Take 3 tablets by mouth 2 times daily For Hypomagnesemia  Patient taking differently: Take by mouth 2 times daily For Hypomagnesemia Yes ALEKSANDAR Madsen CNP     No orders of the defined types were placed in this encounter. Return if symptoms worsen or fail to improve. Patient should call the office immediately with new or ongoing signs or symptoms or worsening, or proceedto the emergency room. No changes in past medical history, past surgical history, social history, or family history were noted during the patient encounter unless specifically listed above. All updates of past medicalhistory, past surgical history, social history, or family history were reviewed personally by me during the office visit. All problems listed in the assessment are stable unless noted otherwise. Medication profilereviewed personally by me during the office visit. Medication side effects and possible impairments from medications were discussed as applicable. Call if pattern of symptoms change or persists for an extended time. This document was prepared by a combination of typing and transcription through a voice recognition software. All medications have the potential for adverse effects. All medications effect each person differently. Please read and review provided information related to medication.  If the medication that you have been prescribed has the potential to cause sedation, do not drive or operate car, truck, or heavy machinery until you know how the medication will effect you. If you experience any adverse effects from the medication, please call the office or report to the emergency department.

## 2022-07-03 ASSESSMENT — ENCOUNTER SYMPTOMS: ALLERGIC/IMMUNOLOGIC NEGATIVE: 1

## 2022-07-03 NOTE — PATIENT INSTRUCTIONS
Patient Education        Chest Pain: Care Instructions  Your Care Instructions     There are many things that can cause chest pain. Some are not serious and will get better on their own in a few days. But some kinds of chest pain need more testing and treatment. Your doctor may have recommended a follow-up visit in the next 8 to 12 hours. If you are not getting better, you may need more testsor treatment. Even though your doctor has released you, you still need to watch for any problems. The doctor carefully checked you, but sometimes problems can develop later. If you have new symptoms or if your symptoms do not get better, getmedical care right away. If you have worse or different chest pain or pressure that lasts more than 5 minutes or you passed out (lost consciousness), call 911 or seek other emergency help right away. A medical visit is only one step in your treatment. Even if you feel better, you still need to do what your doctor recommends, such as going to all suggested follow-up appointments and taking medicines exactly as directed. Thiswill help you recover and help prevent future problems. How can you care for yourself at home?  Rest until you feel better.  Take your medicine exactly as prescribed. Call your doctor if you think you are having a problem with your medicine.  Do not drive after taking a prescription pain medicine. When should you call for help? Call 911 if:     You passed out (lost consciousness).      You have severe difficulty breathing.      You have symptoms of a heart attack. These may include:  ? Chest pain or pressure, or a strange feeling in your chest.  ? Sweating. ? Shortness of breath. ? Nausea or vomiting. ? Pain, pressure, or a strange feeling in your back, neck, jaw, or upper belly or in one or both shoulders or arms. ? Lightheadedness or sudden weakness. ? A fast or irregular heartbeat.   After you call 911, the  may tell you to chew 1 adult-strength or 2 to 4 low-dose aspirin. Wait for an ambulance. Do not try to drive yourself. Call your doctor today if:      You have any trouble breathing.      Your chest pain gets worse.      You are dizzy or lightheaded, or you feel like you may faint.      You are not getting better as expected.      You are having new or different chest pain. Where can you learn more? Go to https://DealerSocketpeallyDVM.Storenvy. org and sign in to your thrdPlace account. Enter A120 in the Aventeon box to learn more about \"Chest Pain: Care Instructions. \"     If you do not have an account, please click on the \"Sign Up Now\" link. Current as of: March 9, 2022               Content Version: 13.3  © 4732-0140 [employer]. Care instructions adapted under license by [facility] ([facility]). If you have questions about a medical condition or this instruction, always ask your healthcare professional. [place] [address] any warranty or liability for your use of this information.

## 2022-07-12 ENCOUNTER — HOSPITAL ENCOUNTER (OUTPATIENT)
Dept: NON INVASIVE DIAGNOSTICS | Age: 77
Discharge: HOME | End: 2022-07-12
Payer: MEDICARE

## 2022-07-12 DIAGNOSIS — R07.9 CHEST PAIN IN ADULT: ICD-10-CM

## 2022-07-12 LAB
LV EF: 60 %
LVEF MODALITY: NORMAL

## 2022-07-12 PROCEDURE — 93351 STRESS TTE COMPLETE: CPT

## 2022-07-14 ENCOUNTER — OFFICE VISIT (OUTPATIENT)
Dept: FAMILY MEDICINE CLINIC | Age: 77
End: 2022-07-14
Payer: MEDICARE

## 2022-07-14 VITALS
DIASTOLIC BLOOD PRESSURE: 74 MMHG | WEIGHT: 159 LBS | OXYGEN SATURATION: 98 % | HEART RATE: 64 BPM | SYSTOLIC BLOOD PRESSURE: 148 MMHG | HEIGHT: 62 IN | BODY MASS INDEX: 29.26 KG/M2

## 2022-07-14 DIAGNOSIS — I10 ESSENTIAL HYPERTENSION: ICD-10-CM

## 2022-07-14 DIAGNOSIS — C54.1 ENDOMETRIAL CANCER (HCC): ICD-10-CM

## 2022-07-14 DIAGNOSIS — E11.42 TYPE 2 DIABETES MELLITUS WITH DIABETIC POLYNEUROPATHY, WITHOUT LONG-TERM CURRENT USE OF INSULIN (HCC): Primary | ICD-10-CM

## 2022-07-14 DIAGNOSIS — F33.1 MODERATE EPISODE OF RECURRENT MAJOR DEPRESSIVE DISORDER (HCC): ICD-10-CM

## 2022-07-14 PROCEDURE — 1090F PRES/ABSN URINE INCON ASSESS: CPT | Performed by: NURSE PRACTITIONER

## 2022-07-14 PROCEDURE — 3017F COLORECTAL CA SCREEN DOC REV: CPT | Performed by: NURSE PRACTITIONER

## 2022-07-14 PROCEDURE — 1123F ACP DISCUSS/DSCN MKR DOCD: CPT | Performed by: NURSE PRACTITIONER

## 2022-07-14 PROCEDURE — 3046F HEMOGLOBIN A1C LEVEL >9.0%: CPT | Performed by: NURSE PRACTITIONER

## 2022-07-14 PROCEDURE — 2022F DILAT RTA XM EVC RTNOPTHY: CPT | Performed by: NURSE PRACTITIONER

## 2022-07-14 PROCEDURE — G8427 DOCREV CUR MEDS BY ELIG CLIN: HCPCS | Performed by: NURSE PRACTITIONER

## 2022-07-14 PROCEDURE — G8399 PT W/DXA RESULTS DOCUMENT: HCPCS | Performed by: NURSE PRACTITIONER

## 2022-07-14 PROCEDURE — 1036F TOBACCO NON-USER: CPT | Performed by: NURSE PRACTITIONER

## 2022-07-14 PROCEDURE — 99214 OFFICE O/P EST MOD 30 MIN: CPT | Performed by: NURSE PRACTITIONER

## 2022-07-14 PROCEDURE — G8417 CALC BMI ABV UP PARAM F/U: HCPCS | Performed by: NURSE PRACTITIONER

## 2022-07-14 RX ORDER — METFORMIN HYDROCHLORIDE 500 MG/1
1500 TABLET, EXTENDED RELEASE ORAL
Qty: 270 TABLET | Refills: 1 | Status: SHIPPED | OUTPATIENT
Start: 2022-07-14 | End: 2022-07-25 | Stop reason: SDUPTHER

## 2022-07-14 RX ORDER — LORAZEPAM 0.5 MG/1
0.5 TABLET ORAL NIGHTLY
COMMUNITY

## 2022-07-14 RX ORDER — HYDROCHLOROTHIAZIDE 12.5 MG/1
12.5 CAPSULE, GELATIN COATED ORAL EVERY MORNING
Qty: 30 CAPSULE | Refills: 0 | Status: SHIPPED | OUTPATIENT
Start: 2022-07-14 | End: 2022-10-17 | Stop reason: SDUPTHER

## 2022-07-14 NOTE — PROGRESS NOTES
[address]  [address] [facility] [address]  Dept: [phone]  Dept Fax: [phone]  Loc: [phone]    Rl Veras is a 78 y.o. female who presents today for her medical conditions/complaints as noted below. Rl Veras is c/o of Hypertension (BP this monring was 15/66. Denied edema, SOB or chest pain)       Subjective:     Chief Complaint   Patient presents with    Hypertension     BP this monring was 15/66. Denied edema, SOB or chest pain       HPI  Hypertension:    The patient is here for routine follow up on HTN. Patient denies chest pain, shortness of breath, headache, lightheadedness, blurred vision, peripheral edema, palpitations, dry cough, and fatigue. She is adherent to a low sodium diet. Patient denies antihypertensive medication side effects of: fatigue, dry cough, swelling in ankles, weakness, orthostatic lightheadedness, myalgias, rash, nausea, abdominal discomfort, headaches, insomnia, weight gain, palpitations, slow heart rate, excessive urination, depression, and wheezing. Blood pressure typically runs 120/60-70 outside of the office. 6/9/22 she was started on hctz 12.5 mg in addition to her metoprolol, norvasc and losartan  She admits she has not been taking hctz since her surgery. She is s/p Laparoscopic paraesophageal hernia repair with magnetic sphincter augmentation on 06/13/22. She was seen on 7/1/22 for chest pain. Patient states she thinks it is from having surgery. The patient has not had recent chest pain and states it did improve over time. She did have stress echo 7/12/22   Summary   Normal exercise stress ECHO.     Sodium (mmol/L)   Date Value   12/02/2021 139    BUN (mg/dL)   Date Value   12/02/2021 23 (H)    Glucose   Date Value   12/02/2021 106 mg/dL (H)   06/01/2016 96 MG/DL      Potassium (mmol/L)   Date Value   12/02/2021 4.4     Potassium reflex Magnesium (mmol/L)   Date Value   03/02/2018 retrograde pyelograms    DIAGNOSTIC CARDIAC CATH LAB PROCEDURE  1/12/2005    One Dr Do [place], No CAD. DILATION AND CURETTAGE OF UTERUS      ENDOSCOPY, COLON, DIAGNOSTIC      HYSTERECTOMY (CERVIX STATUS UNKNOWN)  1/28/2013    due to uterine cancer    OTHER SURGICAL HISTORY  10/18/2017    cystoscopy, bilateral retrogrades    OTHER SURGICAL HISTORY Left 03/02/2017    port removal    SALIVARY GLAND SURGERY      KINGSLEY AND BSO (CERVIX REMOVED)  1/28/13    THYROID SURGERY      right thyroidectomy for benign reasons    UPPER GASTROINTESTINAL ENDOSCOPY  07/14/2016    UPPER GASTROINTESTINAL ENDOSCOPY      UPPER GASTROINTESTINAL ENDOSCOPY N/A 7/9/2021    EGD WITH DIANE performed by Scott Chapin MD at [address] [facility]  7/9/2021    EGD BIOPSY performed by Scott Chapin MD at [address] [facility] History     Socioeconomic History    Marital status:      Spouse name: Not on file    Number of children: Not on file    Years of education: Not on file    Highest education level: Not on file   Occupational History    Not on file   Tobacco Use    Smoking status: Never Smoker    Smokeless tobacco: Never Used   Vaping Use    Vaping Use: Never used   Substance and Sexual Activity    Alcohol use: No     Alcohol/week: 0.0 standard drinks    Drug use: No    Sexual activity: Never   Other Topics Concern    Not on file   Social History Narrative    Not on file     Social Determinants of Health     Financial Resource Strain: Low Risk     Difficulty of Paying Living Expenses: Not hard at all   Food Insecurity: No Food Insecurity    Worried About [address] in the Last Year: Never true    [address] [facility] [address] in the Last Year: Never true   Transportation Needs:     Lack of Transportation (Medical): Not on file    Lack of Transportation (Non-Medical):  Not on file   Physical Activity:     Days of Exercise per Week: Not on file    Minutes of Exercise per Session: Not on file Stress:     Feeling of Stress : Not on file   Social Connections:     Frequency of Communication with Friends and Family: Not on file    Frequency of Social Gatherings with Friends and Family: Not on file    Attends Yarsani Services: Not on file    Active Member of Clubs or Organizations: Not on file    Attends Club or Organization Meetings: Not on file    Marital Status: Not on file   Intimate Partner Violence:     Fear of Current or Ex-Partner: Not on file    Emotionally Abused: Not on file    Physically Abused: Not on file    Sexually Abused: Not on file   Housing Stability:     Unable to Pay for Housing in the Last Year: Not on file    Number of [place] in the Last Year: Not on file    Unstable Housing in the Last Year: Not on file     Current Outpatient Medications   Medication Sig Dispense Refill    LORazepam (ATIVAN) 0.5 MG tablet Take 0.5 mg by mouth nightly.       metoprolol tartrate (LOPRESSOR) 25 MG tablet TAKE 1 TABLET TWICE A  tablet 0    levothyroxine (SYNTHROID) 75 MCG tablet TAKE 1 TABLET DAILY 90 tablet 0    famotidine (PEPCID) 40 MG tablet TAKE 1 TABLET TWICE A DAY (Patient taking differently: Take 20 mg by mouth daily ) 180 tablet 0    amLODIPine (NORVASC) 5 MG tablet TAKE 1 TABLET DAILY 90 tablet 1    aspirin 81 MG EC tablet Take 81 mg by mouth daily      blood glucose test strips (CONTOUR NEXT TEST) strip TEST ONCE DAILY 50 strip 11    losartan (COZAAR) 100 MG tablet TAKE 1 TABLET DAILY 90 tablet 1    rosuvastatin (CRESTOR) 10 MG tablet Take 1 tablet by mouth daily TAKE 1 TABLET BY MOUTH NIGHTLY 90 tablet 3    clobetasol (TEMOVATE) 0.05 % external solution       Cholecalciferol (VITAMIN D3) 25 MCG (1000 UT) TABS Take 1 tablet by mouth daily (Patient taking differently: Take by mouth daily Taking 2,000 units daily)      magnesium oxide (MAG-OX) 400 (241.3 Mg) MG TABS tablet Take 3 tablets by mouth 2 times daily For Hypomagnesemia (Patient taking differently: Take by mouth 2 times daily For Hypomagnesemia) 180 tablet 5    hydroCHLOROthiazide (MICROZIDE) 12.5 MG capsule Take 1 capsule by mouth every morning 30 capsule 0    metFORMIN (GLUCOPHAGE-XR) 500 MG extended release tablet TAKE 1 TABLET TWICE A  tablet 3     No current facility-administered medications for this visit. No changes in past medical history, past surgical history, social history, orfamily history were noted during the patient encounter unless specifically listed above. All updates of past medical history, past surgical history, social history, or family history were reviewed personally by me duringthe office visit. All problems listed in the assessment are stable unless noted otherwise. Medication profile reviewed personally by me during the office visit. Medication side effects and possible impairments frommedications were discussed as applicable. Objective:     Physical Exam  Vitals and nursing note reviewed. Constitutional:       General: She is not in acute distress. Appearance: Normal appearance. She is well-developed. HENT:      Head: Normocephalic and atraumatic. Right Ear: Tympanic membrane, ear canal and external ear normal.      Left Ear: Tympanic membrane, ear canal and external ear normal.      Nose: Nose normal.      Mouth/Throat:      Pharynx: Uvula midline. No oropharyngeal exudate. Eyes:      General: Lids are normal.      Conjunctiva/sclera: Conjunctivae normal.      Pupils: Pupils are equal, round, and reactive to light. Neck:      Thyroid: No thyromegaly. Vascular: No carotid bruit or JVD. Cardiovascular:      Rate and Rhythm: Normal rate and regular rhythm. Pulses: Normal pulses. Radial pulses are 2+ on the right side and 2+ on the left side. Dorsalis pedis pulses are 2+ on the right side and 2+ on the left side. Posterior tibial pulses are 2+ on the right side and 2+ on the left side. Heart sounds: Normal heart sounds.  No murmur heard. No friction rub. No gallop. Pulmonary:      Effort: Pulmonary effort is normal.      Breath sounds: Normal breath sounds. Abdominal:      General: Bowel sounds are normal.      Palpations: Abdomen is soft. There is no mass. Tenderness: There is no abdominal tenderness. Comments: 5 surgical incisions from recent surgery ---healing well   Musculoskeletal:         General: Normal range of motion. Cervical back: Normal range of motion and neck supple. Lymphadenopathy:      Head:      Right side of head: No submandibular adenopathy. Left side of head: No submandibular adenopathy. Cervical: No cervical adenopathy. Skin:     General: Skin is warm and dry. Findings: No lesion or rash. Neurological:      Mental Status: She is alert and oriented to person, place, and time. Gait: Gait normal.   Psychiatric:         Speech: Speech normal.         Behavior: Behavior normal.         Thought Content: Thought content normal.         Judgment: Judgment normal.       BP (!) 148/74 (Site: Left Upper Arm, Position: Sitting, Cuff Size: Medium Adult)   Pulse 64   Ht 5' 2\" (1.575 m)   Wt 159 lb (72.1 kg)   SpO2 98%   BMI 29.08 kg/m²   Body mass index is 29.08 kg/m². BP Readings from Last 2 Encounters:   07/14/22 (!) 148/74   07/01/22 (!) 149/62       Wt Readings from Last 3 Encounters:   07/14/22 159 lb (72.1 kg)   07/01/22 155 lb (70.3 kg)   06/09/22 159 lb (72.1 kg)       Lab Review   No visits with results within 2 Month(s) from this visit. Latest known visit with results is:   Hospital Outpatient Visit on 04/28/2022   Component Date Value    Rapid Influenza A Ag 04/28/2022 Negative     Rapid Influenza B Ag 04/28/2022 Negative     SARS-CoV-2 04/28/2022 Not Detected        No results found for this visit on 07/14/22. Assessment:       1. Type 2 diabetes mellitus with diabetic polyneuropathy, without long-term current use of insulin ([facility] [address]    2.  Essential hypertension    3. Moderate episode of recurrent major depressive disorder ([facility] [address]    4. Endometrial cancer ([facility] [address]        No results found for this visit on 07/14/22. Plan:       Jacob Peter was seen today for hypertension. Diagnoses and all orders for this visit:    Type 2 diabetes mellitus with diabetic polyneuropathy, without long-term current use of insulin ([facility] [address]  Patient states she has been taking 3 metformin daily instead of 2 because her glucose has been above 120 consistently. She denies hypoglycemia  Rx changed to the following  -     metFORMIN (GLUCOPHAGE-XR) 500 MG extended release tablet; Take 3 tablets by mouth daily (with breakfast)  Has skilled nursing in the next couple of months and will obtain labs at that time  Essential hypertension  Continue norvasc, losartan and metorprolol  Restart   -     hydroCHLOROthiazide (MICROZIDE) 12.5 MG capsule; Take 1 capsule by mouth every morning  You should monitor your blood pressure closely at home, then call or send a [employer] message in 1-2 weeks to report the results. Moderate episode of recurrent major depressive disorder (HCC)  Condition appears stable with current medication regimen. Continue current medications. No reported or noted side effects of medication. Will continue to monitor at routine intervals as appropriate. Rarely uses ativan for anxiety. Denies side effects  Chronic benzodiazepine treatment protocol was discussed with the patient again including taking medications only as prescribed , using one pharmacy and getting all controlled substances from one physician unless okayed with me. Proper safeguarding and disposal of medications were also discussed with the patient. The current treatment regimen is needed to decrease the patient's anxiety symptoms, improve the quality of life and ability to function and improve sleep and mood symptoms.         Patient given following instructions - You are on medications which could impair your senses, you are at risk of weakness, falls, dizziness, or drowsiness. You should be careful during activities which could place you at risk of harm, such as climbing, using stairs, operating machinery, or driving vehicles. If you feel you cannot safely do these activities, you should request others to help you, or avoid the activities altogether. If you are drowsy for any other reason, you should use the same precautions as listed above. The patient is made aware of the potential of drowsiness with the prescribed medications, and that care should be exercised in operating machinery, vehicles, or placing oneself in situations of risk to their health, ie heights and climbing and stairs. The patient denies nausea, vomiting, diarrhea and constipation. No respiratory problems. No increased sleepiness, drowsiness or confusion. The patient states that the medications and treatment have helped improve the quality of life and helped in psychosocial functioning. There are no indicators for possible drug abuse, addiction or diversion problems. Periodic Controlled Substance Monitoring: No signs of potential drug abuse or diversion identified. , Possible medication side effects, risk of tolerance/dependence & alternative treatments discussed. , Assessed functional status. (Clearance ALEKSANDAR De Souza - CNP)    Endometrial cancer [facility])  No s/s reoccurrence   Continue follow up with oncology per recommendations    Patient has been instructed call the office immediately with new symptoms, change in symptoms or worseningof symptoms. If this is not feasible, patient is instructed to report to the emergency room. Medication profile reviewed. Medication side effects and possible impairments from medications were discussed as applicable. Allergies were reviewed. Health maintenance was reviewed and updated as appropriate.      Return in about 3 months (around 10/14/2022) [place] [facility] had f/u scheduled in 10/2022    (Comment: Please note this report has been produced using a combination of typing and speech recognition software and may contain errors related to that system including errors in grammar, punctuation, and spelling, as well as words and phrases that may be inappropriate.  If there are any questions or concerns please feel free to contact the dictating provider for clarification.)

## 2022-07-15 ASSESSMENT — ENCOUNTER SYMPTOMS
RESPIRATORY NEGATIVE: 1
ABDOMINAL PAIN: 0
ALLERGIC/IMMUNOLOGIC NEGATIVE: 1
ANAL BLEEDING: 0
GASTROINTESTINAL NEGATIVE: 1
EYES NEGATIVE: 1
SHORTNESS OF BREATH: 0
NAUSEA: 0
BLOOD IN STOOL: 0

## 2022-07-25 DIAGNOSIS — E11.42 TYPE 2 DIABETES MELLITUS WITH DIABETIC POLYNEUROPATHY, WITHOUT LONG-TERM CURRENT USE OF INSULIN (HCC): ICD-10-CM

## 2022-07-25 RX ORDER — METFORMIN HYDROCHLORIDE 500 MG/1
1500 TABLET, EXTENDED RELEASE ORAL
Qty: 90 TABLET | Refills: 0 | Status: SHIPPED | OUTPATIENT
Start: 2022-07-25 | End: 2022-10-17 | Stop reason: SDUPTHER

## 2022-07-25 RX ORDER — LEVOTHYROXINE SODIUM 0.07 MG/1
TABLET ORAL
Qty: 90 TABLET | Refills: 1 | Status: SHIPPED | OUTPATIENT
Start: 2022-07-25

## 2022-07-25 NOTE — TELEPHONE ENCOUNTER
Pt states that she has never received her metformin we sent it to [facility]. I called about it and they said that it is slated to be sent out but it doesn't state when. She is completely out and was wanting to see if she could get a 30 day supply sent to [facility].

## 2022-08-30 ENCOUNTER — OFFICE VISIT (OUTPATIENT)
Dept: PULMONOLOGY | Age: 77
End: 2022-08-30
Payer: MEDICARE

## 2022-08-30 ENCOUNTER — TELEPHONE (OUTPATIENT)
Dept: PULMONOLOGY | Age: 77
End: 2022-08-30

## 2022-08-30 VITALS
WEIGHT: 162 LBS | DIASTOLIC BLOOD PRESSURE: 74 MMHG | BODY MASS INDEX: 29.81 KG/M2 | HEIGHT: 62 IN | HEART RATE: 66 BPM | OXYGEN SATURATION: 97 % | SYSTOLIC BLOOD PRESSURE: 136 MMHG

## 2022-08-30 DIAGNOSIS — G47.33 OSA (OBSTRUCTIVE SLEEP APNEA): Primary | ICD-10-CM

## 2022-08-30 PROCEDURE — G8399 PT W/DXA RESULTS DOCUMENT: HCPCS | Performed by: INTERNAL MEDICINE

## 2022-08-30 PROCEDURE — G8417 CALC BMI ABV UP PARAM F/U: HCPCS | Performed by: INTERNAL MEDICINE

## 2022-08-30 PROCEDURE — 1090F PRES/ABSN URINE INCON ASSESS: CPT | Performed by: INTERNAL MEDICINE

## 2022-08-30 PROCEDURE — G8427 DOCREV CUR MEDS BY ELIG CLIN: HCPCS | Performed by: INTERNAL MEDICINE

## 2022-08-30 PROCEDURE — 99213 OFFICE O/P EST LOW 20 MIN: CPT | Performed by: INTERNAL MEDICINE

## 2022-08-30 PROCEDURE — 1036F TOBACCO NON-USER: CPT | Performed by: INTERNAL MEDICINE

## 2022-08-30 PROCEDURE — 1123F ACP DISCUSS/DSCN MKR DOCD: CPT | Performed by: INTERNAL MEDICINE

## 2022-08-30 PROCEDURE — 3017F COLORECTAL CA SCREEN DOC REV: CPT | Performed by: INTERNAL MEDICINE

## 2022-08-30 ASSESSMENT — SLEEP AND FATIGUE QUESTIONNAIRES
HOW LIKELY ARE YOU TO NOD OFF OR FALL ASLEEP WHILE SITTING AND TALKING TO SOMEONE: 0
HOW LIKELY ARE YOU TO NOD OFF OR FALL ASLEEP WHILE SITTING AND READING: 2
ESS TOTAL SCORE: 10
HOW LIKELY ARE YOU TO NOD OFF OR FALL ASLEEP WHILE WATCHING TV: 3
HOW LIKELY ARE YOU TO NOD OFF OR FALL ASLEEP WHILE LYING DOWN TO REST IN THE AFTERNOON WHEN CIRCUMSTANCES PERMIT: 3
NECK CIRCUMFERENCE (INCHES): 13.25
HOW LIKELY ARE YOU TO NOD OFF OR FALL ASLEEP WHILE SITTING QUIETLY AFTER LUNCH WITHOUT ALCOHOL: 0
HOW LIKELY ARE YOU TO NOD OFF OR FALL ASLEEP WHEN YOU ARE A PASSENGER IN A CAR FOR AN HOUR WITHOUT A BREAK: 2
HOW LIKELY ARE YOU TO NOD OFF OR FALL ASLEEP IN A CAR, WHILE STOPPED FOR A FEW MINUTES IN TRAFFIC: 0
HOW LIKELY ARE YOU TO NOD OFF OR FALL ASLEEP WHILE SITTING INACTIVE IN A PUBLIC PLACE: 0

## 2022-08-30 NOTE — PROGRESS NOTES
Chief Complaint: Sleep Apnea     Referring Provider: Mayco Arrieta NP     HPI: JOAQUIN treated with benefit with PAP 6-11 . PAP use was reviewed and is used 6:49 hours on average, compliance with greater than 4 hour nightly use is 30/30, residual AHI is 3.5. Has new machine through recall    PRESENTING HISTORY  Several year history of loud snoring associated with witnessed apnea and excessive daytime sleepiness. [address] that showed moderate sleep apnea with AHI of 16. She has been wearing CPAP with nasal pillow mask majority of nights since with some improvement, no longer wakes up gasping for air. Now having worse daytime sleepiness and fatigue. She reports she has had her pressure adjusted down multiple times from 11 cm H20 now down to 7 cm H20. Compliance report reviewed by me and showed average use greater than 5 hours; greater than 4 hours on 27/30days. Friendship sleepiness score: 10    Physical Exam:  Blood pressure 136/74, pulse 66, height 5' 2\" (1.575 m), weight 162 lb (73.5 kg), SpO2 97 %.'  Constitutional:  No acute distress. HENT:  Oropharynx is clear and moist. Mallampati Class IV   Neck: . No tracheal deviation present. No obvious masses. Pulmonary/Chest:   No accessory muscle usage or stridor. Musculoskeletal:  No clubbing. Psychiatric: Normal mood and affect. Data:   PSG at [facility] 2/27/14: AHI 16    CPAP titration 9-30-14 with AHI of 5 on CPAP 10    Assessment:  Moderate Obstructive Sleep Apnea    Hypertension     Plan:   APAP change to 5 to 9 from 7-11; higher pressures are waking her up  Check download in 30 days, then will see if pt feels better   Patient has been advised: no driving while sleepy; weight loss recommended; systemic benefits of CPAP therapy have been discussed.     F/U in one year

## 2022-08-30 NOTE — PATIENT INSTRUCTIONS
For Patients with Obstructive Sleep Apnea:  Never drive a car or operate a motorized vehicle while drowsy or sleepy. Maintaining an optimal weight can help limit the severity of sleep apnea. Treating sleep apnea effectively may help reduce the risk of heart disease, stroke and type II diabetes. Never drive if you are feeling sleepy    Sleep Hygiene. .. Tips for better sleep. .. Avoid naps. This will ensure you are sleepy at bedtime. If you have to take a nap, sleep less than 1 hour, before 3 pm.  Sleep only when sleepy; this reduces the time you are awake in bed. Regular exercise is recommended to help you deepen your sleep, but not within 4-6 hours of your bedtime. Timing of exercise is important, aim to exercise early in the morning or early afternoon. A light snack may help you fall asleep. Warm milk and foods high in the amino acid tryptophan, such as bananas, may help you to sleep  Be sure to avoid heavy, spicy or sugary foods 4-6 hours before bedtime and avoid at snack time. Stay away from stimulants such as caffeine and nicotine for at least 4-6 hours before bed. Stimulants can interfere with your ability to fall asleep. Caffeine is found in tea, cola, coffee, cocoa and chocolate and is best avoided at bedtime. Nicotine is found in tobacco products. Avoid alcohol 4-6 hours before bedtime. Alcohol has an immediate sleep-inducing effect, after a few hours when alcohol levels fall there is a stimulant or wake-up effect and will cause fragmented sleep. Sleep rituals are important. Give your body clues it is time to slow down and sleep. Examples include; yoga, deep breathing, listen to relaxing music, a hot bath or a few minutes of reading. Have a fixed bedtime and awakening time, Even on weekends! You will feel better keeping a regular sleep cycle, even if you are retired or not working. Get into your favorite sleep position.  If not asleep in 30 minutes, get up and do something boring until you feel sleepy. Remember not to expose yourself to bright lights such as TV, phone or tablet screens. Only use your bed for sleeping. Do not use your bed as an office, workroom or recreation room. Use comfortable bedding. Uncomfortable bedding can prevent good sleep. Ensure your bedroom is quiet and comfortable. A cooler room along with enough blankets to stay warm is recommended. If your room is too noisy, try a white noise machine. If too bright, try black out shades or an eye mask. Dont take worries to bed. Leave worries about work, school etc. behind you when you go to bed. Some people find it helpful to assign a worry period in the evening or late afternoon to write down your worries and get them out of your system.

## 2022-09-29 NOTE — TELEPHONE ENCOUNTER
Tell pt that after pressure change, her treatment still looks good on our end. Is she sleeping better since the pressure was dropped?

## 2022-10-05 NOTE — TELEPHONE ENCOUNTER
Pt stated she is sleeping better since the pressure change and will be off her cpap for 3-4 days due to having spots removed from her face.

## 2022-10-11 DIAGNOSIS — I10 ESSENTIAL HYPERTENSION: ICD-10-CM

## 2022-10-12 RX ORDER — LOSARTAN POTASSIUM 100 MG/1
TABLET ORAL
Qty: 90 TABLET | Refills: 0 | Status: SHIPPED | OUTPATIENT
Start: 2022-10-12

## 2022-10-17 ENCOUNTER — OFFICE VISIT (OUTPATIENT)
Dept: FAMILY MEDICINE CLINIC | Age: 77
End: 2022-10-17
Payer: MEDICARE

## 2022-10-17 VITALS
BODY MASS INDEX: 29.81 KG/M2 | SYSTOLIC BLOOD PRESSURE: 126 MMHG | WEIGHT: 162 LBS | HEIGHT: 62 IN | HEART RATE: 74 BPM | OXYGEN SATURATION: 98 % | DIASTOLIC BLOOD PRESSURE: 74 MMHG

## 2022-10-17 DIAGNOSIS — F33.1 MODERATE EPISODE OF RECURRENT MAJOR DEPRESSIVE DISORDER (HCC): ICD-10-CM

## 2022-10-17 DIAGNOSIS — E03.9 HYPOTHYROIDISM, UNSPECIFIED TYPE: ICD-10-CM

## 2022-10-17 DIAGNOSIS — I10 ESSENTIAL HYPERTENSION: ICD-10-CM

## 2022-10-17 DIAGNOSIS — F41.9 ANXIETY: ICD-10-CM

## 2022-10-17 DIAGNOSIS — E11.42 TYPE 2 DIABETES MELLITUS WITH DIABETIC POLYNEUROPATHY, WITHOUT LONG-TERM CURRENT USE OF INSULIN (HCC): Primary | ICD-10-CM

## 2022-10-17 DIAGNOSIS — E83.42 HYPOMAGNESEMIA: ICD-10-CM

## 2022-10-17 DIAGNOSIS — E53.8 VITAMIN B12 DEFICIENCY: ICD-10-CM

## 2022-10-17 DIAGNOSIS — E78.2 MIXED HYPERLIPIDEMIA: ICD-10-CM

## 2022-10-17 DIAGNOSIS — D64.9 ANEMIA, UNSPECIFIED TYPE: ICD-10-CM

## 2022-10-17 DIAGNOSIS — E55.9 VITAMIN D DEFICIENCY: ICD-10-CM

## 2022-10-17 DIAGNOSIS — Z79.899 CONTROLLED SUBSTANCE AGREEMENT SIGNED: ICD-10-CM

## 2022-10-17 DIAGNOSIS — R19.7 DIARRHEA, UNSPECIFIED TYPE: ICD-10-CM

## 2022-10-17 DIAGNOSIS — L03.032 PARONYCHIA OF SECOND TOE OF LEFT FOOT: ICD-10-CM

## 2022-10-17 DIAGNOSIS — E87.6 LOW BLOOD POTASSIUM: ICD-10-CM

## 2022-10-17 LAB
BILIRUBIN, POC: NORMAL
BLOOD URINE, POC: NORMAL
CLARITY, POC: CLEAR
COLOR, POC: YELLOW
GLUCOSE URINE, POC: NORMAL
KETONES, POC: NORMAL
LEUKOCYTE EST, POC: NORMAL
NITRITE, POC: NORMAL
PH, POC: 6
PROTEIN, POC: NORMAL
SPECIFIC GRAVITY, POC: 1.02
UROBILINOGEN, POC: 0.2

## 2022-10-17 PROCEDURE — 36415 COLL VENOUS BLD VENIPUNCTURE: CPT | Performed by: NURSE PRACTITIONER

## 2022-10-17 PROCEDURE — 1123F ACP DISCUSS/DSCN MKR DOCD: CPT | Performed by: NURSE PRACTITIONER

## 2022-10-17 PROCEDURE — 3046F HEMOGLOBIN A1C LEVEL >9.0%: CPT | Performed by: NURSE PRACTITIONER

## 2022-10-17 PROCEDURE — 1090F PRES/ABSN URINE INCON ASSESS: CPT | Performed by: NURSE PRACTITIONER

## 2022-10-17 PROCEDURE — 3017F COLORECTAL CA SCREEN DOC REV: CPT | Performed by: NURSE PRACTITIONER

## 2022-10-17 PROCEDURE — 1036F TOBACCO NON-USER: CPT | Performed by: NURSE PRACTITIONER

## 2022-10-17 PROCEDURE — 2022F DILAT RTA XM EVC RTNOPTHY: CPT | Performed by: NURSE PRACTITIONER

## 2022-10-17 PROCEDURE — G8427 DOCREV CUR MEDS BY ELIG CLIN: HCPCS | Performed by: NURSE PRACTITIONER

## 2022-10-17 PROCEDURE — 81002 URINALYSIS NONAUTO W/O SCOPE: CPT | Performed by: NURSE PRACTITIONER

## 2022-10-17 PROCEDURE — G8417 CALC BMI ABV UP PARAM F/U: HCPCS | Performed by: NURSE PRACTITIONER

## 2022-10-17 PROCEDURE — G8484 FLU IMMUNIZE NO ADMIN: HCPCS | Performed by: NURSE PRACTITIONER

## 2022-10-17 PROCEDURE — 99214 OFFICE O/P EST MOD 30 MIN: CPT | Performed by: NURSE PRACTITIONER

## 2022-10-17 PROCEDURE — G8399 PT W/DXA RESULTS DOCUMENT: HCPCS | Performed by: NURSE PRACTITIONER

## 2022-10-17 RX ORDER — TRIAMCINOLONE ACETONIDE 1 MG/G
CREAM TOPICAL
COMMUNITY
Start: 2022-10-04

## 2022-10-17 RX ORDER — ROSUVASTATIN CALCIUM 10 MG/1
10 TABLET, COATED ORAL DAILY
Qty: 90 TABLET | Refills: 3 | Status: SHIPPED | OUTPATIENT
Start: 2022-10-17

## 2022-10-17 RX ORDER — AMLODIPINE BESYLATE 5 MG/1
TABLET ORAL
Qty: 90 TABLET | Refills: 3 | Status: SHIPPED | OUTPATIENT
Start: 2022-10-17

## 2022-10-17 RX ORDER — HYDROCHLOROTHIAZIDE 12.5 MG/1
12.5 CAPSULE, GELATIN COATED ORAL EVERY MORNING
Qty: 90 CAPSULE | Refills: 1 | Status: SHIPPED | OUTPATIENT
Start: 2022-10-17

## 2022-10-17 RX ORDER — METFORMIN HYDROCHLORIDE 500 MG/1
1500 TABLET, EXTENDED RELEASE ORAL
Qty: 90 TABLET | Refills: 0 | Status: SHIPPED | OUTPATIENT
Start: 2022-10-17

## 2022-10-17 SDOH — ECONOMIC STABILITY: FOOD INSECURITY: WITHIN THE PAST 12 MONTHS, THE FOOD YOU BOUGHT JUST DIDN'T LAST AND YOU DIDN'T HAVE MONEY TO GET MORE.: NEVER TRUE

## 2022-10-17 SDOH — ECONOMIC STABILITY: FOOD INSECURITY: WITHIN THE PAST 12 MONTHS, YOU WORRIED THAT YOUR FOOD WOULD RUN OUT BEFORE YOU GOT MONEY TO BUY MORE.: NEVER TRUE

## 2022-10-17 ASSESSMENT — ENCOUNTER SYMPTOMS
DIARRHEA: 1
SHORTNESS OF BREATH: 0
RESPIRATORY NEGATIVE: 1
EYES NEGATIVE: 1
BLOOD IN STOOL: 0
ANAL BLEEDING: 0
NAUSEA: 0
ALLERGIC/IMMUNOLOGIC NEGATIVE: 1
ABDOMINAL PAIN: 0

## 2022-10-17 ASSESSMENT — PATIENT HEALTH QUESTIONNAIRE - PHQ9
8. MOVING OR SPEAKING SO SLOWLY THAT OTHER PEOPLE COULD HAVE NOTICED. OR THE OPPOSITE, BEING SO FIGETY OR RESTLESS THAT YOU HAVE BEEN MOVING AROUND A LOT MORE THAN USUAL: 0
4. FEELING TIRED OR HAVING LITTLE ENERGY: 1
10. IF YOU CHECKED OFF ANY PROBLEMS, HOW DIFFICULT HAVE THESE PROBLEMS MADE IT FOR YOU TO DO YOUR WORK, TAKE CARE OF THINGS AT HOME, OR GET ALONG WITH OTHER PEOPLE: 0
7. TROUBLE CONCENTRATING ON THINGS, SUCH AS READING THE NEWSPAPER OR WATCHING TELEVISION: 0
9. THOUGHTS THAT YOU WOULD BE BETTER OFF DEAD, OR OF HURTING YOURSELF: 0
2. FEELING DOWN, DEPRESSED OR HOPELESS: 0
SUM OF ALL RESPONSES TO PHQ QUESTIONS 1-9: 3
SUM OF ALL RESPONSES TO PHQ QUESTIONS 1-9: 3
6. FEELING BAD ABOUT YOURSELF - OR THAT YOU ARE A FAILURE OR HAVE LET YOURSELF OR YOUR FAMILY DOWN: 0
SUM OF ALL RESPONSES TO PHQ QUESTIONS 1-9: 3
SUM OF ALL RESPONSES TO PHQ QUESTIONS 1-9: 3
1. LITTLE INTEREST OR PLEASURE IN DOING THINGS: 0
3. TROUBLE FALLING OR STAYING ASLEEP: 2
SUM OF ALL RESPONSES TO PHQ9 QUESTIONS 1 & 2: 0
5. POOR APPETITE OR OVEREATING: 0

## 2022-10-17 ASSESSMENT — SOCIAL DETERMINANTS OF HEALTH (SDOH): HOW HARD IS IT FOR YOU TO PAY FOR THE VERY BASICS LIKE FOOD, HOUSING, MEDICAL CARE, AND HEATING?: NOT HARD AT ALL

## 2022-10-17 NOTE — PROGRESS NOTES
[address] [facility]  [address] [facility] [address]  Dept: [phone]  Dept Fax: [phone]  Loc: Leonardo Mueller is a 78 y.o. female who presents today for her medical conditions/complaints as noted below. Ephriam Pitcher is c/o of Diabetes (Sugar this morning 115), Hypertension (BP at home been running 120's, 130's over 70's. Denies edema, SOB or chest pain), and Depression       Subjective:     Chief Complaint   Patient presents with    Diabetes     Sugar this morning 115    Hypertension     BP at home been running 120's, 130's over 70's. Denies edema, SOB or chest pain    Depression       HPI  4-5 times yesterday. Took pepto and having diarrhea x 2-3 weeks. Never had issue with magnesium   Has history of IBS  Several grandchildren have had diarrhea and one of them has had it for a few weeks  No abdominal pain. No f/c/n/c    Left 2nd toe is read and swollen. Had nails trimmed last week. Noticed swelling, redness and tenderness since yesterday. Hypothyroidism: Recent symptoms: fatigue and diarrhea. She denies weight gain, weight loss, cold intolerance, heat intolerance, hair loss, dry skin, constipation, edema, anxiety, tremor, palpitations, and dysphagia. Patient is  taking her medication consistently on an empty stomach. Lab Results   Component Value Date/Time    TSHREFLEX 2.23 03/09/2022 11:53 AM    TSHREFLEX 2.17 01/13/2021 04:27 PM    TSHREFLEX 4.80 06/03/2020 08:19 AM     Lab Results   Component Value Date    TSH 4.44 (H) 10/06/2020    TSH 4.84 (H) 01/09/2020    TSH 1.41 04/15/2019       Diabetes Mellitus Type 2:  Patient is here for follow up on diabetes. Treatment Adherence:   Medication compliance:  Taking medication as prescribed. Diet compliance:  Trying to eat a healthy, diabetic diet. Weight trend:   Wt Readings from Last 3 Encounters:   10/17/22 162 lb (73.5 kg)   08/30/22 162 lb (73.5 kg)   07/14/22 159 lb (72.1 kg)     Denies any hypoglycemia episodes. Current symptoms/problems include:  Neuropathy is  present  Denies any open areas on feet/ulcerations  Denies any polyuria, polydipsia, or polyphagia, nausea, vomiting or diarrhea. Home blood sugar records: fasting range: 115 this morning, patient tests 3 time(s) per week  Eye exam current (withinone year): yes  Denies blurry vision   reports that she has never smoked. She has never used smokeless tobacco.      Hypertension:  Home blood pressure monitoring: Yes - 120-130's/70's. Patient  is adherent to a low sodium diet. Patient denies chest pain, shortness of breath, headache, lightheadedness, blurred vision, peripheral edema, palpitations, dry cough, and fatigue. Patient complains of fatigue. BP Readings from Last 3 Encounters:   10/17/22 126/74   08/30/22 136/74   07/14/22 (!) 148/74       Hyperlipidemia:  No newmyalgias or GI upset on rosuvastatin (Crestor). The 10-year ASCVD risk score (Louise MARCELO, et al., 2019) is: 35.3%    Values used to calculate the score:      Age: 78 years      Sex: Female      Is Non- : No      Diabetic: Yes      Tobacco smoker: No      Systolic Blood Pressure: 273 mmHg      Is BP treated: Yes      HDL Cholesterol: 64 mg/dL      Total Cholesterol: 160 mg/dL    Lab Results   Component Value Date    LABA1C 6.4 12/02/2021    LABA1C 6.2 05/11/2021    LABA1C 6.3 06/03/2020     Lab Results   Component Value Date    LABMICR <1.20 08/26/2021    LDLCALC 58 12/02/2021    CREATININE 0.9 12/02/2021       Vitamin D Deficiency  Patient with vitamin d deficiencyand currently on supplement without adverse reactions. Lab Results   Component Value Date    VITD25 56.3 05/11/2021     Mood Disorder:  Patient presents for follow-up of depression and anxiety disorder. Current complaints include: See PHQ9 below .     She denies tearfulness, decreased libido, irritability, excessive worry, panic attacks, obsessive thoughts, compulsive behaviors, increased use of drugs or alcohol, suicidal thoughts or behavior, and impaired memory. Symptoms/signs of awa: none. External stressors: nothing new. Current treatment includes: benzodiazepine- ativan 0.5 mg qd prn. Medication side effects: none. PHQ-9  10/17/2022 6/9/2022 5/5/2021 7/16/2020 4/15/2020 4/15/2019 1/31/2019   Little interest or pleasure in doing things 0 0 1 0 0 2 1   Feeling down, depressed, or hopeless 0 0 2 1 0 3 1   Trouble falling or staying asleep, or sleeping too much 2 0 0 0 0 1 1   Feeling tired or having little energy 1 1 3 3 3 3 3   Poor appetite or overeating 0 0 0 0 0 1 0   Feeling bad about yourself - or that you are a failure or have let yourself or your family down 0 0 0 0 0 0 0   Trouble concentrating on things, such as reading the newspaper or watching television 0 0 0 0 0 1 0   Moving or speaking so slowly that other people could have noticed. Or the opposite - being so fidgety or restless that you have been moving around a lot more than usual 0 0 0 0 0 1 0   Thoughts that you would be better off dead, or of hurting yourself in some way 0 0 0 0 0 0 0   PHQ-2 Score 0 0 3 1 0 5 2   PHQ-9 Total Score 3 1 6 4 3 12 6   If you checked off any problems, how difficult have these problems made it for you to do your work, take care of things at home, or get along with other people? 0 0 0 0 0 1 1     Interpretation of Total Score Total Score Depression Severity: 1-4 = Minimal depression, 5-9 = Mild depression, 10-14 = Moderate depression, 15-19 = Moderately severe depression, 20-27 = Severe depression        Review of Systems   Constitutional: Negative. Negative for appetite change, fatigue and unexpected weight change. HENT: Negative. Eyes: Negative. Respiratory: Negative. Negative for shortness of breath. Cardiovascular: Negative. Negative for chest pain, palpitations and leg swelling. Gastrointestinal:  Positive for diarrhea.  Negative for abdominal pain, anal bleeding, blood in stool and nausea. Endocrine: Negative. Genitourinary: Negative. Negative for hematuria. Musculoskeletal: Negative. Skin: Negative. Negative for rash. Left 2 nd toe   Allergic/Immunologic: Negative. Neurological:  Positive for tremors (chronic and stable). Negative for dizziness, syncope, light-headedness and numbness. Hematological: Negative. Does not bruise/bleed easily. Psychiatric/Behavioral: Negative. All other systems reviewed and are negative. Past Medical History:   Diagnosis Date    Allergic     Anemia     Arthritis of left hip 11/10/2020    Brain condition     pt states a cluster of blood vessels left side of brain    Cancer [facility]) 1/2013    endometrial cancer/ Dr. Jaquelin Rosales and Dr. Plaza Shoulder    Cerebral cavernoma     followed by Dr. Pedro Arteaga at [facility]    Complication of anesthesia     Takes a long time to wake up    Diverticulosis     Endometrial cancer ([facility] [address]     Fungus infection     Fungus infection on bilat toes. GERD (gastroesophageal reflux disease)     Hyperlipidemia     Hypertension     Hypomagnesemia 1/2016    consult with Dr. Grace Carey ([facility]) and no renal wasting.  felt most likely d/t HCTz and PPI,  HCTZ discontinued,  increased mag ox to 400 mg 2 tabs BID    Hypothyroidism     Irritable bowel syndrome     MVP (mitral valve prolapse)     JOAQUIN (obstructive sleep apnea)     Paronychia of great toe, left 7/11/2017    Pneumonia     infant    Prolonged emergence from general anesthesia     Type 2 diabetes mellitus without complication ([facility] [address]     Type II or unspecified type diabetes mellitus without mention of complication, not stated as uncontrolled     Unspecified sleep apnea     Vitamin B12 deficiency     Vitamin D deficiency      Family History   Problem Relation Age of Onset    Cancer Mother         colon    COPD Father     Heart Disease Father     Cancer Father         NMSC, skin     Past Surgical History:   Procedure Laterality Date    COLONOSCOPY      COLONOSCOPY  07/14/2016    COLONOSCOPY      CYSTOSCOPY  10/18/2017    Cystoscopy with Urethral dilation    CYSTOSCOPY Bilateral 10/18/2017    Cystoscopy with bilateral retrograde pyelograms    DIAGNOSTIC CARDIAC CATH LAB PROCEDURE  1/12/2005    One Dr Chris Clemons, No CAD.     DILATION AND CURETTAGE OF UTERUS      ENDOSCOPY, COLON, DIAGNOSTIC      HYSTERECTOMY (CERVIX STATUS UNKNOWN)  1/28/2013    due to uterine cancer    OTHER SURGICAL HISTORY  10/18/2017    cystoscopy, bilateral retrogrades    OTHER SURGICAL HISTORY Left 03/02/2017    port removal    SALIVARY GLAND SURGERY      KINGSLEY AND BSO (CERVIX REMOVED)  1/28/13    THYROID SURGERY      right thyroidectomy for benign reasons    UPPER GASTROINTESTINAL ENDOSCOPY  07/14/2016    UPPER GASTROINTESTINAL ENDOSCOPY      UPPER GASTROINTESTINAL ENDOSCOPY N/A 7/9/2021    EGD WITH BRAVO performed by Rudy Grimes MD at [facility]  7/9/2021    EGD BIOPSY performed by Rudy Grimes MD at [address] [place] [address] History     Socioeconomic History    Marital status:      Spouse name: Not on file    Number of children: Not on file    Years of education: Not on file    Highest education level: Not on file   Occupational History    Not on file   Tobacco Use    Smoking status: Never    Smokeless tobacco: Never   Vaping Use    Vaping Use: Never used   Substance and Sexual Activity    Alcohol use: No     Alcohol/week: 0.0 standard drinks    Drug use: No    Sexual activity: Never   Other Topics Concern    Not on file   Social History Narrative    Not on file     Social Determinants of Health     Financial Resource Strain: Low Risk     Difficulty of Paying Living Expenses: Not hard at all   Food Insecurity: No Food Insecurity    Worried About Running Out of Food in the Last Year: Never true    Ran Out of Food in the Last Year: Never true   Transportation Needs: Not on file   Physical Activity: Not on file   Stress: Not on file   Social Connections: Not on file   Intimate Partner Violence: Not on file   Housing Stability: Not on file     Current Outpatient Medications   Medication Sig Dispense Refill    losartan (COZAAR) 100 MG tablet TAKE 1 TABLET DAILY 90 tablet 0    levothyroxine (SYNTHROID) 75 MCG tablet TAKE 1 TABLET DAILY 90 tablet 1    metFORMIN (GLUCOPHAGE-XR) 500 MG extended release tablet Take 3 tablets by mouth daily (with breakfast) 90 tablet 0    LORazepam (ATIVAN) 0.5 MG tablet Take 0.5 mg by mouth nightly. hydroCHLOROthiazide (MICROZIDE) 12.5 MG capsule Take 1 capsule by mouth every morning 30 capsule 0    metoprolol tartrate (LOPRESSOR) 25 MG tablet TAKE 1 TABLET TWICE A  tablet 0    amLODIPine (NORVASC) 5 MG tablet TAKE 1 TABLET DAILY 90 tablet 1    aspirin 81 MG EC tablet Take 81 mg by mouth daily      blood glucose test strips (CONTOUR NEXT TEST) strip TEST ONCE DAILY 50 strip 11    rosuvastatin (CRESTOR) 10 MG tablet Take 1 tablet by mouth daily TAKE 1 TABLET BY MOUTH NIGHTLY 90 tablet 3    clobetasol (TEMOVATE) 0.05 % external solution       Cholecalciferol (VITAMIN D3) 25 MCG (1000 UT) TABS Take 1 tablet by mouth daily (Patient taking differently: Take by mouth daily Taking 2,000 units daily)      magnesium oxide (MAG-OX) 400 (241.3 Mg) MG TABS tablet Take 3 tablets by mouth 2 times daily For Hypomagnesemia (Patient taking differently: Take by mouth 2 times daily For Hypomagnesemia) 180 tablet 5    triamcinolone (KENALOG) 0.1 % cream        No current facility-administered medications for this visit. No changes in past medicalhistory, past surgical history, social history, or family history were noted during the patient encounter unless specifically listed above. All updates of past medical history, past surgical history, social history, orfamily history were reviewed personally by me during the office visit.   All problems listed in the assessment are stable unless noted otherwise. Medication profile reviewed personally by me during the office visit. Medication side effects and possible impairments from medications were discussed as applicable. Objective:       Physical Exam  Vitals and nursing note reviewed. Constitutional:       General: She is not in acute distress. Appearance: Normal appearance. She is well-developed. HENT:      Head: Normocephalic and atraumatic. Right Ear: Tympanic membrane, ear canal and external ear normal.      Left Ear: Tympanic membrane, ear canal and external ear normal.      Nose: Nose normal.      Mouth/Throat:      Pharynx: Uvula midline. No oropharyngeal exudate. Eyes:      General: Lids are normal.      Conjunctiva/sclera: Conjunctivae normal.      Pupils: Pupils are equal, round, and reactive to light. Neck:      Thyroid: No thyromegaly. Vascular: No carotid bruit or JVD. Cardiovascular:      Rate and Rhythm: Normal rate and regular rhythm. Pulses: Normal pulses. Radial pulses are 2+ on the right side and 2+ on the left side. Dorsalis pedis pulses are 2+ on the right side and 2+ on the left side. Posterior tibial pulses are 2+ on the right side and 2+ on the left side. Heart sounds: Normal heart sounds. No murmur heard. No friction rub. No gallop. Pulmonary:      Effort: Pulmonary effort is normal.      Breath sounds: Normal breath sounds. Chest:       Abdominal:      General: Bowel sounds are normal.      Palpations: Abdomen is soft. There is no mass. Tenderness: There is no abdominal tenderness. Musculoskeletal:         General: Normal range of motion. Cervical back: Normal range of motion and neck supple. Right foot: Normal range of motion. Deformity and bunion present. Left foot: Normal range of motion. Deformity and bunion present. Feet:    Feet:      Right foot:      Protective Sensation: 10 sites tested. 10 sites sensed. Skin integrity: Callus present. Toenail Condition: Right toenails are abnormally thick. Fungal disease present. Left foot:      Protective Sensation: 10 sites tested. 10 sites sensed. Skin integrity: Callus present. Toenail Condition: Left toenails are abnormally thick. Fungal disease present. Comments: Bilateral hallux valgus deformity  Medial exostosis first MTP bilaterally  Lymphadenopathy:      Head:      Right side of head: No submandibular adenopathy. Left side of head: No submandibular adenopathy. Cervical: No cervical adenopathy. Skin:     General: Skin is warm and dry. Findings: No lesion or rash. Neurological:      Mental Status: She is alert and oriented to person, place, and time. Gait: Gait normal.   Psychiatric:         Speech: Speech normal.         Behavior: Behavior normal.         Thought Content: Thought content normal.         Judgment: Judgment normal.       /74 (Site: Left Upper Arm, Position: Sitting, Cuff Size: Medium Adult)   Pulse 74   Ht 5' 2\" (1.575 m)   Wt 162 lb (73.5 kg)   SpO2 98%   BMI 29.63 kg/m²   Body mass index is 29.63 kg/m². Lab Review   No visits with results within 2 Month(s) from this visit. Latest known visit with results is:   Hospital Outpatient Visit on 07/12/2022   Component Date Value    Left Ventricular Ejectio* 07/12/2022 60     LVEF MODALITY 07/12/2022 ECHO               Assessment:       1. Type 2 diabetes mellitus with diabetic polyneuropathy, without long-term current use of insulin ([facility] [address]    2. Moderate episode of recurrent major depressive disorder ([facility] [address]    3. Diarrhea, unspecified type    4. Anxiety    5. Essential hypertension    6. Hypothyroidism, unspecified type    7. Mixed hyperlipidemia    8. Hypomagnesemia    9. Vitamin D deficiency    10. Vitamin B12 deficiency    11. Low blood potassium    12. Controlled substance agreement signed    13. Anemia, unspecified type    14.  Paronychia of second toe of left foot        Results for POC orders placed in visit on 10/17/22   POCT Urinalysis no Micro   Result Value Ref Range    Color, UA yellow     Clarity, UA clear     Glucose, UA POC neg     Bilirubin, UA neg     Ketones, UA neg     Spec Grav, UA 1.025     Blood, UA POC neg     pH, UA 6.0     Protein, UA POC neg     Urobilinogen, UA 0.2     Leukocytes, UA neg     Nitrite, UA neg             Plan:       Duong Aquino was seen today for diabetes, hypertension and depression. Diagnoses and all orders for this visit:    Type 2 diabetes mellitus with diabetic polyneuropathy, without long-term current use of insulin (HCC)  -     Hemoglobin A1C  -     POCT Urinalysis no Micro  -     Microalbumin / Creatinine Urine Ratio  -      DIABETES FOOT EXAM  -     metFORMIN (GLUCOPHAGE-XR) 500 MG extended release tablet; Take 3 tablets by mouth daily (with breakfast)    Moderate episode of recurrent major depressive disorder (HCC)  Educated if patient develops SI/HI/awa to call 911 or go to ER. Discussed use, benefit, risks and side effects of prescribed medications. Barriers to compliance discussed. All patient questions answered. Pt voiced understanding. Diarrhea, unspecified type  I was going to order stool culture since patient has had diarrhea for 2 to 3 weeks and several of her grandchildren have 2 however patient told me that she increase her metformin to 3-4 tabs over the last couple of weeks because her sugar had been elevated. It is a possibility the diarrhea is related to the increase metformin. Patient did decrease metformin back to her normal dose of 2 daily. If her diarrhea does not improve she was sent home with the stool specimen collection containers and will bring those in  -     Clostridium Difficile Toxin/Antigen; Future  -     Gastrointestinal Panel, Molecular; Future  -     Fecal leukocytes;  Future    Anxiety  Chronic benzodiazepine treatment protocol was discussed with the patient again including taking medications only as prescribed , using one pharmacy and getting all controlled substances from one physician unless okayed with me. Proper safeguarding and disposal of medications were also discussed with the patient. The current treatment regimen is needed to decrease the patient's anxiety symptoms, improve the quality of life and ability to function and improve sleep and mood symptoms. Patient given following instructions - You are on medications which could impair your senses, you are at risk of weakness, falls, dizziness, or drowsiness. You should be careful during activities which could place you at risk of harm, such as climbing, using stairs, operating machinery, or driving vehicles. If you feel you cannot safely do these activities, you should request others to help you, or avoid the activities altogether. If you are drowsy for any other reason, you should use the same precautions as listed above. The patient is made aware of the potential of drowsiness with the prescribed medications, and that care should be exercised in operating machinery, vehicles, or placing oneself in situations of risk to their health, ie heights and climbing and stairs. The patient denies nausea, vomiting, diarrhea and constipation. No respiratory problems. No increased sleepiness, drowsiness or confusion. The patient states that the medications and treatment have helped improve the quality of life and helped in psychosocial functioning. There are no indicators for possible drug abuse, addiction or diversion problems. Periodic Controlled Substance Monitoring: Possible medication side effects, risk of tolerance/dependence & alternative treatments discussed., No signs of potential drug abuse or diversion identified. , Assessed functional status. , Random urine drug screen sent today.  (dEilia Lyon, APRN - CNP)    Essential hypertension  -     Comprehensive Metabolic Panel  -     CBC with Auto Differential  -     hydroCHLOROthiazide (MICROZIDE) 12.5 MG capsule; Take 1 capsule by mouth every morning  -     amLODIPine (NORVASC) 5 MG tablet; TAKE 1 TABLET DAILY  Hypertension, Blood pressure is  well controlled on current medication regimen. Medication: no change. Dietary sodium restriction. Regular aerobic exercise. Check blood pressures monthly and record. Hypothyroidism, unspecified type  -     TSH with Reflex  Condition appears stable with current medication regimen. Continue current medications. No reported or noted side effects of medication. Will continue to monitor at routine intervals as appropriate. Mixed hyperlipidemia  -     Lipid Panel  Condition appears stable with current medication regimen. No reported or noted side effects of medication. Will continue to monitor at routine intervals as appropriate. Continue current medications as follows:  -     rosuvastatin (CRESTOR) 10 MG tablet; Take 1 tablet by mouth daily TAKE 1 TABLET BY MOUTH NIGHTLY  The patient is asked to make an attempt to improve diet and exercise patterns to aid in medical management of this problem. Hypomagnesemia  -     Magnesium    Vitamin D deficiency  -     Vitamin D 25 Hydroxy    Vitamin B12 deficiency    Low blood potassium  -     Cancel: Potassium    Controlled substance agreement signed  -     Drug Panel-PM-HI Res-UR Interp-A    Anemia, unspecified type  -     Iron and TIBC  -     Ferritin    Other orders  -     metoprolol tartrate (LOPRESSOR) 25 MG tablet; TAKE 1 TABLET TWICE A DAY      Patient has been instructed call the office immediately with new symptoms, change in symptoms or worseningof symptoms. If this is not feasible, patient is instructed to report to the emergency room. Medication profile reviewed. Medication side effects and possible impairments from medications were discussed as applicable. Allergies were reviewed. Health maintenance was reviewed and updated as appropriate.      No follow-ups on file. (Comment: Please note this report has been produced using a combination of typing and speech recognition software and may contain errors related to that system including errors in grammar, punctuation, and spelling, as well as words and phrases that may be inappropriate.  If there are any questions or concerns please feel free to contact the dictating provider for clarification.)

## 2022-10-18 LAB
A/G RATIO: 2.2 (ref 1.1–2.2)
ALBUMIN SERPL-MCNC: 4.9 G/DL (ref 3.4–5)
ALP BLD-CCNC: 99 U/L (ref 40–129)
ALT SERPL-CCNC: 18 U/L (ref 10–40)
ANION GAP SERPL CALCULATED.3IONS-SCNC: 15 MMOL/L (ref 3–16)
AST SERPL-CCNC: 17 U/L (ref 15–37)
BASOPHILS ABSOLUTE: 0.1 K/UL (ref 0–0.2)
BASOPHILS RELATIVE PERCENT: 1.7 %
BILIRUB SERPL-MCNC: 0.3 MG/DL (ref 0–1)
BUN BLDV-MCNC: 20 MG/DL (ref 7–20)
CALCIUM SERPL-MCNC: 10 MG/DL (ref 8.3–10.6)
CHLORIDE BLD-SCNC: 101 MMOL/L (ref 99–110)
CHOLESTEROL, TOTAL: 194 MG/DL (ref 0–199)
CO2: 24 MMOL/L (ref 21–32)
CREAT SERPL-MCNC: 0.8 MG/DL (ref 0.6–1.2)
CREATININE URINE: 228.9 MG/DL (ref 28–259)
EOSINOPHILS ABSOLUTE: 0.1 K/UL (ref 0–0.6)
EOSINOPHILS RELATIVE PERCENT: 1.7 %
ESTIMATED AVERAGE GLUCOSE: 139.9 MG/DL
FERRITIN: 11.4 NG/ML (ref 15–150)
GFR SERPL CREATININE-BSD FRML MDRD: >60 ML/MIN/{1.73_M2}
GLUCOSE BLD-MCNC: 109 MG/DL (ref 70–99)
HBA1C MFR BLD: 6.5 %
HCT VFR BLD CALC: 36.3 % (ref 36–48)
HDLC SERPL-MCNC: 67 MG/DL (ref 40–60)
HEMOGLOBIN: 11.8 G/DL (ref 12–16)
IRON SATURATION: 16 % (ref 15–50)
IRON: 61 UG/DL (ref 37–145)
LDL CHOLESTEROL CALCULATED: 92 MG/DL
LYMPHOCYTES ABSOLUTE: 1.5 K/UL (ref 1–5.1)
LYMPHOCYTES RELATIVE PERCENT: 21.4 %
MAGNESIUM: 2.2 MG/DL (ref 1.8–2.4)
MCH RBC QN AUTO: 27.7 PG (ref 26–34)
MCHC RBC AUTO-ENTMCNC: 32.6 G/DL (ref 31–36)
MCV RBC AUTO: 84.9 FL (ref 80–100)
MICROALBUMIN UR-MCNC: <1.2 MG/DL
MICROALBUMIN/CREAT UR-RTO: NORMAL MG/G (ref 0–30)
MONOCYTES ABSOLUTE: 0.5 K/UL (ref 0–1.3)
MONOCYTES RELATIVE PERCENT: 6.6 %
NEUTROPHILS ABSOLUTE: 4.9 K/UL (ref 1.7–7.7)
NEUTROPHILS RELATIVE PERCENT: 68.6 %
PDW BLD-RTO: 15.7 % (ref 12.4–15.4)
PLATELET # BLD: 222 K/UL (ref 135–450)
PMV BLD AUTO: 9.3 FL (ref 5–10.5)
POTASSIUM SERPL-SCNC: 4.7 MMOL/L (ref 3.5–5.1)
RBC # BLD: 4.27 M/UL (ref 4–5.2)
SODIUM BLD-SCNC: 140 MMOL/L (ref 136–145)
TOTAL IRON BINDING CAPACITY: 376 UG/DL (ref 260–445)
TOTAL PROTEIN: 7.1 G/DL (ref 6.4–8.2)
TRIGL SERPL-MCNC: 175 MG/DL (ref 0–150)
TSH REFLEX: 1.22 UIU/ML (ref 0.27–4.2)
VITAMIN D 25-HYDROXY: 55.4 NG/ML
VLDLC SERPL CALC-MCNC: 35 MG/DL
WBC # BLD: 7.1 K/UL (ref 4–11)

## 2022-10-21 LAB
6-ACETYLMORPHINE: NOT DETECTED
7-AMINOCLONAZEPAM: NOT DETECTED
ALPHA-OH-ALPRAZOLAM: NOT DETECTED
ALPHA-OH-MIDAZOLAM, URINE: NOT DETECTED
ALPRAZOLAM: NOT DETECTED
AMPHETAMINE: NOT DETECTED
BARBITURATES: NOT DETECTED
BENZOYLECGONINE: NOT DETECTED
BUPRENORPHINE: NOT DETECTED
CARISOPRODOL: NOT DETECTED
CLONAZEPAM: NOT DETECTED
CODEINE: NOT DETECTED
CREATININE URINE: 189.8 MG/DL (ref 20–400)
DIAZEPAM: NOT DETECTED
DRUGS EXPECTED: NORMAL
EER PAIN MGT DRUG PANEL, HIGH RES/EMIT U: NORMAL
ETHYL GLUCURONIDE: NOT DETECTED
FENTANYL: NOT DETECTED
GABAPENTIN: NOT DETECTED
HYDROCODONE: NOT DETECTED
HYDROMORPHONE: NOT DETECTED
LORAZEPAM: PRESENT
MARIJUANA METABOLITE: NOT DETECTED
MDA: NOT DETECTED
MDEA: NOT DETECTED
MDMA URINE: NOT DETECTED
MEPERIDINE: NOT DETECTED
METHADONE: NOT DETECTED
METHAMPHETAMINE: NOT DETECTED
METHYLPHENIDATE: NOT DETECTED
MIDAZOLAM: NOT DETECTED
MORPHINE: NOT DETECTED
NALOXONE: NOT DETECTED
NORBUPRENORPHINE, FREE: NOT DETECTED
NORDIAZEPAM: NOT DETECTED
NORFENTANYL: NOT DETECTED
NORHYDROCODONE, URINE: NOT DETECTED
NOROXYCODONE: NOT DETECTED
NOROXYMORPHONE, URINE: NOT DETECTED
OXAZEPAM: NOT DETECTED
OXYCODONE: NOT DETECTED
OXYMORPHONE: NOT DETECTED
PAIN MANAGEMENT DRUG PANEL: NORMAL
PAIN MANAGEMENT DRUG PANEL: NORMAL
PCP: NOT DETECTED
PHENTERMINE: NOT DETECTED
PREGABALIN: NOT DETECTED
TAPENTADOL, URINE: NOT DETECTED
TAPENTADOL-O-SULFATE, URINE: NOT DETECTED
TEMAZEPAM: NOT DETECTED
TRAMADOL: NOT DETECTED
ZOLPIDEM: NOT DETECTED

## 2022-10-28 ENCOUNTER — TELEMEDICINE (OUTPATIENT)
Dept: FAMILY MEDICINE CLINIC | Age: 77
End: 2022-10-28
Payer: MEDICARE

## 2022-10-28 DIAGNOSIS — Z00.00 INITIAL MEDICARE ANNUAL WELLNESS VISIT: Primary | ICD-10-CM

## 2022-10-28 DIAGNOSIS — Z13.6 SCREENING FOR CARDIOVASCULAR CONDITION: ICD-10-CM

## 2022-10-28 PROCEDURE — 1123F ACP DISCUSS/DSCN MKR DOCD: CPT | Performed by: NURSE PRACTITIONER

## 2022-10-28 PROCEDURE — G0438 PPPS, INITIAL VISIT: HCPCS | Performed by: NURSE PRACTITIONER

## 2022-10-28 PROCEDURE — G8484 FLU IMMUNIZE NO ADMIN: HCPCS | Performed by: NURSE PRACTITIONER

## 2022-10-28 PROCEDURE — G0446 INTENS BEHAVE THER CARDIO DX: HCPCS | Performed by: NURSE PRACTITIONER

## 2022-10-28 PROCEDURE — 3017F COLORECTAL CA SCREEN DOC REV: CPT | Performed by: NURSE PRACTITIONER

## 2022-10-28 RX ORDER — GENTAMICIN SULFATE 1 MG/G
OINTMENT TOPICAL
Qty: 15 G | Refills: 0 | Status: SHIPPED | OUTPATIENT
Start: 2022-10-28 | End: 2022-11-04

## 2022-10-28 ASSESSMENT — PATIENT HEALTH QUESTIONNAIRE - PHQ9
SUM OF ALL RESPONSES TO PHQ QUESTIONS 1-9: 4
10. IF YOU CHECKED OFF ANY PROBLEMS, HOW DIFFICULT HAVE THESE PROBLEMS MADE IT FOR YOU TO DO YOUR WORK, TAKE CARE OF THINGS AT HOME, OR GET ALONG WITH OTHER PEOPLE: 0
SUM OF ALL RESPONSES TO PHQ QUESTIONS 1-9: 4
SUM OF ALL RESPONSES TO PHQ QUESTIONS 1-9: 4
9. THOUGHTS THAT YOU WOULD BE BETTER OFF DEAD, OR OF HURTING YOURSELF: 0
5. POOR APPETITE OR OVEREATING: 0
1. LITTLE INTEREST OR PLEASURE IN DOING THINGS: 1
4. FEELING TIRED OR HAVING LITTLE ENERGY: 3
8. MOVING OR SPEAKING SO SLOWLY THAT OTHER PEOPLE COULD HAVE NOTICED. OR THE OPPOSITE, BEING SO FIGETY OR RESTLESS THAT YOU HAVE BEEN MOVING AROUND A LOT MORE THAN USUAL: 0
3. TROUBLE FALLING OR STAYING ASLEEP: 0
SUM OF ALL RESPONSES TO PHQ9 QUESTIONS 1 & 2: 1
SUM OF ALL RESPONSES TO PHQ QUESTIONS 1-9: 4
7. TROUBLE CONCENTRATING ON THINGS, SUCH AS READING THE NEWSPAPER OR WATCHING TELEVISION: 0
6. FEELING BAD ABOUT YOURSELF - OR THAT YOU ARE A FAILURE OR HAVE LET YOURSELF OR YOUR FAMILY DOWN: 0
2. FEELING DOWN, DEPRESSED OR HOPELESS: 0

## 2022-10-28 ASSESSMENT — LIFESTYLE VARIABLES
HOW OFTEN DO YOU HAVE A DRINK CONTAINING ALCOHOL: NEVER
HOW MANY STANDARD DRINKS CONTAINING ALCOHOL DO YOU HAVE ON A TYPICAL DAY: PATIENT DOES NOT DRINK

## 2022-10-28 NOTE — PATIENT INSTRUCTIONS
Advance Directives: Care Instructions  Overview  An advance directive is a legal way to state your wishes at the end of your life. It tells your family and your doctor what to do if you can't say what you want. There are two main types of advance directives. You can change them any time your wishes change. Living will. This form tells your family and your doctor your wishes about life support and other treatment. The form is also called a declaration. Medical power of . This form lets you name a person to make treatment decisions for you when you can't speak for yourself. This person is called a health care agent (health care proxy, health care surrogate). The form is also called a durable power of  for health care. If you do not have an advance directive, decisions about your medical care may be made by a family member, or by a doctor or a  who doesn't know you. It may help to think of an advance directive as a gift to the people who care for you. If you have one, they won't have to make tough decisions by themselves. Follow-up care is a key part of your treatment and safety. Be sure to make and go to all appointments, and call your doctor if you are having problems. It's also a good idea to know your test results and keep a list of the medicines you take. What should you include in an advance directive? Many states have a unique advance directive form. (It may ask you to address specific issues.) Or you might use a universal form that's approved by many states. If your form doesn't tell you what to address, it may be hard to know what to include in your advance directive. Use the questions below to help you get started. Who do you want to make decisions about your medical care if you are not able to? What life-support measures do you want if you have a serious illness that gets worse over time or can't be cured? What are you most afraid of that might happen?  (Maybe you're afraid of having pain, losing your independence, or being kept alive by machines.)  Where would you prefer to die? (Your home? A hospital? A nursing home?)  Do you want to donate your organs when you die? Do you want certain Uatsdin practices performed before you die? When should you call for help? Be sure to contact your doctor if you have any questions. Where can you learn more? Go to [email or web address] and sign in to your [employer] account. Enter R264 in the [employer] box to learn more about \"Advance Directives: Care Instructions. \"     If you do not have an account, please click on the \"Sign Up Now\" link. Current as of: June 16, 2022               Content Version: 13.4  © 8181-0001 [employer]. Care instructions adapted under license by [facility] ([facility]). If you have questions about a medical condition or this instruction, always ask your healthcare professional. [place] [address] any warranty or liability for your use of this information. Learning About Medical Power of   What is a medical power of ? A medical power of , also called a durable power of  for health care, is one type of the legal forms called advance directives. It lets you name the person you want to make treatment decisions for you if you can't speak or decide for yourself. The person you choose is called your health care agent. This person is also called a health care proxy or health care surrogate. A medical power of  may be called something else in your state. How do you choose a health care agent? Choose your health care agent carefully. This person may or may not be a family member. Talk to the person before you make your final decision. Make sure he or she is comfortable with this responsibility. It's a good idea to choose someone who:  Is at least 25years old.   Knows you well and understands what makes life meaningful for you. Understands your Quaker and moral values. Will do what you want, not what he or she wants. Will be able to make difficult choices at a stressful time. Will be able to refuse or stop treatment, if that is what you would want, even if you could die. Will be firm and confident with health professionals if needed. Will ask questions to get needed information. Lives near you or agrees to travel to you if needed. Your family may help you make medical decisions while you can still be part of that process. But it's important to choose one person to be your health care agent in case you aren't able to make decisions for yourself. If you don't fill out the legal form and name a health care agent, the decisions your family can make may be limited. A health care agent may be called something else in your state. Who will make decisions for you if you don't have a health care agent? If you don't have a health care agent or a living will, you may not get the care you want. Decisions may be made by family members who disagree about your medical care. Or decisions may be made by a medical professional who doesn't know you well. In some cases, a  makes the decisions. When you name a health care agent, it is very clear who has the power to make health decisions for you. How do you name a health care agent? You name your health care agent on a legal form. This form is usually called a medical power of . Ask your hospital, state bar association, or office on aging where to find these forms. You must sign the form to make it legal. Some states require you to get the form notarized. This means that a person called a  watches you sign the form and then he or she signs the form. Some states also require that two or more witnesses sign the form. Be sure to tell your family members and doctors who your health care agent is. Where can you learn more?   Go to [email or web address] and sign in to your [employer] account. Enter [id number] in the [facility] box to learn more about \"Learning About [address]. \"     If you do not have an account, please click on the \"Sign Up Now\" link. Current as of: October 6, 2021               Content Version: 13.4  © 2006-2022 Healthwise, [facility]. Care instructions adapted under license by [facility] ([facility]). If you have questions about a medical condition or this instruction, always ask your healthcare professional. [address] any warranty or liability for your use of this information. Learning About Living Lynn Hicks  What is a living will? A living will, also called a declaration, is a legal form. It tells your family and your doctor your wishes when you can't speak for yourself. It's used by the health professionals who will treat you as you near the end of your life or if you get seriously hurt or ill. If you put your wishes in writing, your loved ones and others will know what kind of care you want. They won't need to guess. This can ease your mind and be helpful to others. And you can change or cancel your living will at any time. A living will is not the same as an estate or property will. An estate will explains what you want to happen with your money and property after you die. How do you use it? Keep these facts in mind about how a living will is used. Your living will is used only if you can't speak or make decisions for yourself. Most often, one or more doctors must certify that you can't speak or decide for yourself before your living will takes effect. If you get better and can speak for yourself again, you can accept or refuse any treatment. It doesn't matter what you said in your living will. Some states may limit your right to refuse treatment in certain cases.  For example, you may need to clearly state in your living will that you don't want artificial hydration and nutrition, such as being fed through a tube. Is a living will a legal document? A living will is a legal document. Each state has its own laws about living hutton. And a living will may be called something else in your state. Here are some things to know about living hutton. You don't need an  to complete a living will. But legal advice can be helpful if your state's laws are unclear. It can also help if your health history is complicated or your family can't agree on what should be in your living will. You can change your living will at any time. Some people find that their wishes about end-of-life care change as their health changes. If you make big changes to your living will, complete a new form. If you move to another state, make sure that your living will is legal in the state where you now live. In most cases, doctors will respect your wishes even if you have a form from a different state. You might use a universal form that has been approved by many states. This kind of form can sometimes be filled out and stored online. Your digital copy will then be available wherever you have a connection to the internet. The doctors and nurses who need to treat you can find it right away. Your state may offer an online registry. This is another place where you can store your living will online. It's a good idea to get your living will notarized. This means using a person called a  to watch two people sign, or witness, your living will. What should you know when you create a living will? Here are some questions to ask yourself as you make your living will. Do you know enough about life support methods that might be used? If not, talk to your doctor so you know what might be done if you can't breathe on your own, your heart stops, or you can't swallow. What things would you still want to be able to do after you receive life-support methods?  Would you want to be able to walk? To speak? To eat on your own? To live without the help of machines? Do you want certain Presybeterian practices performed if you become very ill? If you have a choice, where do you want to be cared for? In your home? At a hospital or nursing home? If you have a choice at the end of your life, where would you prefer to die? At home? In a hospital or nursing home? Somewhere else? Would you prefer to be buried or cremated? Do you want your organs to be donated after you die? What should you do with your living will? Make sure that your family members and your health care agent have copies of your living will (also called a declaration). Give your doctor a copy of your living will. Ask to have it kept as part of your medical record. If you have more than one doctor, make sure that each one has a copy. Put a copy of your living will where it can be easily found. For example, some people may put a copy on their refrigerator door. If you are using a digital copy, be sure your doctor, family members, and health care agent know how to find and access it. Where can you learn more? Go to https://ii4bpepiceweb.[email or web address]. org and sign in to your [employer] account. Enter N940 in the [employer] box to learn more about \"Learning About Living [place]vin[place]. \"     If you do not have an account, please click on the \"Sign Up Now\" link. Current as of: June 16, 2022               Content Version: 13.4  © 9229-9561 Healthwise, Incorporated. Care instructions adapted under license by [facility] ([facility]). If you have questions about a medical condition or this instruction, always ask your healthcare professional. [place] [address] any warranty or liability for your use of this information. Personalized Preventive Plan for Kp Gonzalez - 10/28/2022  Medicare offers a range of preventive health benefits.  Some of the tests and screenings are paid in full while other may be subject to a deductible, co-insurance, and/or copay. Some of these benefits include a comprehensive review of your medical history including lifestyle, illnesses that may run in your family, and various assessments and screenings as appropriate. After reviewing your medical record and screening and assessments performed today your provider may have ordered immunizations, labs, imaging, and/or referrals for you. A list of these orders (if applicable) as well as your Preventive Care list are included within your After Visit Summary for your review. Other Preventive Recommendations:    A preventive eye exam performed by an eye specialist is recommended every 1-2 years to screen for glaucoma; cataracts, macular degeneration, and other eye disorders. A preventive dental visit is recommended every 6 months. Try to get at least 150 minutes of exercise per week or 10,000 steps per day on a pedometer . Order or download the FREE \"Exercise & Physical Activity: Your Everyday Guide\" from [employer]. Call 6-246.964.1970 or search [employer] online. You need 4262-4483 mg of calcium and 3588-8662 IU of vitamin D per day. It is possible to meet your calcium requirement with diet alone, but a vitamin D supplement is usually necessary to meet this goal.  When exposed to the sun, use a sunscreen that protects against both UVA and UVB radiation with an SPF of 30 or greater. Reapply every 2 to 3 hours or after sweating, drying off with a towel, or swimming. Always wear a seat belt when traveling in a car. Always wear a helmet when riding a bicycle or motorcycle. Preventing Osteoporosis: After Your Visit  Your Care Instructions  Osteoporosis means the bones are weak and thin enough that they can break easily. The older you are, the more likely you are to get osteoporosis. But with plenty of calcium, vitamin D, and exercise, you can help prevent osteoporosis.   The preteen and teen years are a key time for bone building. With the help of calcium, vitamin D, and exercise in those early years and beyond, the bones reach their peak density and strength by age 27. After age 27, your bones naturally start to thin and weaken. The stronger your bones are at around age 27, the lower your risk for osteoporosis. But no matter what your age and risk are, your bones still need calcium, vitamin D, and exercise to stay strong. Also avoid smoking, and limit alcohol. Smoking and heavy alcohol use can make your bones thinner. Talk to your doctor about any special risks you might have, such as having a close relative with osteoporosis or taking a medicine that can weaken bones. Your doctor can tell you the best ways to protect your bones from thinning. Follow-up care is a key part of your treatment and safety. Be sure to make and go to all appointments, and call your doctor if you are having problems. It's also a good idea to know your test results and keep a list of the medicines you take. How can you care for yourself at home? Get enough calcium and vitamin D. The [place] of Medicine recommends adults younger than age 46 need 1,000 mg of calcium and 600 IU of vitamin D each day. Women ages 46 to 79 need 1,200 mg of calcium and 600 IU of vitamin D each day. Men ages 46 to 79 need 1,000 mg of calcium and 600 IU of vitamin D each day. Adults 71 and older need 1,200 mg of calcium and 800 IU of vitamin D each day. Eat foods rich in calcium, like yogurt, cheese, milk, and dark green vegetables. Eat foods rich in vitamin D, like eggs, fatty fish, cereal, and fortified milk. Get some sunshine. Your body uses sunshine to make its own vitamin D. The safest time to be out in the sun is before 10 a.m. or after 3 p.m. Avoid getting sunburned. Sunburn can increase your risk of skin cancer. Talk to your doctor about taking a calcium plus vitamin D supplement.  Ask about what type of calcium is right for you, and how much to take at a time. Adults ages 23 to 48 should not get more than 2,500 mg of calcium and 4,000 IU of vitamin D each day, whether it is from supplements and/or food. Adults ages 46 and older should not get more than 2,000 mg of calcium and 4,000 IU of vitamin D each day from supplements and/or food. Get regular bone-building exercise. Weight-bearing and resistance exercises keep bones healthy by working the muscles and bones against gravity. Start out at an exercise level that feels right for you. Add a little at a time until you can do the following:  Do 30 minutes of weight-bearing exercise on most days of the week. Walking, jogging, stair climbing, and dancing are good choices. Do resistance exercises with weights or elastic bands 2 to 3 days a week. Limit alcohol. Drink no more than 1 alcohol drink a day if you are a woman. Drink no more than 2 alcohol drinks a day if you are a man. Do not smoke. Smoking can make bones thin faster. If you need help quitting, talk to your doctor about stop-smoking programs and medicines. These can increase your chances of quitting for good. When should you call for help? Watch closely for changes in your health, and be sure to contact your doctor if:  You need help with a healthy eating plan. You need help with an exercise plan    © 4490-6859 [employer]. Care instructions adapted under license by [facility]. This care instruction is for use with your licensed healthcare professional. If you have questions about a medical condition or this instruction, always ask your healthcare professional. [place] [address] any warranty or liability for your use of this information. Content Version: 9.4.40450; Last Revised: June 20, 2011                Keep Your Memory Meryl Mcdonald       Many factors can affect your ability to remembera hectic lifestyle, aging, stress, chronic disease, and certain medicines.  But, there are steps you can take to sharpen your mind and help preserve your memory. Challenge Your Brain   Regularly challenging your mind may help keeps it in top shape. Good mental exercises include:   Crossword puzzlesUse a dictionary if you need it; you will learn more that way. Brainteasers Try some! Crafts, such as wood working and sewing   Hobbies, such as gardening and building model airplanes   SocializingVisit old friends or join groups to meet new ones. Reading   Learning a new language   Taking a class, whether it be art history or leonie chi   TravelingExperience the food, history, and culture of your destination   Learning to use a computer   Going to museums, the theater, or thought-provoking movies   Changing things in your daily life, such as reversing your pattern in the grocery store or brushing your teeth using your nondominant hand   Use Memory Aids   There is no need to remember every detail on your own. These memory aids can help:   Calendars and day planners   Electronic organizers to store all sorts of helpful informationThese devices can \"beep\" to remind you of appointments. A book of days to record birthdays, anniversaries, and other occasions that occur on the same date every year   Detailed \"to-do\" lists and strategically placed sticky notes   Quick \"study\" sessionsBefore a gathering, review who will be there so their names will be fresh in your mind. Establish routinesFor example, keep your keys, wallet, and umbrella in the same place all the time or take medicine with your 8:00 AM glass of juice   Live a Healthy Life   Many actions that will keep your body strong will do the same for your mind. For example:   Talk to Your Doctor About Herbs and Supplements    Malnutrition and vitamin deficiencies can impair your mental function. For example, vitamin B12 deficiency can cause a range of symptoms, including confusion. But, what if your nutritional needs are being met? Can herbs and supplements still offer a benefit?  Researchers have investigated a range of natural remedies, such as ginkgo , ginseng , and the supplement phosphatidylserine (PS). So far, though, the evidence is inconsistent as to whether these products can improve memory or thinking. If you are interested in taking herbs and supplements, talk to your doctor first because they may interact with other medicines that you are taking. Exercise Regularly    Among the many benefits of regular exercise are increased blood flow to the brain and decreased risk of certain diseases that can interfere with memory function. One study found that even moderate exercise has a beneficial effect. Examples of \"moderate\" exercise include:   Playing 18 holes of golf once a week, without a cart   Playing tennis twice a week   Walking one mile per day   Manage Stress    It can be tough to remember what is important when your mind is cluttered. Make time for relaxation. Choose activities that calm you down, and make it routine. Manage Chronic Conditions    Side effects of high blood pressure , diabetes, and heart disease can interfere with mental function. Many of the lifestyle steps discussed here can help manage these conditions. Strive to eat a healthy diet, exercise regularly, get stress under control, and follow your doctor's advice for your condition. Minimize Medications    Talk to your doctor about the medicines that you take. Some may be unnecessary. Also, healthy lifestyle habits may lower the need for certain drugs. Last Reviewed: April 2010 Donya Ruth MD   Updated: 4/13/2010     Keeping Home a [address]       As we get older, changes in balance, gait, strength, vision, hearing, and cognition make even the most youthful senior more prone to accidents. Falls are one of the leading health risks for older people.  This increased risk of falling is related to:   Aging process (eg, decreased muscle strength, slowed reflexes)   Higher incidence of chronic health problems (eg, arthritis, diabetes) that may limit mobility, agility or sensory awareness   Side effects of medicine (eg, dizziness, blurred vision)especially medicines like prescription pain medicines and drugs used to treat mental health conditions   Depending on the brittleness of your bones, the consequences of a fall can be serious and long lasting. Home Life   Research by the Association of Aging [facility]) shows that some home accidents among older adults can be prevented by making simple lifestyle changes and basic modifications and repairs to the home environment. Here are some lifestyle changes that experts recommend:   Have your hearing and vision checked regularly. Be sure to wear prescription glasses that are right for you. Speak to your doctor or pharmacist about the possible side effects of your medicines. A number of medicines can cause dizziness. If you have problems with sleep, talk to your doctor. Limit your intake of alcohol. If necessary, use a cane or walker to help maintain your balance. Wear supportive, rubber-soled shoes, even at home. If you live in a region that gets wintry weather, you may want to put special cleats on your shoes to prevent you from slipping on the snow and ice. Exercise regularly to help maintain muscle tone, agility, and balance. Always hold the banister when going up or down stairs. Also, use  bars when getting in or out of the bath or shower, or using the toilet. To avoid dizziness, get up slowly from a lying down position. Sit up first, dangling your legs for a minute or two before rising to a standing position. Overall Home Safety Check   According to the Consumer Product Safety Commision's \"Older Consumer Home Safety Checklist,\" it is important to check for potential hazards in each room. And remember, proper lighting is an essential factor in home safety. If you cannot see clearly, you are more likely to fall.    Important questions to ask yourself include: Are lamp, electric, extension, and telephone cords placed out of the flow of traffic and maintained in good condition? Have frayed cords been replaced? Are all small rugs and runners slip resistant? If not, you can secure them to the floor with a special double-sided carpet tape. Are smoke detectors properly locatedone on every floor of your home and one outside of every sleeping area? Are they in good working order? Are batteries replaced at least once a year? Do you have a well-maintained carbon monoxide detector outside every sleeping are in your home? Does your furniture layout leave plenty of space to maneuver between and around chairs, tables, beds, and sofas? Are hallways, stairs and passages between rooms well lit? Can you reach a lamp without getting out of bed? Are floor surfaces well maintained? Shag rugs, high-pile carpeting, tile floors, and polished wood floors can be particularly slippery. Stairs should always have handrails and be carpeted or fitted with a non-skid tread. Is your telephone easily reachable. Is the cord safely tucked away? Room by Room   According to the Association of Aging, bathrooms and lakeisha are the two most potentially hazardous rooms in your home. In the Kitchen    Be sure your stove is in proper working order and always make sure burners and the oven are off before you go out or go to sleep. Keep pots on the back burners, turn handles away from the front of the stove, and keep stove clean and free of grease build-up. Kitchen ventilation systems and range exhausts should be working properly. Keep flammable objects such as towels and pot holders away from the cooking area except when in use. Make sure kitchen curtains are tied back. Move cords and appliances away from the sink and hot surfaces. If extension cords are needed, install wiring guides so they do not hang over the sink, range, or working areas.     Look for coffee pots, kettles and toaster ovens with automatic shut-offs. Keep a mop handy in the kitchen so you can wipe up spills instantly. You should also have a small fire extinguisher. Arrange your kitchen with frequently used items on lower shelves to avoid the need to stand on a stepstool to reach them. Make sure countertops are well-lit to avoid injuries while cutting and preparing food. In the Bathroom    Use a non-slip mat or decals in the tub and shower, since wet, soapy tile or porcelain surfaces are extremely slippery. Make sure bathroom rugs are non-skid or tape them firmly to the floor. Bathtubs should have at least one, preferably two, grab bars, firmly attached to structural supports in the wall. (Do not use built-in soap holders or glass shower doors as grab bars.)    Tub seats fitted with non-slip material on the legs allow you to wash sitting down. For people with limited mobility, bathtub transfer benches allow you to slide safely into the tub. Raised toilet seats and toilet safety rails are helpful for those with knee or hip problems. In the [facility]    Make sure you use a nightlight and that the area around your bed is clear of potential obstacles. Be careful with electric blankets and never go to sleep with a heating pad, which can cause serious burns even if on a low setting. Use fire-resistant mattress covers and pillows, and NEVER smoke in bed. Keep a phone next to the bed that is programmed to dial 911 at the push of a button. If you have a chronic condition, you may want to sign on with an automatic call-in service. Typically the system includes a small pendant that connects directly to an emergency medical voice-response system. You should also make arrangements to stay in contact with someonefriend, neighbor, family memberon a regular schedule.    Fire Prevention   According to the [employer]. (Smoke Alarms for Every) [address] [place] [address], senior citizens are one of the two highest able may also ease your mind and make your final days less stressful and more meaningful. Follow-up care is a key part of your treatment and safety. Be sure to make and go to all appointments, and call your doctor if you are having problems. It's also a good idea to know your test results and keep a list of the medicines you take. What can you do to plan for the end of life? You can bring these issues up with your doctor. You do not need to wait until your doctor starts the conversation. You might start with, \"What makes life worth living for me is. Lucyann Push .\" And then follow it with, \"I would not be willing to live with . Lucyann Push Lucyann Push \" When you complete this sentence it helps your doctor understand your wishes. Talk openly and honestly with your doctor. This is the best way to understand the decisions you will need to make as your health changes. Know that you can always change your mind. Ask your doctor about commonly used life-support measures. These include tube feedings, breathing machines, and fluids given through a vein (IV). Understanding these treatments will help you decide whether you want them. You may choose to have these life-supporting treatments for a limited time. This allows a trial period to see whether they will help you. You may also decide that you want your doctor to take only certain measures to keep you alive. It may help to think about the big picture, like what makes life worth living for you or what your values and goals are. Talk to your doctor about how long you are likely to live. Your doctor may be able to give you an idea of what usually happens with your specific illness. Think about preparing papers that state your wishes. These papers are called advance directives. If you do this early and review them often, there will not be any confusion about what you want. You can change your instructions at any time. Which papers should you prepare?   Advance directives are legal papers that tell doctors how you want to be cared for at the end of your life. You do not need a  to write these papers. Ask your doctor or your state health department for information on how to write your advance directives. They may have the forms for each of these types of papers. Make sure your doctor has a copy of these on file, and give a copy to a family member or close friend. Consider a do-not-resuscitate order (DNR). This order asks that no extra treatments be done if your heart stops or you stop breathing. Extra treatments may include cardiopulmonary resuscitation (CPR), electrical shock to restart your heart, or a machine to breathe for you. If you decide to have a DNR order, ask your doctor to explain and write it. Place the order in your home where everyone can easily see it. Consider a living will. A living will explains your wishes about life support and other treatments at the end of your life if you become unable to speak for yourself. Living hutton tell doctors to use or not use treatments that would keep you alive. You must have one or two witnesses or a notary present when you sign this form. A living will may be called something else in your state. Consider a medical power of . This form allows you to name a person to make decisions about your care if you are not able to. Most people ask a close friend or family member. Talk to this person about the kinds of treatments you want and those that you do not want. Make sure this person understands your wishes. A medical power of  may be called something else in your state. These legal papers are simple to change. Tell your doctor what you want to change, and ask him or her to make a note in your medical file. Give your family updated copies of the papers. Where can you learn more? Go to https://chpepiceweb.360Cities. org and sign in to your NICE account.  Enter P184 in the [facility] box to learn more about \"Advance Care Planning: Care Instructions. \"     If you do not have an account, please click on the \"Sign Up Now\" link. Current as of: October 6, 2021               Content Version: 13.4  © 2006-2022 Healthwise, Incorporated. Care instructions adapted under license by [facility] ([facility]). If you have questions about a medical condition or this instruction, always ask your healthcare professional. [address] any warranty or liability for your use of this information. Learning About Living Perroy  What is a living will? A living will, also called a declaration, is a legal form. It tells your family and your doctor your wishes when you can't speak for yourself. It's used by the health professionals who will treat you as you near the end of your life or if you get seriously hurt or ill. If you put your wishes in writing, your loved ones and others will know what kind of care you want. They won't need to guess. This can ease your mind and be helpful to others. And you can change or cancel your living will at any time. A living will is not the same as an estate or property will. An estate will explains what you want to happen with your money and property after you die. How do you use it? Keep these facts in mind about how a living will is used. Your living will is used only if you can't speak or make decisions for yourself. Most often, one or more doctors must certify that you can't speak or decide for yourself before your living will takes effect. If you get better and can speak for yourself again, you can accept or refuse any treatment. It doesn't matter what you said in your living will. Some states may limit your right to refuse treatment in certain cases. For example, you may need to clearly state in your living will that you don't want artificial hydration and nutrition, such as being fed through a tube. Is a living will a legal document? A living will is a legal document.  Each state has its own laws about living hutton. And a living will may be called something else in your state. Here are some things to know about living hutton. You don't need an  to complete a living will. But legal advice can be helpful if your state's laws are unclear. It can also help if your health history is complicated or your family can't agree on what should be in your living will. You can change your living will at any time. Some people find that their wishes about end-of-life care change as their health changes. If you make big changes to your living will, complete a new form. If you move to another state, make sure that your living will is legal in the state where you now live. In most cases, doctors will respect your wishes even if you have a form from a different state. You might use a universal form that has been approved by many states. This kind of form can sometimes be filled out and stored online. Your digital copy will then be available wherever you have a connection to the internet. The doctors and nurses who need to treat you can find it right away. Your state may offer an online registry. This is another place where you can store your living will online. It's a good idea to get your living will notarized. This means using a person called a  to watch two people sign, or witness, your living will. What should you know when you create a living will? Here are some questions to ask yourself as you make your living will. Do you know enough about life support methods that might be used? If not, talk to your doctor so you know what might be done if you can't breathe on your own, your heart stops, or you can't swallow. What things would you still want to be able to do after you receive life-support methods? Would you want to be able to walk? To speak? To eat on your own? To live without the help of machines?   Do you want certain Mormonism practices performed if you become very ill?  If you have a choice, where do you want to be cared for? In your home? At a hospital or nursing home? If you have a choice at the end of your life, where would you prefer to die? At home? In a hospital or nursing home? Somewhere else? Would you prefer to be buried or cremated? Do you want your organs to be donated after you die? What should you do with your living will? Make sure that your family members and your health care agent have copies of your living will (also called a declaration). Give your doctor a copy of your living will. Ask to have it kept as part of your medical record. If you have more than one doctor, make sure that each one has a copy. Put a copy of your living will where it can be easily found. For example, some people may put a copy on their refrigerator door. If you are using a digital copy, be sure your doctor, family members, and health care agent know how to find and access it. Where can you learn more? Go to https://Mayday PACpeGuguchu.Link_A_ Media. org and sign in to your [employer] account. Enter X174 in the [facility] box to learn more about \"Learning About Living Perroy. \"     If you do not have an account, please click on the \"Sign Up Now\" link. Current as of: June 16, 2022               Content Version: 13.4  © 6248-7261 Healthwise, Incorporated. Care instructions adapted under license by [facility] ([facility]). If you have questions about a medical condition or this instruction, always ask your healthcare professional. [place] [address] any warranty or liability for your use of this information. Learning About Medical Power of   What is a medical power of ? A medical power of , also called a durable power of  for health care, is one type of the legal forms called advance directives. It lets you name the person you want to make treatment decisions for you if you can't speak or decide for yourself.  The person you choose is called your health care agent. This person is also called a health care proxy or health care surrogate. A medical power of  may be called something else in your state. How do you choose a health care agent? Choose your health care agent carefully. This person may or may not be a family member. Talk to the person before you make your final decision. Make sure he or she is comfortable with this responsibility. It's a good idea to choose someone who:  Is at least 25years old. Knows you well and understands what makes life meaningful for you. Understands your Amish and moral values. Will do what you want, not what he or she wants. Will be able to make difficult choices at a stressful time. Will be able to refuse or stop treatment, if that is what you would want, even if you could die. Will be firm and confident with health professionals if needed. Will ask questions to get needed information. Lives near you or agrees to travel to you if needed. Your family may help you make medical decisions while you can still be part of that process. But it's important to choose one person to be your health care agent in case you aren't able to make decisions for yourself. If you don't fill out the legal form and name a health care agent, the decisions your family can make may be limited. A health care agent may be called something else in your state. Who will make decisions for you if you don't have a health care agent? If you don't have a health care agent or a living will, you may not get the care you want. Decisions may be made by family members who disagree about your medical care. Or decisions may be made by a medical professional who doesn't know you well. In some cases, a  makes the decisions. When you name a health care agent, it is very clear who has the power to make health decisions for you. How do you name a health care agent?   You name your health care agent on a legal form. This form is usually called a medical power of . Ask your hospital, state bar association, or office on aging where to find these forms. You must sign the form to make it legal. Some states require you to get the form notarized. This means that a person called a  watches you sign the form and then he or she signs the form. Some states also require that two or more witnesses sign the form. Be sure to tell your family members and doctors who your health care agent is. Where can you learn more? Go to https://Arithmaticapepiceweb.[employer].. org and sign in to your Via[employer]te account. Enter [id number] in the "[employer] box to learn more about \"Learning About [address]. \"     If you do not have an account, please click on the \"Sign Up Now\" link. Current as of: October 6, 2021               Content Version: 13.4  © 2006-2022 Healthwise, Incorporated. Care instructions adapted under license by [facility] ([facility]). If you have questions about a medical condition or this instruction, always ask your healthcare professional. [address] any warranty or liability for your use of this information.

## 2022-10-28 NOTE — PROGRESS NOTES
Medicare Annual Wellness Visit    Darryle Haven is here for Medicare AWV    Assessment & Plan   Initial Medicare annual wellness visit  Screening for cardiovascular condition  -     OK Intens behave ther cardio dx, 15 minutes []  Cardiovascular Disease Risk Counseling: Assessed the patient's risk to develop cardiovascular disease and reviewed main risk factors. Reviewed steps to reduce disease risk including:   Quitting tobacco use, reducing amount smoked, or not starting the habit  Making healthy food choices  Being physically active and gradualy increasing activity levels   Reduce weight and determine a healthy BMI goal  Monitor blood pressure and treat if higher than 140/90 mmHg  Maintain blood total cholesterol levels under 5 mmol/l or 190 mg/dl  Maintain LDL cholesterol levels under 3.0 mmol/l or 115 mg/dl   Control blood glucose levels  Consider taking aspirin (75 mg daily), once blood pressure is controlled   Provided a follow up plan. Time spent (minutes): 15 minutes    Advanced Care Planning: Discussed the patients choices for care and treatment in case of a health event that adversely affects decision-making abilities. Also discussed the patients long-term treatment options. Reviewed with the patient the appropriate state-specific advance directive documents. Reviewed the process of designating a competent adult as an Agent (or -in-fact) that could take make health care decisions for the patient if incompetent. Patient was asked to complete the declaration forms, either acknowledge the forms by a public notary or an eligible witness and provide a signed copy to the practice office.   Time spent (minutes): 30 minutes      Recommendations for Preventive Services Due: see orders and patient instructions/AVS.  Recommended screening schedule for the next 5-10 years is provided to the patient in written form: see Patient Instructions/AVS.     Return for Medicare Annual Wellness Visit in 1 year.     Subjective       Patient's complete Health Risk Assessment and screening values have been reviewed and are found in Flowsheets. The following problems were reviewed today and where indicated follow up appointments were made and/or referrals ordered. Positive Risk Factor Screenings with Interventions:    Fall Risk:  Do you feel unsteady or are you worried about falling? : (!) yes  2 or more falls in past year?: (!) yes  Fall with injury in past year?: no   Fall Risk Interventions:    Home safety tips provided  Home exercises provided to promote strength and balance            General Health and ACP:  General  In general, how would you say your health is?: Fair  In the past 7 days, have you experienced any of the following: New or Increased Pain, New or Increased Fatigue, Loneliness, Social Isolation, Stress or Anger?: No  Do you get the social and emotional support that you need?: Yes  Do you have a Living Will?: (!) No    Advance Directives       Power of  Living Will ACP-Advance Directive ACP-Power of     Not on File Not on File Not on File Not on File        General Health Risk Interventions:  No Living Will: [address] addressed with patient today     Hearing/Vision:  Do you or your family notice any trouble with your hearing that hasn't been managed with hearing aids?: No  Do you have difficulty driving, watching TV, or doing any of your daily activities because of your eyesight?: (!) Yes ([place])  Have you had an eye exam within the past year?: Yes  No results found.   Hearing/Vision Interventions:  Vision concerns:  patient encouraged to make appointment with his/her eye specialist    Safety:  Do you have working smoke detectors?: Yes  Do you have any tripping hazards - loose or unsecured carpets or rugs?: (!) Yes  Do you have any tripping hazards - clutter in doorways, halls, or stairs?: No  Do you have either shower bars, grab bars, non-slip mats or non-slip surfaces in your shower or bathtub?: Yes  Do all of your stairways have a railing or banister?: Yes  Do you always fasten your seatbelt when you are in a car?: Yes  Safety Interventions:  Home safety tips provided  Recommend PT eval and treatment but patient declines at this time           Objective      Patient-Reported Vitals  Patient-Reported Systolic (Top): 309 mmHg  Patient-Reported Diastolic (Bottom): 57 mmHg  Patient-Reported Pulse: 57  Patient-Reported Weight: 162 bs  Patient-Reported Height: 5'2\"            Allergies   Allergen Reactions    Aspirin Other (See Comments)     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  Authorizing Provider   triamcinolone (KENALOG) 0.1 % cream  Yes Historical Provider, MD   metFORMIN (GLUCOPHAGE-XR) 500 MG extended release tablet Take 3 tablets by mouth daily (with breakfast) Yes ALEKSANDAR Cano CNP   hydroCHLOROthiazide (MICROZIDE) 12.5 MG capsule Take 1 capsule by mouth every morning Yes ALEKSANDAR Cano CNP   metoprolol tartrate (LOPRESSOR) 25 MG tablet TAKE 1 TABLET TWICE A DAY Yes ALEKSANDAR Cano CNP   amLODIPine (NORVASC) 5 MG tablet TAKE 1 TABLET DAILY Yes ALEKSANDAR Cano CNP   rosuvastatin (CRESTOR) 10 MG tablet Take 1 tablet by mouth daily TAKE 1 TABLET BY MOUTH NIGHTLY Yes ALEKSANDAR Cano CNP   losartan (COZAAR) 100 MG tablet TAKE 1 TABLET DAILY Yes ALEKSANDAR Cano CNP   levothyroxine (SYNTHROID) 75 MCG tablet TAKE 1 TABLET DAILY Yes ALEKSANDAR Cano CNP   LORazepam (ATIVAN) 0.5 MG tablet Take 0.5 mg by mouth nightly. Yes Historical Provider, MD   aspirin 81 MG EC tablet Take 81 mg by mouth daily Yes Historical Provider, MD   blood glucose test strips (CONTOUR NEXT TEST) strip TEST ONCE DAILY Yes ALEKSANDAR [place] CNP   clobetasol (TEMOVATE) 0.05 % external solution  Yes Historical Provider, MD   Cholecalciferol (VITAMIN D3) 25 MCG (1000 UT) TABS Take 1 tablet by mouth daily  Patient taking differently: Take by mouth daily Taking 2,000 units daily Yes ALEKSANDAR Ramírez CNP   magnesium oxide (MAG-OX) 400 (241.3 Mg) MG TABS tablet Take 3 tablets by mouth 2 times daily For Hypomagnesemia  Patient taking differently: Take by mouth 2 times daily For Hypomagnesemia Yes ALEKSANDAR Ramírez CNP       CareTeam (Including outside providers/suppliers regularly involved in providing care):   Patient Care Team:  ALEKSANDAR Ramírez CNP as PCP - Evelynn Apley, MD as PCP - Hematology/Oncology (Medical Oncology)  ALEKSANDAR Ramírez CNP as PCP - [facility] Provider  Rebecca Hammer MD as Consulting Physician (Pulmonology)  Jacobo Hodgkins, MD as Consulting Physician (Gynecological Oncology)     Reviewed and updated this visit:  Allergies  Meds  Problems           Othella Micha, was evaluated through a synchronous (real-time) audio-video encounter. The patient (or guardian if applicable) is aware that this is a billable service, which includes applicable co-pays. This Virtual Visit was conducted with patient's (and/or legal guardian's) consent. The visit was conducted pursuant to the emergency declaration under the [facility], [facility] authority and the [employer] and [employer] General Act. Patient identification was verified, and a caregiver was present when appropriate. The patient was located at Home: [address] [place] [address]. Provider was located at Other: [place], [place] .

## 2022-10-31 ENCOUNTER — NURSE ONLY (OUTPATIENT)
Dept: FAMILY MEDICINE CLINIC | Age: 77
End: 2022-10-31

## 2022-10-31 DIAGNOSIS — R19.7 DIARRHEA, UNSPECIFIED TYPE: ICD-10-CM

## 2022-11-01 LAB
C DIFF TOXIN/ANTIGEN: NORMAL
GI BACTERIAL PATHOGENS BY PCR: NORMAL
WHITE BLOOD CELLS (WBC), STOOL: NORMAL

## 2022-11-14 ENCOUNTER — TELEPHONE (OUTPATIENT)
Dept: CARDIOLOGY CLINIC | Age: 77
End: 2022-11-14

## 2022-11-14 DIAGNOSIS — R53.83 OTHER FATIGUE: ICD-10-CM

## 2022-11-14 DIAGNOSIS — R06.02 SOB (SHORTNESS OF BREATH): Primary | ICD-10-CM

## 2022-11-14 DIAGNOSIS — I34.1 MVP (MITRAL VALVE PROLAPSE): ICD-10-CM

## 2022-11-14 DIAGNOSIS — I36.1 NONRHEUMATIC TRICUSPID VALVE REGURGITATION: ICD-10-CM

## 2022-11-14 DIAGNOSIS — R94.39 ABNORMAL CARDIOVASCULAR STRESS TEST: ICD-10-CM

## 2022-11-14 NOTE — TELEPHONE ENCOUNTER
Please have the patient get some blood work. I need a CBC, CMP, and BNP done to assess her symptoms further.

## 2022-11-14 NOTE — TELEPHONE ENCOUNTER
Spoke with pt she says her SOB has been worsening, happens a lot when walking up the hill to get the mail. She says while bending over tying shoes she is SOB. She says she does not exercise regularly. She is less active than she use to be. Edema in ankles and legs, she noticed the edema last night. Both legs and legs were swollen. She was not on her feet more than usual yesterday. She says her weight is the same. She says she had reflux surgery in June and since the surgery she is eating more. She is taking HCTZ 12.5 mg every other day. She says kidney doctor took her off it but her family doctor re-prescribed HCTZ. So she takes it every other or every few days. She also does not want to take it due to it causing cancer. Please advise. Pt says to leave message on machine if she does not .

## 2022-11-14 NOTE — TELEPHONE ENCOUNTER
11/14 pt called and stated both of her legs and ankles are swollen and she has been sob, please advise

## 2022-11-15 ENCOUNTER — HOSPITAL ENCOUNTER (OUTPATIENT)
Age: 77
Discharge: HOME | End: 2022-11-15
Payer: MEDICARE

## 2022-11-15 ENCOUNTER — TELEPHONE (OUTPATIENT)
Dept: CARDIOLOGY CLINIC | Age: 77
End: 2022-11-15

## 2022-11-15 DIAGNOSIS — I36.1 NONRHEUMATIC TRICUSPID VALVE REGURGITATION: ICD-10-CM

## 2022-11-15 DIAGNOSIS — R53.83 OTHER FATIGUE: ICD-10-CM

## 2022-11-15 DIAGNOSIS — I34.1 MVP (MITRAL VALVE PROLAPSE): ICD-10-CM

## 2022-11-15 DIAGNOSIS — R06.02 SOB (SHORTNESS OF BREATH): ICD-10-CM

## 2022-11-15 LAB
A/G RATIO: 1.9 (ref 1.1–2.2)
ALBUMIN SERPL-MCNC: 4.4 G/DL (ref 3.4–5)
ALP BLD-CCNC: 87 U/L (ref 40–129)
ALT SERPL-CCNC: 16 U/L (ref 10–40)
ANION GAP SERPL CALCULATED.3IONS-SCNC: 11 MMOL/L (ref 3–16)
AST SERPL-CCNC: 18 U/L (ref 15–37)
BILIRUB SERPL-MCNC: 0.3 MG/DL (ref 0–1)
BUN BLDV-MCNC: 18 MG/DL (ref 7–20)
CALCIUM SERPL-MCNC: 10.2 MG/DL (ref 8.3–10.6)
CHLORIDE BLD-SCNC: 100 MMOL/L (ref 99–110)
CO2: 26 MMOL/L (ref 21–32)
CREAT SERPL-MCNC: 0.7 MG/DL (ref 0.6–1.2)
GFR SERPL CREATININE-BSD FRML MDRD: >60 ML/MIN/{1.73_M2}
GLUCOSE BLD-MCNC: 113 MG/DL (ref 70–99)
HCT VFR BLD CALC: 33.8 % (ref 36–48)
HEMOGLOBIN: 11.3 G/DL (ref 12–16)
MCH RBC QN AUTO: 27.3 PG (ref 26–34)
MCHC RBC AUTO-ENTMCNC: 33.6 G/DL (ref 31–36)
MCV RBC AUTO: 81.2 FL (ref 80–100)
PDW BLD-RTO: 15.1 % (ref 12.4–15.4)
PLATELET # BLD: 232 K/UL (ref 135–450)
PMV BLD AUTO: 8.4 FL (ref 5–10.5)
POTASSIUM SERPL-SCNC: 4.1 MMOL/L (ref 3.5–5.1)
PRO-BNP: 215 PG/ML (ref 0–449)
RBC # BLD: 4.16 M/UL (ref 4–5.2)
SODIUM BLD-SCNC: 137 MMOL/L (ref 136–145)
TOTAL PROTEIN: 6.7 G/DL (ref 6.4–8.2)
WBC # BLD: 6.7 K/UL (ref 4–11)

## 2022-11-15 PROCEDURE — 80053 COMPREHEN METABOLIC PANEL: CPT

## 2022-11-15 PROCEDURE — 83880 ASSAY OF NATRIURETIC PEPTIDE: CPT

## 2022-11-15 PROCEDURE — 36415 COLL VENOUS BLD VENIPUNCTURE: CPT

## 2022-11-15 PROCEDURE — 85027 COMPLETE CBC AUTOMATED: CPT

## 2022-11-15 NOTE — TELEPHONE ENCOUNTER
She probably should be seen by one of the providers or her primary care doctor to take a look at her legs.

## 2022-11-15 NOTE — TELEPHONE ENCOUNTER
Spoke with pt relayed VSP message. Pt v/u. Pt went ahead and schedule Madelin Salas office visit in 1/20 with CATARINO.

## 2022-11-15 NOTE — TELEPHONE ENCOUNTER
Spoke with pt relayed message per VSP. Pt will contact the office for for a samaria appt. She will call back in Dec. She wants to know she plans on traveling to [facility] and her legs and ankles swell bad with the travel. Is there anything she can do to help with the edema?

## 2022-11-15 NOTE — TELEPHONE ENCOUNTER
----- Message from Rosie Lilly MD sent at 11/15/2022  1:31 PM EST -----  The test is normal. Please call the patient and inform them of the normal result. BNP levels indicate that she is not fluid overloaded. Do not suspect that her symptom complex is related to congestive heart failure.     Can we see her in the office at some point to review her symptom complex

## 2022-11-29 ENCOUNTER — HOSPITAL ENCOUNTER (OUTPATIENT)
Dept: MAMMOGRAPHY | Age: 77
Discharge: HOME | End: 2022-11-29
Payer: MEDICARE

## 2022-11-29 DIAGNOSIS — Z12.39 SCREENING BREAST EXAMINATION: ICD-10-CM

## 2022-11-29 PROCEDURE — 77063 BREAST TOMOSYNTHESIS BI: CPT

## 2022-11-30 ENCOUNTER — TELEPHONE (OUTPATIENT)
Dept: MAMMOGRAPHY | Age: 77
End: 2022-11-30

## 2022-12-09 ENCOUNTER — TELEPHONE (OUTPATIENT)
Dept: FAMILY MEDICINE CLINIC | Age: 77
End: 2022-12-09

## 2022-12-09 RX ORDER — CEPHALEXIN 500 MG/1
500 CAPSULE ORAL 2 TIMES DAILY
Qty: 20 CAPSULE | Refills: 0 | Status: SHIPPED | OUTPATIENT
Start: 2022-12-09 | End: 2022-12-19

## 2022-12-09 NOTE — TELEPHONE ENCOUNTER
Pt called stating that she was advised to contact PCP if her infected toe started seeping. Pt states that it did start to get a little better but now it's infected again, no seeping. Pt states that she was told she might need an oral antibiotic. Pt doesn't want any steroids due to having heart palpitations with them. Pt uses [facility].  Call back [phone]

## 2022-12-09 NOTE — TELEPHONE ENCOUNTER
Patient seen almost 2 months ago for this issue. Since it is not resolving I recommend she be seen by podiatry for further evaluation.   I will start her on antibiotic as well---rx sent to [facility]

## 2022-12-21 ENCOUNTER — TELEMEDICINE (OUTPATIENT)
Dept: FAMILY MEDICINE CLINIC | Age: 77
End: 2022-12-21
Payer: MEDICARE

## 2022-12-21 ENCOUNTER — TELEPHONE (OUTPATIENT)
Dept: FAMILY MEDICINE CLINIC | Age: 77
End: 2022-12-21

## 2022-12-21 DIAGNOSIS — J06.9 ACUTE URI: Primary | ICD-10-CM

## 2022-12-21 DIAGNOSIS — J02.9 SORE THROAT: ICD-10-CM

## 2022-12-21 DIAGNOSIS — R05.1 ACUTE COUGH: ICD-10-CM

## 2022-12-21 LAB
INFLUENZA A ANTIBODY: NORMAL
INFLUENZA B ANTIBODY: NORMAL
Lab: NORMAL
QC PASS/FAIL: NORMAL
S PYO AG THROAT QL: NORMAL
SARS-COV-2 RDRP RESP QL NAA+PROBE: NEGATIVE

## 2022-12-21 PROCEDURE — 98967 PH1 ASSMT&MGMT NQHP 11-20: CPT | Performed by: NURSE PRACTITIONER

## 2022-12-21 PROCEDURE — 87635 SARS-COV-2 COVID-19 AMP PRB: CPT | Performed by: NURSE PRACTITIONER

## 2022-12-21 PROCEDURE — 87880 STREP A ASSAY W/OPTIC: CPT | Performed by: NURSE PRACTITIONER

## 2022-12-21 PROCEDURE — 87804 INFLUENZA ASSAY W/OPTIC: CPT | Performed by: NURSE PRACTITIONER

## 2022-12-21 RX ORDER — METHYLPREDNISOLONE 4 MG/1
TABLET ORAL
Qty: 1 KIT | Refills: 0 | Status: SHIPPED
Start: 2022-12-21 | End: 2022-12-21 | Stop reason: SINTOL

## 2022-12-21 RX ORDER — DEXTROMETHORPHAN HYDROBROMIDE AND PROMETHAZINE HYDROCHLORIDE 15; 6.25 MG/5ML; MG/5ML
5 SYRUP ORAL 4 TIMES DAILY PRN
Qty: 1 EACH | Refills: 0 | Status: SHIPPED | OUTPATIENT
Start: 2022-12-21 | End: 2022-12-28

## 2022-12-21 ASSESSMENT — ENCOUNTER SYMPTOMS
SHORTNESS OF BREATH: 0
GASTROINTESTINAL NEGATIVE: 1
ALLERGIC/IMMUNOLOGIC NEGATIVE: 1
RHINORRHEA: 0
FACIAL SWELLING: 0
CHOKING: 0
CHEST TIGHTNESS: 0
COUGH: 1
SINUS PRESSURE: 1
TROUBLE SWALLOWING: 0
VOICE CHANGE: 1
SWOLLEN GLANDS: 0
SINUS PAIN: 1
WHEEZING: 0
HOARSE VOICE: 1
APNEA: 0
STRIDOR: 0
SORE THROAT: 1

## 2022-12-21 NOTE — TELEPHONE ENCOUNTER
May cancel Rx for medrol. With her symptoms improving, do not think an antibiotic would be needed. I also sent her in cough syrup. She may get the cough syrup if she would like. Recommend rest, increased fluids, good nutrition. Recommend cool mist humidifier and utilizing nasal saline spray every 2 hours as needed for congestion. RX canceled.  Patient informed of above recommendations by provider Rhett Wilson NP

## 2022-12-21 NOTE — PROGRESS NOTES
2022    TELEHEALTH EVALUATION -- Audio/Visual (During [facility]-09 public health emergency)    HPI:    Areatha Holter (:  1945) has requested an audio/video evaluation for the following concern(s):    Sinusitis  This is a new problem. Episode onset: 6 days ago. The problem has been gradually improving since onset. There has been no fever. The pain is mild. Associated symptoms include chills, congestion (Mild sinus and chest), coughing (Nonproductive), ear pain (Left ear - intermittent), headaches (Mild headache), a hoarse voice, sinus pressure (MIld right side), sneezing and a sore throat. Pertinent negatives include no diaphoresis, neck pain, shortness of breath or swollen glands. Past treatments include saline sprays (Tussin DM). The treatment provided moderate relief. Review of Systems   Constitutional:  Positive for activity change (R/t fatigue), appetite change (Lack of appetite), chills and fatigue. Negative for diaphoresis, fever and unexpected weight change. HENT:  Positive for congestion (Mild sinus and chest), ear pain (Left ear - intermittent), hoarse voice, postnasal drip, sinus pressure (MIld right side), sinus pain, sneezing, sore throat and voice change (Hoarse). Negative for dental problem, drooling, ear discharge, facial swelling, hearing loss, mouth sores, nosebleeds, rhinorrhea, tinnitus and trouble swallowing. Respiratory:  Positive for cough (Nonproductive). Negative for apnea, choking, chest tightness, shortness of breath, wheezing and stridor. Cardiovascular: Negative. Gastrointestinal: Negative. Musculoskeletal: Negative. Negative for neck pain. Allergic/Immunologic: Negative. Neurological:  Positive for headaches (Mild headache). Negative for dizziness and light-headedness. Prior to Visit Medications    Medication Sig Taking?  Authorizing Provider   triamcinolone (KENALOG) 0.1 % cream  Yes Historical Provider, MD   metFORMIN (GLUCOPHAGE-XR) 500 MG extended release tablet Take 3 tablets by mouth daily (with breakfast) Yes ALEKSANDAR Castro CNP   hydroCHLOROthiazide (MICROZIDE) 12.5 MG capsule Take 1 capsule by mouth every morning Yes ALEKSANDAR Castro CNP   metoprolol tartrate (LOPRESSOR) 25 MG tablet TAKE 1 TABLET TWICE A DAY Yes ALEKSANDAR Castro CNP   amLODIPine (NORVASC) 5 MG tablet TAKE 1 TABLET DAILY Yes ALEKSANDAR Castro CNP   rosuvastatin (CRESTOR) 10 MG tablet Take 1 tablet by mouth daily TAKE 1 TABLET BY MOUTH NIGHTLY Yes ALEKSANDAR Castro CNP   losartan (COZAAR) 100 MG tablet TAKE 1 TABLET DAILY Yes ALEKSANDAR Castro CNP   levothyroxine (SYNTHROID) 75 MCG tablet TAKE 1 TABLET DAILY Yes ALEKSANDAR Castro CNP   LORazepam (ATIVAN) 0.5 MG tablet Take 0.5 mg by mouth nightly.  Yes Historical Provider, MD   aspirin 81 MG EC tablet Take 81 mg by mouth daily Yes Historical Provider, MD   blood glucose test strips (CONTOUR NEXT TEST) strip TEST ONCE DAILY Yes ALEKSANDAR Castro CNP   clobetasol (TEMOVATE) 0.05 % external solution  Yes Historical Provider, MD   Cholecalciferol (VITAMIN D3) 25 MCG (1000 UT) TABS Take 1 tablet by mouth daily  Patient taking differently: Take by mouth daily Taking 2,000 units daily Yes ALEKSANDAR Castro CNP   magnesium oxide (MAG-OX) 400 (241.3 Mg) MG TABS tablet Take 3 tablets by mouth 2 times daily For Hypomagnesemia  Patient taking differently: Take by mouth 2 times daily For Hypomagnesemia Yes ALEKSANDAR Castro CNP       Social History     Tobacco Use    Smoking status: Never    Smokeless tobacco: Never   Vaping Use    Vaping Use: Never used   Substance Use Topics    Alcohol use: No     Alcohol/week: 0.0 standard drinks    Drug use: No        Allergies   Allergen Reactions    Aspirin Other (See Comments)     Brain cavernoma - avoid all blood thinners    Lipitor Other (See Comments)     Muscle cramps    Other      Avoid blood thinners    Sodium Hydroxide Other (See Comments)     Blisters    Stadol [Butorphanol Tartrate] Other (See Comments)     Increased BP    Vicodin [Hydrocodone-Acetaminophen]      Elevates blood pressure    Lamisil [Terbinafine] Rash    Prilosec [Omeprazole] Rash   ,   Past Medical History:   Diagnosis Date    Allergic     Anemia     Arthritis of left hip 11/10/2020    Brain condition     pt states a cluster of blood vessels left side of brain    Cancer [facility]) 1/2013    endometrial cancer/ Dr. Allison Escalante and Dr. Christo Ly    Cerebral cavernoma     followed by Dr. Cisco Hodge at [facility]    Complication of anesthesia     Takes a long time to wake up    Diverticulosis     Endometrial cancer ([facility] [address]     Fungus infection     Fungus infection on bilat toes. GERD (gastroesophageal reflux disease)     Hyperlipidemia     Hypertension     Hypomagnesemia 1/2016    consult with Dr. Norman Bernstein ([facility]) and no renal wasting. felt most likely d/t HCTz and PPI,  HCTZ discontinued,  increased mag ox to 400 mg 2 tabs BID    Hypothyroidism     Irritable bowel syndrome     MVP (mitral valve prolapse)     JOAQUIN (obstructive sleep apnea)     Paronychia of great toe, left 7/11/2017    Pneumonia     infant    Prolonged emergence from general anesthesia     Type 2 diabetes mellitus without complication ([facility] [address]     Type II or unspecified type diabetes mellitus without mention of complication, not stated as uncontrolled     Unspecified sleep apnea     Vitamin B12 deficiency     Vitamin D deficiency    ,   Past Surgical History:   Procedure Laterality Date    COLONOSCOPY      COLONOSCOPY  07/14/2016    COLONOSCOPY      CYSTOSCOPY  10/18/2017    Cystoscopy with Urethral dilation    CYSTOSCOPY Bilateral 10/18/2017    Cystoscopy with bilateral retrograde pyelograms    DIAGNOSTIC CARDIAC CATH LAB PROCEDURE  01/12/2005    One Dr Arminda Clemons, No CAD.     DILATION AND CURETTAGE OF UTERUS      ENDOSCOPY, COLON, DIAGNOSTIC      HYSTERECTOMY (CERVIX STATUS UNKNOWN) 01/28/2013    due to uterine cancer    OTHER SURGICAL HISTORY  10/18/2017    cystoscopy, bilateral retrogrades    OTHER SURGICAL HISTORY Left 03/02/2017    port removal    SALIVARY GLAND SURGERY      KINGSLEY AND BSO (CERVIX REMOVED)  01/28/2013    THYROID SURGERY      right thyroidectomy for benign reasons    UPPER GASTROINTESTINAL ENDOSCOPY  07/14/2016    UPPER GASTROINTESTINAL ENDOSCOPY      UPPER GASTROINTESTINAL ENDOSCOPY N/A 07/09/2021    EGD WITH DIANE performed by Yang Swan MD at [facility]  07/09/2021    EGD BIOPSY performed by Yang Swan MD at [address] [place] [address]   ,   Social History     Tobacco Use    Smoking status: Never    Smokeless tobacco: Never   Vaping Use    Vaping Use: Never used   Substance Use Topics    Alcohol use: No     Alcohol/week: 0.0 standard drinks    Drug use: No   ,   Family History   Problem Relation Age of Onset    Cancer Mother         colon    COPD Father     Heart Disease Father     Cancer Father         NMSC, skin   ,   Immunization History   Administered Date(s) Administered    Influenza A ([id number]) Vaccine PF IM 11/06/2009    Influenza Virus Vaccine 10/01/2005, 10/05/2009, 11/22/2010, 11/20/2015, 10/01/2016    Influenza Whole 10/22/2013, 10/14/2014    Influenza, AFLURIA (age 1 yrs+), FLUZONE, (age 10 mo+), MDV, 0.5mL 10/04/2017, 11/09/2018    Influenza, FLUARIX, FLULAVAL, FLUZONE (age 10 mo+) AND AFLURIA, (age 1 y+), PF, 0.5mL 10/10/2019, 10/14/2020    Influenza, FLUCELVAX, (age 10 mo+), MDCK, PF, 0.5mL 12/08/2021    Influenza, High Dose (Fluzone 65 yrs and older) 10/15/2014    Pneumococcal Conjugate 13-valent ([id number]) 07/21/2016    Pneumococcal Conjugate 7-valent (Prevnar7) 01/04/2006    Pneumococcal Polysaccharide (Karnrqbqp24) 04/19/2013    Tdap (Boostrix, Adacel) 02/01/2008   ,   Health Maintenance   Topic Date Due    COVID-19 Vaccine (1) 07/12/2023 (Originally 4/5/1946)    DTaP/Tdap/Td vaccine (2 - Td or Tdap) 10/17/2023 (Originally 2/1/2018)    Flu vaccine (1) 10/17/2023 (Originally 8/1/2022)    Shingles vaccine (1 of 2) 10/17/2023 (Originally 10/5/1997)    Diabetic retinal exam  08/03/2023    Colorectal Cancer Screen  09/09/2023    Diabetic foot exam  10/17/2023    A1C test (Diabetic or Prediabetic)  10/17/2023    Lipids  10/17/2023    Depression Monitoring  10/28/2023    Annual Wellness Visit (AWV)  10/29/2023    DEXA (modify frequency per FRAX score)  Completed    Pneumococcal 65+ years Vaccine  Completed    Hepatitis C screen  Completed    Hepatitis A vaccine  Aged Out    Hib vaccine  Aged Out    Meningococcal (ACWY) vaccine  Aged Out       PHYSICAL EXAMINATION: Telephone Only     ASSESSMENT/PLAN:  Susu Stout was seen today for sinusitis. Symptoms seem more viral.  She is going to [place] in 4 days. Recommend her getting COVID and influenza testing. Orders placed. POCT strep with her having a sore throat. Will treat symptomatically at this time with rest, increased fluids, good nutrition, nasal saline spray, warm compresses to the sinuses, promethazine-DM for cough, Tylenol as needed for pain or fevers, cool mist humdifier. Did offer to send her in a Medrol Dosepak. She declines as she states this caused her to have side effects. Do not think an antibiotic is warranted at this time specially since her symptoms are improving. Susu Stout was seen today for sinusitis. Diagnoses and all orders for this visit:    Acute URI  -     POCT Influenza A/B  -     promethazine-dextromethorphan (PROMETHAZINE-DM) 6.25-15 MG/5ML syrup; Take 5 mLs by mouth 4 times daily as needed for Cough    Sore throat  -     POCT rapid strep A  -     Culture, Throat    Acute cough  -     promethazine-dextromethorphan (PROMETHAZINE-DM) 6.25-15 MG/5ML syrup; Take 5 mLs by mouth 4 times daily as needed for Cough    Return if symptoms worsen or fail to improve.     Kp Gonzalez is a 78 y.o. female being evaluated by a Virtual Visit (Telephone visit) encounter to address concerns as mentioned above. A caregiver was present when appropriate. Due to this being a TeleHealth encounter (During [facility] public health emergency), evaluation of the following organ systems was limited: Vitals/Constitutional/EENT/Resp/CV/GI//MS/Neuro/Skin/Heme-Lymph-Imm. Pursuant to the emergency declaration under the [facility], [id number] waiver authority and the [employer] Act, this Virtual Visit was conducted with patient's (and/or legal guardian's) consent, to reduce the patient's risk of exposure to COVID-19 and provide necessary medical care. The patient (and/or legal guardian) has also been advised to contact this office for worsening conditions or problems, and seek emergency medical treatment and/or call 911 if deemed necessary. Patient identification was verified at the start of the visit: Yes    Total time spent on this encounter: 13 minutes     Services were provided through a telephone synchronous discussion virtually to substitute for in-person clinic visit. Patient and provider were located at their individual homes. --Lorena Wyman DNP, APRN - CNP on 12/21/2022 at 7:45 AM    An electronic signature was used to authenticate this note. Patient should call the office immediately with new or ongoing signs or symptoms or worsening, or proceed to the emergency room. All entries in chief complaint and history of present illness are reviewed and validated by me. No changes in past medical history, past surgical history, social history, or family history were noted during the patient encounter unless specifically listed above. All updates of past medical history, past surgical history, social history, or family history were reviewed personally by me during the office visit. All problems listed in the assessment are stable unless noted otherwise. Medication profile reviewed personally by me during the office visit. Medication side effects and possible impairments from medications were discussed as applicable. Every effort has been made to assure accurate transcription by this voice recognition software. However, mistakes in transcription may still occur    You are being started on a new medication. All medications have the potential for adverse effects. All medications effect each person differently. Please read and review provided information related to medication. If the medication that you have been prescribed has the potential to cause sedation, do not drive or operate car, truck, or heavy machinery until you know how the medication will effect you. If you experience any adverse effects from the medication, please call the office or report to the emergency department. Sinusitis: Care Instructions  Your Care Instructions    Sinusitis is an infection of the lining of the sinus cavities in your head. Sinusitis often follows a cold. It causes pain and pressure in your head and face. In most cases, sinusitis gets better on its own in 1 to 2 weeks. But some mild symptoms may last for several weeks. Sometimes antibiotics are needed. Follow-up care is a key part of your treatment and safety. Be sure to make and go to all appointments, and call your doctor if you are having problems. It's also a good idea to know your test results and keep a list of the medicines you take. How can you care for yourself at home? Take an over-the-counter pain medicine, such as acetaminophen (Tylenol), ibuprofen (Advil, Motrin), or naproxen (Aleve). Read and follow all instructions on the label. If the doctor prescribed antibiotics, take them as directed. Do not stop taking them just because you feel better. You need to take the full course of antibiotics. Be careful when taking over-the-counter cold or flu medicines and Tylenol at the same time.  Many of these medicines have acetaminophen, which is Tylenol. Read the labels to make sure that you are not taking more than the recommended dose. Too much acetaminophen (Tylenol) can be harmful. Breathe warm, moist air from a steamy shower, a hot bath, or a sink filled with hot water. Avoid cold, dry air. Using a humidifier in your home may help. Follow the directions for cleaning the machine. Use saline (saltwater) nasal washes to help keep your nasal passages open and wash out mucus and bacteria. You can buy saline nose drops at a grocery store or drugstore. Or you can make your own at home by adding 1 teaspoon of salt and 1 teaspoon of baking soda to 2 cups of distilled water. If you make your own, fill a bulb syringe with the solution, insert the tip into your nostril, and squeeze gently. Radha Nyhan your nose. Put a hot, wet towel or a warm gel pack on your face 3 or 4 times a day for 5 to 10 minutes each time. Try a decongestant nasal spray like oxymetazoline (Afrin). Do not use it for more than 3 days in a row. Using it for more than 3 days can make your congestion worse. When should you call for help? Call your doctor now or seek immediate medical care if:    You have new or worse swelling or redness in your face or around your eyes. You have a new or higher fever. Watch closely for changes in your health, and be sure to contact your doctor if:    You have new or worse facial pain. The mucus from your nose becomes thicker (like pus) or has new blood in it. You are not getting better as expected. Where can you learn more? Go to https://iCurrent.N-Trig. org and sign in to your [employer] account. Enter H537 in the Wallit box to learn more about \"Sinusitis: Care Instructions. \"     If you do not have an account, please click on the \"Sign Up Now\" link. Current as of: May 12, 2017  Content Version: 11.7  © 5485-3754 [employer].  Care instructions adapted under license by [facility] [place] ([facility]). If you have questions about a medical condition or this instruction, always ask your healthcare professional. [place] [address] any warranty or liability for your use of this information.

## 2022-12-21 NOTE — TELEPHONE ENCOUNTER
Pt states that she can not take the Medrol Dose pack it makes her feel weird. And she stated that we sent it to [employer]

## 2022-12-22 RX ORDER — LEVOTHYROXINE SODIUM 0.07 MG/1
TABLET ORAL
Qty: 90 TABLET | Refills: 0 | Status: SHIPPED | OUTPATIENT
Start: 2022-12-22

## 2022-12-24 LAB — THROAT CULTURE: NORMAL

## 2022-12-29 DIAGNOSIS — I10 ESSENTIAL HYPERTENSION: ICD-10-CM

## 2022-12-29 RX ORDER — LOSARTAN POTASSIUM 100 MG/1
TABLET ORAL
Qty: 90 TABLET | Refills: 0 | Status: SHIPPED | OUTPATIENT
Start: 2022-12-29

## 2023-01-25 ENCOUNTER — OFFICE VISIT (OUTPATIENT)
Dept: FAMILY MEDICINE CLINIC | Age: 78
End: 2023-01-25
Payer: MEDICARE

## 2023-01-25 VITALS
BODY MASS INDEX: 30 KG/M2 | HEART RATE: 64 BPM | SYSTOLIC BLOOD PRESSURE: 136 MMHG | DIASTOLIC BLOOD PRESSURE: 74 MMHG | HEIGHT: 62 IN | WEIGHT: 163 LBS | OXYGEN SATURATION: 98 %

## 2023-01-25 DIAGNOSIS — F41.9 ANXIETY: Primary | ICD-10-CM

## 2023-01-25 DIAGNOSIS — R19.7 DIARRHEA, UNSPECIFIED TYPE: ICD-10-CM

## 2023-01-25 PROBLEM — K44.9 HH (HIATUS HERNIA): Status: ACTIVE | Noted: 2022-06-03

## 2023-01-25 PROBLEM — N39.46 MIXED STRESS AND URGE URINARY INCONTINENCE: Status: ACTIVE | Noted: 2023-01-25

## 2023-01-25 PROBLEM — N95.8 OTHER SPECIFIED MENOPAUSAL AND PERIMENOPAUSAL DISORDERS: Status: ACTIVE | Noted: 2023-01-25

## 2023-01-25 PROCEDURE — G8427 DOCREV CUR MEDS BY ELIG CLIN: HCPCS | Performed by: NURSE PRACTITIONER

## 2023-01-25 PROCEDURE — G8484 FLU IMMUNIZE NO ADMIN: HCPCS | Performed by: NURSE PRACTITIONER

## 2023-01-25 PROCEDURE — 3017F COLORECTAL CA SCREEN DOC REV: CPT | Performed by: NURSE PRACTITIONER

## 2023-01-25 PROCEDURE — 1090F PRES/ABSN URINE INCON ASSESS: CPT | Performed by: NURSE PRACTITIONER

## 2023-01-25 PROCEDURE — 1123F ACP DISCUSS/DSCN MKR DOCD: CPT | Performed by: NURSE PRACTITIONER

## 2023-01-25 PROCEDURE — 3078F DIAST BP <80 MM HG: CPT | Performed by: NURSE PRACTITIONER

## 2023-01-25 PROCEDURE — G8399 PT W/DXA RESULTS DOCUMENT: HCPCS | Performed by: NURSE PRACTITIONER

## 2023-01-25 PROCEDURE — G8417 CALC BMI ABV UP PARAM F/U: HCPCS | Performed by: NURSE PRACTITIONER

## 2023-01-25 PROCEDURE — 1036F TOBACCO NON-USER: CPT | Performed by: NURSE PRACTITIONER

## 2023-01-25 PROCEDURE — 3075F SYST BP GE 130 - 139MM HG: CPT | Performed by: NURSE PRACTITIONER

## 2023-01-25 PROCEDURE — 99213 OFFICE O/P EST LOW 20 MIN: CPT | Performed by: NURSE PRACTITIONER

## 2023-01-25 RX ORDER — LORAZEPAM 0.5 MG/1
0.5 TABLET ORAL NIGHTLY
Qty: 30 TABLET | Refills: 0 | Status: SHIPPED | OUTPATIENT
Start: 2023-01-25 | End: 2023-02-24

## 2023-01-25 ASSESSMENT — PATIENT HEALTH QUESTIONNAIRE - PHQ9
SUM OF ALL RESPONSES TO PHQ QUESTIONS 1-9: 2
4. FEELING TIRED OR HAVING LITTLE ENERGY: 0
10. IF YOU CHECKED OFF ANY PROBLEMS, HOW DIFFICULT HAVE THESE PROBLEMS MADE IT FOR YOU TO DO YOUR WORK, TAKE CARE OF THINGS AT HOME, OR GET ALONG WITH OTHER PEOPLE: 0
2. FEELING DOWN, DEPRESSED OR HOPELESS: 1
8. MOVING OR SPEAKING SO SLOWLY THAT OTHER PEOPLE COULD HAVE NOTICED. OR THE OPPOSITE, BEING SO FIGETY OR RESTLESS THAT YOU HAVE BEEN MOVING AROUND A LOT MORE THAN USUAL: 0
SUM OF ALL RESPONSES TO PHQ QUESTIONS 1-9: 2
7. TROUBLE CONCENTRATING ON THINGS, SUCH AS READING THE NEWSPAPER OR WATCHING TELEVISION: 0
5. POOR APPETITE OR OVEREATING: 0
SUM OF ALL RESPONSES TO PHQ QUESTIONS 1-9: 2
9. THOUGHTS THAT YOU WOULD BE BETTER OFF DEAD, OR OF HURTING YOURSELF: 0
SUM OF ALL RESPONSES TO PHQ QUESTIONS 1-9: 2
SUM OF ALL RESPONSES TO PHQ9 QUESTIONS 1 & 2: 2
6. FEELING BAD ABOUT YOURSELF - OR THAT YOU ARE A FAILURE OR HAVE LET YOURSELF OR YOUR FAMILY DOWN: 0
3. TROUBLE FALLING OR STAYING ASLEEP: 0
1. LITTLE INTEREST OR PLEASURE IN DOING THINGS: 1

## 2023-01-25 ASSESSMENT — ANXIETY QUESTIONNAIRES
GAD7 TOTAL SCORE: 2
5. BEING SO RESTLESS THAT IT IS HARD TO SIT STILL: 0
6. BECOMING EASILY ANNOYED OR IRRITABLE: 0
IF YOU CHECKED OFF ANY PROBLEMS ON THIS QUESTIONNAIRE, HOW DIFFICULT HAVE THESE PROBLEMS MADE IT FOR YOU TO DO YOUR WORK, TAKE CARE OF THINGS AT HOME, OR GET ALONG WITH OTHER PEOPLE: NOT DIFFICULT AT ALL
2. NOT BEING ABLE TO STOP OR CONTROL WORRYING: 0
7. FEELING AFRAID AS IF SOMETHING AWFUL MIGHT HAPPEN: 0
3. WORRYING TOO MUCH ABOUT DIFFERENT THINGS: 0
4. TROUBLE RELAXING: 0
1. FEELING NERVOUS, ANXIOUS, OR ON EDGE: 2

## 2023-01-25 ASSESSMENT — ENCOUNTER SYMPTOMS
ALLERGIC/IMMUNOLOGIC NEGATIVE: 1
SHORTNESS OF BREATH: 0
ANAL BLEEDING: 0
ABDOMINAL PAIN: 0
VOMITING: 0
NAUSEA: 0
RESPIRATORY NEGATIVE: 1
ABDOMINAL DISTENTION: 0
RECTAL PAIN: 0
CONSTIPATION: 0
DIARRHEA: 1
EYES NEGATIVE: 1
BLOOD IN STOOL: 0

## 2023-01-25 NOTE — PROGRESS NOTES
[address] [facility]  [address] [facility] [address]  Dept: [phone]  Dept Fax: [phone]  Loc: Leonardo Mueller is a 78 y.o. female who presents today for her medical conditions/complaints as noted below. Jo Levin is c/o of Anxiety       Subjective:     Chief Complaint   Patient presents with    Anxiety       HPI  The patient is here today for follow-up on her diarrhea. She does have loose stools 4-5 times per day. She does take Pepto-Bismol when she has to go anywhere. Still having diarrhea and feels like her metformin is the cause. When she takes 3 of her metformin her diarrhea is worse. The patient also is on magnesium supplementation. The patient states that she has never had a problem with diarrhea with the use of magnesium before. The problem has been ongoing since October 2022. And has not worsened. The patient does have a history of IBS. Patient did have stool testing done October 2022 that did not reveal any causative agent      She presents today for 3 month follow up on chronic benzodiazepine management. The patient is on chronic benzodiazepine therapy for the diagnosis of:  Anxiety:   The patient complains of racing thoughts, but denies  any other symptoms . Current pharmacologic treatment: Ativan. But rarely used  Medication side effects:  none. The patient denies nausea, vomiting, diarrhea and constipation. Denies respiratory problems/depression. Denies increased sleepiness, drowsiness or confusion. The patient states that the medications and treatment have helped improve the quality of life and helped in psychosocial functioning. She is not participating in counselling/therapy. The patient states they are taking medications only as prescribed , using one pharmacy and getting all controlled substances from one provider unless okayed with me.  The patient states they are following proper safeguarding and disposal of medications. NGUYỄN-7 SCREENING 1/25/2023   Feeling nervous, anxious, or on edge More than half the days   Not being able to stop or control worrying Not at all   Worrying too much about different things Not at all   Trouble relaxing Not at all   Being so restless that it is hard to sit still Not at all   Becoming easily annoyed or irritable Not at all   Feeling afraid as if something awful might happen Not at all   NGUYỄN-7 Total Score 2   How difficult have these problems made it for you to do your work, take care of things at home, or get along with other people? Not difficult at all     NGUYỄN 7 SCORE 1/25/2023   NGUYỄN-7 Total Score 2     Interpretation of NGUYỄN-7 score: 5-9 = mild anxiety, 10-14 = moderate anxiety, 15+ = severe anxiety. Recommend referral to behavioral health for scores 10 or greater. 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 Iris Villeda and Dr. Tiffanie Jc    Cerebral cavernoma     followed by Dr. Courtney Bishop at [facility]    Complication of anesthesia     Takes a long time to wake up    Diverticulosis     Endometrial cancer ([facility] [address]     Fungus infection     Fungus infection on bilat toes. GERD (gastroesophageal reflux disease)     Hyperlipidemia     Hypertension     Hypomagnesemia 1/2016    consult with Dr. Jocelyne Stout ([facility]) and no renal wasting.  felt most likely d/t HCTz and PPI,  HCTZ discontinued,  increased mag ox to 400 mg 2 tabs BID    Hypothyroidism     Irritable bowel syndrome     MVP (mitral valve prolapse)     JOAQUIN (obstructive sleep apnea)     Paronychia of great toe, left 7/11/2017    Pneumonia     infant    Prolonged emergence from general anesthesia     Type 2 diabetes mellitus without complication ([facility] [address]     Type II or unspecified type diabetes mellitus without mention of complication, not stated as uncontrolled     Unspecified sleep apnea     Vitamin B12 deficiency     Vitamin D deficiency          Review of Systems   Constitutional: Negative.  Negative for appetite change, fatigue and unexpected weight change.   HENT: Negative.     Eyes: Negative.    Respiratory: Negative.  Negative for shortness of breath.    Cardiovascular: Negative.  Negative for chest pain, palpitations and leg swelling.   Gastrointestinal:  Positive for diarrhea. Negative for abdominal distention, abdominal pain, anal bleeding, blood in stool, constipation, nausea, rectal pain and vomiting.   Endocrine: Negative.    Genitourinary: Negative.  Negative for hematuria.   Musculoskeletal: Negative.    Skin: Negative.  Negative for rash.   Allergic/Immunologic: Negative.    Neurological:  Positive for tremors. Negative for dizziness, syncope, light-headedness and numbness.   Hematological: Negative.  Does not bruise/bleed easily.   Psychiatric/Behavioral:  The patient is nervous/anxious.    All other systems reviewed and are negative.     Current Outpatient Medications   Medication Sig Dispense Refill    losartan (COZAAR) 100 MG tablet TAKE 1 TABLET DAILY 90 tablet 0    levothyroxine (SYNTHROID) 75 MCG tablet TAKE 1 TABLET DAILY 90 tablet 0    triamcinolone (KENALOG) 0.1 % cream       metFORMIN (GLUCOPHAGE-XR) 500 MG extended release tablet Take 3 tablets by mouth daily (with breakfast) 90 tablet 0    hydroCHLOROthiazide (MICROZIDE) 12.5 MG capsule Take 1 capsule by mouth every morning 90 capsule 1    metoprolol tartrate (LOPRESSOR) 25 MG tablet TAKE 1 TABLET TWICE A  tablet 2    amLODIPine (NORVASC) 5 MG tablet TAKE 1 TABLET DAILY 90 tablet 3    rosuvastatin (CRESTOR) 10 MG tablet Take 1 tablet by mouth daily TAKE 1 TABLET BY MOUTH NIGHTLY 90 tablet 3    LORazepam (ATIVAN) 0.5 MG tablet Take 0.5 mg by mouth nightly.      aspirin 81 MG EC tablet Take 81 mg by mouth daily      blood glucose test strips (CONTOUR NEXT TEST) strip  TEST ONCE DAILY 50 strip 11    clobetasol (TEMOVATE) 0.05 % external solution       Cholecalciferol (VITAMIN D3) 25 MCG (1000 UT) TABS Take 1 tablet by mouth daily (Patient taking differently: Take by mouth daily Taking 2,000 units daily)      magnesium oxide (MAG-OX) 400 (241.3 Mg) MG TABS tablet Take 3 tablets by mouth 2 times daily For Hypomagnesemia (Patient taking differently: Take by mouth 2 times daily For Hypomagnesemia) 180 tablet 5     No current facility-administered medications for this visit. No changes in past medical history, past surgical history, social history, orfamily history were noted during the patient encounter unless specifically listed above. All updates of past medical history, past surgical history, social history, or family history were reviewed personally by me duringthe office visit. All problems listed in the assessment are stable unless noted otherwise. Medication profile reviewed personally by me during the office visit. Medication side effects and possible impairments frommedications were discussed as applicable. Objective:     Physical Exam  Vitals and nursing note reviewed. Constitutional:       General: She is not in acute distress. Appearance: Normal appearance. She is well-developed. HENT:      Head: Normocephalic and atraumatic. Right Ear: Tympanic membrane, ear canal and external ear normal.      Left Ear: Tympanic membrane, ear canal and external ear normal.      Nose: Nose normal.      Mouth/Throat:      Pharynx: Uvula midline. No oropharyngeal exudate. Eyes:      General: Lids are normal.      Conjunctiva/sclera: Conjunctivae normal.      Pupils: Pupils are equal, round, and reactive to light. Neck:      Thyroid: No thyromegaly. Vascular: No carotid bruit or JVD. Cardiovascular:      Rate and Rhythm: Normal rate and regular rhythm. Pulses: Normal pulses. Radial pulses are 2+ on the right side and 2+ on the left side. Dorsalis pedis pulses are 2+ on the right side and 2+ on the left side. Posterior tibial pulses are 2+ on the right side and 2+ on the left side. Heart sounds: Normal heart sounds. No murmur heard. No friction rub. No gallop. Pulmonary:      Effort: Pulmonary effort is normal.      Breath sounds: Normal breath sounds. Abdominal:      General: Bowel sounds are normal.      Palpations: Abdomen is soft. There is no mass. Tenderness: There is no abdominal tenderness. Musculoskeletal:         General: Normal range of motion. Cervical back: Normal range of motion and neck supple. Lymphadenopathy:      Head:      Right side of head: No submandibular adenopathy. Left side of head: No submandibular adenopathy. Cervical: No cervical adenopathy. Skin:     General: Skin is warm and dry. Findings: No lesion or rash. Neurological:      Mental Status: She is alert and oriented to person, place, and time. Motor: Tremor present. Coordination: Coordination is intact. Gait: Gait is intact. Gait normal.   Psychiatric:         Speech: Speech normal.         Behavior: Behavior normal.         Thought Content: Thought content normal.         Judgment: Judgment normal.       /74 (Site: Left Upper Arm, Position: Sitting, Cuff Size: Medium Adult)   Pulse 64   Ht 5' 2\" (1.575 m)   Wt 163 lb (73.9 kg)   SpO2 98%   BMI 29.81 kg/m²   Body mass index is 29.81 kg/m².     BP Readings from Last 2 Encounters:   01/25/23 136/74   10/17/22 126/74       Wt Readings from Last 3 Encounters:   01/25/23 163 lb (73.9 kg)   10/17/22 162 lb (73.5 kg)   08/30/22 162 lb (73.5 kg)       Lab Review   Telemedicine on 12/21/2022   Component Date Value    Influenza A Ab 12/21/2022 neg     Influenza B Ab 12/21/2022 neg     Strep A Ag 12/21/2022 None Detected     SARS-COV-2, RdRp gene 12/21/2022 Negative     Lot Number 12/21/2022 [id number]     QC Pass/Fail 12/21/2022 pass     Throat Culture 12/21/2022 Normal oral toribio, No Beta Strep isolated        No results found for this visit on 01/25/23. Assessment:       1. Anxiety    2. Diarrhea, unspecified type        No results found for this visit on 01/25/23. Plan:       Logan Lynn was seen today for anxiety. Diagnoses and all orders for this visit:    Anxiety  -     LORazepam (ATIVAN) 0.5 MG tablet; Take 1 tablet by mouth nightly for 30 days. Max Daily Amount: 0.5 mg  Chronic benzodiazepine treatment protocol was discussed with the patient again including taking medications only as prescribed , using one pharmacy and getting all controlled substances from one physician unless okayed with me. Proper safeguarding and disposal of medications were also discussed with the patient. The current treatment regimen is needed to decrease the patient's anxiety symptoms, improve the quality of life and ability to function and improve sleep and mood symptoms. Patient given following instructions - You are on medications which could impair your senses, you are at risk of weakness, falls, dizziness, or drowsiness. You should be careful during activities which could place you at risk of harm, such as climbing, using stairs, operating machinery, or driving vehicles. If you feel you cannot safely do these activities, you should request others to help you, or avoid the activities altogether. If you are drowsy for any other reason, you should use the same precautions as listed above. The patient is made aware of the potential of drowsiness with the prescribed medications, and that care should be exercised in operating machinery, vehicles, or placing oneself in situations of risk to their health, ie heights and climbing and stairs. The patient denies nausea, vomiting, diarrhea and constipation. No respiratory problems. No increased sleepiness, drowsiness or confusion.  The patient states that the medications and treatment have helped improve the quality of life and helped in psychosocial functioning. There are no indicators for possible drug abuse, addiction or diversion problems. Periodic Controlled Substance Monitoring: Possible medication side effects, risk of tolerance/dependence & alternative treatments discussed., No signs of potential drug abuse or diversion identified. , Assessed functional status. (Ten Lyon, APRN - CNP)    Diarrhea, unspecified type  Still having diarrhea and feels like her metformin is the cause. When she takes 3 of her metformin her diarrhea is worse. The patient also is on magnesium supplementation. The patient states that she has never had a problem with diarrhea with the use of magnesium before. The problem has been ongoing since October 2022. And has not worsened. The patient does have a history of IBS. Patient did have stool testing done October 2022 that did not reveal any causative agent  I did discuss coming off of the metformin and trying alternative medication such as Actos 15 mg daily due to the cost of brand-name medications. I also did discuss the use of Jardiance for renal protection and cardiovascular protection but again the patient is concerned with cost.  She will consider the use of Actos with discontinuation of metformin and let me know how she wants to proceed    Patient has been instructed call the office immediately with new symptoms, change in symptoms or worseningof symptoms. If this is not feasible, patient is instructed to report to the emergency room. Medication profile reviewed. Medication side effects and possible impairments from medications were discussed as applicable. Allergies were reviewed. Health maintenance was reviewed and updated as appropriate. Return in about 3 months (around 4/25/2023) for [facility].     (Comment: Please note this report has been produced using a combination of typing and speech recognition software and may contain errors related to that system including errors in grammar, punctuation, and spelling, as well as words and phrases that may be inappropriate.  If there are any questions or concerns please feel free to contact the dictating provider for clarification.)

## 2023-02-04 DIAGNOSIS — E11.42 TYPE 2 DIABETES MELLITUS WITH DIABETIC POLYNEUROPATHY, WITHOUT LONG-TERM CURRENT USE OF INSULIN (HCC): ICD-10-CM

## 2023-02-06 RX ORDER — METFORMIN HYDROCHLORIDE 500 MG/1
TABLET, EXTENDED RELEASE ORAL
Qty: 270 TABLET | Refills: 1 | Status: SHIPPED | OUTPATIENT
Start: 2023-02-06

## 2023-02-06 NOTE — TELEPHONE ENCOUNTER
Gonzales Kowalski is requesting refill(s)   Last OV 1/25/23 (pertaining to medication)  LR 10/17/22 (per medication requested)  Next office visit scheduled or attempted Yes   If no, reason:

## 2023-03-06 ENCOUNTER — HOSPITAL ENCOUNTER (OUTPATIENT)
Dept: GENERAL RADIOLOGY | Age: 78
Discharge: HOME | End: 2023-03-06
Payer: MEDICARE

## 2023-03-06 DIAGNOSIS — E03.9 HYPOTHYROIDISM, UNSPECIFIED TYPE: ICD-10-CM

## 2023-03-06 DIAGNOSIS — N95.8 OTHER SPECIFIED MENOPAUSAL AND PERIMENOPAUSAL DISORDERS: ICD-10-CM

## 2023-03-06 PROCEDURE — 77080 DXA BONE DENSITY AXIAL: CPT

## 2023-03-15 ENCOUNTER — OFFICE VISIT (OUTPATIENT)
Dept: CARDIOLOGY CLINIC | Age: 78
End: 2023-03-15
Payer: MEDICARE

## 2023-03-15 VITALS
BODY MASS INDEX: 30.59 KG/M2 | WEIGHT: 166.2 LBS | OXYGEN SATURATION: 96 % | SYSTOLIC BLOOD PRESSURE: 128 MMHG | HEIGHT: 62 IN | HEART RATE: 62 BPM | DIASTOLIC BLOOD PRESSURE: 70 MMHG

## 2023-03-15 DIAGNOSIS — E78.2 MIXED HYPERLIPIDEMIA: ICD-10-CM

## 2023-03-15 DIAGNOSIS — I10 ESSENTIAL HYPERTENSION: ICD-10-CM

## 2023-03-15 DIAGNOSIS — I10 ESSENTIAL HYPERTENSION: Primary | ICD-10-CM

## 2023-03-15 DIAGNOSIS — I36.1 NONRHEUMATIC TRICUSPID VALVE REGURGITATION: ICD-10-CM

## 2023-03-15 DIAGNOSIS — R53.82 CHRONIC FATIGUE: ICD-10-CM

## 2023-03-15 PROCEDURE — 3078F DIAST BP <80 MM HG: CPT | Performed by: INTERNAL MEDICINE

## 2023-03-15 PROCEDURE — G8427 DOCREV CUR MEDS BY ELIG CLIN: HCPCS | Performed by: INTERNAL MEDICINE

## 2023-03-15 PROCEDURE — G8484 FLU IMMUNIZE NO ADMIN: HCPCS | Performed by: INTERNAL MEDICINE

## 2023-03-15 PROCEDURE — G8417 CALC BMI ABV UP PARAM F/U: HCPCS | Performed by: INTERNAL MEDICINE

## 2023-03-15 PROCEDURE — 3017F COLORECTAL CA SCREEN DOC REV: CPT | Performed by: INTERNAL MEDICINE

## 2023-03-15 PROCEDURE — 99214 OFFICE O/P EST MOD 30 MIN: CPT | Performed by: INTERNAL MEDICINE

## 2023-03-15 PROCEDURE — 1036F TOBACCO NON-USER: CPT | Performed by: INTERNAL MEDICINE

## 2023-03-15 PROCEDURE — 1090F PRES/ABSN URINE INCON ASSESS: CPT | Performed by: INTERNAL MEDICINE

## 2023-03-15 PROCEDURE — 3074F SYST BP LT 130 MM HG: CPT | Performed by: INTERNAL MEDICINE

## 2023-03-15 PROCEDURE — 1123F ACP DISCUSS/DSCN MKR DOCD: CPT | Performed by: INTERNAL MEDICINE

## 2023-03-15 PROCEDURE — G8399 PT W/DXA RESULTS DOCUMENT: HCPCS | Performed by: INTERNAL MEDICINE

## 2023-03-15 RX ORDER — LOSARTAN POTASSIUM 100 MG/1
TABLET ORAL
Qty: 90 TABLET | Refills: 0 | Status: SHIPPED | OUTPATIENT
Start: 2023-03-15

## 2023-03-15 RX ORDER — HYDROCHLOROTHIAZIDE 12.5 MG/1
CAPSULE, GELATIN COATED ORAL
Qty: 90 CAPSULE | Refills: 0 | Status: SHIPPED | OUTPATIENT
Start: 2023-03-15

## 2023-03-15 RX ORDER — LEVOTHYROXINE SODIUM 0.07 MG/1
TABLET ORAL
Qty: 90 TABLET | Refills: 0 | Status: SHIPPED | OUTPATIENT
Start: 2023-03-15

## 2023-03-15 NOTE — TELEPHONE ENCOUNTER
Ksenia Reid is requesting refill(s)   Last OV 1/25/23 (pertaining to medication)  LR 12/29/22, 12/22/22 (per medication requested)  Next office visit scheduled or attempted Yes   If no, reason:  5/4/23

## 2023-03-15 NOTE — PROGRESS NOTES
[facility]   Cardiovascular Evaluation    PATIENT: Leydi Muse  DATE: 3/15/2023  MRN: [id number]  CSN: [id number]  : 1945    Primary Care Doctor: ALEKSANDAR Ramírez CNP    Reason for evaluation:   1 Year Follow Up, Hypertension, Hyperlipidemia, and Palpitations (Pt reports last night./)    Subjective:     History of present illness on initial date of evaluation:   Leydi Muse is a 78 y.o. patient who presents for cardiology follow up. She has a past medical history including hypertension, hyperlipidemia, mitral valve prolapse, type II diabetes mellitus, and obstructive sleep apnea. She underwent a stress echocardiogram 2022 showing EF 60%, normal stress echo. Since last office visit she had called into the office in 2022 to report swelling of ankles and shortness of breath. Patient was ordered . She was instructed to follow with her primary care provider. Today she reports she is fatigued. She states her thyroid is on edge of being low, she is following with her PCP for this. Patient currently denies any weight gain, edema, palpitations, chest pain, shortness of breath, dizziness, and syncope. She is taking medications as prescribed, tolerating well.        Patient Active Problem List   Diagnosis    Chest pain    Palpitations    GERD (gastroesophageal reflux disease)    Diverticulitis    MVP (mitral valve prolapse)    Fasciitis    Hypothyroidism    JOAQUIN (obstructive sleep apnea)    Diabetes mellitus, type 2 (HCC)    B12 deficiency    Vitamin D deficiency    Essential hypertension    Hyperlipidemia    Cerebral cavernoma    Abnormal cardiovascular stress test    Postmenopausal atrophic vaginitis    Herniation of rectum into vagina    Female cystocele    Encounter for central line care    Vaginal atrophy    History of endometrial cancer    Hypomagnesemia    Anxiety    Malignant neoplasm (HCC)    Elevated CA-125    Urethral syndrome    Hematuria Hydronephrosis    Pelvic prolapse    Bilateral senile cataracts    Chronic lymphocytic thyroiditis    Diverticulitis of intestine    Goiter, nontoxic, multinodular    H/O total hysterectomy with removal of both tubes and ovaries    Paronychia of great toe, left    Post-menopause    Postsurgical hypothyroidism    Right upper quadrant abdominal pain    Status post chemotherapy    Dizziness    Endometrial carcinoma (HCC)    Fibrohistiocytic proliferation of the skin    Moderate episode of recurrent major depressive disorder (HCC)    Greater trochanteric bursitis, left    It band syndrome, left    Lumbar paraspinal muscle spasm    Arthritis of left hip    Benign essential hypertension    S/P partial thyroidectomy    Localized edema    Nonrheumatic tricuspid valve regurgitation    HH (hiatus hernia)    Mixed stress and urge urinary incontinence    Other specified menopausal and perimenopausal disorders    Chronic fatigue         Cardiac Testing: I have reviewed the findings below. EKG:  ECHO: 11/2021   Summary   Left ventricular systolic function is normal with ejection fraction   estimated at 55-60 %. No regional wall motion abnormalities are noted. Sigmoid septum is present with normal thickness of remaining left   ventricular wall segments. Normal left ventricular diastolic filling pressure. Mild posterior mitral annular calcification is present. Moderate tricuspid regurgitation. Systolic pulmonary artery pressure (SPAP) estimated at 41 mmHg (RA pressure   8 mmHg), consistent with mild pulmonary hypertension. Mild pulmonic regurgitation present.    8- 55%     STRESS TEST:  CATH:  BYPASS:  VASCULAR:      Past Medical History:   has a past medical history of Allergic, Anemia, Arthritis of left hip, Brain condition, Cancer ([address] Cerebral cavernoma, Complication of anesthesia, Diverticulosis, Endometrial cancer ([address] Fungus infection, GERD (gastroesophageal reflux disease), Hyperlipidemia, Hypertension, Hypomagnesemia, Hypothyroidism, Irritable bowel syndrome, MVP (mitral valve prolapse), JOAQUIN (obstructive sleep apnea), Paronychia of great toe, left, Pneumonia, Prolonged emergence from general anesthesia, Type 2 diabetes mellitus without complication ([facility] Type II or unspecified type diabetes mellitus without mention of complication, not stated as uncontrolled, Unspecified sleep apnea, Vitamin B12 deficiency, and Vitamin D deficiency. Surgical History:   has a past surgical history that includes Thyroid surgery; Dilation and curettage of uterus; Salivary gland surgery; Hysterectomy (01/28/2013); Total abdominal hysterectomy w/ bilateral salpingoophorectomy (01/28/2013); Colonoscopy; Endoscopy, colon, diagnostic; Diagnostic Cardiac Cath Lab Procedure (01/12/2005); Upper gastrointestinal endoscopy (07/14/2016); Colonoscopy (07/14/2016); Colonoscopy; Upper gastrointestinal endoscopy; other surgical history (10/18/2017); Cystoscopy (10/18/2017); Cystoscopy (Bilateral, 10/18/2017); other surgical history (Left, 03/02/2017); Upper gastrointestinal endoscopy (N/A, 07/09/2021); and Upper gastrointestinal endoscopy (07/09/2021). Social History:   reports that she has never smoked. She has never used smokeless tobacco. She reports that she does not drink alcohol and does not use drugs. Family History:  No evidence for sudden cardiac death or premature CAD    Home Medications:  Reviewed and are listed in nursing record.  and/or listed below  Current Outpatient Medications   Medication Sig Dispense Refill    levothyroxine (SYNTHROID) 75 MCG tablet TAKE 1 TABLET DAILY 90 tablet 0    losartan (COZAAR) 100 MG tablet TAKE 1 TABLET DAILY 90 tablet 0    hydroCHLOROthiazide (MICROZIDE) 12.5 MG capsule TAKE 1 CAPSULE EVERY       MORNING 90 capsule 0    metoprolol tartrate (LOPRESSOR) 25 MG tablet Take 1/2 tablet by mouth twice daily 90 tablet 3    metFORMIN (GLUCOPHAGE-XR) 500 MG extended release tablet TAKE 3 TABLETS DAILY WITH  BREAKFAST 270 tablet 1    triamcinolone (KENALOG) 0.1 % cream       amLODIPine (NORVASC) 5 MG tablet TAKE 1 TABLET DAILY 90 tablet 3    rosuvastatin (CRESTOR) 10 MG tablet Take 1 tablet by mouth daily TAKE 1 TABLET BY MOUTH NIGHTLY 90 tablet 3    aspirin 81 MG EC tablet Take 81 mg by mouth daily      blood glucose test strips (CONTOUR NEXT TEST) strip TEST ONCE DAILY 50 strip 11    clobetasol (TEMOVATE) 0.05 % external solution       Cholecalciferol (VITAMIN D3) 25 MCG (1000 UT) TABS Take 1 tablet by mouth daily (Patient taking differently: Take by mouth daily Taking 2,000 units daily)      magnesium oxide (MAG-OX) 400 (241.3 Mg) MG TABS tablet Take 3 tablets by mouth 2 times daily For Hypomagnesemia (Patient taking differently: Take by mouth 2 times daily For Hypomagnesemia) 180 tablet 5     No current facility-administered medications for this visit. Allergies:  Aspirin, Lipitor, Other, Sodium hydroxide, Stadol [butorphanol tartrate], Vicodin [hydrocodone-acetaminophen], Lamisil [terbinafine], Medrol [methylprednisolone], and Prilosec [omeprazole]     Review of Systems:   All 14 point review of symptoms completed. Pertinent positives identified in the HPI, all other review of symptoms negative as below.     Review of Systems - History obtained from the patient  General ROS: negative for - chills, fever or night sweats  Psychological ROS: negative for - disorientation or hallucinations  Ophthalmic ROS: negative for - dry eyes, eye pain or loss of vision  ENT ROS: negative for - nasal discharge or sore throat  Allergy and Immunology ROS: negative for - hives or itchy/watery eyes  Hematological and Lymphatic ROS: negative for - jaundice or night sweats  Endocrine ROS: negative for - mood swings or temperature intolerance  Breast ROS: deferred  Respiratory ROS: negative for - hemoptysis or stridor  Cardiovascular ROS: negative for - chest pain, dyspnea on exertion or palpitations  Gastrointestinal ROS: no abdominal pain, change in bowel habits, or black or bloody stools  Genito-Urinary ROS: no dysuria, trouble voiding, or hematuria  Musculoskeletal ROS: negative for - gait disturbance, joint pain or joint stiffness  Neurological ROS: negative for - seizures or speech problems  Dermatological ROS: negative for - rash or skin lesion changes     Physical Examination:    Vitals:    03/15/23 1019   BP: 128/70   Pulse: 62   SpO2: 96%      Weight: 166 lb 3.2 oz (75.4 kg)     Wt Readings from Last 3 Encounters:   03/15/23 166 lb 3.2 oz (75.4 kg)   01/25/23 163 lb (73.9 kg)   10/17/22 162 lb (73.5 kg)         General Appearance:  Alert, cooperative, no distress, appears stated age   Head:  Normocephalic, without obvious abnormality, atraumatic   Eyes:  PERRL, conjunctiva/corneas clear       Nose: Nares normal, no drainage or sinus tenderness   Throat: Lips, mucosa, and tongue normal   Neck: Supple, symmetrical, trachea midline, no adenopathy, thyroid: not enlarged, symmetric, no tenderness/mass/nodules, no carotid bruit or JVD       Lungs:   Clear to auscultation bilaterally, respirations unlabored   Chest Wall:  No tenderness or deformity   Heart:  Regular rhythm and normal rate; S1, S2 are normal; no murmur noted; no rub or gallop   Abdomen:   Soft, non-tender, bowel sounds active all four quadrants,  no masses, no organomegaly           Extremities: Extremities normal, atraumatic, no cyanosis or edema   Pulses: 2+ and symmetric   Skin: Skin color, texture, turgor normal, no rashes or lesions   Pysch: Normal mood and affect   Neurologic: Normal gross motor and sensory exam.         Labs  CBC:   Lab Results   Component Value Date/Time    WBC 6.7 11/15/2022 12:21 PM    RBC 4.16 11/15/2022 12:21 PM    RBC 4.09 06/07/2017 12:47 PM    HGB 11.3 11/15/2022 12:21 PM    HCT 33.8 11/15/2022 12:21 PM    MCV 81.2 11/15/2022 12:21 PM    RDW 15.1 11/15/2022 12:21 PM     11/15/2022 12:21 PM CMP:    Lab Results   Component Value Date/Time     11/15/2022 12:21 PM    K 4.1 11/15/2022 12:21 PM    K 4.6 03/02/2018 09:34 AM     11/15/2022 12:21 PM    CO2 26 11/15/2022 12:21 PM    BUN 18 11/15/2022 12:21 PM    CREATININE 0.7 11/15/2022 12:21 PM    GFRAA >60 12/02/2021 09:25 AM    GFRAA >60 03/18/2013 12:12 PM    AGRATIO 1.9 11/15/2022 12:21 PM    LABGLOM >60 11/15/2022 12:21 PM    GLUCOSE 113 11/15/2022 12:21 PM    GLUCOSE 96 06/01/2016 11:08 AM    PROT 6.7 11/15/2022 12:21 PM    PROT 7.1 06/01/2016 11:08 AM    CALCIUM 10.2 11/15/2022 12:21 PM    BILITOT 0.3 11/15/2022 12:21 PM    ALKPHOS 87 11/15/2022 12:21 PM    AST 18 11/15/2022 12:21 PM    ALT 16 11/15/2022 12:21 PM     PT/INR:    No components found for: PTPATIENT,  PTINR  Lab Results   Component Value Date    CKTOTAL 45 08/28/2015     No components found for: CHLPL  Lab Results   Component Value Date    TRIG 175 (H) 10/17/2022    TRIG 190 (H) 12/02/2021    TRIG 152 (H) 05/11/2021     Lab Results   Component Value Date    HDL 67 (H) 10/17/2022    HDL 64 (H) 12/02/2021    HDL 66 (H) 05/11/2021     Lab Results   Component Value Date    LDLCALC 92 10/17/2022    LDLCALC 58 12/02/2021    LDLCALC 73 05/11/2021     Lab Results   Component Value Date    LABVLDL 35 10/17/2022    LABVLDL 38 12/02/2021    LABVLDL 30 05/11/2021     Lab Results   Component Value Date    ALT 16 11/15/2022    AST 18 11/15/2022    ALKPHOS 87 11/15/2022    BILITOT 0.3 11/15/2022       Imaging:  I have reviewed the below testing personally and my interpretation is below. EKG:  CXR:      Assessment:  78 y.o. patient with:   Diagnosis Orders   1. Essential hypertension  Echo 2D w doppler w color complete    metoprolol tartrate (LOPRESSOR) 25 MG tablet      2. Chronic fatigue        3. Mixed hyperlipidemia        4.  Nonrheumatic tricuspid valve regurgitation  Echo 2D w doppler w color complete            The 10-year ASCVD risk score (Louise DK, et al., 2019) is: 36.8% Values used to calculate the score:      Age: 78 years      Sex: Female      Is Non- : No      Diabetic: Yes      Tobacco smoker: No      Systolic Blood Pressure: 830 mmHg      Is BP treated: Yes      HDL Cholesterol: 67 mg/dL      Total Cholesterol: 194 mg/dL     Plan:  1. Decrease metoprolol tartrate to 12.5 mg twice daily ~ trial to assist with symptom fatigue. ~FATIGUE MAY NOT BE CARDIAC ISSUE  2. Your condition does not require antibiotic prior to dental procedure. 3. Order echocardiogram after July 2023 ~ tricuspid regurgitation  ~A test that records movement of your heart valves and chambers by ultrasound. Evaluates heart valves, any chamber enlargement, abnormal openings, or any fluid in the sac surrounding the heart. 4.  Follow up in 1 year. Scribe's attestation: This note was scribed in the presence of Colleen Biswas by Mehnaz Lemus RN     I, Dr. Yovani Rubio, personally performed the services described in this documentation, as scribed by the above signed scribe in my presence. It is both accurate and complete to my knowledge. I agree with the details independently gathered by the clinical support staff, while the remaining scribed note accurately describes my personal service to the patient. Medical Decision Making: The following items were considered in medical decision making:  Independent review of images  Review / order clinical lab tests  Review / order radiology tests  Decision to obtain old records  Review and summation of old records as accessed through [facility] (a summary of my findings in these old records)      Time Based Itemization  A total of 30 minutes was spent on today's patient encounter.   If applicable, non-patient-facing activities:  (X)Preparing to see the patient and reviewing records  (X) Individual interpretation of results  ( ) Discussion or coordination of care with other health care professionals  () Ordering of unique tests, medications, or procedures  (X) Documentation within the EHR                All questions and concerns were addressed to the patient/family. Alternatives to my treatment were discussed. The note was completed using EMR. Every effort was made to ensure accuracy; however, inadvertent computerized transcription errors may be present.     Sarabjit Boggs MD, [address], [place], [place]  [phone] [facility]  [phone] [facility]  3/15/2023  12:11 PM

## 2023-04-25 DIAGNOSIS — E11.9 TYPE 2 DIABETES MELLITUS WITHOUT COMPLICATION, WITHOUT LONG-TERM CURRENT USE OF INSULIN (HCC): ICD-10-CM

## 2023-04-25 RX ORDER — PERPHENAZINE 16 MG/1
TABLET, FILM COATED ORAL
Qty: 50 STRIP | Refills: 2 | Status: SHIPPED | OUTPATIENT
Start: 2023-04-25

## 2023-05-04 ENCOUNTER — OFFICE VISIT (OUTPATIENT)
Dept: FAMILY MEDICINE CLINIC | Age: 78
End: 2023-05-04

## 2023-05-04 VITALS
OXYGEN SATURATION: 98 % | DIASTOLIC BLOOD PRESSURE: 72 MMHG | BODY MASS INDEX: 30.36 KG/M2 | WEIGHT: 165 LBS | HEIGHT: 62 IN | SYSTOLIC BLOOD PRESSURE: 130 MMHG | HEART RATE: 78 BPM

## 2023-05-04 DIAGNOSIS — I10 ESSENTIAL HYPERTENSION: ICD-10-CM

## 2023-05-04 DIAGNOSIS — E11.42 TYPE 2 DIABETES MELLITUS WITH DIABETIC POLYNEUROPATHY, WITHOUT LONG-TERM CURRENT USE OF INSULIN (HCC): ICD-10-CM

## 2023-05-04 DIAGNOSIS — D64.9 ANEMIA, UNSPECIFIED TYPE: ICD-10-CM

## 2023-05-04 DIAGNOSIS — E78.2 MIXED HYPERLIPIDEMIA: ICD-10-CM

## 2023-05-04 DIAGNOSIS — E11.9 TYPE 2 DIABETES MELLITUS WITHOUT COMPLICATION, WITHOUT LONG-TERM CURRENT USE OF INSULIN (HCC): ICD-10-CM

## 2023-05-04 DIAGNOSIS — F41.9 ANXIETY: ICD-10-CM

## 2023-05-04 DIAGNOSIS — E04.1 THYROID NODULE: ICD-10-CM

## 2023-05-04 DIAGNOSIS — E55.9 VITAMIN D DEFICIENCY: ICD-10-CM

## 2023-05-04 DIAGNOSIS — C54.1 ENDOMETRIAL CANCER (HCC): ICD-10-CM

## 2023-05-04 DIAGNOSIS — F33.1 MODERATE EPISODE OF RECURRENT MAJOR DEPRESSIVE DISORDER (HCC): Primary | ICD-10-CM

## 2023-05-04 DIAGNOSIS — I83.93 VARICOSE VEINS OF BOTH LOWER EXTREMITIES, UNSPECIFIED WHETHER COMPLICATED: ICD-10-CM

## 2023-05-04 DIAGNOSIS — E83.42 HYPOMAGNESEMIA: ICD-10-CM

## 2023-05-04 RX ORDER — LORAZEPAM 0.5 MG/1
0.5 TABLET ORAL NIGHTLY
Qty: 30 TABLET | Refills: 0 | Status: SHIPPED | OUTPATIENT
Start: 2023-05-04 | End: 2023-06-03

## 2023-05-04 RX ORDER — LORAZEPAM 0.5 MG/1
0.5 TABLET ORAL NIGHTLY
COMMUNITY
End: 2023-05-04 | Stop reason: SDUPTHER

## 2023-05-04 SDOH — ECONOMIC STABILITY: FOOD INSECURITY: WITHIN THE PAST 12 MONTHS, THE FOOD YOU BOUGHT JUST DIDN'T LAST AND YOU DIDN'T HAVE MONEY TO GET MORE.: NEVER TRUE

## 2023-05-04 SDOH — ECONOMIC STABILITY: INCOME INSECURITY: HOW HARD IS IT FOR YOU TO PAY FOR THE VERY BASICS LIKE FOOD, HOUSING, MEDICAL CARE, AND HEATING?: NOT HARD AT ALL

## 2023-05-04 SDOH — ECONOMIC STABILITY: HOUSING INSECURITY
IN THE LAST 12 MONTHS, WAS THERE A TIME WHEN YOU DID NOT HAVE A STEADY PLACE TO SLEEP OR SLEPT IN A SHELTER (INCLUDING NOW)?: NO

## 2023-05-04 SDOH — ECONOMIC STABILITY: FOOD INSECURITY: WITHIN THE PAST 12 MONTHS, YOU WORRIED THAT YOUR FOOD WOULD RUN OUT BEFORE YOU GOT MONEY TO BUY MORE.: NEVER TRUE

## 2023-05-04 ASSESSMENT — ENCOUNTER SYMPTOMS
SHORTNESS OF BREATH: 0
GASTROINTESTINAL NEGATIVE: 1
BLOOD IN STOOL: 0
ALLERGIC/IMMUNOLOGIC NEGATIVE: 1
RESPIRATORY NEGATIVE: 1
EYES NEGATIVE: 1
NAUSEA: 0
ANAL BLEEDING: 0
ABDOMINAL PAIN: 0

## 2023-05-04 ASSESSMENT — PATIENT HEALTH QUESTIONNAIRE - PHQ9
4. FEELING TIRED OR HAVING LITTLE ENERGY: 2
SUM OF ALL RESPONSES TO PHQ QUESTIONS 1-9: 5
9. THOUGHTS THAT YOU WOULD BE BETTER OFF DEAD, OR OF HURTING YOURSELF: 0
7. TROUBLE CONCENTRATING ON THINGS, SUCH AS READING THE NEWSPAPER OR WATCHING TELEVISION: 0
8. MOVING OR SPEAKING SO SLOWLY THAT OTHER PEOPLE COULD HAVE NOTICED. OR THE OPPOSITE, BEING SO FIGETY OR RESTLESS THAT YOU HAVE BEEN MOVING AROUND A LOT MORE THAN USUAL: 0
6. FEELING BAD ABOUT YOURSELF - OR THAT YOU ARE A FAILURE OR HAVE LET YOURSELF OR YOUR FAMILY DOWN: 0
SUM OF ALL RESPONSES TO PHQ9 QUESTIONS 1 & 2: 1
SUM OF ALL RESPONSES TO PHQ QUESTIONS 1-9: 5
2. FEELING DOWN, DEPRESSED OR HOPELESS: 1
1. LITTLE INTEREST OR PLEASURE IN DOING THINGS: 0
SUM OF ALL RESPONSES TO PHQ QUESTIONS 1-9: 5
10. IF YOU CHECKED OFF ANY PROBLEMS, HOW DIFFICULT HAVE THESE PROBLEMS MADE IT FOR YOU TO DO YOUR WORK, TAKE CARE OF THINGS AT HOME, OR GET ALONG WITH OTHER PEOPLE: 0
SUM OF ALL RESPONSES TO PHQ QUESTIONS 1-9: 5
5. POOR APPETITE OR OVEREATING: 1
3. TROUBLE FALLING OR STAYING ASLEEP: 1

## 2023-05-04 ASSESSMENT — ANXIETY QUESTIONNAIRES
GAD7 TOTAL SCORE: 1
IF YOU CHECKED OFF ANY PROBLEMS ON THIS QUESTIONNAIRE, HOW DIFFICULT HAVE THESE PROBLEMS MADE IT FOR YOU TO DO YOUR WORK, TAKE CARE OF THINGS AT HOME, OR GET ALONG WITH OTHER PEOPLE: NOT DIFFICULT AT ALL
3. WORRYING TOO MUCH ABOUT DIFFERENT THINGS: 0
1. FEELING NERVOUS, ANXIOUS, OR ON EDGE: 1
5. BEING SO RESTLESS THAT IT IS HARD TO SIT STILL: 0
6. BECOMING EASILY ANNOYED OR IRRITABLE: 0
2. NOT BEING ABLE TO STOP OR CONTROL WORRYING: 0
7. FEELING AFRAID AS IF SOMETHING AWFUL MIGHT HAPPEN: 0
4. TROUBLE RELAXING: 0

## 2023-05-04 NOTE — PROGRESS NOTES
Negative for shortness of breath. Cardiovascular: Negative. Negative for chest pain, palpitations and leg swelling. Gastrointestinal: Negative. Negative for abdominal pain, anal bleeding, blood in stool and nausea. Endocrine: Negative. Genitourinary: Negative. Negative for hematuria. Musculoskeletal: Negative. Skin: Negative. Negative for rash. Patient complains of painful varicose veins in her lower extremity   Allergic/Immunologic: Negative. Neurological:  Positive for tremors (Chronic). Negative for dizziness, syncope, light-headedness and numbness. Hematological: Negative. Does not bruise/bleed easily. Psychiatric/Behavioral:  The patient is nervous/anxious. All other systems reviewed and are negative. Past Medical History:   Diagnosis Date    Allergic     Anemia     Arthritis of left hip 11/10/2020    Brain condition     pt states a cluster of blood vessels left side of brain    Cancer [facility]) 1/2013    endometrial cancer/ Dr. Madison Westfall and Dr. Bambi Koroma    Cerebral cavernoma     follow[facility] by [facility]. Hoang Waldrop at [facility]    Complication of anesthesia     Takes a long time to wake up    Diverticulosis     Endometrial cancer [address] [address]     Fungus infection     Fungus infection on bilat toes. GERD (gastroesophageal reflux disease)     Hyperlipidemia     Hypertension     Hypomagnesemia 1/2016    consult with Dr. Gianna Hernandez ([facility]) and no renal wasting.  felt most likely d/t HCTz and PPI,  HCTZ discontinued,  increased mag ox to 400 mg 2 tabs BID    Hypothyroidism     Irritable bowel syndrome     MVP (mitral valve prolapse)     JOAQUIN (obstructive sleep apnea)     Paronychia of great toe, left 7/11/2017    Pneumonia     infant    Prolonged emergence from general anesthesia     Type 2 diabetes mellitus without complication ([facility] [address]     Type II or unspecified type diabetes mellitus without mention of complication, not stated as uncontrolled     Unspecified sleep apnea     Vitamin B12

## 2023-05-08 ENCOUNTER — HOSPITAL ENCOUNTER (OUTPATIENT)
Dept: ULTRASOUND IMAGING | Age: 78
Discharge: HOME | End: 2023-05-08
Payer: MEDICARE

## 2023-05-08 DIAGNOSIS — E04.1 THYROID NODULE: ICD-10-CM

## 2023-05-08 PROCEDURE — 76536 US EXAM OF HEAD AND NECK: CPT

## 2023-05-10 ENCOUNTER — HOSPITAL ENCOUNTER (OUTPATIENT)
Age: 78
Discharge: HOME | End: 2023-05-10
Payer: MEDICARE

## 2023-05-10 DIAGNOSIS — E55.9 VITAMIN D DEFICIENCY: ICD-10-CM

## 2023-05-10 DIAGNOSIS — E83.42 HYPOMAGNESEMIA: ICD-10-CM

## 2023-05-10 DIAGNOSIS — E11.9 TYPE 2 DIABETES MELLITUS WITHOUT COMPLICATION, WITHOUT LONG-TERM CURRENT USE OF INSULIN (HCC): ICD-10-CM

## 2023-05-10 DIAGNOSIS — D64.9 ANEMIA, UNSPECIFIED TYPE: ICD-10-CM

## 2023-05-10 DIAGNOSIS — E78.2 MIXED HYPERLIPIDEMIA: ICD-10-CM

## 2023-05-10 LAB
ALBUMIN SERPL-MCNC: 4.6 G/DL (ref 3.4–5)
ALP SERPL-CCNC: 86 U/L (ref 40–129)
ALT SERPL-CCNC: 13 U/L (ref 10–40)
AST SERPL-CCNC: 16 U/L (ref 15–37)
BILIRUB DIRECT SERPL-MCNC: <0.2 MG/DL (ref 0–0.3)
BILIRUB INDIRECT SERPL-MCNC: NORMAL MG/DL (ref 0–1)
BILIRUB SERPL-MCNC: 0.4 MG/DL (ref 0–1)
MAGNESIUM SERPL-MCNC: 1.8 MG/DL (ref 1.8–2.4)
PROT SERPL-MCNC: 7.4 G/DL (ref 6.4–8.2)

## 2023-05-10 PROCEDURE — 83550 IRON BINDING TEST: CPT

## 2023-05-10 PROCEDURE — 36415 COLL VENOUS BLD VENIPUNCTURE: CPT

## 2023-05-10 PROCEDURE — 83735 ASSAY OF MAGNESIUM: CPT

## 2023-05-10 PROCEDURE — 82306 VITAMIN D 25 HYDROXY: CPT

## 2023-05-10 PROCEDURE — 82728 ASSAY OF FERRITIN: CPT

## 2023-05-10 PROCEDURE — 80076 HEPATIC FUNCTION PANEL: CPT

## 2023-05-10 PROCEDURE — 80061 LIPID PANEL: CPT

## 2023-05-10 PROCEDURE — 83036 HEMOGLOBIN GLYCOSYLATED A1C: CPT

## 2023-05-10 PROCEDURE — 83540 ASSAY OF IRON: CPT

## 2023-05-11 LAB
25(OH)D3 SERPL-MCNC: 42.8 NG/ML
CHOLEST SERPL-MCNC: 156 MG/DL (ref 0–199)
EST. AVERAGE GLUCOSE BLD GHB EST-MCNC: 142.7 MG/DL
FERRITIN SERPL IA-MCNC: 17.2 NG/ML (ref 15–150)
HBA1C MFR BLD: 6.6 %
HDLC SERPL-MCNC: 73 MG/DL (ref 40–60)
IRON SATN MFR SERPL: 17 % (ref 15–50)
IRON SERPL-MCNC: 57 UG/DL (ref 37–145)
LDLC SERPL CALC-MCNC: 56 MG/DL
TIBC SERPL-MCNC: 338 UG/DL (ref 260–445)
TRIGL SERPL-MCNC: 133 MG/DL (ref 0–150)
VLDLC SERPL CALC-MCNC: 27 MG/DL

## 2023-05-24 ENCOUNTER — TELEPHONE (OUTPATIENT)
Dept: PULMONOLOGY | Age: 78
End: 2023-05-24

## 2023-05-24 DIAGNOSIS — G47.33 OSA (OBSTRUCTIVE SLEEP APNEA): Primary | ICD-10-CM

## 2023-05-24 LAB — DIABETIC RETINOPATHY: NEGATIVE

## 2023-05-24 NOTE — TELEPHONE ENCOUNTER
Pt dropped off a letter received from [employer] (scanned in media) stating they need a device therapy prescription. Please advise.

## 2023-06-06 DIAGNOSIS — I10 ESSENTIAL HYPERTENSION: ICD-10-CM

## 2023-06-06 RX ORDER — LOSARTAN POTASSIUM 100 MG/1
TABLET ORAL
Qty: 90 TABLET | Refills: 0 | Status: SHIPPED | OUTPATIENT
Start: 2023-06-06

## 2023-06-06 RX ORDER — HYDROCHLOROTHIAZIDE 12.5 MG/1
CAPSULE, GELATIN COATED ORAL
Qty: 90 CAPSULE | Refills: 0 | Status: SHIPPED | OUTPATIENT
Start: 2023-06-06

## 2023-06-21 ENCOUNTER — TELEPHONE (OUTPATIENT)
Dept: CARDIOLOGY CLINIC | Age: 78
End: 2023-06-21

## 2023-06-21 ENCOUNTER — OFFICE VISIT (OUTPATIENT)
Dept: CARDIOLOGY CLINIC | Age: 78
End: 2023-06-21
Payer: MEDICARE

## 2023-06-21 VITALS
SYSTOLIC BLOOD PRESSURE: 124 MMHG | RESPIRATION RATE: 22 BRPM | WEIGHT: 164 LBS | BODY MASS INDEX: 30 KG/M2 | HEART RATE: 80 BPM | DIASTOLIC BLOOD PRESSURE: 78 MMHG

## 2023-06-21 DIAGNOSIS — R60.0 BILATERAL LEG EDEMA: Primary | ICD-10-CM

## 2023-06-21 DIAGNOSIS — I83.893 VARICOSE VEINS OF BOTH LEGS WITH EDEMA: ICD-10-CM

## 2023-06-21 PROCEDURE — 1123F ACP DISCUSS/DSCN MKR DOCD: CPT | Performed by: INTERNAL MEDICINE

## 2023-06-21 PROCEDURE — 3074F SYST BP LT 130 MM HG: CPT | Performed by: INTERNAL MEDICINE

## 2023-06-21 PROCEDURE — 3078F DIAST BP <80 MM HG: CPT | Performed by: INTERNAL MEDICINE

## 2023-06-21 PROCEDURE — 99204 OFFICE O/P NEW MOD 45 MIN: CPT | Performed by: INTERNAL MEDICINE

## 2023-06-21 PROCEDURE — 3017F COLORECTAL CA SCREEN DOC REV: CPT | Performed by: INTERNAL MEDICINE

## 2023-06-21 PROCEDURE — 1036F TOBACCO NON-USER: CPT | Performed by: INTERNAL MEDICINE

## 2023-06-21 PROCEDURE — G8399 PT W/DXA RESULTS DOCUMENT: HCPCS | Performed by: INTERNAL MEDICINE

## 2023-06-21 PROCEDURE — 1090F PRES/ABSN URINE INCON ASSESS: CPT | Performed by: INTERNAL MEDICINE

## 2023-06-21 PROCEDURE — G8428 CUR MEDS NOT DOCUMENT: HCPCS | Performed by: INTERNAL MEDICINE

## 2023-06-21 PROCEDURE — G8417 CALC BMI ABV UP PARAM F/U: HCPCS | Performed by: INTERNAL MEDICINE

## 2023-06-26 LAB
CATARACTS: POSITIVE
DIABETIC RETINOPATHY: NEGATIVE
GLAUCOMA: NEGATIVE
INTRAOCULAR PRESSURE EYE: 14
INTRAOCULAR PRESSURE EYE: 16
VISUAL ACUITY DISTANCE LEFT EYE: NORMAL
VISUAL ACUITY DISTANCE RIGHT EYE: NORMAL

## 2023-07-07 RX ORDER — LEVOTHYROXINE SODIUM 0.07 MG/1
TABLET ORAL
Qty: 90 TABLET | Refills: 0 | Status: SHIPPED | OUTPATIENT
Start: 2023-07-07

## 2023-07-11 ENCOUNTER — HOSPITAL ENCOUNTER (OUTPATIENT)
Dept: GENERAL RADIOLOGY | Age: 78
Discharge: HOME | End: 2023-07-11
Payer: MEDICARE

## 2023-07-11 ENCOUNTER — HOSPITAL ENCOUNTER (OUTPATIENT)
Age: 78
Discharge: HOME | End: 2023-07-11
Payer: MEDICARE

## 2023-07-11 DIAGNOSIS — K21.9 GERD WITHOUT ESOPHAGITIS: ICD-10-CM

## 2023-07-11 PROCEDURE — 71046 X-RAY EXAM CHEST 2 VIEWS: CPT

## 2023-07-17 ENCOUNTER — HOSPITAL ENCOUNTER (OUTPATIENT)
Dept: VASCULAR LAB | Age: 78
Discharge: HOME | End: 2023-07-17
Payer: MEDICARE

## 2023-07-17 DIAGNOSIS — R60.0 BILATERAL LEG EDEMA: ICD-10-CM

## 2023-07-17 DIAGNOSIS — I83.893 VARICOSE VEINS OF BOTH LEGS WITH EDEMA: ICD-10-CM

## 2023-07-17 PROCEDURE — 93970 EXTREMITY STUDY: CPT

## 2023-07-20 NOTE — RESULT ENCOUNTER NOTE
Please notify patient that their vein ultrasound shows no significant reflux, Do not require intervention.  Continue compression and leg elevation

## 2023-07-28 DIAGNOSIS — E11.42 TYPE 2 DIABETES MELLITUS WITH DIABETIC POLYNEUROPATHY, WITHOUT LONG-TERM CURRENT USE OF INSULIN (HCC): ICD-10-CM

## 2023-07-28 RX ORDER — METFORMIN HYDROCHLORIDE 500 MG/1
TABLET, EXTENDED RELEASE ORAL
Qty: 270 TABLET | Refills: 0 | Status: SHIPPED | OUTPATIENT
Start: 2023-07-28

## 2023-07-28 NOTE — TELEPHONE ENCOUNTER
Refill Request     CONFIRM preferrred pharmacy with the patient. If Mail Order Rx - Pend for 90 day refill. Last Seen: Last Seen Department: 5/4/2023  Last Seen by PCP: 5/4/2023    Last Written: 2/6/23    If no future appointment scheduled, route STAFF MESSAGE with patient name to the [facility] for scheduling. Next Appointment:   Future Appointments   Date Time Provider [address]   8/9/2023  1:20 PM ALEKSANDAR Moore - CNP [place] - DYD   8/30/2023 11:00 AM Senia Dudley MD [facility] MMA   9/1/2023 12:30 PM [facility] [place] [address] [place] [address]   12/13/2023  2:15 PM Luis Eduardo Villalba MD [facility]       Message sent to [address] [place] [address] to schedule appt with patient?   YES      Requested Prescriptions     Pending Prescriptions Disp Refills    metFORMIN (GLUCOPHAGE-XR) 500 MG extended release tablet [Pharmacy Med Name: METFORMIN ER TAB 500MG GP] 270 tablet 0     Sig: TAKE 3 TABLETS DAILY WITH  BREAKFAST

## 2023-08-30 ENCOUNTER — OFFICE VISIT (OUTPATIENT)
Dept: PULMONOLOGY | Age: 78
End: 2023-08-30
Payer: MEDICARE

## 2023-08-30 VITALS
RESPIRATION RATE: 16 BRPM | SYSTOLIC BLOOD PRESSURE: 136 MMHG | BODY MASS INDEX: 29.85 KG/M2 | OXYGEN SATURATION: 96 % | HEART RATE: 88 BPM | DIASTOLIC BLOOD PRESSURE: 72 MMHG | WEIGHT: 162.2 LBS | HEIGHT: 62 IN

## 2023-08-30 DIAGNOSIS — G47.33 OSA (OBSTRUCTIVE SLEEP APNEA): Primary | ICD-10-CM

## 2023-08-30 PROCEDURE — G8427 DOCREV CUR MEDS BY ELIG CLIN: HCPCS | Performed by: INTERNAL MEDICINE

## 2023-08-30 PROCEDURE — G8399 PT W/DXA RESULTS DOCUMENT: HCPCS | Performed by: INTERNAL MEDICINE

## 2023-08-30 PROCEDURE — G8417 CALC BMI ABV UP PARAM F/U: HCPCS | Performed by: INTERNAL MEDICINE

## 2023-08-30 PROCEDURE — 3017F COLORECTAL CA SCREEN DOC REV: CPT | Performed by: INTERNAL MEDICINE

## 2023-08-30 PROCEDURE — 3075F SYST BP GE 130 - 139MM HG: CPT | Performed by: INTERNAL MEDICINE

## 2023-08-30 PROCEDURE — 3078F DIAST BP <80 MM HG: CPT | Performed by: INTERNAL MEDICINE

## 2023-08-30 PROCEDURE — 1036F TOBACCO NON-USER: CPT | Performed by: INTERNAL MEDICINE

## 2023-08-30 PROCEDURE — 99213 OFFICE O/P EST LOW 20 MIN: CPT | Performed by: INTERNAL MEDICINE

## 2023-08-30 PROCEDURE — 1090F PRES/ABSN URINE INCON ASSESS: CPT | Performed by: INTERNAL MEDICINE

## 2023-08-30 PROCEDURE — 1123F ACP DISCUSS/DSCN MKR DOCD: CPT | Performed by: INTERNAL MEDICINE

## 2023-08-30 ASSESSMENT — SLEEP AND FATIGUE QUESTIONNAIRES
HOW LIKELY ARE YOU TO NOD OFF OR FALL ASLEEP WHILE WATCHING TV: 2
HOW LIKELY ARE YOU TO NOD OFF OR FALL ASLEEP WHILE SITTING AND READING: 1
HOW LIKELY ARE YOU TO NOD OFF OR FALL ASLEEP WHILE SITTING AND TALKING TO SOMEONE: 0
NECK CIRCUMFERENCE (INCHES): 12.5
ESS TOTAL SCORE: 6
HOW LIKELY ARE YOU TO NOD OFF OR FALL ASLEEP WHILE SITTING INACTIVE IN A PUBLIC PLACE: 0
HOW LIKELY ARE YOU TO NOD OFF OR FALL ASLEEP WHILE LYING DOWN TO REST IN THE AFTERNOON WHEN CIRCUMSTANCES PERMIT: 3
HOW LIKELY ARE YOU TO NOD OFF OR FALL ASLEEP WHEN YOU ARE A PASSENGER IN A CAR FOR AN HOUR WITHOUT A BREAK: 0
HOW LIKELY ARE YOU TO NOD OFF OR FALL ASLEEP IN A CAR, WHILE STOPPED FOR A FEW MINUTES IN TRAFFIC: 0
HOW LIKELY ARE YOU TO NOD OFF OR FALL ASLEEP WHILE SITTING QUIETLY AFTER LUNCH WITHOUT ALCOHOL: 0

## 2023-08-30 NOTE — PROGRESS NOTES
Chief Complaint: Sleep Apnea     Referring Provider: Awilda Rosales NP     HPI: JOAQUIN treated with benefit with PAP 5-9 . PAP use was reviewed and is used 6:44 hours on average, compliance with greater than 4 hour nightly use is 25/30, residual AHI is 4.2, but most nights AHI less than this, a few nights with markedly increased AHI, unclear why. PRESENTING HISTORY  Several year history of loud snoring associated with witnessed apnea and excessive daytime sleepiness. Had PSG at [address] [place] [address] on 2/27/06 that showed moderate sleep apnea with AHI of 16. She has been wearing CPAP with nasal pillow mask majority of nights since with some improvement, no longer wakes up gasping for air. Now having worse daytime sleepiness and fatigue. She reports she has had her pressure adjusted down multiple times from 11 cm H20 now down to 7 cm H20.       reports that she has never smoked. She has never been exposed to tobacco smoke. She has never used smokeless tobacco. She reports that she does not drink alcohol and does not use drugs. Physical Exam:  Blood pressure 136/72, pulse 88, resp. rate 16, height 5' 2\" (1.575 m), weight 162 lb 3.2 oz (73.6 kg), SpO2 96 %.'  Constitutional:  No acute distress. HENT:  Pupils equal, round, and reactive to light. No scleral icterus. Oropharynx is clear and moist.   Neck: No tracheal deviation present. Pulmonary/Chest:   No accessory muscle usage or stridor. No wheezes. No rhonchi. No rales. No decreased breath sounds. Cardiovascular: Normal heart sounds. No lower extremity edema. Abdominal:   soft  Musculoskeletal: No cyanosis. No clubbing. Skin: Skin is warm and dry. Psychiatric: Normal mood and affect.   Neurologic: speech fluent, strength symmetric       Data:   PSG at [facility] 2/27/14: AHI 16    CPAP titration 9-30-14 with AHI of 5 on CPAP 10    Assessment:  Moderate Obstructive Sleep Apnea    Hypertension     Plan:   APAP changed to 5 to 9

## 2023-09-01 ENCOUNTER — HOSPITAL ENCOUNTER (OUTPATIENT)
Dept: NON INVASIVE DIAGNOSTICS | Age: 78
Discharge: HOME | End: 2023-09-01
Attending: INTERNAL MEDICINE
Payer: MEDICARE

## 2023-09-01 DIAGNOSIS — I10 ESSENTIAL HYPERTENSION: ICD-10-CM

## 2023-09-01 DIAGNOSIS — I36.1 NONRHEUMATIC TRICUSPID VALVE REGURGITATION: ICD-10-CM

## 2023-09-01 LAB
LV EF: 55 %
LVEF MODALITY: NORMAL

## 2023-09-01 PROCEDURE — 93306 TTE W/DOPPLER COMPLETE: CPT

## 2023-09-05 ENCOUNTER — TELEPHONE (OUTPATIENT)
Dept: CARDIOLOGY CLINIC | Age: 78
End: 2023-09-05

## 2023-09-05 NOTE — TELEPHONE ENCOUNTER
----- Message from Debbie Rowland MD sent at 9/1/2023  4:21 PM EDT -----  Please let the patient know that we are seeing improvement in their test results. Valve leakage is better. Heart still strong. Please have them see us as planned or sooner if clinical concerns.

## 2023-09-13 ENCOUNTER — OFFICE VISIT (OUTPATIENT)
Dept: FAMILY MEDICINE CLINIC | Age: 78
End: 2023-09-13

## 2023-09-13 VITALS
OXYGEN SATURATION: 97 % | HEIGHT: 61 IN | SYSTOLIC BLOOD PRESSURE: 156 MMHG | BODY MASS INDEX: 30.4 KG/M2 | WEIGHT: 161 LBS | DIASTOLIC BLOOD PRESSURE: 72 MMHG | HEART RATE: 68 BPM

## 2023-09-13 DIAGNOSIS — I10 ESSENTIAL HYPERTENSION: ICD-10-CM

## 2023-09-13 DIAGNOSIS — F41.9 ANXIETY: Primary | ICD-10-CM

## 2023-09-13 DIAGNOSIS — Z79.899 HIGH RISK MEDICATION USE: ICD-10-CM

## 2023-09-13 DIAGNOSIS — R19.00 ABDOMINAL MASS, UNSPECIFIED ABDOMINAL LOCATION: ICD-10-CM

## 2023-09-13 DIAGNOSIS — E11.9 TYPE 2 DIABETES MELLITUS WITHOUT COMPLICATION, WITHOUT LONG-TERM CURRENT USE OF INSULIN (HCC): ICD-10-CM

## 2023-09-13 RX ORDER — LORAZEPAM 0.5 MG/1
0.5 TABLET ORAL NIGHTLY
COMMUNITY
End: 2023-09-13 | Stop reason: SDUPTHER

## 2023-09-13 RX ORDER — METFORMIN HYDROCHLORIDE 500 MG/1
500 TABLET, EXTENDED RELEASE ORAL
COMMUNITY
End: 2023-09-13 | Stop reason: SDUPTHER

## 2023-09-13 RX ORDER — PERPHENAZINE 16 MG/1
TABLET, FILM COATED ORAL
Qty: 100 STRIP | Refills: 2 | Status: SHIPPED | OUTPATIENT
Start: 2023-09-13

## 2023-09-13 RX ORDER — LORAZEPAM 0.5 MG/1
TABLET ORAL
Qty: 30 TABLET | Refills: 0 | Status: SHIPPED | OUTPATIENT
Start: 2023-09-13 | End: 2023-10-13

## 2023-09-13 RX ORDER — METFORMIN HYDROCHLORIDE 500 MG/1
1000 TABLET, EXTENDED RELEASE ORAL 2 TIMES DAILY
Qty: 360 TABLET | Refills: 2 | Status: SHIPPED | OUTPATIENT
Start: 2023-09-13

## 2023-09-13 NOTE — PROGRESS NOTES
generalized abdominal pain, nausea, vomiting or change in her bowel movements     Past Medical History:   Diagnosis Date    Allergic     Anemia     Arthritis of left hip 11/10/2020    Brain condition     pt states a cluster of blood vessels left side of brain    Cancer [facility]) 1/2013    endometrial cancer/ Dr. Hilda John and Dr. Majo Ybarra    Cerebral cavernoma     followed by Dr. uAgustina Carl at [facility]    Complication of anesthesia     Takes a long time to wake up    Diverticulosis     Endometrial cancer ([address])     Fungus infection     Fungus infection on bilat toes. GERD (gastroesophageal reflux disease)     Hyperlipidemia     Hypertension     Hypomagnesemia 1/2016    consult with Dr. Ant Moreno ([facility]) and no renal wasting. felt most likely d/t HCTz and PPI,  HCTZ discontinued,  increased mag ox to 400 mg 2 tabs BID    Hypothyroidism     Irritable bowel syndrome     MVP (mitral valve prolapse)     JOAQUIN (obstructive sleep apnea)     Paronychia of great toe, left 7/11/2017    Pneumonia     infant    Prolonged emergence from general anesthesia     Type 2 diabetes mellitus without complication ([address])     Type II or unspecified type diabetes mellitus without mention of complication, not stated as uncontrolled     Unspecified sleep apnea     Vitamin B12 deficiency     Vitamin D deficiency          Review of Systems   Constitutional: Negative. Negative for appetite change, fatigue and unexpected weight change. HENT: Negative. Eyes: Negative. Respiratory: Negative. Negative for shortness of breath. Cardiovascular: Negative. Negative for chest pain, palpitations and leg swelling. Gastrointestinal:  Negative for abdominal distention, abdominal pain, anal bleeding, blood in stool, constipation, diarrhea, nausea, rectal pain and vomiting. Lump under skin right side abdomen   Endocrine: Negative. Genitourinary: Negative. Negative for hematuria. Musculoskeletal: Negative. Skin: Negative.

## 2023-09-15 PROBLEM — R60.0 EDEMA OF FOOT: Status: ACTIVE | Noted: 2023-09-15

## 2023-09-15 ASSESSMENT — ENCOUNTER SYMPTOMS
EYES NEGATIVE: 1
BLOOD IN STOOL: 0
RECTAL PAIN: 0
SHORTNESS OF BREATH: 0
ANAL BLEEDING: 0
ALLERGIC/IMMUNOLOGIC NEGATIVE: 1
ABDOMINAL DISTENTION: 0
CONSTIPATION: 0
RESPIRATORY NEGATIVE: 1
DIARRHEA: 0
NAUSEA: 0
VOMITING: 0
ABDOMINAL PAIN: 0

## 2023-09-16 LAB
6MAM UR QL: NOT DETECTED
7AMINOCLONAZEPAM UR QL: NOT DETECTED
A-OH ALPRAZ UR QL: NOT DETECTED
ALPHA-OH-MIDAZOLAM, URINE: NOT DETECTED
ALPRAZ UR QL: NOT DETECTED
AMPHET UR QL SCN: NOT DETECTED
ANNOTATION COMMENT IMP: NORMAL
ANNOTATION COMMENT IMP: NORMAL
BARBITURATES UR QL: NOT DETECTED
BUPRENORPHINE UR QL: NOT DETECTED
BZE UR QL: NOT DETECTED
CARBOXYTHC UR QL: NOT DETECTED
CARISOPRODOL UR QL: NOT DETECTED
CLONAZEPAM UR QL: NOT DETECTED
CODEINE UR QL: NOT DETECTED
CREAT UR-MCNC: 153.5 MG/DL (ref 20–400)
DIAZEPAM UR QL: NOT DETECTED
ETHYL GLUCURONIDE UR QL: NOT DETECTED
FENTANYL UR QL: NOT DETECTED
GABAPENTIN: NOT DETECTED
HYDROCODONE UR QL: NOT DETECTED
HYDROMORPHONE UR QL: NOT DETECTED
LORAZEPAM UR QL: NOT DETECTED
MDA UR QL: NOT DETECTED
MDEA UR QL: NOT DETECTED
MDMA UR QL: NOT DETECTED
MEPERIDINE UR QL: NOT DETECTED
METHADONE UR QL: NOT DETECTED
METHAMPHET UR QL: NOT DETECTED
MIDAZOLAM UR QL SCN: NOT DETECTED
MORPHINE UR QL: NOT DETECTED
NALOXONE: NOT DETECTED
NORBUPRENORPHINE UR QL CFM: NOT DETECTED
NORDIAZEPAM UR QL: NOT DETECTED
NORFENTANYL UR QL: NOT DETECTED
NORHYDROCODONE UR QL CFM: NOT DETECTED
NOROXYCODONE UR QL CFM: NOT DETECTED
NOROXYMORPHONE, URINE: NOT DETECTED
OXAZEPAM UR QL: NOT DETECTED
OXYCODONE UR QL: NOT DETECTED
OXYMORPHONE UR QL: NOT DETECTED
PATHOLOGY STUDY: NORMAL
PCP UR QL: NOT DETECTED
PHENTERMINE UR QL: NOT DETECTED
PPAA UR QL: NOT DETECTED
PREGABALIN: NOT DETECTED
SERVICE CMNT-IMP: NORMAL
TAPENTADOL UR QL SCN: NOT DETECTED
TAPENTADOL-O-SULFATE, URINE: NOT DETECTED
TEMAZEPAM UR QL: NOT DETECTED
TRAMADOL UR QL: NOT DETECTED
ZOLPIDEM UR QL: NOT DETECTED

## 2023-09-24 DIAGNOSIS — I10 ESSENTIAL HYPERTENSION: ICD-10-CM

## 2023-09-24 DIAGNOSIS — E11.9 TYPE 2 DIABETES MELLITUS WITHOUT COMPLICATION, WITHOUT LONG-TERM CURRENT USE OF INSULIN (HCC): ICD-10-CM

## 2023-09-25 ENCOUNTER — TELEPHONE (OUTPATIENT)
Dept: FAMILY MEDICINE CLINIC | Age: 78
End: 2023-09-25

## 2023-09-25 RX ORDER — PERPHENAZINE 16 MG/1
TABLET, FILM COATED ORAL
Qty: 100 STRIP | Refills: 2 | Status: SHIPPED | OUTPATIENT
Start: 2023-09-25

## 2023-09-25 RX ORDER — LEVOTHYROXINE SODIUM 0.07 MG/1
TABLET ORAL
Qty: 90 TABLET | Refills: 0 | Status: SHIPPED | OUTPATIENT
Start: 2023-09-25

## 2023-09-25 RX ORDER — HYDROCHLOROTHIAZIDE 12.5 MG/1
CAPSULE, GELATIN COATED ORAL
Qty: 90 CAPSULE | Refills: 0 | Status: SHIPPED | OUTPATIENT
Start: 2023-09-25

## 2023-09-25 RX ORDER — LOSARTAN POTASSIUM 100 MG/1
TABLET ORAL
Qty: 90 TABLET | Refills: 0 | Status: SHIPPED | OUTPATIENT
Start: 2023-09-25

## 2023-09-25 NOTE — TELEPHONE ENCOUNTER
Refill Request     CONFIRM preferrred pharmacy with the patient. If Mail Order Rx - Pend for 90 day refill. Last Seen: Last Seen Department: 9/13/2023  Last Seen by PCP: 12/21/2022    Last Written: n/a    If no future appointment scheduled, route STAFF MESSAGE with patient name to the [facility] for scheduling. Next Appointment:   Future Appointments   Date Time Provider [address]   11/15/2023  2:00 PM ALEKSANDAR Vega - CNP [facility] [place] - DYD   12/13/2023  2:15 PM Patel Posey MD [facility]   9/3/2024 11:30 AM MD [facility]       Message sent to [address] [place] [address] to schedule appt with patient? N/A      Requested Prescriptions     Pending Prescriptions Disp Refills    hydroCHLOROthiazide (MICROZIDE) 12.5 MG capsule [Pharmacy Med Name: HYDROCHLOROT CAP 12.5MG] 90 capsule 0     Sig: TAKE 1 CAPSULE EVERY       MORNING    levothyroxine (SYNTHROID) 75 MCG tablet [Pharmacy Med Name: LEVOTHYROXIN TAB 75MCG] 90 tablet 0     Sig: TAKE 1 TABLET DAILY    losartan (COZAAR) 100 MG tablet [Pharmacy Med Name: LOSARTAN TAB 100MG] 90 tablet 0     Sig: TAKE 1 TABLET DAILY    blood glucose test strips (CONTOUR NEXT TEST) strip 100 strip 2     Sig: As needed.

## 2023-10-19 DIAGNOSIS — E11.9 TYPE 2 DIABETES MELLITUS WITHOUT COMPLICATION, WITHOUT LONG-TERM CURRENT USE OF INSULIN (HCC): ICD-10-CM

## 2023-10-19 DIAGNOSIS — E78.2 MIXED HYPERLIPIDEMIA: ICD-10-CM

## 2023-10-19 RX ORDER — METFORMIN HYDROCHLORIDE 500 MG/1
TABLET, EXTENDED RELEASE ORAL
Qty: 270 TABLET | Refills: 0 | Status: SHIPPED | OUTPATIENT
Start: 2023-10-19

## 2023-10-19 RX ORDER — ROSUVASTATIN CALCIUM 10 MG/1
TABLET, COATED ORAL
Qty: 90 TABLET | Refills: 0 | Status: SHIPPED | OUTPATIENT
Start: 2023-10-19

## 2023-10-19 NOTE — TELEPHONE ENCOUNTER
Refill Request     CONFIRM preferrred pharmacy with the patient. If Mail Order Rx - Pend for 90 day refill. Last Seen: Last Seen Department: 9/13/2023  Last Seen by PCP: 9/13/2023    Last Written: 9/13/2023    If no future appointment scheduled, route STAFF MESSAGE with patient name to the [facility] for scheduling. Next Appointment:   Future Appointments   Date Time Provider [address] [place] [address]   11/15/2023  2:00 PM Germaine Zapata APRN - CNP [facility]   12/13/2023  2:15 PM Dariela Clark MD [facility]   9/3/2024 11:30 AM MD Marifer Soriano [facility]       Message sent to [address] [place] [address] to schedule appt with patient?   NO      Requested Prescriptions     Pending Prescriptions Disp Refills    metFORMIN (GLUCOPHAGE-XR) 500 MG extended release tablet [Pharmacy Med Name: METFORMIN ER TAB 500MG GP] 270 tablet 0     Sig: TAKE 3 TABLETS DAILY WITH  BREAKFAST    rosuvastatin (CRESTOR) 10 MG tablet [Pharmacy Med Name: ROSUVASTATIN TAB 10MG] 90 tablet 3     Sig: TAKE 1 TABLET DAILY AT     NIGHT

## 2023-10-26 DIAGNOSIS — E11.9 TYPE 2 DIABETES MELLITUS WITHOUT COMPLICATION, WITHOUT LONG-TERM CURRENT USE OF INSULIN (HCC): ICD-10-CM

## 2023-10-26 RX ORDER — PERPHENAZINE 16 MG/1
1 TABLET, FILM COATED ORAL 2 TIMES DAILY
Qty: 200 STRIP | Refills: 0 | Status: SHIPPED | OUTPATIENT
Start: 2023-10-26

## 2023-10-26 NOTE — TELEPHONE ENCOUNTER
Pt is needing Rx sent to local pharmacy due to mail order not filling. Refill Request     CONFIRM preferrred pharmacy with the patient. If Mail Order Rx - Pend for 90 day refill. Last Seen: Last Seen Department: 2023  Last Seen by PCP: 2023    Last Written: 2023    If no future appointment scheduled, route STAFF MESSAGE with patient name to the [facility] for scheduling. Next Appointment:   Future Appointments   Date Time Provider [address]   11/15/2023  2:00 PM ALEKSANDAR Walker - CNP [facility]   2023  2:15 PM Dariela Clark MD [facility]   9/3/2024 11:30 AM MD Marifer Soriano [facility]       Message sent to [address] [place] [address] to schedule appt with patient?   N/A      Requested Prescriptions     Pending Prescriptions Disp Refills    blood glucose test strips (CONTOUR NEXT TEST) strip 200 strip 0     Si each by In Vitro route 2 times daily

## 2023-11-14 PROBLEM — R31.9 HEMATURIA: Status: RESOLVED | Noted: 2017-10-23 | Resolved: 2023-11-14

## 2023-11-14 PROBLEM — F33.1 MODERATE EPISODE OF RECURRENT MAJOR DEPRESSIVE DISORDER (HCC): Status: RESOLVED | Noted: 2020-04-15 | Resolved: 2023-11-14

## 2023-11-14 PROBLEM — R42 DIZZINESS: Status: RESOLVED | Noted: 2019-11-20 | Resolved: 2023-11-14

## 2023-11-14 NOTE — PROGRESS NOTES
[address] [address]  Dept: [phone]  Dept Fax: [phone]  Loc: [phone]    Maria Alejandra Montemayor is a 70 y.o. female who presents today for her medical conditions/complaints as noted below. Maria Alejandra Montemayor is c/o of Other (Follow up on controlled medication )       Subjective:     Chief Complaint   Patient presents with    Other     Follow up on controlled medication        HPI  Hypothyroidism: Recent symptoms: heat intolerance and palpitations. She denies fatigue, weight gain, weight loss, cold intolerance, hair loss, dry skin, constipation, diarrhea, edema, and dysphagia. Patient is  taking her medication consistently on an empty stomach. Lab Results   Component Value Date/Time    TSHREFLEX 1.22 10/17/2022 11:38 AM    TSHREFLEX 2.23 03/09/2022 11:53 AM    TSHREFLEX 2.17 01/13/2021 04:27 PM     Lab Results   Component Value Date    TSH 4.44 (H) 10/06/2020    TSH 4.84 (H) 01/09/2020    TSH 1.41 04/15/2019     Diabetes Mellitus Type 2:  Patient is here for follow up on diabetes. Treatment Adherence:   Medication compliance:  Taking medication as prescribed. Diet compliance:  Trying to eat a healthy, diabetic diet. Weight trend: Wt Readings from Last 3 Encounters:   09/13/23 73 kg (161 lb)   08/30/23 73.6 kg (162 lb 3.2 oz)   06/21/23 74.4 kg (164 lb)     Denies any hypoglycemia episodes. Current symptoms/problems include:  Neuropathy is  present  Denies any open areas on feet/ulcerations  Denies any polyuria, polydipsia, or polyphagia, nausea, vomiting or diarrhea. Home blood sugar records: fasting range: below 120, patient tests 1 time(s) per day  Eye exam current (withinone year): no  Denies blurry vision   reports that she has never smoked. She has never been exposed to tobacco smoke.  She has never used smokeless tobacco.  Current diabetic medications include  metFORMIN - 500 MG    Hypertension:  Home blood

## 2023-11-15 ENCOUNTER — OFFICE VISIT (OUTPATIENT)
Dept: FAMILY MEDICINE CLINIC | Age: 78
End: 2023-11-15

## 2023-11-15 VITALS
DIASTOLIC BLOOD PRESSURE: 64 MMHG | OXYGEN SATURATION: 99 % | WEIGHT: 158 LBS | BODY MASS INDEX: 29.83 KG/M2 | SYSTOLIC BLOOD PRESSURE: 132 MMHG | HEIGHT: 61 IN | HEART RATE: 71 BPM

## 2023-11-15 DIAGNOSIS — E78.2 MIXED HYPERLIPIDEMIA: ICD-10-CM

## 2023-11-15 DIAGNOSIS — E53.8 VITAMIN B12 DEFICIENCY: ICD-10-CM

## 2023-11-15 DIAGNOSIS — E11.42 TYPE 2 DIABETES MELLITUS WITH DIABETIC POLYNEUROPATHY, WITHOUT LONG-TERM CURRENT USE OF INSULIN (HCC): Primary | ICD-10-CM

## 2023-11-15 DIAGNOSIS — E89.0 POSTSURGICAL HYPOTHYROIDISM: ICD-10-CM

## 2023-11-15 DIAGNOSIS — E83.42 HYPOMAGNESEMIA: ICD-10-CM

## 2023-11-15 DIAGNOSIS — E55.9 VITAMIN D DEFICIENCY: ICD-10-CM

## 2023-11-15 DIAGNOSIS — E89.0 S/P PARTIAL THYROIDECTOMY: ICD-10-CM

## 2023-11-15 DIAGNOSIS — F41.9 ANXIETY: ICD-10-CM

## 2023-11-15 DIAGNOSIS — I10 ESSENTIAL HYPERTENSION: ICD-10-CM

## 2023-11-15 LAB
BILIRUBIN, POC: ABNORMAL
BLOOD URINE, POC: ABNORMAL
CLARITY, POC: CLEAR
COLOR, POC: YELLOW
GLUCOSE URINE, POC: ABNORMAL
KETONES, POC: ABNORMAL
LEUKOCYTE EST, POC: ABNORMAL
NITRITE, POC: ABNORMAL
PH, POC: 5
PROTEIN, POC: ABNORMAL
SPECIFIC GRAVITY, POC: >=1.03
UROBILINOGEN, POC: 0.2

## 2023-11-15 RX ORDER — LORAZEPAM 0.5 MG/1
0.5 TABLET ORAL
COMMUNITY

## 2023-11-15 RX ORDER — HYDROCHLOROTHIAZIDE 12.5 MG/1
12.5 CAPSULE, GELATIN COATED ORAL DAILY PRN
COMMUNITY
Start: 2023-11-15

## 2023-11-15 RX ORDER — AMLODIPINE BESYLATE 5 MG/1
TABLET ORAL
Qty: 90 TABLET | Refills: 3 | OUTPATIENT
Start: 2023-11-15

## 2023-11-15 RX ORDER — AMLODIPINE BESYLATE 5 MG/1
TABLET ORAL
Qty: 90 TABLET | Refills: 3 | Status: SHIPPED | OUTPATIENT
Start: 2023-11-15

## 2023-11-15 ASSESSMENT — ANXIETY QUESTIONNAIRES
2. NOT BEING ABLE TO STOP OR CONTROL WORRYING: 0
6. BECOMING EASILY ANNOYED OR IRRITABLE: 0
IF YOU CHECKED OFF ANY PROBLEMS ON THIS QUESTIONNAIRE, HOW DIFFICULT HAVE THESE PROBLEMS MADE IT FOR YOU TO DO YOUR WORK, TAKE CARE OF THINGS AT HOME, OR GET ALONG WITH OTHER PEOPLE: NOT DIFFICULT AT ALL
4. TROUBLE RELAXING: 0
5. BEING SO RESTLESS THAT IT IS HARD TO SIT STILL: 0
7. FEELING AFRAID AS IF SOMETHING AWFUL MIGHT HAPPEN: 0
1. FEELING NERVOUS, ANXIOUS, OR ON EDGE: 0
GAD7 TOTAL SCORE: 0
3. WORRYING TOO MUCH ABOUT DIFFERENT THINGS: 0

## 2023-11-15 ASSESSMENT — PATIENT HEALTH QUESTIONNAIRE - PHQ9
SUM OF ALL RESPONSES TO PHQ QUESTIONS 1-9: 0
7. TROUBLE CONCENTRATING ON THINGS, SUCH AS READING THE NEWSPAPER OR WATCHING TELEVISION: 0
6. FEELING BAD ABOUT YOURSELF - OR THAT YOU ARE A FAILURE OR HAVE LET YOURSELF OR YOUR FAMILY DOWN: 0
5. POOR APPETITE OR OVEREATING: 0
8. MOVING OR SPEAKING SO SLOWLY THAT OTHER PEOPLE COULD HAVE NOTICED. OR THE OPPOSITE, BEING SO FIGETY OR RESTLESS THAT YOU HAVE BEEN MOVING AROUND A LOT MORE THAN USUAL: 0
2. FEELING DOWN, DEPRESSED OR HOPELESS: 0
10. IF YOU CHECKED OFF ANY PROBLEMS, HOW DIFFICULT HAVE THESE PROBLEMS MADE IT FOR YOU TO DO YOUR WORK, TAKE CARE OF THINGS AT HOME, OR GET ALONG WITH OTHER PEOPLE: 0
1. LITTLE INTEREST OR PLEASURE IN DOING THINGS: 0
4. FEELING TIRED OR HAVING LITTLE ENERGY: 0
SUM OF ALL RESPONSES TO PHQ9 QUESTIONS 1 & 2: 0
9. THOUGHTS THAT YOU WOULD BE BETTER OFF DEAD, OR OF HURTING YOURSELF: 0
SUM OF ALL RESPONSES TO PHQ QUESTIONS 1-9: 0
3. TROUBLE FALLING OR STAYING ASLEEP: 0

## 2023-11-15 ASSESSMENT — ENCOUNTER SYMPTOMS
ABDOMINAL PAIN: 0
BLOOD IN STOOL: 0
NAUSEA: 0
DIARRHEA: 0
CONSTIPATION: 0
EYES NEGATIVE: 1
RECTAL PAIN: 0
VOMITING: 0
ANAL BLEEDING: 0
ABDOMINAL DISTENTION: 0
SHORTNESS OF BREATH: 0
ALLERGIC/IMMUNOLOGIC NEGATIVE: 1
RESPIRATORY NEGATIVE: 1

## 2023-11-15 NOTE — TELEPHONE ENCOUNTER
Refill Request     CONFIRM preferrred pharmacy with the patient.    If Mail Order Rx - Pend for 90 day refill.      Last Seen: Last Seen Department: 9/13/2023  Last Seen by PCP: 9/13/2023    Last Written: 10/17/2022    If no future appointment scheduled, route STAFF MESSAGE with patient name to the  Pool for scheduling.      Next Appointment:   Future Appointments   Date Time Provider Department Center   11/15/2023  2:00 PM Ranjana Lyon, APRN - CNP [facility]   12/13/2023  2:15 PM Romario Macdonald MD [facility]   9/3/2024 11:30 AM [facility]       Message sent to  to schedule appt with patient?  NO      Requested Prescriptions     Pending Prescriptions Disp Refills    amLODIPine (NORVASC) 5 MG tablet [Pharmacy Med Name: AMLODIPINE TAB 5MG] 90 tablet 3     Sig: TAKE 1 TABLET DAILY

## 2023-11-16 LAB
CREAT UR-MCNC: 232.9 MG/DL (ref 28–259)
EST. AVERAGE GLUCOSE BLD GHB EST-MCNC: 134.1 MG/DL
FOLATE SERPL-MCNC: 15.17 NG/ML (ref 4.78–24.2)
HBA1C MFR BLD: 6.3 %
MAGNESIUM SERPL-MCNC: 1.8 MG/DL (ref 1.8–2.4)
MICROALBUMIN UR DL<=1MG/L-MCNC: <1.2 MG/DL
MICROALBUMIN/CREAT UR: NORMAL MG/G (ref 0–30)
TSH SERPL DL<=0.005 MIU/L-ACNC: 0.93 UIU/ML (ref 0.27–4.2)
VIT B12 SERPL-MCNC: 345 PG/ML (ref 211–911)

## 2023-12-01 ENCOUNTER — HOSPITAL ENCOUNTER (OUTPATIENT)
Dept: MAMMOGRAPHY | Age: 78
Discharge: HOME | End: 2023-12-01
Payer: MEDICARE

## 2023-12-01 ENCOUNTER — HOSPITAL ENCOUNTER (OUTPATIENT)
Dept: ULTRASOUND IMAGING | Age: 78
Discharge: HOME | End: 2023-12-01
Payer: MEDICARE

## 2023-12-01 DIAGNOSIS — Z12.31 SCREENING MAMMOGRAM, ENCOUNTER FOR: ICD-10-CM

## 2023-12-01 DIAGNOSIS — R19.00 ABDOMINAL MASS, UNSPECIFIED ABDOMINAL LOCATION: ICD-10-CM

## 2023-12-01 PROCEDURE — 77063 BREAST TOMOSYNTHESIS BI: CPT

## 2023-12-01 PROCEDURE — 76999 ECHO EXAMINATION PROCEDURE: CPT

## 2024-02-13 NOTE — PROGRESS NOTES
by mouth 2 times daily For Hypomagnesemia) 180 tablet 5     No current facility-administered medications for this visit.        No changes in past medical history, past surgical history, social history, orfamily history were noted during the patient encounter unless specifically listed above.  All updates of past medical history, past surgical history, social history, or family history were reviewed personally by me duringthe office visit.  All problems listed in the assessment are stable unless noted otherwise.  Medication profile reviewed personally by me during the office visit.  Medication side effects and possible impairments frommedications were discussed as applicable.     Objective:     Physical Exam  Constitutional:       General: She is not in acute distress.     Appearance: Normal appearance. She is well-developed. She is not toxic-appearing.   HENT:      Head: Normocephalic and atraumatic.      Right Ear: Hearing, tympanic membrane and ear canal normal.      Left Ear: Hearing, tympanic membrane and ear canal normal.      Nose: Nose normal.      Mouth/Throat:      Pharynx: Uvula midline.   Eyes:      General: Lids are normal.      Conjunctiva/sclera: Conjunctivae normal.   Cardiovascular:      Rate and Rhythm: Normal rate and regular rhythm.      Pulses: Normal pulses.      Heart sounds: Normal heart sounds.   Pulmonary:      Effort: Pulmonary effort is normal. No accessory muscle usage or respiratory distress.      Breath sounds: Normal breath sounds.   Abdominal:      Palpations: Abdomen is soft.      Tenderness: There is no abdominal tenderness.   Musculoskeletal:      Cervical back: Neck supple.   Lymphadenopathy:      Head:      Right side of head: No submental or submandibular adenopathy.      Left side of head: No submental or submandibular adenopathy.      Cervical: No cervical adenopathy.   Skin:     General: Skin is warm and dry.      Findings: No lesion or rash.   Neurological:      Mental

## 2024-02-21 ENCOUNTER — OFFICE VISIT (OUTPATIENT)
Dept: FAMILY MEDICINE CLINIC | Age: 79
End: 2024-02-21
Payer: MEDICARE

## 2024-02-21 VITALS
DIASTOLIC BLOOD PRESSURE: 70 MMHG | SYSTOLIC BLOOD PRESSURE: 120 MMHG | WEIGHT: 157 LBS | HEIGHT: 60 IN | OXYGEN SATURATION: 96 % | BODY MASS INDEX: 30.82 KG/M2 | HEART RATE: 74 BPM

## 2024-02-21 DIAGNOSIS — F41.9 ANXIETY: Primary | ICD-10-CM

## 2024-02-21 PROCEDURE — 1036F TOBACCO NON-USER: CPT | Performed by: NURSE PRACTITIONER

## 2024-02-21 PROCEDURE — 3078F DIAST BP <80 MM HG: CPT | Performed by: NURSE PRACTITIONER

## 2024-02-21 PROCEDURE — G8417 CALC BMI ABV UP PARAM F/U: HCPCS | Performed by: NURSE PRACTITIONER

## 2024-02-21 PROCEDURE — G8484 FLU IMMUNIZE NO ADMIN: HCPCS | Performed by: NURSE PRACTITIONER

## 2024-02-21 PROCEDURE — G8399 PT W/DXA RESULTS DOCUMENT: HCPCS | Performed by: NURSE PRACTITIONER

## 2024-02-21 PROCEDURE — 1090F PRES/ABSN URINE INCON ASSESS: CPT | Performed by: NURSE PRACTITIONER

## 2024-02-21 PROCEDURE — 1123F ACP DISCUSS/DSCN MKR DOCD: CPT | Performed by: NURSE PRACTITIONER

## 2024-02-21 PROCEDURE — G8427 DOCREV CUR MEDS BY ELIG CLIN: HCPCS | Performed by: NURSE PRACTITIONER

## 2024-02-21 PROCEDURE — 3074F SYST BP LT 130 MM HG: CPT | Performed by: NURSE PRACTITIONER

## 2024-02-21 PROCEDURE — 99213 OFFICE O/P EST LOW 20 MIN: CPT | Performed by: NURSE PRACTITIONER

## 2024-02-21 RX ORDER — LORAZEPAM 0.5 MG/1
0.5 TABLET ORAL DAILY PRN
Qty: 30 TABLET | Refills: 0 | Status: SHIPPED | OUTPATIENT
Start: 2024-02-21 | End: 2024-03-22

## 2024-02-21 ASSESSMENT — PATIENT HEALTH QUESTIONNAIRE - PHQ9
2. FEELING DOWN, DEPRESSED OR HOPELESS: 0
5. POOR APPETITE OR OVEREATING: 0
7. TROUBLE CONCENTRATING ON THINGS, SUCH AS READING THE NEWSPAPER OR WATCHING TELEVISION: 0
4. FEELING TIRED OR HAVING LITTLE ENERGY: 0
3. TROUBLE FALLING OR STAYING ASLEEP: 0
SUM OF ALL RESPONSES TO PHQ QUESTIONS 1-9: 0
SUM OF ALL RESPONSES TO PHQ QUESTIONS 1-9: 0
SUM OF ALL RESPONSES TO PHQ9 QUESTIONS 1 & 2: 0
9. THOUGHTS THAT YOU WOULD BE BETTER OFF DEAD, OR OF HURTING YOURSELF: 0
10. IF YOU CHECKED OFF ANY PROBLEMS, HOW DIFFICULT HAVE THESE PROBLEMS MADE IT FOR YOU TO DO YOUR WORK, TAKE CARE OF THINGS AT HOME, OR GET ALONG WITH OTHER PEOPLE: 0
SUM OF ALL RESPONSES TO PHQ QUESTIONS 1-9: 0
6. FEELING BAD ABOUT YOURSELF - OR THAT YOU ARE A FAILURE OR HAVE LET YOURSELF OR YOUR FAMILY DOWN: 0
8. MOVING OR SPEAKING SO SLOWLY THAT OTHER PEOPLE COULD HAVE NOTICED. OR THE OPPOSITE, BEING SO FIGETY OR RESTLESS THAT YOU HAVE BEEN MOVING AROUND A LOT MORE THAN USUAL: 0
SUM OF ALL RESPONSES TO PHQ QUESTIONS 1-9: 0
1. LITTLE INTEREST OR PLEASURE IN DOING THINGS: 0

## 2024-02-21 ASSESSMENT — ENCOUNTER SYMPTOMS
ANAL BLEEDING: 0
NAUSEA: 0
GASTROINTESTINAL NEGATIVE: 1
ALLERGIC/IMMUNOLOGIC NEGATIVE: 1
EYES NEGATIVE: 1
SHORTNESS OF BREATH: 0
RESPIRATORY NEGATIVE: 1
ABDOMINAL PAIN: 0
BLOOD IN STOOL: 0

## 2024-03-05 DIAGNOSIS — E78.2 MIXED HYPERLIPIDEMIA: ICD-10-CM

## 2024-03-05 DIAGNOSIS — E11.9 TYPE 2 DIABETES MELLITUS WITHOUT COMPLICATION, WITHOUT LONG-TERM CURRENT USE OF INSULIN (HCC): ICD-10-CM

## 2024-03-05 DIAGNOSIS — I10 ESSENTIAL HYPERTENSION: ICD-10-CM

## 2024-03-05 RX ORDER — AMLODIPINE BESYLATE 5 MG/1
TABLET ORAL
Qty: 90 TABLET | Refills: 0 | Status: SHIPPED | OUTPATIENT
Start: 2024-03-05

## 2024-03-05 RX ORDER — LEVOTHYROXINE SODIUM 0.07 MG/1
75 TABLET ORAL DAILY
Qty: 90 TABLET | Refills: 0 | Status: SHIPPED | OUTPATIENT
Start: 2024-03-05

## 2024-03-05 RX ORDER — ROSUVASTATIN CALCIUM 10 MG/1
TABLET, COATED ORAL
Qty: 90 TABLET | Refills: 0 | Status: SHIPPED | OUTPATIENT
Start: 2024-03-05

## 2024-03-05 RX ORDER — METFORMIN HYDROCHLORIDE 500 MG/1
TABLET, EXTENDED RELEASE ORAL
Qty: 270 TABLET | Refills: 0 | Status: SHIPPED | OUTPATIENT
Start: 2024-03-05 | End: 2024-03-05 | Stop reason: SDUPTHER

## 2024-03-05 RX ORDER — LOSARTAN POTASSIUM 100 MG/1
100 TABLET ORAL DAILY
Qty: 90 TABLET | Refills: 0 | Status: SHIPPED | OUTPATIENT
Start: 2024-03-05

## 2024-03-05 RX ORDER — METFORMIN HYDROCHLORIDE 500 MG/1
TABLET, EXTENDED RELEASE ORAL
Qty: 270 TABLET | Refills: 0 | Status: SHIPPED | OUTPATIENT
Start: 2024-03-05

## 2024-03-05 NOTE — TELEPHONE ENCOUNTER
Refill Request     CONFIRM preferrred pharmacy with the patient.    If Mail Order Rx - Pend for 90 day refill.      Last Seen: Last Seen Department: 2/21/2024  Last Seen by PCP: 2/21/2024    Last Written: 12.18.23     If no future appointment scheduled, route STAFF MESSAGE with patient name to the  Pool for scheduling.      Next Appointment:   Future Appointments   Date Time Provider Department Center   3/15/2024  2:00 PM [facility] - [facility]   5/22/2024  2:00 PM Ranjana [facility]N - [facility]   9/3/2024 11:30 AM [facility]       Message sent to  to schedule appt with patient?  NO  New scripts needed to new mail in pharmacy, [facility]      levothyroxine (SYNTHROID) 75 MCG tablet [[id number]]     rosuvastatin (CRESTOR) 10 MG tablet [[id number]     losartan (COZAAR) 100 MG tablet [[id number]]     amLODIPine (NORVASC) 5 MG tablet [[id number]]     metoprolol tartrate (LOPRESSOR) 25 MG tablet [[id number]]     metFORMIN (GLUCOPHAGE-XR) 500 MG extended release tablet [[id number]]

## 2024-03-05 NOTE — TELEPHONE ENCOUNTER
Refill Request     CONFIRM preferrred pharmacy with the patient.    If Mail Order Rx - Pend for 90 day refill.      Last Seen: Last Seen Department: 2/21/2024  Last Seen by PCP: 2/21/2024    Last Written: 10.19.23    If no future appointment scheduled, route STAFF MESSAGE with patient name to the  Pool for scheduling.      Next Appointment:   Future Appointments   Date Time Provider Department Center   3/15/2024  2:00 PM Ranjana Lyon APRN - CNP [facility]   5/22/2024  2:00 PM Ranjana Lyon APRN - CNP [facility]   9/3/2024 11:30 AM Jose Frost MD [facility]       Message sent to  to schedule appt with patient?  NO        metFORMIN (GLUCOPHAGE-XR) 500 MG extended release tablet [[id number]]

## 2024-05-14 NOTE — TELEPHONE ENCOUNTER
Order pended need to fax to [facility]. POST-OPERATIVE CATARACT DROP INSTRUCTIONS    Keep the eye shield on at all times except while instilling the drops below.    In the operative eye:  Prednisolone Acetate (PINK or WHITE top) - in your gray bag  Ketorolac (GREY top) - at your pharmacy  Place 1 drop 4 times daily x 1 week THEN  Starting 5/21/2024, place 1 drop 3 times daily x 1 week THEN  Starting 5/28/2024, place 1 drop 2 times daily x 1 week THEN  Starting 6/4/2024, place 1 drop 1 time daily x 1 week THEN STOP (end date: 6/11/2024).    Moxifloxacin (TAN top) - in your gray bag  Place 1 drop 4 times daily x 7 days then STOP (end date: 5/21/2024).    ---------------------------------------------------------------------------------------    Continue all drops in the other eye as before.    Wait 5 minutes between the drops. The order of the drops is not important.    For the prednisolone, please shake the bottle before applying the drop    No eye make-up for 2 weeks  No heavy lifting, bending or straining for 10 days  Do not rub your eyes for 1 month  Do not get water in your eyes for 1 week  No swimming for 1 month  Please sleep with the shield over your eye for the next week to protect your eye during sleep    If you experience any increasing redness, sensitivity to light, pain, or changes in vision, please call the office immediately.    Liseth Solano MD  Office number: [phone]

## 2024-05-22 ENCOUNTER — OFFICE VISIT (OUTPATIENT)
Dept: FAMILY MEDICINE CLINIC | Age: 79
End: 2024-05-22

## 2024-05-22 VITALS
HEART RATE: 56 BPM | OXYGEN SATURATION: 97 % | DIASTOLIC BLOOD PRESSURE: 72 MMHG | SYSTOLIC BLOOD PRESSURE: 126 MMHG | BODY MASS INDEX: 30.23 KG/M2 | WEIGHT: 154 LBS | HEIGHT: 60 IN

## 2024-05-22 DIAGNOSIS — E11.42 TYPE 2 DIABETES MELLITUS WITH DIABETIC POLYNEUROPATHY, WITHOUT LONG-TERM CURRENT USE OF INSULIN (HCC): Primary | ICD-10-CM

## 2024-05-22 DIAGNOSIS — F41.9 ANXIETY: ICD-10-CM

## 2024-05-22 DIAGNOSIS — E11.9 TYPE 2 DIABETES MELLITUS WITHOUT COMPLICATION, WITHOUT LONG-TERM CURRENT USE OF INSULIN (HCC): ICD-10-CM

## 2024-05-22 DIAGNOSIS — R59.0 CERVICAL LYMPHADENOPATHY: ICD-10-CM

## 2024-05-22 DIAGNOSIS — M54.41 ACUTE MIDLINE LOW BACK PAIN WITH RIGHT-SIDED SCIATICA: ICD-10-CM

## 2024-05-22 DIAGNOSIS — E55.9 VITAMIN D DEFICIENCY: ICD-10-CM

## 2024-05-22 DIAGNOSIS — E83.42 HYPOMAGNESEMIA: ICD-10-CM

## 2024-05-22 DIAGNOSIS — E78.2 MIXED HYPERLIPIDEMIA: ICD-10-CM

## 2024-05-22 DIAGNOSIS — E89.0 POSTSURGICAL HYPOTHYROIDISM: ICD-10-CM

## 2024-05-22 DIAGNOSIS — Z82.49 FAMILY HISTORY OF ABDOMINAL AORTIC ANEURYSM (AAA): ICD-10-CM

## 2024-05-22 DIAGNOSIS — I10 ESSENTIAL HYPERTENSION: ICD-10-CM

## 2024-05-22 DIAGNOSIS — M25.551 RIGHT HIP PAIN: ICD-10-CM

## 2024-05-22 RX ORDER — METHYLPREDNISOLONE 4 MG/1
TABLET ORAL
Qty: 1 KIT | Refills: 0 | Status: SHIPPED | OUTPATIENT
Start: 2024-05-22 | End: 2024-05-28

## 2024-05-22 RX ORDER — TIZANIDINE 2 MG/1
2 TABLET ORAL NIGHTLY PRN
Qty: 30 TABLET | Refills: 0 | Status: SHIPPED | OUTPATIENT
Start: 2024-05-22

## 2024-05-22 RX ORDER — BLOOD-GLUCOSE METER
1 KIT MISCELLANEOUS DAILY
Qty: 1 KIT | Refills: 0 | Status: SHIPPED | OUTPATIENT
Start: 2024-05-22

## 2024-05-22 RX ORDER — LORAZEPAM 0.5 MG/1
0.5 TABLET ORAL PRN
Qty: 30 TABLET | Refills: 0 | Status: SHIPPED | OUTPATIENT
Start: 2024-05-22 | End: 2024-06-21

## 2024-05-22 RX ORDER — LORAZEPAM 0.5 MG/1
0.5 TABLET ORAL PRN
COMMUNITY
End: 2024-05-22 | Stop reason: SDUPTHER

## 2024-05-22 ASSESSMENT — ENCOUNTER SYMPTOMS
GASTROINTESTINAL NEGATIVE: 1
NAUSEA: 0
SHORTNESS OF BREATH: 0
ANAL BLEEDING: 0
ALLERGIC/IMMUNOLOGIC NEGATIVE: 1
RESPIRATORY NEGATIVE: 1
EYES NEGATIVE: 1
BACK PAIN: 1
BLOOD IN STOOL: 0
ABDOMINAL PAIN: 0

## 2024-05-22 NOTE — PROGRESS NOTES
[facility]  [facility]  [address] [place] [address]  Dept: [phone]  Dept Fax: [phone]  Loc: [phone]    Lilia Robert is a 78 y.o. female who presents today for her medical conditions/complaints as noted below.  Lilia Robert is c/o of Hypertension (BP at home 120's over 60's.  Denies edema,  SOB or chest pain ), Diabetes (Yesterday fasting sugar was 96), Cholesterol Problem (Patient is not fasting today ), Anxiety, and Other (Vitamin D deficiency /Hypomagnesemia )       Subjective:     Chief Complaint   Patient presents with    Hypertension     BP at home 120's over 60's.  Denies edema,  SOB or chest pain     Diabetes     Yesterday fasting sugar was 96    Cholesterol Problem     Patient is not fasting today     Anxiety    Other     Vitamin D deficiency   Hypomagnesemia        HPI      Diabetes Mellitus Type 2:  Patient is here for follow up on diabetes.      Treatment Adherence:   Medication compliance:  Taking medication as prescribed.  Diet compliance:  Trying to eat a healthy, diabetic diet.    Weight trend:  Wt Readings from Last 3 Encounters:   02/21/24 71.2 kg (157 lb)   12/13/23 71.7 kg (158 lb)   11/15/23 71.7 kg (158 lb)     Denies any hypoglycemia episodes.    Current symptoms/problems include:  Neuropathy is  present  Denies any open areas on feet/ulcerations  Denies any polyuria, polydipsia, or polyphagia, nausea, vomiting or diarrhea.    Home blood sugar records: fasting range: 96 yesterday, patient tests 3 time(s) per week  Eye exam current (withinone year): yes  Denies blurry vision   reports that she has never smoked. She has never been exposed to tobacco smoke. She has never used smokeless tobacco.  Current diabetic medications include  metFORMIN xr - 500 MG  3 tabs daily    Hip and back pain  The patient is complaining of right hip pain.  It is mostly on the lateral aspect of the right hip   she states that is been going on for

## 2024-05-29 LAB — DIABETIC RETINOPATHY: NEGATIVE

## 2024-05-30 DIAGNOSIS — E78.2 MIXED HYPERLIPIDEMIA: ICD-10-CM

## 2024-05-30 DIAGNOSIS — E11.9 TYPE 2 DIABETES MELLITUS WITHOUT COMPLICATION, WITHOUT LONG-TERM CURRENT USE OF INSULIN (HCC): ICD-10-CM

## 2024-05-30 DIAGNOSIS — I10 ESSENTIAL HYPERTENSION: ICD-10-CM

## 2024-05-30 RX ORDER — LEVOTHYROXINE SODIUM 0.07 MG/1
75 TABLET ORAL DAILY
Qty: 90 TABLET | Refills: 1 | Status: SHIPPED | OUTPATIENT
Start: 2024-05-30

## 2024-05-30 RX ORDER — LOSARTAN POTASSIUM 100 MG/1
100 TABLET ORAL DAILY
Qty: 90 TABLET | Refills: 3 | Status: SHIPPED | OUTPATIENT
Start: 2024-05-30

## 2024-05-30 RX ORDER — METFORMIN HYDROCHLORIDE 500 MG/1
TABLET, EXTENDED RELEASE ORAL
Qty: 270 TABLET | Refills: 1 | Status: SHIPPED | OUTPATIENT
Start: 2024-05-30

## 2024-05-30 RX ORDER — ROSUVASTATIN CALCIUM 10 MG/1
TABLET, COATED ORAL
Qty: 90 TABLET | Refills: 3 | Status: SHIPPED | OUTPATIENT
Start: 2024-05-30

## 2024-05-30 RX ORDER — AMLODIPINE BESYLATE 5 MG/1
TABLET ORAL
Qty: 90 TABLET | Refills: 1 | Status: SHIPPED | OUTPATIENT
Start: 2024-05-30

## 2024-05-30 NOTE — TELEPHONE ENCOUNTER
Refill Request     CONFIRM preferrred pharmacy with the patient.    If Mail Order Rx - Pend for 90 day refill.      Last Seen: Last Seen Department: 5/22/2024  Last Seen by PCP: 5/22/2024    Last Written: 3.5.24    If no future appointment scheduled, route STAFF MESSAGE with patient name to the  Pool for scheduling.      Next Appointment:   Future Appointments   Date Time Provider Department Center   7/26/2024  2:00 PM Ranjana Lyon APRN - CNP [facility]   8/28/2024  1:00 PM Ranjana Lyon APRN - CNP [facility] - [facility]   9/3/2024 11:30 AM Jose Frost MD [facility]       Message sent to  to schedule appt with patient?  NO        metFORMIN (GLUCOPHAGE-XR) 500 MG extended release tablet [[id number]]

## 2024-05-30 NOTE — TELEPHONE ENCOUNTER
Refill Request     CONFIRM preferrred pharmacy with the patient.    If Mail Order Rx - Pend for 90 day refill.      Last Seen: Last Seen Department: 5/22/2024  Last Seen by PCP: 5/22/2024    Last Written: 3/5/24    If no future appointment scheduled, route STAFF MESSAGE with patient name to the  Pool for scheduling.      Next Appointment:   Future Appointments   Date Time Provider Department Center   7/26/2024  2:00 PM Ranj[facility] - [facility]   8/28/2024  1:00 PM Ranjana Lyon APRN - [facility]   9/3/2024 11:30 AM [facility]       Message sent to  to schedule appt with patient?  NO      Requested Prescriptions     Pending Prescriptions Disp Refills    levothyroxine (SYNTHROID) 75 MCG tablet [Pharmacy Med Name: L-THYROXINE (SYNTHROID) TABS 75MCG] 90 tablet 3     Sig: TAKE 1 TABLET DAILY    rosuvastatin (CRESTOR) 10 MG tablet [Pharmacy Med Name: ROSUVASTATIN TABS 10MG] 90 tablet 3     Sig: TAKE 1 TABLET DAILY AT     NIGHT    losartan (COZAAR) 100 MG tablet [Pharmacy Med Name: LOSARTAN TABS 100MG] 90 tablet 3     Sig: TAKE 1 TABLET DAILY    amLODIPine (NORVASC) 5 MG tablet [Pharmacy Med Name: AMLODIPINE BESYLATE TABS 5MG] 90 tablet 3     Sig: TAKE 1 TABLET DAILY    metoprolol tartrate (LOPRESSOR) 25 MG tablet [Pharmacy Med Name: METOPROLOL TARTRATE TABS 25MG] 180 tablet 3     Sig: TAKE 1 TABLET TWICE A DAY

## 2024-06-26 ENCOUNTER — HOSPITAL ENCOUNTER (OUTPATIENT)
Age: 79
Discharge: HOME | End: 2024-06-26
Payer: MEDICARE

## 2024-06-26 ENCOUNTER — HOSPITAL ENCOUNTER (OUTPATIENT)
Dept: GENERAL RADIOLOGY | Age: 79
Discharge: HOME | End: 2024-06-26
Payer: MEDICARE

## 2024-06-26 ENCOUNTER — OFFICE VISIT (OUTPATIENT)
Dept: FAMILY MEDICINE CLINIC | Age: 79
End: 2024-06-26

## 2024-06-26 VITALS
WEIGHT: 153 LBS | SYSTOLIC BLOOD PRESSURE: 134 MMHG | HEART RATE: 59 BPM | DIASTOLIC BLOOD PRESSURE: 68 MMHG | HEIGHT: 61 IN | OXYGEN SATURATION: 99 % | BODY MASS INDEX: 28.89 KG/M2

## 2024-06-26 DIAGNOSIS — Z85.42 HISTORY OF ENDOMETRIAL CANCER: ICD-10-CM

## 2024-06-26 DIAGNOSIS — M25.551 RIGHT HIP PAIN: Primary | ICD-10-CM

## 2024-06-26 DIAGNOSIS — M54.41 ACUTE MIDLINE LOW BACK PAIN WITH RIGHT-SIDED SCIATICA: ICD-10-CM

## 2024-06-26 DIAGNOSIS — M25.561 ACUTE PAIN OF RIGHT KNEE: ICD-10-CM

## 2024-06-26 DIAGNOSIS — M25.551 RIGHT HIP PAIN: ICD-10-CM

## 2024-06-26 PROCEDURE — 73560 X-RAY EXAM OF KNEE 1 OR 2: CPT

## 2024-06-26 PROCEDURE — 73502 X-RAY EXAM HIP UNI 2-3 VIEWS: CPT

## 2024-06-26 ASSESSMENT — ENCOUNTER SYMPTOMS
GASTROINTESTINAL NEGATIVE: 1
ANAL BLEEDING: 0
SHORTNESS OF BREATH: 0
BACK PAIN: 1
EYES NEGATIVE: 1
NAUSEA: 0
BLOOD IN STOOL: 0
ABDOMINAL PAIN: 0
RESPIRATORY NEGATIVE: 1
ALLERGIC/IMMUNOLOGIC NEGATIVE: 1

## 2024-06-26 NOTE — PROGRESS NOTES
she has been using Tylenol which does help some.  She states the pain is aching in nature.  She states it is worse with weightbearing and going up and down stairs.      Past Medical History:   Diagnosis Date    Allergic     Anemia     Arthritis of left hip 11/10/2020    Brain condition     pt states a cluster of blood vessels left side of brain    Cancer (HCC) 1/2013    endometrial cancer/ Dr. Alberto and Dr. Huerta    Cerebral cavernoma     followed by Dr. Zuccarrello at     Chest pain 9/19/2011    Complication of anesthesia     Takes a long time to wake up    Diverticulosis     Dizziness 11/20/2019    Encounter for central line care 6/10/2015    Endometrial cancer (HCC)     Endometrial carcinoma (HCC) 1/28/2013    serous, poorly differnetiated ca s/p KINGSLEY/BSO on 1/28/13 along with six cycles of carboplatin/taxol , completed on 7/13  Formatting of this note might be different from the original. serous, poorly differnetiated ca s/p KINGSLEY/BSO on 1/28/13 along with six  cycles of carboplatin/taxol , completed on 7/13    Fungus infection     Fungus infection on bilat toes.     GERD (gastroesophageal reflux disease)     Hematuria 10/23/2017    Hyperlipidemia     Hypertension     Hypomagnesemia 1/2016    consult with Dr. Tovar ([facility]) and no renal wasting. felt most likely d/t HCTz and PPI,  HCTZ discontinued,  increased mag ox to 400 mg 2 tabs BID    Hypothyroidism     Irritable bowel syndrome     Malignant neoplasm (HCC) 1/1/2013    endometrial cancer/ Dr. Alberto and Dr. Huerta Formatting of this note might be different from the original. endometrial cancer/ Dr. Alberto and Dr. Huerta    Moderate episode of recurrent major depressive disorder ([facility]) 4/15/2020    MVP (mitral valve prolapse)     JOAQUIN (obstructive sleep apnea)     Paronychia of great toe, left 7/11/2017    Pneumonia     infant    Prolonged emergence from general anesthesia     Type 2 diabetes mellitus without complication ([facility])     Type

## 2024-06-27 DIAGNOSIS — M25.551 RIGHT HIP PAIN: Primary | ICD-10-CM

## 2024-06-27 RX ORDER — METHYLPREDNISOLONE SODIUM SUCCINATE 125 MG/2ML
125 INJECTION, POWDER, LYOPHILIZED, FOR SOLUTION INTRAMUSCULAR; INTRAVENOUS ONCE
Status: COMPLETED | OUTPATIENT
Start: 2024-06-27 | End: 2024-06-27

## 2024-07-10 ENCOUNTER — OFFICE VISIT (OUTPATIENT)
Dept: FAMILY MEDICINE CLINIC | Age: 79
End: 2024-07-10

## 2024-07-10 VITALS
OXYGEN SATURATION: 98 % | BODY MASS INDEX: 29.64 KG/M2 | HEIGHT: 60 IN | WEIGHT: 151 LBS | DIASTOLIC BLOOD PRESSURE: 80 MMHG | HEART RATE: 56 BPM | SYSTOLIC BLOOD PRESSURE: 136 MMHG

## 2024-07-10 DIAGNOSIS — M25.551 RIGHT HIP PAIN: Primary | ICD-10-CM

## 2024-07-10 RX ORDER — MELOXICAM 15 MG/1
15 TABLET ORAL DAILY
Qty: 10 TABLET | Refills: 0 | Status: SHIPPED | OUTPATIENT
Start: 2024-07-10 | End: 2024-07-20

## 2024-07-10 SDOH — ECONOMIC STABILITY: FOOD INSECURITY: WITHIN THE PAST 12 MONTHS, YOU WORRIED THAT YOUR FOOD WOULD RUN OUT BEFORE YOU GOT MONEY TO BUY MORE.: NEVER TRUE

## 2024-07-10 SDOH — ECONOMIC STABILITY: FOOD INSECURITY: WITHIN THE PAST 12 MONTHS, THE FOOD YOU BOUGHT JUST DIDN'T LAST AND YOU DIDN'T HAVE MONEY TO GET MORE.: NEVER TRUE

## 2024-07-10 SDOH — ECONOMIC STABILITY: INCOME INSECURITY: HOW HARD IS IT FOR YOU TO PAY FOR THE VERY BASICS LIKE FOOD, HOUSING, MEDICAL CARE, AND HEATING?: NOT HARD AT ALL

## 2024-07-10 ASSESSMENT — ENCOUNTER SYMPTOMS
ALLERGIC/IMMUNOLOGIC NEGATIVE: 1
RESPIRATORY NEGATIVE: 1
NAUSEA: 0
BACK PAIN: 1
EYES NEGATIVE: 1
GASTROINTESTINAL NEGATIVE: 1
ABDOMINAL PAIN: 0
ANAL BLEEDING: 0
BLOOD IN STOOL: 0
SHORTNESS OF BREATH: 0

## 2024-07-10 NOTE — PROGRESS NOTES
[facility]  [facility]  [address] [place] [address]  Dept: [phone]  Dept Fax: [phone]  Loc: [phone]    Lilia Robert is a 78 y.o. female who presents today for her medical conditions/complaints as noted below.  Lilia Robert is c/o of Other (Right hip pain)       Subjective:     Chief Complaint   Patient presents with    Other     Right hip pain       HPI  The patient is here to follow-up from her 6/26/2024 office visit for right hip pain and acute midline low back pain with right-sided sciatica and acute right knee pain  The patient states her right knee pain has improved  She continues to have pain in the right SI region that will radiate sometimes down her leg.  The other day she did have pain outside right calf but none today  The patient has tried oral steroids as well as Solu-Medrol injection without much relief  Patient has been using Tylenol and on occasion ibuprofen  At some point she was told by a specialist that she should not take ibuprofen except for on rare occasions  Patient did have x-rays of both the hip and knee and both only showed mild arthritis  Today the patient is ambulating with a cane and he states that her right hip pain is worsening  Past Medical History:   Diagnosis Date    Allergic     Anemia     Arthritis of left hip 11/10/2020    Brain condition     pt states a cluster of blood vessels left side of brain    Cancer (HCC) 1/2013    endometrial cancer/ Dr. Alberto and Dr. Huerta    Cerebral cavernoma     followed by Dr. Zuccarrello at     Chest pain 9/19/2011    Complication of anesthesia     Takes a long time to wake up    Diverticulosis     Dizziness 11/20/2019    Encounter for central line care 6/10/2015    Endometrial cancer (HCC)     Endometrial carcinoma (HCC) 1/28/2013    serous, poorly differnetiated ca s/p KINGSLEY/BSO on 1/28/13 along with six cycles of carboplatin/taxol , completed on 7/13  Formatting of this note

## 2024-07-17 ENCOUNTER — OFFICE VISIT (OUTPATIENT)
Dept: ORTHOPEDIC SURGERY | Age: 79
End: 2024-07-17
Payer: MEDICARE

## 2024-07-17 VITALS — HEIGHT: 60 IN | WEIGHT: 153 LBS | BODY MASS INDEX: 30.04 KG/M2

## 2024-07-17 DIAGNOSIS — M70.62 GREATER TROCHANTERIC BURSITIS, LEFT: Primary | ICD-10-CM

## 2024-07-17 DIAGNOSIS — M76.31 ILIOTIBIAL BAND SYNDROME OF RIGHT SIDE: ICD-10-CM

## 2024-07-17 PROCEDURE — 1090F PRES/ABSN URINE INCON ASSESS: CPT | Performed by: ORTHOPAEDIC SURGERY

## 2024-07-17 PROCEDURE — 1036F TOBACCO NON-USER: CPT | Performed by: ORTHOPAEDIC SURGERY

## 2024-07-17 PROCEDURE — 1123F ACP DISCUSS/DSCN MKR DOCD: CPT | Performed by: ORTHOPAEDIC SURGERY

## 2024-07-17 PROCEDURE — G8427 DOCREV CUR MEDS BY ELIG CLIN: HCPCS | Performed by: ORTHOPAEDIC SURGERY

## 2024-07-17 PROCEDURE — 99204 OFFICE O/P NEW MOD 45 MIN: CPT | Performed by: ORTHOPAEDIC SURGERY

## 2024-07-17 PROCEDURE — 20610 DRAIN/INJ JOINT/BURSA W/O US: CPT | Performed by: ORTHOPAEDIC SURGERY

## 2024-07-17 PROCEDURE — G8399 PT W/DXA RESULTS DOCUMENT: HCPCS | Performed by: ORTHOPAEDIC SURGERY

## 2024-07-17 PROCEDURE — G8417 CALC BMI ABV UP PARAM F/U: HCPCS | Performed by: ORTHOPAEDIC SURGERY

## 2024-07-17 RX ORDER — TRIAMCINOLONE ACETONIDE 40 MG/ML
80 INJECTION, SUSPENSION INTRA-ARTICULAR; INTRAMUSCULAR ONCE
Status: COMPLETED | OUTPATIENT
Start: 2024-07-17 | End: 2024-07-17

## 2024-07-17 RX ORDER — LIDOCAINE HYDROCHLORIDE 10 MG/ML
3 INJECTION, SOLUTION EPIDURAL; INFILTRATION; INTRACAUDAL; PERINEURAL ONCE
Status: COMPLETED | OUTPATIENT
Start: 2024-07-17 | End: 2024-07-17

## 2024-07-17 RX ADMIN — TRIAMCINOLONE ACETONIDE 80 MG: 40 INJECTION, SUSPENSION INTRA-ARTICULAR; INTRAMUSCULAR at 13:45

## 2024-07-17 RX ADMIN — LIDOCAINE HYDROCHLORIDE 3 ML: 10 INJECTION, SOLUTION EPIDURAL; INFILTRATION; INTRACAUDAL; PERINEURAL at 13:45

## 2024-07-17 NOTE — PROGRESS NOTES
OF THE RIGHT HIP     6/26/2024 10:16 am     COMPARISON:  09/23/2020.     HISTORY:  ORDERING SYSTEM PROVIDED HISTORY: Right hip pain  TECHNOLOGIST PROVIDED HISTORY:  Reason for exam:->right hip pain x 3 months, hx endometrial cancer  Reason for Exam: Right hip and knee pain     FINDINGS:  There is mild joint space narrowing in the right hip.  No fracture or  dislocation is seen.  The pelvis is intact.     IMPRESSION:  Mild osteoarthritic changes in both hips with no acute bony abnormality.    My read: AP and frog views of the right hip demonstrate minimal osteoarthritic changes.  Pelvic ring is symmetric.  Visualized portion of the lumbar spine does not demonstrate significant osteoarthritic changes/deterioration.  2 views of the knee including AP and lateral x-rays demonstrate minimal medial knee osteoarthritic changes.  No fracture.  No obvious effusion.    ASSESSMENT AND PLAN    78 y.o. female with the following orthopaedic surgery problems:    Right iliotibial band syndrome with resultant trochanteric bursitis, lateral knee pain  Referred pain from lumbar source    I reviewed with the patient I believe her pain is multifactorial.  She does have exquisite tenderness over the greater trochanter.  I reviewed the conservative treatments of trochanteric bursitis to include physical therapy with indicated modalities of foam rolling, deep tissue massage, dry needling etc.  I provided her with a handout of home exercises.  I did offer a trochanteric bursa injection.  She would like to move forward with this today.  Risk, benefits, alternatives were discussed.  Therefore, the point of maximal tenderness overlying the greater trochanter was sterilely prepped with Betadine.  Ethyl chloride spray was used anesthetize the skin.  A 22-gauge spinal needle was used to administer a mixture of 80 mg Kenalog mixed with 3 cc lidocaine 1% plain.  She tolerated this well.  It was dressed with a Band-Aid.  She did achieve some initial

## 2024-07-26 ENCOUNTER — TELEMEDICINE (OUTPATIENT)
Dept: FAMILY MEDICINE CLINIC | Age: 79
End: 2024-07-26

## 2024-07-26 DIAGNOSIS — Z72.3 INACTIVITY: ICD-10-CM

## 2024-07-26 DIAGNOSIS — Z00.00 MEDICARE ANNUAL WELLNESS VISIT, SUBSEQUENT: Primary | ICD-10-CM

## 2024-07-26 DIAGNOSIS — Z91.81 AT RISK FOR FALLING: ICD-10-CM

## 2024-07-26 NOTE — PROGRESS NOTES
Medicare Annual Wellness Visit    Lilia Robert is here for Medicare AWV    Assessment & Plan   Medicare annual wellness visit, subsequent  At risk for falling  Interventions:    Reviewed medications, home hazards, visual acuity, and co-morbidities that can increase risk for falls  Patient will be starting PT in the next week  See AVS for additional education material  Inactivity  Interventions:  Recommendations: patient agrees to exercise for at least 150 minutes/week  See AVS for additional education material  Recommendations for Preventive Services Due: see orders and patient instructions/AVS.  Recommended screening schedule for the next 5-10 years is provided to the patient in written form: see Patient Instructions/AVS.     Return in 1 year (on 7/26/2025) for Medicare AWV.     Subjective       Patient's complete Health Risk Assessment and screening values have been reviewed and are found in Flowsheets. The following problems were reviewed today and where indicated follow up appointments were made and/or referrals ordered.    Positive Risk Factor Screenings with Interventions:    Fall Risk:  Do you feel unsteady or are you worried about falling? : (!) yes  2 or more falls in past year?: no  Fall with injury in past year?: no       Interventions:    Reviewed medications, home hazards, visual acuity, and co-morbidities that can increase risk for falls  Patient will be starting PT in the next week  See AVS for additional education material             Inactivity:  On average, how many days per week do you engage in moderate to strenuous exercise (like a brisk walk)?: 0 days (!) Abnormal  On average, how many minutes do you engage in exercise at this level?: 0 min    Interventions:  Recommendations: patient agrees to exercise for at least 150 minutes/week  See AVS for additional education material       Hearing Screen:  Do you or your family notice any trouble with your hearing that hasn't been managed with

## 2024-07-29 ENCOUNTER — HOSPITAL ENCOUNTER (OUTPATIENT)
Dept: PHYSICAL THERAPY | Age: 79
Setting detail: THERAPIES SERIES
Discharge: HOME | End: 2024-07-29
Payer: MEDICARE

## 2024-07-29 DIAGNOSIS — M62.81 MUSCLE WEAKNESS OF LOWER EXTREMITY: ICD-10-CM

## 2024-07-29 DIAGNOSIS — M25.551 RIGHT HIP PAIN: Primary | ICD-10-CM

## 2024-07-29 PROCEDURE — 97112 NEUROMUSCULAR REEDUCATION: CPT

## 2024-07-29 PROCEDURE — 97110 THERAPEUTIC EXERCISES: CPT

## 2024-07-29 PROCEDURE — 97161 PT EVAL LOW COMPLEX 20 MIN: CPT

## 2024-07-29 PROCEDURE — 97140 MANUAL THERAPY 1/> REGIONS: CPT

## 2024-07-29 NOTE — PLAN OF CARE
[facility]- Outpatient Rehabilitation and Therapy [address] [address] office: [phone] fax: [phone]     Physical Therapy Initial Evaluation Certification      Dear Sony Kennedy MD,    We had the pleasure of evaluating the following patient for physical therapy services at [facility].  A summary of our findings can be found in the initial assessment below.  This includes our plan of care.  If you have any questions or concerns regarding these findings, please do not hesitate to contact me at the office phone number listed above.  Thank you for the referral.     Physician Signature:_______________________________Date:__________________  By signing above (or electronic signature), therapist’s plan is approved by physician       Physical Therapy: TREATMENT/PROGRESS NOTE   Patient: Lilia Robert (78 y.o. female)   Examination Date: 2024   :  1945 MRN: [id number]   Visit #: 1   Insurance Allowable Auth Needed   Med Nec    KX Modifier at visit 15 []Yes    [x]No    Insurance: Payor: MEDICARE / Plan: MEDICARE PART A AND B / Product Type: *No Product type* /   Insurance ID: [id number] - (Medicare)  Secondary Insurance (if applicable): [facility]   Treatment Diagnosis:     ICD-10-CM    1. Right hip pain  M25.551       2. Muscle weakness of lower extremity  M62.81          Medical Diagnosis:  Greater trochanteric bursitis, left [M70.62]  Iliotibial band syndrome of right side [M76.31]   Referring Physician: Sony Kennedy MD  PCP: Ranjana Lyon, APRN - CNP     Plan of care signed (Y/N):     Date of Patient follow up with Physician:      Plan of Care Report: EVAL today  POC update due: (10 visits /OR AUTH LIMITS, whichever is less)  2024                                             Medical History:  Comorbidities:  Cancer/Tumor  Diabetes (Type I or II)  Hypertension  Other Metabolic Conditions:

## 2024-07-31 ENCOUNTER — HOSPITAL ENCOUNTER (OUTPATIENT)
Dept: PHYSICAL THERAPY | Age: 79
Setting detail: THERAPIES SERIES
Discharge: HOME | End: 2024-07-31
Payer: MEDICARE

## 2024-07-31 PROCEDURE — 97110 THERAPEUTIC EXERCISES: CPT | Performed by: PHYSICAL THERAPY ASSISTANT

## 2024-07-31 PROCEDURE — 97112 NEUROMUSCULAR REEDUCATION: CPT | Performed by: PHYSICAL THERAPY ASSISTANT

## 2024-07-31 PROCEDURE — 97140 MANUAL THERAPY 1/> REGIONS: CPT | Performed by: PHYSICAL THERAPY ASSISTANT

## 2024-07-31 NOTE — FLOWSHEET NOTE
improve muscular strength or increase flexibility, following either an injury or surgery.   (93652) MANUAL THERAPY -  Manual therapy techniques, 1 or more regions, each 15 minutes (Mobilization/manipulation, manual lymphatic drainage, manual traction) for the purpose of modulating pain, promoting relaxation,  increasing ROM, reducing/eliminating soft tissue swelling/inflammation/restriction, improving soft tissue extensibility and allowing for proper ROM for normal function with self care, mobility, lifting and ambulation    GOALS     Patient stated goal: To improve overall functional upright posture during standing activity  [] Progressing: [] Met: [] Not Met: [] Adjusted    Therapist goals for Patient:   Short Term Goals: To be achieved in: 2 weeks  1. Independent in HEP and progression per patient tolerance, in order to prevent re-injury.   [x] Progressing: [] Met: [] Not Met: [] Adjusted  2. Patient will have a decrease in pain to <1-2/10 to facilitate improvement in movement, function, and ADLs as indicated by Functional Deficits.  [x] Progressing: [] Met: [] Not Met: [] Adjusted    Long Term Goals: To be achieved in: 12 weeks  1. Disability index score of 20% or less for the LEFS to assist with reaching prior level of function with activities such as walking for longer distances.  [] Progressing: [] Met: [] Not Met: [] Adjusted  2. Patient will demonstrate increased AROM of R Hip Flexion to >/=65 °  without pain to allow for proper joint functioning to enable patient to proper upright posture during ambulation.   [] Progressing: [] Met: [] Not Met: [] Adjusted  3. Patient will demonstrate increased Strength of R LE grossly to at least 4 to 4+/5 throughout without pain to allow for proper functional mobility to enable patient to return to ascending and descending stairs reciprocally.   [] Progressing: [] Met: [] Not Met: [] Adjusted  4. Patient will return to walking without AD without increased symptoms or

## 2024-08-05 ENCOUNTER — HOSPITAL ENCOUNTER (OUTPATIENT)
Dept: PHYSICAL THERAPY | Age: 79
Setting detail: THERAPIES SERIES
Discharge: HOME | End: 2024-08-05
Payer: MEDICARE

## 2024-08-05 PROCEDURE — 97112 NEUROMUSCULAR REEDUCATION: CPT

## 2024-08-05 PROCEDURE — 97140 MANUAL THERAPY 1/> REGIONS: CPT

## 2024-08-05 PROCEDURE — 97110 THERAPEUTIC EXERCISES: CPT

## 2024-08-05 NOTE — FLOWSHEET NOTE
[facility]- Outpatient Rehabilitation and Therapy [address], [place] [address] office: [phone] fax: [phone]         Physical Therapy: TREATMENT/PROGRESS NOTE   Patient: Lilia Robert (78 y.o. female)   Examination Date: 2024   :  1945 MRN: [id number]   Visit #: 3   Insurance Allowable Auth Needed   Med Nec    KX Modifier at visit 15 []Yes    [x]No    Insurance: Payor: MEDICARE / Plan: MEDICARE PART A AND B / Product Type: *No Product type* /   Insurance ID: [id number] - (Medicare)  Secondary Insurance (if applicable): [facility]   Treatment Diagnosis:     ICD-10-CM    1. Right hip pain  M25.551       2. Muscle weakness of lower extremity  M62.81          Medical Diagnosis:  Greater trochanteric bursitis, left [M70.62]  Iliotibial band syndrome of right side [M76.31]   Referring Physician: Sony Kennedy MD  PCP: Ranjana Lyon APRN - CNP     Plan of care signed (Y/N):     Date of Patient follow up with Physician:      Plan of Care Report: NO  POC update due: (10 visits /OR AUTH LIMITS, whichever is less)  2024                                             Medical History:  Comorbidities:  Cancer/Tumor  Diabetes (Type I or II)  Hypertension  Other Metabolic Conditions: Hypothyriodism  Other Cardiovascular Conditions: Hyperlipidemia  Other: Neuropathy  Relevant Medical History: non-remarkable                                         Precautions/ Contra-indications:           Latex allergy:  NO  Pacemaker:    NO  Contraindications for Manipulation: unhealthy/ multiple comorbidities   Date of Surgery: n/a  Other:    Red Flags:  None    Suicide Screening:   The patient did not verbalize a primary behavioral concern, suicidal ideation, suicidal intent, or demonstrate suicidal behaviors.    Preferred Language for Healthcare:   [x] English       [] other:    SUBJECTIVE EXAMINATION     Patient stated complaint: Pt states she had a lot of increased pain

## 2024-08-06 ENCOUNTER — APPOINTMENT (OUTPATIENT)
Dept: PHYSICAL THERAPY | Age: 79
End: 2024-08-06
Payer: MEDICARE

## 2024-08-08 ENCOUNTER — HOSPITAL ENCOUNTER (OUTPATIENT)
Dept: PHYSICAL THERAPY | Age: 79
Setting detail: THERAPIES SERIES
Discharge: HOME | End: 2024-08-08
Payer: MEDICARE

## 2024-08-08 PROCEDURE — 97140 MANUAL THERAPY 1/> REGIONS: CPT

## 2024-08-08 PROCEDURE — 97112 NEUROMUSCULAR REEDUCATION: CPT

## 2024-08-08 PROCEDURE — 97110 THERAPEUTIC EXERCISES: CPT

## 2024-08-08 NOTE — FLOWSHEET NOTE
the LEFS to assist with reaching prior level of function with activities such as walking for longer distances.  [x] Progressing: [] Met: [] Not Met: [] Adjusted  2. Patient will demonstrate increased AROM of R Hip Flexion to >/=65 °  without pain to allow for proper joint functioning to enable patient to proper upright posture during ambulation.   [x] Progressing: [] Met: [] Not Met: [] Adjusted  3. Patient will demonstrate increased Strength of R LE grossly to at least 4 to 4+/5 throughout without pain to allow for proper functional mobility to enable patient to return to ascending and descending stairs reciprocally.   [x] Progressing: [] Met: [] Not Met: [] Adjusted  4. Patient will return to walking without AD without increased symptoms or restriction.   [x] Progressing: [] Met: [] Not Met: [] Adjusted  5. To be able to perform all necessary ADLs without increased symptoms. (patient specific functional goal)    [x] Progressing: [] Met: [] Not Met: [] Adjusted     Overall Progression Towards Functional goals/ Treatment Progress Update:  [] Patient is progressing as expected towards functional goals listed.    [] Progression is slowed due to complexities/Impairments listed.  [] Progression has been slowed due to co-morbidities.  [x] Plan just implemented, too soon (<30days) to assess goals progression   [] Goals require adjustment due to lack of progress  [] Patient is not progressing as expected and requires additional follow up with physician  [] Other:     TREATMENT PLAN     Frequency/Duration: 1-2x/week for  12  weeks for the following treatment interventions:    Interventions:  Therapeutic Exercise (45864) including: strength training, ROM, and functional mobility  Therapeutic Activities (02032) including: functional mobility training and education.  Neuromuscular Re-education (15537) activation and proprioception, including postural re-education.    Manual Therapy (23185) as indicated to include: Passive

## 2024-08-12 ENCOUNTER — APPOINTMENT (OUTPATIENT)
Dept: PHYSICAL THERAPY | Age: 79
End: 2024-08-12
Payer: MEDICARE

## 2024-08-13 ENCOUNTER — HOSPITAL ENCOUNTER (OUTPATIENT)
Dept: PHYSICAL THERAPY | Age: 79
Setting detail: THERAPIES SERIES
Discharge: HOME | End: 2024-08-13
Payer: MEDICARE

## 2024-08-13 PROCEDURE — 97110 THERAPEUTIC EXERCISES: CPT | Performed by: PHYSICAL THERAPY ASSISTANT

## 2024-08-13 PROCEDURE — 97140 MANUAL THERAPY 1/> REGIONS: CPT | Performed by: PHYSICAL THERAPY ASSISTANT

## 2024-08-13 PROCEDURE — 97112 NEUROMUSCULAR REEDUCATION: CPT | Performed by: PHYSICAL THERAPY ASSISTANT

## 2024-08-13 NOTE — FLOWSHEET NOTE
sets - 10 reps - 5\" hold  - Supine Hip Adduction Isometric with Ball  - 1 x daily - 7 x weekly - 2 sets - 10 reps - 5\" hold  - Supine Lower Trunk Rotation  - 1 x daily - 7 x weekly - 3 sets - 10 reps  - Seated Hamstring Stretch  - 1 x daily - 7 x weekly - 1 sets - 3 reps - 30\" hold      ASSESSMENT     Today's Assessment: During therapy this date, patient required visual cueing for exercise progression, improving proper muscle recruitment and activation/motor control patterns, modulating pain, increasing ROM, reduce/eliminate soft tissue swelling/inflammation/restriction, and improving soft tissue extensibility.Patient will continue to benefit from ongoing evaluation and advanced clinical decision from a Physical Therapist to address and improve pain control, ROM, muscle strength, normalization of gait, functional mobility, ADL status, fine motor control, and coordination to safely return to PLOF without symptoms or restrictions.    Tolerates well today - good steadiness in gait off the cane.     Medical Necessity Documentation:  I certify that this patient meets the below criteria necessary for medical necessity for care and/or justification of therapy services:  The patient has a musculoskeletal condition(s) with a corresponding ICD-10 code that is of complexity and severity that require skilled therapeutic intervention. This has a direct and significant impact on the need for therapy and significantly impacts the rate of recovery.     Return to Play: NA    Prognosis for POC: [x] Good [] Fair  [] Poor    Patient requires continued skilled intervention: [x] Yes  [] No      CHARGE CAPTURE     PT CHARGE GRID   CPT Code (TIMED) minutes # CPT Code (UNTIMED) #     Therex (40250)  34' 2  EVAL:LOW (87131 - Typically 20 minutes face-to-face)     Neuromusc. Re-ed (82045) 12' 1  Re-Eval (71093)     Manual (59015) 15' 1  Estim Unattended (87157)     Ther. Act (07290)    Mech. Traction (78693)     Gait (10488)    Dry Needle 1-2

## 2024-08-15 ENCOUNTER — HOSPITAL ENCOUNTER (OUTPATIENT)
Dept: PHYSICAL THERAPY | Age: 79
Setting detail: THERAPIES SERIES
Discharge: HOME | End: 2024-08-15
Payer: MEDICARE

## 2024-08-15 PROCEDURE — 97110 THERAPEUTIC EXERCISES: CPT

## 2024-08-15 PROCEDURE — 97112 NEUROMUSCULAR REEDUCATION: CPT

## 2024-08-15 PROCEDURE — 97140 MANUAL THERAPY 1/> REGIONS: CPT

## 2024-08-15 NOTE — FLOWSHEET NOTE
[facility]- Outpatient Rehabilitation and Therapy [address], [place] [address] office: [phone] fax: [phone]         Physical Therapy: TREATMENT/PROGRESS NOTE   Patient: Lilia Robert (78 y.o. female)  \"Hawa\" Examination Date: 08/15/2024   :  1945 MRN: [id number]   Visit #: 6   Insurance Allowable Auth Needed   Med Nec    KX Modifier at visit 15 []Yes    [x]No    Insurance: Payor: MEDICARE / Plan: MEDICARE PART A AND B / Product Type: *No Product type* /   Insurance ID: [id number] - (Medicare)  Secondary Insurance (if applicable): [facility]   Treatment Diagnosis:     ICD-10-CM    1. Right hip pain  M25.551       2. Muscle weakness of lower extremity  M62.81          Medical Diagnosis:  Greater trochanteric bursitis, left [M70.62]  Iliotibial band syndrome of right side [M76.31]   Referring Physician: Sony Kennedy MD  PCP: Ranjana Lyon, ALEKSANDAR - CNP     Plan of care signed (Y/N):     Date of Patient follow up with Physician:      Plan of Care Report: NO  POC update due: (10 visits /OR AUTH LIMITS, whichever is less)  2024                                             Medical History:  Comorbidities:  Cancer/Tumor  Diabetes (Type I or II)  Hypertension  Other Metabolic Conditions: Hypothyriodism  Other Cardiovascular Conditions: Hyperlipidemia  Other: Neuropathy  Relevant Medical History: non-remarkable                                         Precautions/ Contra-indications:           Latex allergy:  NO  Pacemaker:    NO  Contraindications for Manipulation: unhealthy/ multiple comorbidities   Date of Surgery: n/a  Other:    Red Flags:  None    Suicide Screening:   The patient did not verbalize a primary behavioral concern, suicidal ideation, suicidal intent, or demonstrate suicidal behaviors.    Preferred Language for Healthcare:   [x] English       [] other:    SUBJECTIVE EXAMINATION     Patient stated complaint: Pt reports overall feeling much

## 2024-08-20 ENCOUNTER — HOSPITAL ENCOUNTER (OUTPATIENT)
Dept: PHYSICAL THERAPY | Age: 79
Setting detail: THERAPIES SERIES
Discharge: HOME | End: 2024-08-20
Payer: MEDICARE

## 2024-08-20 PROCEDURE — 97140 MANUAL THERAPY 1/> REGIONS: CPT

## 2024-08-20 PROCEDURE — 97110 THERAPEUTIC EXERCISES: CPT

## 2024-08-20 PROCEDURE — 97112 NEUROMUSCULAR REEDUCATION: CPT

## 2024-08-20 NOTE — FLOWSHEET NOTE
[facility]- Outpatient Rehabilitation and Therapy [address], [place] [address] office: [phone] fax: [phone]         Physical Therapy: TREATMENT/PROGRESS NOTE   Patient: Lilia Robert (78 y.o. female)  \"Hawa\" Examination Date: 2024   :  1945 MRN: [id number]   Visit #: 7   Insurance Allowable Auth Needed   Med Nec    KX Modifier at visit 15 []Yes    [x]No    Insurance: Payor: MEDICARE / Plan: MEDICARE PART A AND B / Product Type: *No Product type* /   Insurance ID: [id number] - (Medicare)  Secondary Insurance (if applicable): [facility]   Treatment Diagnosis:     ICD-10-CM    1. Right hip pain  M25.551       2. Muscle weakness of lower extremity  M62.81          Medical Diagnosis:  Greater trochanteric bursitis, left [M70.62]  Iliotibial band syndrome of right side [M76.31]   Referring Physician: Sony Kennedy MD  PCP: Ranjana Lyon, ALEKSANDAR - CNP     Plan of care signed (Y/N):     Date of Patient follow up with Physician:      Plan of Care Report: NO  POC update due: (10 visits /OR AUTH LIMITS, whichever is less)  2024                                             Medical History:  Comorbidities:  Cancer/Tumor  Diabetes (Type I or II)  Hypertension  Other Metabolic Conditions: Hypothyriodism  Other Cardiovascular Conditions: Hyperlipidemia  Other: Neuropathy  Relevant Medical History: non-remarkable                                         Precautions/ Contra-indications:           Latex allergy:  NO  Pacemaker:    NO  Contraindications for Manipulation: unhealthy/ multiple comorbidities   Date of Surgery: n/a  Other:    Red Flags:  None    Suicide Screening:   The patient did not verbalize a primary behavioral concern, suicidal ideation, suicidal intent, or demonstrate suicidal behaviors.    Preferred Language for Healthcare:   [x] English       [] other:    SUBJECTIVE EXAMINATION     Patient stated complaint: Patient reports feeling pretty good

## 2024-08-22 ENCOUNTER — OFFICE VISIT (OUTPATIENT)
Dept: FAMILY MEDICINE CLINIC | Age: 79
End: 2024-08-22

## 2024-08-22 VITALS
WEIGHT: 151 LBS | DIASTOLIC BLOOD PRESSURE: 68 MMHG | HEART RATE: 68 BPM | BODY MASS INDEX: 29.64 KG/M2 | HEIGHT: 60 IN | SYSTOLIC BLOOD PRESSURE: 132 MMHG | OXYGEN SATURATION: 99 %

## 2024-08-22 DIAGNOSIS — Z79.899 HIGH RISK MEDICATION USE: Primary | ICD-10-CM

## 2024-08-22 DIAGNOSIS — F41.9 ANXIETY: ICD-10-CM

## 2024-08-22 RX ORDER — LORAZEPAM 0.5 MG/1
0.5 TABLET ORAL PRN
COMMUNITY

## 2024-08-22 ASSESSMENT — ANXIETY QUESTIONNAIRES
3. WORRYING TOO MUCH ABOUT DIFFERENT THINGS: NOT AT ALL
1. FEELING NERVOUS, ANXIOUS, OR ON EDGE: SEVERAL DAYS
GAD7 TOTAL SCORE: 1
4. TROUBLE RELAXING: NOT AT ALL
2. NOT BEING ABLE TO STOP OR CONTROL WORRYING: NOT AT ALL
IF YOU CHECKED OFF ANY PROBLEMS ON THIS QUESTIONNAIRE, HOW DIFFICULT HAVE THESE PROBLEMS MADE IT FOR YOU TO DO YOUR WORK, TAKE CARE OF THINGS AT HOME, OR GET ALONG WITH OTHER PEOPLE: NOT DIFFICULT AT ALL
6. BECOMING EASILY ANNOYED OR IRRITABLE: NOT AT ALL
7. FEELING AFRAID AS IF SOMETHING AWFUL MIGHT HAPPEN: NOT AT ALL

## 2024-08-22 ASSESSMENT — ENCOUNTER SYMPTOMS
SHORTNESS OF BREATH: 0
EYES NEGATIVE: 1
ALLERGIC/IMMUNOLOGIC NEGATIVE: 1
RESPIRATORY NEGATIVE: 1
ANAL BLEEDING: 0
GASTROINTESTINAL NEGATIVE: 1
BLOOD IN STOOL: 0
ABDOMINAL PAIN: 0
NAUSEA: 0

## 2024-08-22 ASSESSMENT — PATIENT HEALTH QUESTIONNAIRE - PHQ9
2. FEELING DOWN, DEPRESSED OR HOPELESS: SEVERAL DAYS
1. LITTLE INTEREST OR PLEASURE IN DOING THINGS: SEVERAL DAYS
10. IF YOU CHECKED OFF ANY PROBLEMS, HOW DIFFICULT HAVE THESE PROBLEMS MADE IT FOR YOU TO DO YOUR WORK, TAKE CARE OF THINGS AT HOME, OR GET ALONG WITH OTHER PEOPLE: NOT DIFFICULT AT ALL
8. MOVING OR SPEAKING SO SLOWLY THAT OTHER PEOPLE COULD HAVE NOTICED. OR THE OPPOSITE, BEING SO FIGETY OR RESTLESS THAT YOU HAVE BEEN MOVING AROUND A LOT MORE THAN USUAL: NOT AT ALL
9. THOUGHTS THAT YOU WOULD BE BETTER OFF DEAD, OR OF HURTING YOURSELF: NOT AT ALL
SUM OF ALL RESPONSES TO PHQ QUESTIONS 1-9: 3
SUM OF ALL RESPONSES TO PHQ9 QUESTIONS 1 & 2: 2
5. POOR APPETITE OR OVEREATING: NOT AT ALL
SUM OF ALL RESPONSES TO PHQ QUESTIONS 1-9: 3
6. FEELING BAD ABOUT YOURSELF - OR THAT YOU ARE A FAILURE OR HAVE LET YOURSELF OR YOUR FAMILY DOWN: NOT AT ALL
4. FEELING TIRED OR HAVING LITTLE ENERGY: NOT AT ALL
7. TROUBLE CONCENTRATING ON THINGS, SUCH AS READING THE NEWSPAPER OR WATCHING TELEVISION: NOT AT ALL
SUM OF ALL RESPONSES TO PHQ QUESTIONS 1-9: 3
3. TROUBLE FALLING OR STAYING ASLEEP: SEVERAL DAYS
SUM OF ALL RESPONSES TO PHQ QUESTIONS 1-9: 3

## 2024-08-22 NOTE — PROGRESS NOTES
Pulse 68   Ht 1.524 m (5')   Wt 68.5 kg (151 lb)   SpO2 99%   BMI 29.49 kg/m²   Body mass index is 29.49 kg/m².    BP Readings from Last 2 Encounters:   08/22/24 132/68   07/10/24 136/80       Wt Readings from Last 3 Encounters:   08/22/24 68.5 kg (151 lb)   07/17/24 69.4 kg (153 lb)   07/10/24 68.5 kg (151 lb)       Lab Review   No visits with results within 2 Month(s) from this visit.   Latest known visit with results is:   Abstract on 05/31/2024   Component Date Value    Diabetic Retinopathy 05/29/2024 Negative        No results found for this visit on 08/22/24.       Assessment:       1. High risk medication use    2. Anxiety        No results found for this visit on 08/22/24.         Plan:       Lilia was seen today for anxiety.    Diagnoses and all orders for this visit:    High risk medication use  -     Drug Panel-PM-HI Res-UR Interp-A    Anxiety  -     Drug Panel-PM-HI Res-UR Interp-A  Chronic benzodiazepine treatment protocol was discussed with the patient again including taking medications only as prescribed , using one pharmacy and getting all controlled substances from one physician unless okayed with me. Proper safeguarding and disposal of medications were also discussed with the patient.     The current treatment regimen is needed to decrease the patient's anxiety symptoms, improve the quality of life and ability to function and improve sleep and mood symptoms.        Patient given following instructions - You are on medications which could impair your senses, you are at risk of weakness, falls, dizziness, or drowsiness. You should be careful during activities which could place you at risk of harm, such as climbing, using stairs, operating machinery, or driving vehicles. If you feel you cannot safely do these activities, you should request others to help you, or avoid the activities altogether. If you are drowsy for any other reason, you should use the same precautions as listed above.     The

## 2024-08-26 ENCOUNTER — APPOINTMENT (OUTPATIENT)
Dept: PHYSICAL THERAPY | Age: 79
End: 2024-08-26
Payer: MEDICARE

## 2024-08-26 ENCOUNTER — NURSE ONLY (OUTPATIENT)
Dept: FAMILY MEDICINE CLINIC | Age: 79
End: 2024-08-26

## 2024-08-27 ENCOUNTER — HOSPITAL ENCOUNTER (OUTPATIENT)
Dept: PHYSICAL THERAPY | Age: 79
Setting detail: THERAPIES SERIES
Discharge: HOME | End: 2024-08-27
Payer: MEDICARE

## 2024-08-27 PROCEDURE — 97110 THERAPEUTIC EXERCISES: CPT

## 2024-08-27 PROCEDURE — 97112 NEUROMUSCULAR REEDUCATION: CPT

## 2024-08-27 PROCEDURE — 97140 MANUAL THERAPY 1/> REGIONS: CPT

## 2024-08-27 NOTE — DISCHARGE SUMMARY
[facility]- Outpatient Rehabilitation and Therapy [address] [place] [address] office: [phone] fax: [phone]      Physical Therapy Discharge Summary    Dear Sony Kennedy MD  ,    We had the pleasure of treating the following patient for physical therapy services at [facility].  A summary of our findings can be found in the discharge summary below.  If you have any questions or concerns regarding these findings, please do not hesitate to contact me at the office phone number checked above.  Thank you for the referral.       Functional Outcome: LEFS: 13 % disability    Total Visits: 8     Plan of Care: Discharge from Physical Therapy    Reason for Discharge:  All Goals achieved           Physical Therapy: TREATMENT/PROGRESS NOTE   Patient: Lilia Robert (78 y.o. female)  \"Hawa\" Examination Date: 2024   :  1945 MRN: [id number]   Visit #: 8   Insurance Allowable Auth Needed   Med Nec    KX Modifier at visit 15 []Yes    [x]No    Insurance: Payor: MEDICARE / Plan: MEDICARE PART A AND B / Product Type: *No Product type* /   Insurance ID: [id number] - (Medicare)  Secondary Insurance (if applicable): [facility]   Treatment Diagnosis:     ICD-10-CM    1. Right hip pain  M25.551       2. Muscle weakness of lower extremity  M62.81          Medical Diagnosis:  Greater trochanteric bursitis, left [M70.62]  Iliotibial band syndrome of right side [M76.31]   Referring Physician: Sony Kennedy MD  PCP: Ranjana Lyon, APRN - CNP     Plan of care signed (Y/N):     Date of Patient follow up with Physician:      Plan of Care Report: NO  POC update due: (10 visits /OR AUTH LIMITS, whichever is less)  2024                                             Medical History:  Comorbidities:  Cancer/Tumor  Diabetes (Type I or II)  Hypertension  Other Metabolic Conditions: Hypothyriodism  Other Cardiovascular Conditions:

## 2024-08-30 LAB

## 2024-09-03 ENCOUNTER — TELEPHONE (OUTPATIENT)
Dept: PULMONOLOGY | Age: 79
End: 2024-09-03

## 2024-09-09 ENCOUNTER — TELEPHONE (OUTPATIENT)
Dept: PULMONOLOGY | Age: 79
End: 2024-09-09

## 2024-09-09 ENCOUNTER — OFFICE VISIT (OUTPATIENT)
Age: 79
End: 2024-09-09
Payer: MEDICARE

## 2024-09-09 VITALS
HEART RATE: 61 BPM | OXYGEN SATURATION: 97 % | DIASTOLIC BLOOD PRESSURE: 64 MMHG | HEIGHT: 61 IN | BODY MASS INDEX: 28.51 KG/M2 | RESPIRATION RATE: 20 BRPM | WEIGHT: 151 LBS | SYSTOLIC BLOOD PRESSURE: 118 MMHG

## 2024-09-09 DIAGNOSIS — R40.0 DAYTIME SLEEPINESS: ICD-10-CM

## 2024-09-09 DIAGNOSIS — G47.33 OSA (OBSTRUCTIVE SLEEP APNEA): Primary | ICD-10-CM

## 2024-09-09 PROBLEM — R60.0 LOCALIZED EDEMA: Status: RESOLVED | Noted: 2021-11-16 | Resolved: 2024-09-09

## 2024-09-09 PROBLEM — R10.11 RIGHT UPPER QUADRANT ABDOMINAL PAIN: Status: RESOLVED | Noted: 2019-11-01 | Resolved: 2024-09-09

## 2024-09-09 PROBLEM — N13.30 HYDRONEPHROSIS: Status: RESOLVED | Noted: 2017-10-23 | Resolved: 2024-09-09

## 2024-09-09 PROBLEM — M70.62 GREATER TROCHANTERIC BURSITIS, LEFT: Status: RESOLVED | Noted: 2020-11-10 | Resolved: 2024-09-09

## 2024-09-09 PROBLEM — M76.32 IT BAND SYNDROME, LEFT: Status: RESOLVED | Noted: 2020-11-10 | Resolved: 2024-09-09

## 2024-09-09 PROBLEM — L03.032 PARONYCHIA OF GREAT TOE, LEFT: Status: RESOLVED | Noted: 2017-07-11 | Resolved: 2024-09-09

## 2024-09-09 PROBLEM — K57.92 DIVERTICULITIS OF INTESTINE: Status: RESOLVED | Noted: 2019-11-01 | Resolved: 2024-09-09

## 2024-09-09 PROCEDURE — G8417 CALC BMI ABV UP PARAM F/U: HCPCS

## 2024-09-09 PROCEDURE — 1123F ACP DISCUSS/DSCN MKR DOCD: CPT

## 2024-09-09 PROCEDURE — G8427 DOCREV CUR MEDS BY ELIG CLIN: HCPCS

## 2024-09-09 PROCEDURE — 3074F SYST BP LT 130 MM HG: CPT

## 2024-09-09 PROCEDURE — 1036F TOBACCO NON-USER: CPT

## 2024-09-09 PROCEDURE — 99214 OFFICE O/P EST MOD 30 MIN: CPT

## 2024-09-09 PROCEDURE — G8399 PT W/DXA RESULTS DOCUMENT: HCPCS

## 2024-09-09 PROCEDURE — 3078F DIAST BP <80 MM HG: CPT

## 2024-09-09 PROCEDURE — 1090F PRES/ABSN URINE INCON ASSESS: CPT

## 2024-09-09 ASSESSMENT — SLEEP AND FATIGUE QUESTIONNAIRES
HOW LIKELY ARE YOU TO NOD OFF OR FALL ASLEEP WHILE SITTING INACTIVE IN A PUBLIC PLACE: WOULD NEVER DOZE
HOW LIKELY ARE YOU TO NOD OFF OR FALL ASLEEP WHEN YOU ARE A PASSENGER IN A CAR FOR AN HOUR WITHOUT A BREAK: WOULD NEVER DOZE
HOW LIKELY ARE YOU TO NOD OFF OR FALL ASLEEP IN A CAR, WHILE STOPPED FOR A FEW MINUTES IN TRAFFIC: WOULD NEVER DOZE
HOW LIKELY ARE YOU TO NOD OFF OR FALL ASLEEP WHILE WATCHING TV: HIGH CHANCE OF DOZING
HOW LIKELY ARE YOU TO NOD OFF OR FALL ASLEEP WHILE LYING DOWN TO REST IN THE AFTERNOON WHEN CIRCUMSTANCES PERMIT: HIGH CHANCE OF DOZING
HOW LIKELY ARE YOU TO NOD OFF OR FALL ASLEEP WHILE SITTING AND TALKING TO SOMEONE: WOULD NEVER DOZE
HOW LIKELY ARE YOU TO NOD OFF OR FALL ASLEEP WHILE SITTING AND READING: SLIGHT CHANCE OF DOZING
HOW LIKELY ARE YOU TO NOD OFF OR FALL ASLEEP WHILE SITTING QUIETLY AFTER LUNCH WITHOUT ALCOHOL: HIGH CHANCE OF DOZING
ESS TOTAL SCORE: 10

## 2024-09-18 DIAGNOSIS — E11.9 TYPE 2 DIABETES MELLITUS WITHOUT COMPLICATION, WITHOUT LONG-TERM CURRENT USE OF INSULIN (HCC): ICD-10-CM

## 2024-09-18 RX ORDER — METFORMIN HCL 500 MG
2000 TABLET, EXTENDED RELEASE 24 HR ORAL
Qty: 360 TABLET | Refills: 1 | Status: SHIPPED | OUTPATIENT
Start: 2024-09-18

## 2024-09-18 RX ORDER — METFORMIN HCL 500 MG
TABLET, EXTENDED RELEASE 24 HR ORAL
Qty: 270 TABLET | Refills: 1 | Status: CANCELLED | OUTPATIENT
Start: 2024-09-18

## 2024-10-16 ENCOUNTER — TELEPHONE (OUTPATIENT)
Dept: PULMONOLOGY | Age: 79
End: 2024-10-16

## 2024-10-16 DIAGNOSIS — G47.33 OSA (OBSTRUCTIVE SLEEP APNEA): Primary | ICD-10-CM

## 2024-10-16 NOTE — TELEPHONE ENCOUNTER
Please tell patient the changes that I made at her last visit of slightly higher air pressures did not help her event # a bit so we can change back to prior settings of APAP 5-9 (unless she prefers how they feel now).  If she continues to have any concerns about abnormal daytime sleepiness then the next step to pursuing evaluation would be repeating an in-lab CPAP titration to find the best PAP settings for her, which was last performed 10 yrs ago.  If she's happy with how she is feeling then I will see her in f/u in a year!

## 2024-10-21 NOTE — TELEPHONE ENCOUNTER
Pt returned call.  She would like to go back to 5 - 9   If she isn't getting benefit from it being on 6

## 2024-10-22 NOTE — TELEPHONE ENCOUNTER
I am unable to find patient in [employer].  I'm placing order to change back to APAP 5-9 cm H2O.  Please fax to DME.

## 2024-11-11 NOTE — PROGRESS NOTES
50 MG tablet      4. Palpitations  Cardiac event/MCOT monitor        The 10-year ASCVD risk score (Louise MARCELO, et al., 2019) is: 46.6%    Values used to calculate the score:      Age: 79 years      Sex: Female      Is Non- : No      Diabetic: Yes      Tobacco smoker: No      Systolic Blood Pressure: 132 mmHg      Is BP treated: Yes      HDL Cholesterol: 73 mg/dL      Total Cholesterol: 156 mg/dL     Plan:  EKG reviewed  Ok to continue metoprolol tartrate (Lopressor) 50 mg twice a day  Order cardiac event monitor 2 weeks ~ palpitations   I recommend that the patient continue their currently prescribed medications. Their drug modifiable risk factors appear to be well controlled. I will continue to address the need/dosing of medications in future visits.   Order fasting lipid panel   Follow up in 1 year     Scribe's attestation:  This note was scribed in the presence of Dr.Vanshipal Kitchen by Susy Britton RN     I, Dr. Clari Kitchen, personally performed the services described in this documentation, as scribed by the above signed scribe in my presence. It is both accurate and complete to my knowledge. I agree with the details independently gathered by the clinical support staff, while the remaining scribed note accurately describes my personal service to the patient.    Medical Decision Making:  The following items were considered in medical decision making:  Independent review of images  Review / order clinical lab tests  Review / order radiology tests  Decision to obtain old records  Review and summation of old records as accessed through [employer] (a summary of my findings in these old records)      Time Based Itemization  A total of 30 minutes was spent on today's patient encounter.  If applicable, non-patient-facing activities:  (X)Preparing to see the patient and reviewing records  (X) Individual interpretation of results  ( ) Discussion or coordination of care with other health care

## 2024-11-13 ENCOUNTER — OFFICE VISIT (OUTPATIENT)
Dept: CARDIOLOGY CLINIC | Age: 79
End: 2024-11-13

## 2024-11-13 ENCOUNTER — TELEPHONE (OUTPATIENT)
Dept: CARDIOLOGY CLINIC | Age: 79
End: 2024-11-13

## 2024-11-13 VITALS
OXYGEN SATURATION: 98 % | SYSTOLIC BLOOD PRESSURE: 132 MMHG | BODY MASS INDEX: 27.97 KG/M2 | HEART RATE: 64 BPM | DIASTOLIC BLOOD PRESSURE: 60 MMHG | WEIGHT: 152 LBS | HEIGHT: 62 IN

## 2024-11-13 DIAGNOSIS — I34.1 MVP (MITRAL VALVE PROLAPSE): ICD-10-CM

## 2024-11-13 DIAGNOSIS — R00.2 PALPITATIONS: ICD-10-CM

## 2024-11-13 DIAGNOSIS — E78.2 MIXED HYPERLIPIDEMIA: ICD-10-CM

## 2024-11-13 DIAGNOSIS — I10 ESSENTIAL HYPERTENSION: Primary | ICD-10-CM

## 2024-11-13 RX ORDER — METOPROLOL TARTRATE 50 MG
TABLET ORAL
Qty: 180 TABLET | Refills: 3 | Status: SHIPPED | OUTPATIENT
Start: 2024-11-13

## 2024-11-13 NOTE — TELEPHONE ENCOUNTER
Monitor placed by Lubna RUIZ  Monitor company [employer]  Length of monitor 14 days  Monitor ordered by [facility]  Serial number [id number]  Kit ID [id number], [id number]  Activation successful prior to pt leaving office? Yes     
on the discharge service for the patient. I have reviewed and made amendments to the documentation where necessary.

## 2024-11-13 NOTE — PATIENT INSTRUCTIONS
Plan:  EKG reviewed  Ok to continue metoprolol tartrate (Lopressor) 50 mg twice a day  Order cardiac event monitor 2 weeks ~ palpitations   I recommend that the patient continue their currently prescribed medications. Their drug modifiable risk factors appear to be well controlled. I will continue to address the need/dosing of medications in future visits.   Order fasting lipid panel   Follow up in 1 year

## 2024-11-18 DIAGNOSIS — I10 ESSENTIAL HYPERTENSION: ICD-10-CM

## 2024-11-18 DIAGNOSIS — I34.1 MVP (MITRAL VALVE PROLAPSE): ICD-10-CM

## 2024-11-18 RX ORDER — METOPROLOL TARTRATE 50 MG
TABLET ORAL
Qty: 180 TABLET | Refills: 3 | OUTPATIENT
Start: 2024-11-18

## 2024-11-18 NOTE — TELEPHONE ENCOUNTER
Last Office Visit: 11/13/24 Provider: CATARINO  Is provider OOT? No    Next Office Visit: 11/13/25Provider: CATARINO    **Has patient already had 30 day supply? No      **Check Care Everywhere? yes -    **Lab orders needed? yes - BMP    Is encounter provider correct?   Yes  Does refill dosage match last filled?   Yes  Changes to script from Tele Encounters or LOV Plan?   no  Did you adjust dispensed amount or refills to reflect last and upcoming OV?  Yes      metoprolol tartrate (LOPRESSOR) 50 MG tablet          Called pt & lvm to inform BMP is needed

## 2024-11-25 ENCOUNTER — OFFICE VISIT (OUTPATIENT)
Dept: FAMILY MEDICINE CLINIC | Age: 79
End: 2024-11-25

## 2024-11-25 VITALS
SYSTOLIC BLOOD PRESSURE: 154 MMHG | DIASTOLIC BLOOD PRESSURE: 78 MMHG | HEIGHT: 62 IN | OXYGEN SATURATION: 97 % | BODY MASS INDEX: 27.79 KG/M2 | HEART RATE: 58 BPM | WEIGHT: 151 LBS

## 2024-11-25 DIAGNOSIS — F41.9 ANXIETY: Primary | ICD-10-CM

## 2024-11-25 DIAGNOSIS — E89.0 POSTSURGICAL HYPOTHYROIDISM: ICD-10-CM

## 2024-11-25 DIAGNOSIS — M20.12 VALGUS DEFORMITY OF BOTH GREAT TOES: ICD-10-CM

## 2024-11-25 DIAGNOSIS — H93.13 TINNITUS OF BOTH EARS: ICD-10-CM

## 2024-11-25 DIAGNOSIS — E83.42 HYPOMAGNESEMIA: ICD-10-CM

## 2024-11-25 DIAGNOSIS — I10 ESSENTIAL HYPERTENSION: ICD-10-CM

## 2024-11-25 DIAGNOSIS — E11.42 TYPE 2 DIABETES MELLITUS WITH DIABETIC POLYNEUROPATHY, WITHOUT LONG-TERM CURRENT USE OF INSULIN (HCC): ICD-10-CM

## 2024-11-25 DIAGNOSIS — E53.8 VITAMIN B12 DEFICIENCY: ICD-10-CM

## 2024-11-25 DIAGNOSIS — E55.9 VITAMIN D DEFICIENCY: ICD-10-CM

## 2024-11-25 DIAGNOSIS — M20.11 VALGUS DEFORMITY OF BOTH GREAT TOES: ICD-10-CM

## 2024-11-25 DIAGNOSIS — D64.9 ANEMIA, UNSPECIFIED TYPE: ICD-10-CM

## 2024-11-25 DIAGNOSIS — E78.2 MIXED HYPERLIPIDEMIA: ICD-10-CM

## 2024-11-25 LAB
BILIRUBIN, POC: ABNORMAL
BLOOD URINE, POC: ABNORMAL
CLARITY, POC: CLEAR
COLOR, POC: YELLOW
CREAT UR-MCNC: 168 MG/DL (ref 28–259)
GLUCOSE URINE, POC: ABNORMAL MG/DL
KETONES, POC: ABNORMAL MG/DL
LEUKOCYTE EST, POC: ABNORMAL
MICROALBUMIN UR DL<=1MG/L-MCNC: <1.2 MG/DL
MICROALBUMIN/CREAT UR: NORMAL MG/G (ref 0–30)
NITRITE, POC: ABNORMAL
PH, POC: 6
PROTEIN, POC: ABNORMAL MG/DL
SPECIFIC GRAVITY, POC: 1.02
UROBILINOGEN, POC: 0.2 MG/DL

## 2024-11-25 ASSESSMENT — ANXIETY QUESTIONNAIRES
4. TROUBLE RELAXING: NOT AT ALL
GAD7 TOTAL SCORE: 0
6. BECOMING EASILY ANNOYED OR IRRITABLE: NOT AT ALL
2. NOT BEING ABLE TO STOP OR CONTROL WORRYING: NOT AT ALL
5. BEING SO RESTLESS THAT IT IS HARD TO SIT STILL: NOT AT ALL
3. WORRYING TOO MUCH ABOUT DIFFERENT THINGS: NOT AT ALL
1. FEELING NERVOUS, ANXIOUS, OR ON EDGE: NOT AT ALL
7. FEELING AFRAID AS IF SOMETHING AWFUL MIGHT HAPPEN: NOT AT ALL
IF YOU CHECKED OFF ANY PROBLEMS ON THIS QUESTIONNAIRE, HOW DIFFICULT HAVE THESE PROBLEMS MADE IT FOR YOU TO DO YOUR WORK, TAKE CARE OF THINGS AT HOME, OR GET ALONG WITH OTHER PEOPLE: NOT DIFFICULT AT ALL

## 2024-11-25 ASSESSMENT — PATIENT HEALTH QUESTIONNAIRE - PHQ9
10. IF YOU CHECKED OFF ANY PROBLEMS, HOW DIFFICULT HAVE THESE PROBLEMS MADE IT FOR YOU TO DO YOUR WORK, TAKE CARE OF THINGS AT HOME, OR GET ALONG WITH OTHER PEOPLE: SOMEWHAT DIFFICULT
SUM OF ALL RESPONSES TO PHQ QUESTIONS 1-9: 5
7. TROUBLE CONCENTRATING ON THINGS, SUCH AS READING THE NEWSPAPER OR WATCHING TELEVISION: SEVERAL DAYS
2. FEELING DOWN, DEPRESSED OR HOPELESS: NOT AT ALL
5. POOR APPETITE OR OVEREATING: NOT AT ALL
9. THOUGHTS THAT YOU WOULD BE BETTER OFF DEAD, OR OF HURTING YOURSELF: NOT AT ALL
4. FEELING TIRED OR HAVING LITTLE ENERGY: NEARLY EVERY DAY
SUM OF ALL RESPONSES TO PHQ QUESTIONS 1-9: 5
SUM OF ALL RESPONSES TO PHQ QUESTIONS 1-9: 5
6. FEELING BAD ABOUT YOURSELF - OR THAT YOU ARE A FAILURE OR HAVE LET YOURSELF OR YOUR FAMILY DOWN: NOT AT ALL
1. LITTLE INTEREST OR PLEASURE IN DOING THINGS: NOT AT ALL
SUM OF ALL RESPONSES TO PHQ QUESTIONS 1-9: 5
8. MOVING OR SPEAKING SO SLOWLY THAT OTHER PEOPLE COULD HAVE NOTICED. OR THE OPPOSITE, BEING SO FIGETY OR RESTLESS THAT YOU HAVE BEEN MOVING AROUND A LOT MORE THAN USUAL: NOT AT ALL
SUM OF ALL RESPONSES TO PHQ9 QUESTIONS 1 & 2: 0
3. TROUBLE FALLING OR STAYING ASLEEP: SEVERAL DAYS

## 2024-11-25 ASSESSMENT — ENCOUNTER SYMPTOMS
EYES NEGATIVE: 1
GASTROINTESTINAL NEGATIVE: 1
SHORTNESS OF BREATH: 0
RESPIRATORY NEGATIVE: 1
BLOOD IN STOOL: 0
ABDOMINAL PAIN: 0
BACK PAIN: 1
ANAL BLEEDING: 0
NAUSEA: 0
ALLERGIC/IMMUNOLOGIC NEGATIVE: 1

## 2024-11-25 NOTE — PROGRESS NOTES
[facility] PHYSICIAN PRACTICES  [facility] FAMILY MEDICINE  [address] [place] [id number]  Dept: [phone]  Dept Fax: [phone]  Loc: [phone]    Lilia Robert is a 79 y.o. female who presents today for her medical conditions/complaints as noted below.  Lilia Robert is c/o of Hypertension (BP at home 120's over 60's.  Denies edema, SOB or chest pain ), Diabetes (Fasting glucose 105), Cholesterol Problem (Patient is not fasting fasting today ), Anxiety, Depression, and Other (Vitamin D deficiency /Hypomagnesemia )       Subjective:     Chief Complaint   Patient presents with    Hypertension     BP at home 120's over 60's.  Denies edema, SOB or chest pain     Diabetes     Fasting glucose 105    Cholesterol Problem     Patient is not fasting fasting today     Anxiety    Depression    Other     Vitamin D deficiency   Hypomagnesemia        HPI  She presents today for 3 month follow up on chronic benzodiazepine management. The patient is on chronic benzodiazepine therapy for the diagnosis of:  Anxiety:   The patient complains of difficulty concentrating, but denies  any other symptoms .     Current pharmacologic treatment: Ativan.    Medication side effects:  none.  The patient denies nausea, vomiting, diarrhea and constipation. Denies respiratory problems/depression. Denies increased sleepiness, drowsiness or confusion.   The patient states that the medications and treatment have helped improve the quality of life and helped in psychosocial functioning.   She is not participating in counselling/therapy.  The patient states they are taking medications only as prescribed , using one pharmacy and getting all controlled substances from one provider unless okayed with me. The patient states they are following proper safeguarding and disposal of medications.      Diabetes Mellitus Type 2:  Patient is here for follow up on diabetes.      Treatment Adherence:   Medication compliance:  Taking medication as

## 2024-11-26 DIAGNOSIS — I10 ESSENTIAL HYPERTENSION: ICD-10-CM

## 2024-11-26 DIAGNOSIS — E89.0 POSTSURGICAL HYPOTHYROIDISM: Primary | ICD-10-CM

## 2024-11-26 RX ORDER — AMLODIPINE BESYLATE 5 MG/1
TABLET ORAL
Qty: 90 TABLET | Refills: 2 | Status: SHIPPED | OUTPATIENT
Start: 2024-11-26

## 2024-11-26 RX ORDER — LEVOTHYROXINE SODIUM 75 UG/1
75 TABLET ORAL DAILY
Qty: 90 TABLET | Refills: 2 | Status: SHIPPED | OUTPATIENT
Start: 2024-11-26

## 2024-11-26 NOTE — TELEPHONE ENCOUNTER
Refill Request     CONFIRM preferrred pharmacy with the patient.    If Mail Order Rx - Pend for 90 day refill.      Last Seen: Last Seen Department: 11/25/2024  Last Seen by PCP: 11/25/2024    Last Written: 5/30/24    If no future appointment scheduled, route STAFF MESSAGE with patient name to the  Pool for scheduling.      Next Appointment:   Future Appointments   Date Time Provider Department Center   12/2/2024 11:00 AM [facility] MAMMO RM 1 [facility] MAMMO [facility]   2/26/2025 10:00 AM Ranjana Lyon, APRN - CNP [facility] DEP   9/11/2025  1:00 PM Tiffanie Luna PA-C [facility]   11/13/2025 11:00 AM Clari Kitchen MD [facility] MMA       Message sent to  to schedule appt with patient?  N/A      Requested Prescriptions     Pending Prescriptions Disp Refills    amLODIPine (NORVASC) 5 MG tablet [Pharmacy Med Name: AMLODIPINE BESYLATE TABS 5MG] 90 tablet 3     Sig: TAKE 1 TABLET DAILY    levothyroxine (SYNTHROID) 75 MCG tablet [Pharmacy Med Name: L-THYROXINE (SYNTHROID) TABS 75MCG] 90 tablet 3     Sig: TAKE 1 TABLET DAILY

## 2024-12-02 ENCOUNTER — HOSPITAL ENCOUNTER (OUTPATIENT)
Age: 79
Discharge: HOME | End: 2024-12-02
Payer: MEDICARE

## 2024-12-02 ENCOUNTER — HOSPITAL ENCOUNTER (OUTPATIENT)
Dept: MAMMOGRAPHY | Age: 79
Discharge: HOME | End: 2024-12-02
Payer: MEDICARE

## 2024-12-02 DIAGNOSIS — E89.0 POSTSURGICAL HYPOTHYROIDISM: ICD-10-CM

## 2024-12-02 DIAGNOSIS — E78.2 MIXED HYPERLIPIDEMIA: ICD-10-CM

## 2024-12-02 DIAGNOSIS — E55.9 VITAMIN D DEFICIENCY: ICD-10-CM

## 2024-12-02 DIAGNOSIS — Z12.39 SCREENING BREAST EXAMINATION: ICD-10-CM

## 2024-12-02 DIAGNOSIS — E11.42 TYPE 2 DIABETES MELLITUS WITH DIABETIC POLYNEUROPATHY, WITHOUT LONG-TERM CURRENT USE OF INSULIN (HCC): ICD-10-CM

## 2024-12-02 DIAGNOSIS — I10 ESSENTIAL HYPERTENSION: ICD-10-CM

## 2024-12-02 LAB
ALBUMIN SERPL-MCNC: 4.8 G/DL (ref 3.4–5)
ALBUMIN/GLOB SERPL: 1.8 {RATIO} (ref 1.1–2.2)
ALP SERPL-CCNC: 70 U/L (ref 40–129)
ALT SERPL-CCNC: 11 U/L (ref 10–40)
ANION GAP SERPL CALCULATED.3IONS-SCNC: 14 MMOL/L (ref 3–16)
AST SERPL-CCNC: 16 U/L (ref 15–37)
BASOPHILS # BLD: 0.1 K/UL (ref 0–0.2)
BASOPHILS NFR BLD: 1.6 %
BILIRUB SERPL-MCNC: 0.4 MG/DL (ref 0–1)
BUN SERPL-MCNC: 17 MG/DL (ref 7–20)
CALCIUM SERPL-MCNC: 9.6 MG/DL (ref 8.3–10.6)
CHLORIDE SERPL-SCNC: 99 MMOL/L (ref 99–110)
CO2 SERPL-SCNC: 27 MMOL/L (ref 21–32)
CREAT SERPL-MCNC: 0.6 MG/DL (ref 0.6–1.2)
DEPRECATED RDW RBC AUTO: 14.2 % (ref 12.4–15.4)
EOSINOPHIL # BLD: 0.1 K/UL (ref 0–0.6)
EOSINOPHIL NFR BLD: 1.1 %
GFR SERPLBLD CREATININE-BSD FMLA CKD-EPI: >90 ML/MIN/{1.73_M2}
GLUCOSE SERPL-MCNC: 108 MG/DL (ref 70–99)
HCT VFR BLD AUTO: 38.6 % (ref 36–48)
HGB BLD-MCNC: 12.5 G/DL (ref 12–16)
LYMPHOCYTES # BLD: 1.5 K/UL (ref 1–5.1)
LYMPHOCYTES NFR BLD: 19.5 %
MCH RBC QN AUTO: 28.3 PG (ref 26–34)
MCHC RBC AUTO-ENTMCNC: 32.3 G/DL (ref 31–36)
MCV RBC AUTO: 87.7 FL (ref 80–100)
MONOCYTES # BLD: 0.6 K/UL (ref 0–1.3)
MONOCYTES NFR BLD: 7.4 %
NEUTROPHILS # BLD: 5.4 K/UL (ref 1.7–7.7)
NEUTROPHILS NFR BLD: 70.4 %
PLATELET # BLD AUTO: 232 K/UL (ref 135–450)
PMV BLD AUTO: 9.2 FL (ref 5–10.5)
POTASSIUM SERPL-SCNC: 4.1 MMOL/L (ref 3.5–5.1)
PROT SERPL-MCNC: 7.4 G/DL (ref 6.4–8.2)
RBC # BLD AUTO: 4.41 M/UL (ref 4–5.2)
SODIUM SERPL-SCNC: 140 MMOL/L (ref 136–145)
WBC # BLD AUTO: 7.6 K/UL (ref 4–11)

## 2024-12-02 PROCEDURE — 83036 HEMOGLOBIN GLYCOSYLATED A1C: CPT

## 2024-12-02 PROCEDURE — 36415 COLL VENOUS BLD VENIPUNCTURE: CPT

## 2024-12-02 PROCEDURE — 80053 COMPREHEN METABOLIC PANEL: CPT

## 2024-12-02 PROCEDURE — 85025 COMPLETE CBC W/AUTO DIFF WBC: CPT

## 2024-12-02 PROCEDURE — 80061 LIPID PANEL: CPT

## 2024-12-02 PROCEDURE — 84443 ASSAY THYROID STIM HORMONE: CPT

## 2024-12-02 PROCEDURE — 77063 BREAST TOMOSYNTHESIS BI: CPT

## 2024-12-02 PROCEDURE — 82306 VITAMIN D 25 HYDROXY: CPT

## 2024-12-03 LAB
25(OH)D3 SERPL-MCNC: 53.3 NG/ML
CHOLEST SERPL-MCNC: 166 MG/DL (ref 0–199)
EST. AVERAGE GLUCOSE BLD GHB EST-MCNC: 125.5 MG/DL
HBA1C MFR BLD: 6 %
HDLC SERPL-MCNC: 64 MG/DL (ref 40–60)
LDLC SERPL CALC-MCNC: 62 MG/DL
TRIGL SERPL-MCNC: 200 MG/DL (ref 0–150)
TSH SERPL DL<=0.005 MIU/L-ACNC: 1.2 UIU/ML (ref 0.27–4.2)
VLDLC SERPL CALC-MCNC: 40 MG/DL

## 2025-01-15 ENCOUNTER — OFFICE VISIT (OUTPATIENT)
Dept: ORTHOPEDIC SURGERY | Age: 80
End: 2025-01-15
Payer: MEDICARE

## 2025-01-15 VITALS — WEIGHT: 151 LBS | BODY MASS INDEX: 27.79 KG/M2 | HEIGHT: 62 IN

## 2025-01-15 DIAGNOSIS — M70.61 GREATER TROCHANTERIC BURSITIS OF RIGHT HIP: Primary | ICD-10-CM

## 2025-01-15 DIAGNOSIS — M70.62 GREATER TROCHANTERIC BURSITIS, LEFT: ICD-10-CM

## 2025-01-15 DIAGNOSIS — M76.31 ILIOTIBIAL BAND SYNDROME OF RIGHT SIDE: ICD-10-CM

## 2025-01-15 PROCEDURE — 99213 OFFICE O/P EST LOW 20 MIN: CPT | Performed by: ORTHOPAEDIC SURGERY

## 2025-01-15 PROCEDURE — G8399 PT W/DXA RESULTS DOCUMENT: HCPCS | Performed by: ORTHOPAEDIC SURGERY

## 2025-01-15 PROCEDURE — 1036F TOBACCO NON-USER: CPT | Performed by: ORTHOPAEDIC SURGERY

## 2025-01-15 PROCEDURE — 1159F MED LIST DOCD IN RCRD: CPT | Performed by: ORTHOPAEDIC SURGERY

## 2025-01-15 PROCEDURE — 1123F ACP DISCUSS/DSCN MKR DOCD: CPT | Performed by: ORTHOPAEDIC SURGERY

## 2025-01-15 PROCEDURE — G8427 DOCREV CUR MEDS BY ELIG CLIN: HCPCS | Performed by: ORTHOPAEDIC SURGERY

## 2025-01-15 PROCEDURE — G8417 CALC BMI ABV UP PARAM F/U: HCPCS | Performed by: ORTHOPAEDIC SURGERY

## 2025-01-15 PROCEDURE — 1090F PRES/ABSN URINE INCON ASSESS: CPT | Performed by: ORTHOPAEDIC SURGERY

## 2025-01-16 RX ORDER — METHYLPREDNISOLONE 4 MG/1
TABLET ORAL
Qty: 1 KIT | Refills: 0 | Status: SHIPPED | OUTPATIENT
Start: 2025-01-16 | End: 2025-01-21

## 2025-01-16 NOTE — PROGRESS NOTES
ORTHOPAEDIC SURGERY FOLLOWUP VISIT    CHIEF COMPLAINT: right hip pain, left knee pain    HISTORY OF PRESENT ILLNESS:  79-year-old female presents for evaluation of continued right hip pain.  She was previously seen in July 2024.  At that time, it was felt that her pain was related to trochanteric bursitis/iliotibial band issue.  She had improvement with trochanteric bursal injection for a few weeks.  This was complete relief.  She did engage with physical therapy which she also felt was beneficial to her.  Her pain has resumed.  She has occasional radiating pain to the hamstring/lateral knee.  She has resorted to using assistive devices for ambulation related to the pain.    PHYSICAL EXAM:  Focused examination of the right lower extremity:  No cuts, open wounds, or abrasions to the right lower extremity.  There is moderate trochanteric tenderness.  There is tenderness over the lateral femoral condyle as well as Holly's tubercle.  There is tightness experienced with Sammie's exam as well as reproducible pain.  There is no pain with gentle hip range of motion to include logroll, axial loading of the hip, flexion to greater than 90 degrees.  There is tenderness to palpation about the sacroiliac joint as well as the paraspinal lumbar musculature.  There is negative straight leg raise exam. Sensation is intact to light touch in deep peroneal, superficial peroneal, tibial, sural, and saphenous nerve distributions.  Motor function is intact to EHL, FHL, tibialis anterior, and gastroc.  There is brisk capillary refill to the toes and a strong palpable dorsalis pedis pulse.  Compartments are soft and compressible.  There is no calf tenderness and a negative Homans' sign.  Gait: Patient has significant forward flexed posture and ambulating with a cane.    RADIOGRAPHIC EXAM:  No new x-rays obtained today.    ASSESSMENT AND PLAN:  Right hip trochanteric bursitis    I counseled the patient on the pathophysiology of iliotibial

## 2025-01-22 ENCOUNTER — HOSPITAL ENCOUNTER (OUTPATIENT)
Dept: PHYSICAL THERAPY | Age: 80
Setting detail: THERAPIES SERIES
Discharge: HOME | End: 2025-01-22
Payer: MEDICARE

## 2025-01-22 DIAGNOSIS — M62.81 MUSCLE WEAKNESS OF LOWER EXTREMITY: ICD-10-CM

## 2025-01-22 DIAGNOSIS — M25.551 RIGHT HIP PAIN: Primary | ICD-10-CM

## 2025-01-22 PROCEDURE — 97110 THERAPEUTIC EXERCISES: CPT

## 2025-01-22 PROCEDURE — 97161 PT EVAL LOW COMPLEX 20 MIN: CPT

## 2025-01-22 PROCEDURE — 97140 MANUAL THERAPY 1/> REGIONS: CPT

## 2025-01-22 NOTE — PLAN OF CARE
[facility]- Outpatient Rehabilitation and Therapy [address] [place] [address] office: [phone] fax: [phone]     Physical Therapy Initial Evaluation Certification      Dear Sony Kennedy MD ,    We had the pleasure of evaluating the following patient for physical therapy services at [facility].  A summary of our findings can be found in the initial assessment below.  This includes our plan of care.  If you have any questions or concerns regarding these findings, please do not hesitate to contact me at the office phone number listed above.  Thank you for the referral.     Physician Signature:_______________________________Date:__________________  By signing above (or electronic signature), therapist’s plan is approved by physician       Physical Therapy: TREATMENT/PROGRESS NOTE   Patient: Lilia Robert (79 y.o. female)   Examination Date: 2025   :  1945 MRN: [id number]   Visit #: 1   Insurance Allowable Auth Needed   KX Modifier at visit 15 []Yes    []No    Insurance: Payor: MEDICARE / Plan: MEDICARE PART A AND B / Product Type: *No Product type* /   Insurance ID: [id number] - (Medicare)  Secondary Insurance (if applicable): [facility]   Treatment Diagnosis:     ICD-10-CM    1. Right hip pain  M25.551       2. Muscle weakness of lower extremity  M62.81          Medical Diagnosis:  Greater trochanteric bursitis, left [M70.61]  Iliotibial band syndrome of right side [M76.31]   Referring Physician: Sony Kennedy MD  PCP: Ranjana Lyon, APRN - CNP     Plan of care signed (Y/N):     Date of Patient follow up with Physician:      Plan of Care Report: EVAL today  POC update due: (10 visits /OR AUTH LIMITS, whichever is less)  2025                                             Medical History:  Comorbidities:  Cancer/Tumor  Diabetes (Type I or II)  Hypertension  Osteoarthritis  Other: Hyperlipidemia  Relevant Medical

## 2025-01-28 ENCOUNTER — HOSPITAL ENCOUNTER (OUTPATIENT)
Dept: PHYSICAL THERAPY | Age: 80
Setting detail: THERAPIES SERIES
Discharge: HOME | End: 2025-01-28
Payer: MEDICARE

## 2025-01-28 PROCEDURE — 97110 THERAPEUTIC EXERCISES: CPT

## 2025-01-28 PROCEDURE — 97112 NEUROMUSCULAR REEDUCATION: CPT

## 2025-01-28 PROCEDURE — 97140 MANUAL THERAPY 1/> REGIONS: CPT

## 2025-01-28 NOTE — FLOWSHEET NOTE
[facility]- Outpatient Rehabilitation and Therapy [address], [place] [address] office: [phone] fax: [phone]    Physical Therapy: TREATMENT/PROGRESS NOTE   Patient: Lilia Robert (79 y.o. female)   Examination Date: 2025   :  1945 MRN: [id number]   Visit #: 2   Insurance Allowable Auth Needed   KX Modifier at visit 15 []Yes    []No    Insurance: Payor: MEDICARE / Plan: MEDICARE PART A AND B / Product Type: *No Product type* /   Insurance ID: [id number] - (Medicare)  Secondary Insurance (if applicable): [facility]   Treatment Diagnosis:   No diagnosis found.     Medical Diagnosis:  Greater trochanteric bursitis, left [M70.61]  Iliotibial band syndrome of right side [M76.31]   Referring Physician: Sony Kennedy MD  PCP: Ranjana Lyon APRN - CNP     Plan of care signed (Y/N):     Date of Patient follow up with Physician:      Plan of Care Report: NO  POC update due: (10 visits /OR AUTH LIMITS, whichever is less)  2025                                             Medical History:  Comorbidities:  Cancer/Tumor  Diabetes (Type I or II)  Hypertension  Osteoarthritis  Other: Hyperlipidemia  Relevant Medical History:   Past Medical History:   Diagnosis Date    Allergic     Anemia     Arthritis of left hip 11/10/2020    Brain condition     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 Alberto and Dr. Huerta    Cerebral cavernoma     followed by Dr. Zuccarrello at     Chest pain 2011    Complication of anesthesia     Takes a long time to wake up    Diverticulosis     Dizziness 2019    Encounter for central line care 6/10/2015    Endometrial cancer (HCC)     Endometrial carcinoma (HCC) 2013    serous, poorly differnetiated ca s/p KINGSLEY/BSO on 13 along with six cycles of carboplatin/taxol , completed on   Formatting of this note might be different from the original.

## 2025-02-04 ENCOUNTER — HOSPITAL ENCOUNTER (OUTPATIENT)
Dept: PHYSICAL THERAPY | Age: 80
Setting detail: THERAPIES SERIES
Discharge: HOME | End: 2025-02-04
Payer: MEDICARE

## 2025-02-04 PROCEDURE — 97112 NEUROMUSCULAR REEDUCATION: CPT | Performed by: PHYSICAL THERAPY ASSISTANT

## 2025-02-04 PROCEDURE — 97140 MANUAL THERAPY 1/> REGIONS: CPT | Performed by: PHYSICAL THERAPY ASSISTANT

## 2025-02-04 PROCEDURE — 97110 THERAPEUTIC EXERCISES: CPT | Performed by: PHYSICAL THERAPY ASSISTANT

## 2025-02-04 NOTE — FLOWSHEET NOTE
[facility]- Outpatient Rehabilitation and Therapy [address], [place] [address] office: [phone] fax: [phone]    Physical Therapy: TREATMENT/PROGRESS NOTE   Patient: Lilia Robert (79 y.o. female)   Examination Date: 2025   :  1945 MRN: [id number]   Visit #: 3   Insurance Allowable Auth Needed   KX Modifier at visit 15 []Yes    []No    Insurance: Payor: MEDICARE / Plan: MEDICARE PART A AND B / Product Type: *No Product type* /   Insurance ID: [id number] - (Medicare)  Secondary Insurance (if applicable): [facility]   Treatment Diagnosis:   No diagnosis found.     Medical Diagnosis:  Greater trochanteric bursitis, left [M70.61]  Iliotibial band syndrome of right side [M76.31]   Referring Physician: Sony Kennedy MD  PCP: Ranjana Lyon APRN - CNP     Plan of care signed (Y/N):     Date of Patient follow up with Physician:      Plan of Care Report: NO  POC update due: (10 visits /OR AUTH LIMITS, whichever is less)  2025                                             Medical History:  Comorbidities:  Cancer/Tumor  Diabetes (Type I or II)  Hypertension  Osteoarthritis  Other: Hyperlipidemia  Relevant Medical History:   Past Medical History:   Diagnosis Date    Allergic     Anemia     Arthritis of left hip 11/10/2020    Brain condition     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 Alberto and Dr. Huerta    Cerebral cavernoma     followed by Dr. Zuccarrello at     Chest pain 2011    Complication of anesthesia     Takes a long time to wake up    Diverticulosis     Dizziness 2019    Encounter for central line care 6/10/2015    Endometrial cancer (HCC)     Endometrial carcinoma (HCC) 2013    serous, poorly differnetiated ca s/p KINGSLEY/BSO on 13 along with six cycles of carboplatin/taxol , completed on   Formatting of this note might be different from the original.  SAR[facility]NAN ambulatory encounter  Endocrine Surgery Thyroid Consult Note    CC:  Thyroid nodules  REFERRING PHYSICIAN:  Will Mckeon MD  PCP:  Will Mckeon MD    History of Present Illness:  Emilia Peters is a 71 year old female whom I am seeing in consultation at the request of Will Mckeon MD for evaluation of thyroid nodules.  The patient reports that a thyroid nodule was found about 5 years ago.  Subsequent evaluation included thyroid ultrasound which revealed a left 1.6 cm thyroid nodule which was biopsied as colloid.  Patient denies dysphagia, dyspnea while lying supine.  She reports some changes in voice/hoarseness which is intermittent and she attributes to GERD.  She denies a history of previous cervical operations or radiation to the head/neck.  Patient denies symptoms of hypo or hyperthyroidism and has been on thyroid hormone medications for ~40 years.  Currently on 50 mcg levothyroxine.      REVIEW OF SYSTEMS   Constitutional:  Negative for fever, chills, change in appetite or fatigue.   Skin:  Negative for rash or wounds.   HEENT:  See HPI   Respiratory:  Negative cough, wheezing or shortness of breath.   Cardiovascular:  Negative for chest pain, chest pressure, palpitations or diaphoresis.   Gastrointestinal:  Negative for nausea, vomiting, diarrhea, abdominal pain, black or tarry stools.   Genitourinary:  Negative for dysuria, urgency, frequency.   Extremities:  Negative for joint swelling or joint pain.   Neurologic:  Negative for change in sensory or motor function.  Negative for headache, change in gait, vision or speech.   Endocrine:  See HPI  Hematological:  Negative for bleeding, bruising or lymphadenopathy.   Psychiatric:  Negative for change in affect, anxiety, depression, mentation or sleep disturbance.     Allergies:  ALLERGIES:   Allergen Reactions   • Hydrocodone Nausea & Vomiting   • Oxycodone Nausea & Vomiting   • Seasonal    • Sulfa Antibiotics PRURITUS     Medications:  Prior to Admission  medications    Medication Sig Start Date End Date Taking? Authorizing Provider   omeprazole (PRILOSEC) 40 MG capsule Take 1 capsule by mouth daily. 18   Will Mckeon MD   L-Methylfolate-Algae (DEPLIN 15) 15-90.314 MG capsule Take 1 capsule by mouth daily.    Outside Provider   venlafaxine XR (EFFEXOR XR) 150 MG 24 hr capsule Take 150 mg by mouth daily.    Outside Provider   levothyroxine (SYNTHROID, LEVOTHROID) 50 MCG tablet Take 1 tablet by mouth daily. 17   Will Mckeon MD   aspirin 81 MG tablet Take 1 tablet by mouth daily. 16   Cristhian Jaramillo MD   traMADOL (ULTRAM) 50 MG tablet Take 1 tablet by mouth every 6 hours as needed for Pain. 16   Will Mckeon MD   B Complex Vitamins (VITAMIN B COMPLEX PO)     Outside Provider   PROBIOTIC PRODUCT PO     Outside Provider   Coenzyme Q10 (Q-10 CO-ENZYME PO)     Outside Provider   Omega-3 Fatty Acids (OMEGA 3 PO)     Outside Provider   OMEPRAZOLE PO   18  Outside Provider   zoledronic acid (RECLAST) 5 MG/100ML Solution injection Inject 100 mLs into the vein once. 1/23/15   Will Mckeon MD   albuterol (PROAIR HFA) 108 (90 BASE) MCG/ACT inhaler Inhale 2 puffs into the lungs every 4 hours as needed for Shortness of Breath or Wheezing. With spacer please 9/15/14   Will Mckeon MD   cholecalciferol (VITAMIN D3) 1000 UNITS tablet Take 4,000 Units by mouth daily. 13   Tom Garcias MD   ibuprofen (MOTRIN) 200 MG tablet Take 400 mg by mouth every 6 hours as needed.     Outside Provider      PMH:  1. Atrial fibrillation  2. ADHD  3. Depression  4. GERD  5. MATHEW  6. Multinodular goiter  7. Osteoporosis  8. Psoriasis  9. Hypothyroidism    PSH:  1. Tubal ligation  2. Right wrist surgery, fracture  3. Cholecystectomy  4. Tonsillectomy and adenoidectomy  5. Ankle fracture surgery bilaterally    Family History:   Mom:   at age 64, COPD, smoker, thyroid issues and thyroid surgery  Dad:   at age 84, MI, Parkinsons  Siblings:  2 sisters living and  relatively healthy; 1 sister  at 75 breast cancer, endometrial cancer, thyroid surgery  Children:  3 living and healthy  Denies a family history of endocrine malignancies.     Social History:   Lives alone  Works as a   EtoH:  Socially   Tobacco:  Denies  Illicit drugs:  Denies    PHYSICAL EXAM  VITAL SIGNS:  Height 5' 5\" (1.651 m), weight 82.2 kg.  WEIGHTS:  Weight    18 0814   Weight: 82.2 kg   GENERAL:  Oriented x3.  Appears well-developed and well-nourished.  NAD.   SKIN:  Warm, dry and intact.  No rashes, bruises, lesions or acanthosis nigricans.  EYES:  anicteric, no lid lag, clear conjunctiva and no exopthalmos  NECK:  No thyromegaly, Trachea is midline, Thyroid gland is nodular and moves with deglutition, No cervical lymph nodes palpable and voice slightly raspy, 3/4 cervical extension  LUNGS:  Non-labored respirations.  Clear to auscultation bilaterally.  CARDIOVASCULAR:  RRR.  ABDOMEN:  Soft and non-tender.   EXTREMITIES:  Warm and well perfused.  No edema.  NEUROLOGIC:  Grossly intact without tremor.  PSYCHIATRIC:  Normal mood and affect.    LABORATORY RESULTS:  TSH:  0.641  Calcium:  9.0    Imaging Studies:  Ultrasound:  Personally reviewed images  There is one large nodule in the inferior left lobe measuring 2.7 x 1.1 x 1.5 cm. This has enlarged since the prior study. Recommend tissue sampling of this large nodule.            Cytology: 2014  Thyroid, left, FNA:    -  Features most consistent with colloid nodule including a cystic component.      ASSESSMENT/PLAN:  Emilia Peters is a 71 year old female who presents with a multinodular goiter.  Prior FNA of left sided nodule was benign but this nodule has significantly grown.  We therefore discussed repeat FNA of this nodule.  If remains benign could continue observation as she does not have significant compressive symptoms.  If this nodule demonstrates continued growth would recommend excision.  Plan pending biopsy results.     The patient was given the opportunity to ask questions, which were answered to her satisfaction.  Patient agrees with the plan as outlined above.      Sommer Thomas MD

## 2025-02-06 ENCOUNTER — APPOINTMENT (OUTPATIENT)
Dept: PHYSICAL THERAPY | Age: 80
End: 2025-02-06
Payer: MEDICARE

## 2025-02-11 ENCOUNTER — HOSPITAL ENCOUNTER (OUTPATIENT)
Dept: PHYSICAL THERAPY | Age: 80
Setting detail: THERAPIES SERIES
Discharge: HOME | End: 2025-02-11
Payer: MEDICARE

## 2025-02-11 PROCEDURE — 97140 MANUAL THERAPY 1/> REGIONS: CPT | Performed by: PHYSICAL THERAPY ASSISTANT

## 2025-02-11 PROCEDURE — 97112 NEUROMUSCULAR REEDUCATION: CPT | Performed by: PHYSICAL THERAPY ASSISTANT

## 2025-02-11 PROCEDURE — 97110 THERAPEUTIC EXERCISES: CPT | Performed by: PHYSICAL THERAPY ASSISTANT

## 2025-02-11 NOTE — FLOWSHEET NOTE
Aquatic Therex (79335)    Dry Needle 3+ muscle (87005)     Iontophoresis (56832)    VASO (09552)     Ultrasound (17174)    Group Therapy (36959)     Estim Attended (53661)    Canalith Repositioning (38885)     Physical Performance Test (21362)    Custom orthotic ()     Other:    Other:    Total Timed Code Tx Minutes 59' 3       Total Treatment Minutes 59'        Charge Justification:  (70670) THERAPEUTIC EXERCISE - Provided verbal/tactile cueing for activities related to strengthening, flexibility, endurance, ROM performed to prevent loss of range of motion, maintain or improve muscular strength or increase flexibility, following either an injury or surgery.   (23213) NEUROMUSCULAR RE-EDUCATION - Therapeutic procedure, 1 or more areas, each 15 minutes; neuromuscular reeducation of movement, balance, coordination, kinesthetic sense, posture, and/or proprioception for sitting and/or standing activities  (94372) MANUAL THERAPY -  Manual therapy techniques, 1 or more regions, each 15 minutes (Mobilization/manipulation, manual lymphatic drainage, manual traction) for the purpose of modulating pain, promoting relaxation,  increasing ROM, reducing/eliminating soft tissue swelling/inflammation/restriction, improving soft tissue extensibility and allowing for proper ROM for normal function with self care, mobility, lifting and ambulation    GOALS     Patient stated goal: To be able to perform household activities without pain.   [] Progressing: [] Met: [] Not Met: [] Adjusted    Therapist goals for Patient:   Short Term Goals: To be achieved in: 2 weeks  1. Independent in HEP and progression per patient tolerance, in order to prevent re-injury.   [x] Progressing: [] Met: [] Not Met: [] Adjusted  2. Patient will have a decrease in pain to <1-2/10 to facilitate improvement in movement, function, and ADLs as indicated by Functional Deficits.  [x] Progressing: [] Met: [] Not Met: [] Adjusted    IF APPLICABLE:  [] Patient

## 2025-02-13 ENCOUNTER — HOSPITAL ENCOUNTER (OUTPATIENT)
Dept: PHYSICAL THERAPY | Age: 80
Setting detail: THERAPIES SERIES
Discharge: HOME | End: 2025-02-13
Payer: MEDICARE

## 2025-02-13 PROCEDURE — 97140 MANUAL THERAPY 1/> REGIONS: CPT

## 2025-02-13 PROCEDURE — 97112 NEUROMUSCULAR REEDUCATION: CPT

## 2025-02-13 PROCEDURE — 97110 THERAPEUTIC EXERCISES: CPT

## 2025-02-13 NOTE — FLOWSHEET NOTE
Summary VAS 1/10 current    10/10 prior to steroids 4/10       Functional questionnaire LEFS 14/80  83%    Other:                OBJECTIVE EXAMINATION     1/22/25  ROM/Strength: (Blank cells denote NT)        Mvmt (norm) ROM L ROM R Notes MMT L MMT R Notes     LUMBAR   Flex (90) 40 °        Ext (25) 10 °        SB (25)          Lumbar Rot             HIP Flex (120)          Abd (45)          ER (50)          IR (45)          Ext (20)         KNEE   Flex (140)          Ext (0)             ANKLE DF (20)          PF (50)          Inversion (30)          Eversion (20)           Exercises/Interventions     Therapeutic Ex (07754)   NMR re-education (27476) resistance Sets/time Reps Notes/Cues/Progressions   Bike L3 5'     PPT  10\" 15 x     HL march with PPT   20 x    HL clamshell 3 way  Orange   20 x ea    Glut Sets  10\" 15 x            HL Hip Add Squeeze  5\" 20 x     HL bridge  5\" 20 x    Quad Sets  10\" 15 x     SLR  2 10 x  R/L           Piriformis Stretch  30\" 3 x     Supine HS Stretch  30\" 3 x     Supine Hip Flexor Stretch  30\" 3 x            Standing IT Band Stretch   30\" 3 x            Standing HR   30 x    Standing march   20 x           LAQ    2x10    SAQ    x20 5\" hold    Manual Intervention (96561)  TIME     Manual Stretching  10'     DTM Lateral thigh  5'                          Therapeutic Activity (32306)  Sets/time                                          Modalities:    No modalities applied this session    Education/Home Exercise Program: Patient HEP program created electronically.  Refer to [employer] access code: Access Code: [email or web address]  Access Code: [email or web address]  URL: [email or web address]  Date: 01/22/2025  Prepared by: Vira Dominguez    Exercises  - Supine Posterior Pelvic Tilt  - 1 x daily - 7 x weekly - 1 sets - 15 reps - 10\" hold  - Supine Gluteal Sets  - 1 x daily - 7 x weekly - 1 sets - 15 reps - 10\" hold  - Supine Quad Set  - 1 x daily - 7 x weekly - 1 sets - 15 reps - 10\" hold  - Active

## 2025-02-19 ENCOUNTER — HOSPITAL ENCOUNTER (OUTPATIENT)
Dept: PHYSICAL THERAPY | Age: 80
Setting detail: THERAPIES SERIES
End: 2025-02-19
Payer: MEDICARE

## 2025-02-20 ENCOUNTER — HOSPITAL ENCOUNTER (OUTPATIENT)
Dept: PHYSICAL THERAPY | Age: 80
Setting detail: THERAPIES SERIES
Discharge: HOME | End: 2025-02-20
Payer: MEDICARE

## 2025-02-20 PROCEDURE — 97140 MANUAL THERAPY 1/> REGIONS: CPT

## 2025-02-20 PROCEDURE — 97110 THERAPEUTIC EXERCISES: CPT

## 2025-02-20 PROCEDURE — 97112 NEUROMUSCULAR REEDUCATION: CPT

## 2025-02-20 NOTE — PLAN OF CARE
Iontophoresis (08687)    VASO (46599)     Ultrasound (07144)    Group Therapy (64212)     Estim Attended (88457)    Canalith Repositioning (71574)     Physical Performance Test (29255)    Custom orthotic ()     Other:    Other:    Total Timed Code Tx Minutes 57' 3       Total Treatment Minutes 57'        Charge Justification:  (07878) THERAPEUTIC EXERCISE - Provided verbal/tactile cueing for activities related to strengthening, flexibility, endurance, ROM performed to prevent loss of range of motion, maintain or improve muscular strength or increase flexibility, following either an injury or surgery.   (76755) NEUROMUSCULAR RE-EDUCATION - Therapeutic procedure, 1 or more areas, each 15 minutes; neuromuscular reeducation of movement, balance, coordination, kinesthetic sense, posture, and/or proprioception for sitting and/or standing activities  (49461) MANUAL THERAPY -  Manual therapy techniques, 1 or more regions, each 15 minutes (Mobilization/manipulation, manual lymphatic drainage, manual traction) for the purpose of modulating pain, promoting relaxation,  increasing ROM, reducing/eliminating soft tissue swelling/inflammation/restriction, improving soft tissue extensibility and allowing for proper ROM for normal function with self care, mobility, lifting and ambulation    GOALS     Patient stated goal: To be able to perform household activities without pain.   [x] Progressing: [] Met: [] Not Met: [] Adjusted    Therapist goals for Patient:   Short Term Goals: To be achieved in: 2 weeks  1. Independent in HEP and progression per patient tolerance, in order to prevent re-injury.   [x] Progressing: [] Met: [] Not Met: [] Adjusted  2. Patient will have a decrease in pain to <1-2/10 to facilitate improvement in movement, function, and ADLs as indicated by Functional Deficits.  [x] Progressing: [] Met: [] Not Met: [] Adjusted    IF APPLICABLE:  [] Patient to demonstrate independence in wear and care for custom

## 2025-02-25 ENCOUNTER — HOSPITAL ENCOUNTER (OUTPATIENT)
Dept: PHYSICAL THERAPY | Age: 80
Setting detail: THERAPIES SERIES
Discharge: HOME | End: 2025-02-25
Payer: MEDICARE

## 2025-02-25 PROCEDURE — 97140 MANUAL THERAPY 1/> REGIONS: CPT

## 2025-02-25 PROCEDURE — 97112 NEUROMUSCULAR REEDUCATION: CPT

## 2025-02-25 PROCEDURE — 97110 THERAPEUTIC EXERCISES: CPT

## 2025-02-25 NOTE — FLOWSHEET NOTE
assess goals progression   [] Goals require adjustment due to lack of progress  [] Patient is not progressing as expected and requires additional follow up with physician  [] Other:     TREATMENT PLAN     Frequency/Duration: 1-2x/week for  12  weeks for the following treatment interventions:    Interventions:  Therapeutic Exercise (35136) including: strength training, ROM, and functional mobility  Therapeutic Activities (76238) including: functional mobility training and education.  Neuromuscular Re-education (86522) activation and proprioception, including postural re-education.    Gait Training (73168) for normalization of ambulation patterns and AD training.   Manual Therapy (39112) as indicated to include: Passive Range of Motion, Gr I-IV mobilizations, Soft Tissue Mobilization, Trigger Point Release, and Myofascial Release  Modalities as needed that may include: Cryotherapy, Electrical Stimulation, Thermal Agents, and Vasoneumatic Compression  Patient education on joint protection, postural re-education, activity modification, and progression of HEP    Plan: Cont POC- Continue emphasis/focus on improving proper muscle recruitment and activation/motor control patterns, modulating pain, increasing ROM, improving soft tissue extensibility, and static and dynamic balance. Next visit plan to add new exercises and continue current phase     Electronically Signed by Eun Mike PTA  Date: 02/25/2025     Note: Portions of this note have been templated and/or copied from initial evaluation, reassessments and prior notes for documentation efficiency.    Note: If patient does not return for scheduled/recommended follow up visits, this note will serve as a discharge from care along with the most recent update on progress.    Ortho Evaluation

## 2025-02-26 ENCOUNTER — OFFICE VISIT (OUTPATIENT)
Dept: FAMILY MEDICINE CLINIC | Age: 80
End: 2025-02-26

## 2025-02-26 VITALS
WEIGHT: 150 LBS | HEART RATE: 64 BPM | SYSTOLIC BLOOD PRESSURE: 148 MMHG | DIASTOLIC BLOOD PRESSURE: 76 MMHG | HEIGHT: 61 IN | BODY MASS INDEX: 28.32 KG/M2 | OXYGEN SATURATION: 97 %

## 2025-02-26 DIAGNOSIS — F41.9 ANXIETY: Primary | ICD-10-CM

## 2025-02-26 DIAGNOSIS — M70.61 GREATER TROCHANTERIC BURSITIS OF RIGHT HIP: ICD-10-CM

## 2025-02-26 DIAGNOSIS — M76.31 ILIOTIBIAL BAND SYNDROME OF RIGHT SIDE: ICD-10-CM

## 2025-02-26 SDOH — ECONOMIC STABILITY: FOOD INSECURITY: WITHIN THE PAST 12 MONTHS, YOU WORRIED THAT YOUR FOOD WOULD RUN OUT BEFORE YOU GOT MONEY TO BUY MORE.: NEVER TRUE

## 2025-02-26 SDOH — ECONOMIC STABILITY: FOOD INSECURITY: WITHIN THE PAST 12 MONTHS, THE FOOD YOU BOUGHT JUST DIDN'T LAST AND YOU DIDN'T HAVE MONEY TO GET MORE.: NEVER TRUE

## 2025-02-26 ASSESSMENT — ANXIETY QUESTIONNAIRES
6. BECOMING EASILY ANNOYED OR IRRITABLE: NOT AT ALL
1. FEELING NERVOUS, ANXIOUS, OR ON EDGE: NOT AT ALL
3. WORRYING TOO MUCH ABOUT DIFFERENT THINGS: SEVERAL DAYS
2. NOT BEING ABLE TO STOP OR CONTROL WORRYING: NOT AT ALL
5. BEING SO RESTLESS THAT IT IS HARD TO SIT STILL: NOT AT ALL
7. FEELING AFRAID AS IF SOMETHING AWFUL MIGHT HAPPEN: NOT AT ALL
4. TROUBLE RELAXING: NOT AT ALL
IF YOU CHECKED OFF ANY PROBLEMS ON THIS QUESTIONNAIRE, HOW DIFFICULT HAVE THESE PROBLEMS MADE IT FOR YOU TO DO YOUR WORK, TAKE CARE OF THINGS AT HOME, OR GET ALONG WITH OTHER PEOPLE: NOT DIFFICULT AT ALL
GAD7 TOTAL SCORE: 1

## 2025-02-26 ASSESSMENT — PATIENT HEALTH QUESTIONNAIRE - PHQ9
3. TROUBLE FALLING OR STAYING ASLEEP: NOT AT ALL
SUM OF ALL RESPONSES TO PHQ QUESTIONS 1-9: 1
9. THOUGHTS THAT YOU WOULD BE BETTER OFF DEAD, OR OF HURTING YOURSELF: NOT AT ALL
SUM OF ALL RESPONSES TO PHQ QUESTIONS 1-9: 1
1. LITTLE INTEREST OR PLEASURE IN DOING THINGS: NOT AT ALL
8. MOVING OR SPEAKING SO SLOWLY THAT OTHER PEOPLE COULD HAVE NOTICED. OR THE OPPOSITE, BEING SO FIGETY OR RESTLESS THAT YOU HAVE BEEN MOVING AROUND A LOT MORE THAN USUAL: NOT AT ALL
SUM OF ALL RESPONSES TO PHQ QUESTIONS 1-9: 1
SUM OF ALL RESPONSES TO PHQ QUESTIONS 1-9: 1
SUM OF ALL RESPONSES TO PHQ9 QUESTIONS 1 & 2: 1
6. FEELING BAD ABOUT YOURSELF - OR THAT YOU ARE A FAILURE OR HAVE LET YOURSELF OR YOUR FAMILY DOWN: NOT AT ALL
7. TROUBLE CONCENTRATING ON THINGS, SUCH AS READING THE NEWSPAPER OR WATCHING TELEVISION: NOT AT ALL
2. FEELING DOWN, DEPRESSED OR HOPELESS: SEVERAL DAYS
4. FEELING TIRED OR HAVING LITTLE ENERGY: NOT AT ALL
5. POOR APPETITE OR OVEREATING: NOT AT ALL
10. IF YOU CHECKED OFF ANY PROBLEMS, HOW DIFFICULT HAVE THESE PROBLEMS MADE IT FOR YOU TO DO YOUR WORK, TAKE CARE OF THINGS AT HOME, OR GET ALONG WITH OTHER PEOPLE: NOT DIFFICULT AT ALL

## 2025-02-26 ASSESSMENT — ENCOUNTER SYMPTOMS
ANAL BLEEDING: 0
GASTROINTESTINAL NEGATIVE: 1
RESPIRATORY NEGATIVE: 1
SHORTNESS OF BREATH: 0
ALLERGIC/IMMUNOLOGIC NEGATIVE: 1
BACK PAIN: 1
NAUSEA: 0
ABDOMINAL PAIN: 0
EYES NEGATIVE: 1
BLOOD IN STOOL: 0

## 2025-02-26 NOTE — PROGRESS NOTES
rash.   Neurological:      Mental Status: She is alert and oriented to person, place, and time.   Psychiatric:         Speech: Speech normal.         Behavior: Behavior normal. Behavior is cooperative.         Chief Complaint   Patient presents with    Anxiety     She is a 79 y.o. female who presents for evalution.     Reviewed PmHx, RxHx, FmHx, SocHx, AllgHx and updated and dated in the chart.    CURRENT MEDS W/ ASSOC DIAG           Start Date End Date     amLODIPine (NORVASC) 5 MG tablet  11/26/24  --     TAKE 1 TABLET DAILY     Associated Diagnoses:  Essential hypertension     blood glucose test strips (CONTOUR NEXT TEST) strip  05/22/24  --     1 each by In Vitro route 2 times daily     Associated Diagnoses:  Type 2 diabetes mellitus without complication, without long-term current use of insulin ([facility])     Cholecalciferol (VITAMIN D3) 25 MCG (1000 UT) TABS  01/15/20  --     Take 1 tablet by mouth daily     Associated Diagnoses:  Vitamin D deficiency     clobetasol (TEMOVATE) 0.05 % external solution  01/14/20  --     Associated Diagnoses:  --     diclofenac sodium (VOLTAREN) 1 % GEL  12/03/23  --     Associated Diagnoses:  --     glucose monitoring kit  05/22/24  --     1 kit by Does not apply route daily     Associated Diagnoses:  --     levothyroxine (SYNTHROID) 75 MCG tablet  11/26/24  --     TAKE 1 TABLET DAILY     Associated Diagnoses:  Postsurgical hypothyroidism     LORazepam (ATIVAN) 0.5 MG tablet  --  --     Associated Diagnoses:  --     losartan (COZAAR) 100 MG tablet  05/30/24  --     TAKE 1 TABLET DAILY     Associated Diagnoses:  Essential hypertension     magnesium oxide (MAG-OX) 400 (241.3 Mg) MG TABS tablet  07/18/19  --     Take 3 tablets by mouth 2 times daily For Hypomagnesemia     Patient taking differently: Take by mouth 2 times daily For Hypomagnesemia     Associated Diagnoses:  Hypomagnesemia     metFORMIN (GLUCOPHAGE-XR) 500 MG extended release tablet  09/18/24  --     Take 4 tablets by

## 2025-02-27 ENCOUNTER — HOSPITAL ENCOUNTER (OUTPATIENT)
Dept: PHYSICAL THERAPY | Age: 80
Setting detail: THERAPIES SERIES
Discharge: HOME | End: 2025-02-27
Payer: MEDICARE

## 2025-02-27 PROCEDURE — 97110 THERAPEUTIC EXERCISES: CPT | Performed by: PHYSICAL THERAPY ASSISTANT

## 2025-02-27 PROCEDURE — 97112 NEUROMUSCULAR REEDUCATION: CPT | Performed by: PHYSICAL THERAPY ASSISTANT

## 2025-02-27 PROCEDURE — 97140 MANUAL THERAPY 1/> REGIONS: CPT | Performed by: PHYSICAL THERAPY ASSISTANT

## 2025-02-27 NOTE — FLOWSHEET NOTE
[facility]- Outpatient Rehabilitation and Therapy [address], [place] [address] office: [phone] fax: [phone]    Physical Therapy: TREATMENT/PROGRESS NOTE   Patient: Lilia Robert (79 y.o. female)   Examination Date: 2025   :  1945 MRN: [id number]   Visit #: 8   Insurance Allowable Auth Needed   KX Modifier at visit 15 []Yes    []No    Insurance: Payor: MEDICARE / Plan: MEDICARE PART A AND B / Product Type: *No Product type* /   Insurance ID: [id number] - (Medicare)  Secondary Insurance (if applicable): [facility]   Treatment Diagnosis:         1. Right hip pain  M25.551         2. Muscle weakness of lower extremity  M62.81         Medical Diagnosis:  Greater trochanteric bursitis, left [M70.61]  Iliotibial band syndrome of right side [M76.31]   Referring Physician: Sony Kennedy MD  PCP: Ranjana Lyon APRN - CNP     Plan of care signed (Y/N):     Date of Patient follow up with Physician:      Plan of Care Report: NO  POC update due: (10 visits /OR AUTH LIMITS, whichever is less)  3/19/25                                            Medical History:  Comorbidities:  Cancer/Tumor  Diabetes (Type I or II)  Hypertension  Osteoarthritis  Other: Hyperlipidemia  Relevant Medical History:   Past Medical History:   Diagnosis Date    Allergic     Anemia     Arthritis of left hip 11/10/2020    Brain condition     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 Alberto and Dr. Huerta    Cerebral cavernoma     followed by Dr. Zuccarrello at     Chest pain 2011    Complication of anesthesia     Takes a long time to wake up    Diverticulosis     Dizziness 2019    Encounter for central line care 6/10/2015    Endometrial cancer (HCC)     Endometrial carcinoma (HCC) 2013    serous, poorly differnetiated ca s/p KINGSLEY/BSO on 13 along with six cycles of carboplatin/taxol , completed on

## 2025-03-05 ENCOUNTER — HOSPITAL ENCOUNTER (OUTPATIENT)
Dept: PHYSICAL THERAPY | Age: 80
Setting detail: THERAPIES SERIES
Discharge: HOME | End: 2025-03-05
Payer: MEDICARE

## 2025-03-05 PROCEDURE — 97112 NEUROMUSCULAR REEDUCATION: CPT | Performed by: PHYSICAL THERAPY ASSISTANT

## 2025-03-05 PROCEDURE — 97140 MANUAL THERAPY 1/> REGIONS: CPT | Performed by: PHYSICAL THERAPY ASSISTANT

## 2025-03-05 PROCEDURE — 97110 THERAPEUTIC EXERCISES: CPT | Performed by: PHYSICAL THERAPY ASSISTANT

## 2025-03-05 NOTE — FLOWSHEET NOTE
additional follow up with physician  [] Other:     TREATMENT PLAN     Frequency/Duration: 1-2x/week for  12  weeks for the following treatment interventions:    Interventions:  Therapeutic Exercise (22950) including: strength training, ROM, and functional mobility  Therapeutic Activities (85636) including: functional mobility training and education.  Neuromuscular Re-education (71738) activation and proprioception, including postural re-education.    Gait Training (69796) for normalization of ambulation patterns and AD training.   Manual Therapy (19007) as indicated to include: Passive Range of Motion, Gr I-IV mobilizations, Soft Tissue Mobilization, Trigger Point Release, and Myofascial Release  Modalities as needed that may include: Cryotherapy, Electrical Stimulation, Thermal Agents, and Vasoneumatic Compression  Patient education on joint protection, postural re-education, activity modification, and progression of HEP    Plan: Cont POC- Continue emphasis/focus on improving proper muscle recruitment and activation/motor control patterns, modulating pain, increasing ROM, improving soft tissue extensibility, and static and dynamic balance. Next visit plan to add new exercises and continue current phase     Electronically Signed by Jessica Mendoza PTA  Date: 03/05/2025     Note: Portions of this note have been templated and/or copied from initial evaluation, reassessments and prior notes for documentation efficiency.    Note: If patient does not return for scheduled/recommended follow up visits, this note will serve as a discharge from care along with the most recent update on progress.    Ortho Evaluation

## 2025-03-07 ENCOUNTER — TELEPHONE (OUTPATIENT)
Dept: ORTHOPEDIC SURGERY | Age: 80
End: 2025-03-07

## 2025-03-07 ENCOUNTER — HOSPITAL ENCOUNTER (OUTPATIENT)
Dept: PHYSICAL THERAPY | Age: 80
Setting detail: THERAPIES SERIES
End: 2025-03-07
Payer: MEDICARE

## 2025-03-07 NOTE — TELEPHONE ENCOUNTER
PT IS REQUESTING A CALL BACK IN REGARDS TO MRI OPTIONS. PT STATES PHYSICAL THERAPY HAS NOT BEEN WORKING, AND SHE WILL LIKE TO PROCEED WITH MRI ORDER. PLEASE CONTACT PT -[phone]

## 2025-03-11 ENCOUNTER — HOSPITAL ENCOUNTER (OUTPATIENT)
Dept: PHYSICAL THERAPY | Age: 80
Setting detail: THERAPIES SERIES
Discharge: HOME | End: 2025-03-11
Payer: MEDICARE

## 2025-03-11 PROCEDURE — 97110 THERAPEUTIC EXERCISES: CPT | Performed by: PHYSICAL THERAPY ASSISTANT

## 2025-03-11 PROCEDURE — 97112 NEUROMUSCULAR REEDUCATION: CPT | Performed by: PHYSICAL THERAPY ASSISTANT

## 2025-03-11 PROCEDURE — 97140 MANUAL THERAPY 1/> REGIONS: CPT | Performed by: PHYSICAL THERAPY ASSISTANT

## 2025-03-11 NOTE — FLOWSHEET NOTE
[facility]- Outpatient Rehabilitation and Therapy [address], [place] [address] office: [phone] fax: [phone]    Physical Therapy: TREATMENT/PROGRESS NOTE   Patient: Lilia Robert (79 y.o. female)   Examination Date: 2025   :  1945 MRN: [id number]   Visit #: 10   Insurance Allowable Auth Needed   KX Modifier at visit 15 []Yes    []No    Insurance: Payor: MEDICARE / Plan: MEDICARE PART A AND B / Product Type: *No Product type* /   Insurance ID: [id number] - (Medicare)  Secondary Insurance (if applicable): [facility]   Treatment Diagnosis:         1. Right hip pain  M25.551         2. Muscle weakness of lower extremity  M62.81         Medical Diagnosis:  Greater trochanteric bursitis, left [M70.61]  Iliotibial band syndrome of right side [M76.31]   Referring Physician: Sony Kennedy MD  PCP: Ranjana Lyon APRN - CNP     Plan of care signed (Y/N):     Date of Patient follow up with Physician:      Plan of Care Report: NO  POC update due: (10 visits /OR AUTH LIMITS, whichever is less)  3/19/25                                            Medical History:  Comorbidities:  Cancer/Tumor  Diabetes (Type I or II)  Hypertension  Osteoarthritis  Other: Hyperlipidemia  Relevant Medical History:   Past Medical History:   Diagnosis Date    Allergic     Anemia     Arthritis of left hip 11/10/2020    Brain condition     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 Alberto and Dr. Huerta    Cerebral cavernoma     followed by Dr. Zuccarrello at     Chest pain 2011    Complication of anesthesia     Takes a long time to wake up    Diverticulosis     Dizziness 2019    Encounter for central line care 6/10/2015    Endometrial cancer (HCC)     Endometrial carcinoma (HCC) 2013    serous, poorly differnetiated ca s/p KINGSLEY/BSO on 13 along with six cycles of carboplatin/taxol , completed on

## 2025-03-13 ENCOUNTER — TELEPHONE (OUTPATIENT)
Dept: ORTHOPEDIC SURGERY | Age: 80
End: 2025-03-13

## 2025-03-13 NOTE — TELEPHONE ENCOUNTER
PATIENT CALLED IN REQUESTING TO SPEAK WITH THE PROVIDER SHE DID NOT SPECIFY THE REASONING    PATIENT CAN BE REACHED [phone]

## 2025-03-14 ENCOUNTER — HOSPITAL ENCOUNTER (OUTPATIENT)
Dept: PHYSICAL THERAPY | Age: 80
Setting detail: THERAPIES SERIES
Discharge: HOME | End: 2025-03-14
Payer: MEDICARE

## 2025-03-14 DIAGNOSIS — M70.61 GREATER TROCHANTERIC BURSITIS OF RIGHT HIP: ICD-10-CM

## 2025-03-14 DIAGNOSIS — M76.31 ILIOTIBIAL BAND SYNDROME OF RIGHT SIDE: Primary | ICD-10-CM

## 2025-03-14 PROCEDURE — 97140 MANUAL THERAPY 1/> REGIONS: CPT | Performed by: PHYSICAL THERAPY ASSISTANT

## 2025-03-14 PROCEDURE — 97110 THERAPEUTIC EXERCISES: CPT | Performed by: PHYSICAL THERAPY ASSISTANT

## 2025-03-14 PROCEDURE — 97112 NEUROMUSCULAR REEDUCATION: CPT | Performed by: PHYSICAL THERAPY ASSISTANT

## 2025-03-14 NOTE — FLOWSHEET NOTE
[] Goals require adjustment due to lack of progress  [] Patient is not progressing as expected and requires additional follow up with physician  [] Other:     TREATMENT PLAN     Frequency/Duration: 1-2x/week for  12  weeks for the following treatment interventions:    Interventions:  Therapeutic Exercise (70998) including: strength training, ROM, and functional mobility  Therapeutic Activities (65929) including: functional mobility training and education.  Neuromuscular Re-education (19184) activation and proprioception, including postural re-education.    Gait Training (28436) for normalization of ambulation patterns and AD training.   Manual Therapy (36801) as indicated to include: Passive Range of Motion, Gr I-IV mobilizations, Soft Tissue Mobilization, Trigger Point Release, and Myofascial Release  Modalities as needed that may include: Cryotherapy, Electrical Stimulation, Thermal Agents, and Vasoneumatic Compression  Patient education on joint protection, postural re-education, activity modification, and progression of HEP    Plan: Cont POC- Continue emphasis/focus on improving proper muscle recruitment and activation/motor control patterns, modulating pain, increasing ROM, improving soft tissue extensibility, and static and dynamic balance. Next visit plan to add new exercises and continue current phase     Electronically Signed by Jessica Mendoza, PTA  Date: 03/14/2025     Note: Portions of this note have been templated and/or copied from initial evaluation, reassessments and prior notes for documentation efficiency.    Note: If patient does not return for scheduled/recommended follow up visits, this note will serve as a discharge from care along with the most recent update on progress.    Ortho Evaluation

## 2025-03-14 NOTE — TELEPHONE ENCOUNTER
Spoke with patient. No relief with PT lately. Ordered MRI of right hip. Patient will call Monday to schedule at [place]. Will f/u with me once MRI is done.

## 2025-03-18 ENCOUNTER — TELEPHONE (OUTPATIENT)
Dept: ORTHOPEDIC SURGERY | Age: 80
End: 2025-03-18

## 2025-03-18 ENCOUNTER — HOSPITAL ENCOUNTER (OUTPATIENT)
Dept: PHYSICAL THERAPY | Age: 80
Setting detail: THERAPIES SERIES
Discharge: HOME | End: 2025-03-18
Payer: MEDICARE

## 2025-03-18 PROCEDURE — 97110 THERAPEUTIC EXERCISES: CPT | Performed by: PHYSICAL THERAPY ASSISTANT

## 2025-03-18 PROCEDURE — 97112 NEUROMUSCULAR REEDUCATION: CPT | Performed by: PHYSICAL THERAPY ASSISTANT

## 2025-03-18 PROCEDURE — 97140 MANUAL THERAPY 1/> REGIONS: CPT | Performed by: PHYSICAL THERAPY ASSISTANT

## 2025-03-18 NOTE — PLAN OF CARE
TREATMENT/PROGRESS NOTE   Patient: Lilia Robert (79 y.o. female)   Examination Date: 2025   :  1945 MRN: [id number]   Visit #: 12   Insurance Allowable Auth Needed   KX Modifier at visit 15 []Yes    []No    Insurance: Payor: MEDICARE / Plan: MEDICARE PART A AND B / Product Type: *No Product type* /   Insurance ID: [id number] - (Medicare)  Secondary Insurance (if applicable): [facility]   Treatment Diagnosis:         1. Right hip pain  M25.551         2. Muscle weakness of lower extremity  M62.81         Medical Diagnosis:  Greater trochanteric bursitis, left [M70.61]  Iliotibial band syndrome of right side [M76.31]   Referring Physician: Sony Kennedy MD  PCP: Ranjana Lyon APRN - CNP     Plan of care signed (Y/N):     Date of Patient follow up with Physician:      Plan of Care Report: YES, Date Range for this report: 3/18/2025 to 2025  POC update due: (10 visits /OR AUTH LIMITS, whichever is less)  25                                            Medical History:  Comorbidities:  Cancer/Tumor  Diabetes (Type I or II)  Hypertension  Osteoarthritis  Other: Hyperlipidemia  Relevant Medical History:   Past Medical History:   Diagnosis Date    Allergic     Anemia     Arthritis of left hip 11/10/2020    Brain condition     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 Alberto and Dr. Huerta    Cerebral cavernoma     followed by Dr. Zuccarrello at     Chest pain 2011    Complication of anesthesia     Takes a long time to wake up    Diverticulosis     Dizziness 2019    Encounter for central line care 6/10/2015    Endometrial cancer (HCC)     Endometrial carcinoma (HCC) 2013    serous, poorly differnetiated ca s/p KINGSLEY/BSO on 13 along with six cycles of carboplatin/taxol , completed on   Formatting of this note might be different from the original. serous, poorly differnetiated ca s/p KINGSLEY/BSO on

## 2025-03-19 ENCOUNTER — HOSPITAL ENCOUNTER (OUTPATIENT)
Dept: MRI IMAGING | Age: 80
Discharge: HOME | End: 2025-03-19
Attending: ORTHOPAEDIC SURGERY
Payer: MEDICARE

## 2025-03-19 DIAGNOSIS — M70.61 GREATER TROCHANTERIC BURSITIS OF RIGHT HIP: ICD-10-CM

## 2025-03-19 DIAGNOSIS — M76.31 ILIOTIBIAL BAND SYNDROME OF RIGHT SIDE: ICD-10-CM

## 2025-03-19 PROCEDURE — 73721 MRI JNT OF LWR EXTRE W/O DYE: CPT

## 2025-03-21 ENCOUNTER — HOSPITAL ENCOUNTER (OUTPATIENT)
Dept: PHYSICAL THERAPY | Age: 80
Setting detail: THERAPIES SERIES
Discharge: HOME | End: 2025-03-21
Payer: MEDICARE

## 2025-03-21 PROCEDURE — 97140 MANUAL THERAPY 1/> REGIONS: CPT | Performed by: PHYSICAL THERAPY ASSISTANT

## 2025-03-21 PROCEDURE — 97112 NEUROMUSCULAR REEDUCATION: CPT | Performed by: PHYSICAL THERAPY ASSISTANT

## 2025-03-21 PROCEDURE — 97110 THERAPEUTIC EXERCISES: CPT | Performed by: PHYSICAL THERAPY ASSISTANT

## 2025-03-21 NOTE — PLAN OF CARE
[facility]- Outpatient Rehabilitation and Therapy [address], [place] [address] office: [phone] fax: [phone]        Physical Therapy: TREATMENT/PROGRESS NOTE   Patient: Lilia Robert (79 y.o. female)   Examination Date: 2025   :  1945 MRN: [id number]   Visit #: 13   Insurance Allowable Auth Needed   KX Modifier at visit 15 []Yes    []No    Insurance: Payor: MEDICARE / Plan: MEDICARE PART A AND B / Product Type: *No Product type* /   Insurance ID: [id number] - (Medicare)  Secondary Insurance (if applicable): [facility]   Treatment Diagnosis:         1. Right hip pain  M25.551         2. Muscle weakness of lower extremity  M62.81         Medical Diagnosis:  Greater trochanteric bursitis, left [M70.61]  Iliotibial band syndrome of right side [M76.31]   Referring Physician: Sony Kennedy MD  PCP: Ranjana Lyon APRN - CNP     Plan of care signed (Y/N):     Date of Patient follow up with Physician:      Plan of Care Report: NO  POC update due: (10 visits /OR AUTH LIMITS, whichever is less)  25                                            Medical History:  Comorbidities:  Cancer/Tumor  Diabetes (Type I or II)  Hypertension  Osteoarthritis  Other: Hyperlipidemia  Relevant Medical History:   Past Medical History:   Diagnosis Date    Allergic     Anemia     Arthritis of left hip 11/10/2020    Brain condition     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 Alberto and Dr. Huerta    Cerebral cavernoma     followed by Dr. Zuccarrello at     Chest pain 2011    Complication of anesthesia     Takes a long time to wake up    Diverticulosis     Dizziness 2019    Encounter for central line care 6/10/2015    Endometrial cancer (HCC)     Endometrial carcinoma (HCC) 2013    serous, poorly differnetiated ca s/p KINGSLEY/BSO on 13 along with six cycles of carboplatin/taxol , completed

## 2025-03-21 NOTE — FLOWSHEET NOTE
[facility]- Outpatient Rehabilitation and Therapy [address], [place] [address] office: [phone] fax: [phone]        Physical Therapy: TREATMENT/PROGRESS NOTE   Patient: Lilia Robert (79 y.o. female)   Examination Date: 2025   :  1945 MRN: [id number]   Visit #: 13   Insurance Allowable Auth Needed   KX Modifier at visit 15 []Yes    []No    Insurance: Payor: MEDICARE / Plan: MEDICARE PART A AND B / Product Type: *No Product type* /   Insurance ID: [id number] - (Medicare)  Secondary Insurance (if applicable): [facility]   Treatment Diagnosis:         1. Right hip pain  M25.551         2. Muscle weakness of lower extremity  M62.81         Medical Diagnosis:  Greater trochanteric bursitis, left [M70.61]  Iliotibial band syndrome of right side [M76.31]   Referring Physician: Sony Kennedy MD  PCP: Ranjana Lyon APRN - CNP     Plan of care signed (Y/N):     Date of Patient follow up with Physician:      Plan of Care Report: NO  POC update due: (10 visits /OR AUTH LIMITS, whichever is less)  25                                            Medical History:  Comorbidities:  Cancer/Tumor  Diabetes (Type I or II)  Hypertension  Osteoarthritis  Other: Hyperlipidemia  Relevant Medical History:   Past Medical History:   Diagnosis Date    Allergic     Anemia     Arthritis of left hip 11/10/2020    Brain condition     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 Alberto and Dr. Huerta    Cerebral cavernoma     followed by Dr. Zuccarrello at     Chest pain 2011    Complication of anesthesia     Takes a long time to wake up    Diverticulosis     Dizziness 2019    Encounter for central line care 6/10/2015    Endometrial cancer (HCC)     Endometrial carcinoma (HCC) 2013    serous, poorly differnetiated ca s/p KINGSLEY/BSO on 13 along with six cycles of carboplatin/taxol , completed

## 2025-03-25 ENCOUNTER — HOSPITAL ENCOUNTER (OUTPATIENT)
Dept: PHYSICAL THERAPY | Age: 80
Setting detail: THERAPIES SERIES
Discharge: HOME | End: 2025-03-25
Payer: MEDICARE

## 2025-03-25 DIAGNOSIS — I34.1 MVP (MITRAL VALVE PROLAPSE): ICD-10-CM

## 2025-03-25 DIAGNOSIS — I10 ESSENTIAL HYPERTENSION: ICD-10-CM

## 2025-03-25 PROCEDURE — 97110 THERAPEUTIC EXERCISES: CPT | Performed by: PHYSICAL THERAPY ASSISTANT

## 2025-03-25 PROCEDURE — 97112 NEUROMUSCULAR REEDUCATION: CPT | Performed by: PHYSICAL THERAPY ASSISTANT

## 2025-03-25 PROCEDURE — 97140 MANUAL THERAPY 1/> REGIONS: CPT | Performed by: PHYSICAL THERAPY ASSISTANT

## 2025-03-25 RX ORDER — METOPROLOL TARTRATE 50 MG
TABLET ORAL
Qty: 180 TABLET | Refills: 0 | Status: SHIPPED | OUTPATIENT
Start: 2025-03-25

## 2025-03-25 NOTE — TELEPHONE ENCOUNTER
Refill Request     CONFIRM preferrred pharmacy with the patient.    If Mail Order Rx - Pend for 90 day refill.      Last Seen: Last Seen Department: 2/26/2025  Last Seen by PCP: 2/26/2025    Last Written: 11/13/24    If no future appointment scheduled, route STAFF MESSAGE with patient name to the  Pool for scheduling.      Next Appointment:   Future Appointments   Date Time Provider Department Center   3/26/2025 10:15 AM Sony Kennedy MD AND ORTHO [facility]   3/28/2025 10:20 AM Jessica Mendoza, MAX [facility] [place] [facility]   5/29/2025 10:00 AM Ranjana Lyon, APRN - CNP [facility] DEP   9/11/2025  1:00 PM Tiffanie Luna [facility] CLER SLEEP [facility]   11/13/2025 10:15 AM Clari Kitchen MD [facility]       Message sent to  to schedule appt with patient?  N/A      Requested Prescriptions     Pending Prescriptions Disp Refills    metoprolol tartrate (LOPRESSOR) 50 MG tablet 180 tablet 0     Sig: TAKE 1 TABLET TWICE A DAY

## 2025-03-25 NOTE — PLAN OF CARE
[facility]- Outpatient Rehabilitation and Therapy [address], [place] [address] office: [phone] fax: [phone]    Physical Therapy Re-Certification Plan of Care    Dear Sony Kennedy MD  ,    We had the pleasure of treating the following patient for physical therapy services at [facility]. A summary of our findings can be found in the updated assessment below.  This includes our plan of care.  If you have any questions or concerns regarding these findings, please do not hesitate to contact me at the office phone number checked above.  Thank you for the referral.     Physician Signature:________________________________Date:__________________  By signing above (or electronic signature), therapist's plan is approved by physician      Total Visits: 14     Patient stated complaint: States that on Friday on the way out of therapy her left knee gave way and she had some increased pain.(We did not do any standing activities that day but we did add a 1# weight to her sitting and supine exercises.) She notes that she was sore all weekend.  She requests to continue to hold the standing activities and also to avoid adding weights to exercises. She notes she moves a lot through the day.  She gets a lot of pain upon standing after she has sat for awhile.  She does work on her HEP on days that she does not have therapy. She notes that the left knee and the right hip has been more sore since about two weeks ago when she slipped off the side of a sidewalk and nearly fell but did not go down as she was able to catch herself. Sees Dr. Kennedy tomorrow.       Test used Initial score  1/22/25 03/25/2025   Pain Summary VAS 1/10 current    10/10 prior to steroids 6/10 with walking in the right hip and the left knee    Any an intermittent ache with sitting     Functional questionnaire LEFS 14/80  83% 19/80  76%   Other:                  Overall Response to Treatment:  Lilia has

## 2025-03-26 ENCOUNTER — OFFICE VISIT (OUTPATIENT)
Dept: ORTHOPEDIC SURGERY | Age: 80
End: 2025-03-26

## 2025-03-26 VITALS — BODY MASS INDEX: 28.32 KG/M2 | HEIGHT: 61 IN | WEIGHT: 150 LBS

## 2025-03-26 DIAGNOSIS — M54.16 LUMBAR RADICULOPATHY: ICD-10-CM

## 2025-03-26 DIAGNOSIS — M25.562 LEFT KNEE PAIN, UNSPECIFIED CHRONICITY: Primary | ICD-10-CM

## 2025-03-26 DIAGNOSIS — M70.61 TROCHANTERIC BURSITIS OF RIGHT HIP: ICD-10-CM

## 2025-03-26 RX ORDER — TIZANIDINE 2 MG/1
2 TABLET ORAL NIGHTLY PRN
Qty: 30 TABLET | Refills: 0 | Status: SHIPPED | OUTPATIENT
Start: 2025-03-26

## 2025-03-28 ENCOUNTER — HOSPITAL ENCOUNTER (OUTPATIENT)
Dept: PHYSICAL THERAPY | Age: 80
Setting detail: THERAPIES SERIES
Discharge: HOME | End: 2025-03-28
Payer: MEDICARE

## 2025-03-28 PROCEDURE — 97112 NEUROMUSCULAR REEDUCATION: CPT | Performed by: PHYSICAL THERAPY ASSISTANT

## 2025-03-28 PROCEDURE — 97110 THERAPEUTIC EXERCISES: CPT | Performed by: PHYSICAL THERAPY ASSISTANT

## 2025-03-28 PROCEDURE — 97140 MANUAL THERAPY 1/> REGIONS: CPT | Performed by: PHYSICAL THERAPY ASSISTANT

## 2025-03-28 NOTE — PROGRESS NOTES
3:00 to 12:00 mild spurring along the acetabular  margin.  There is no obvious detachment or paralabral cyst..  Moderate  chondral thinning along superior acetabulum and femoral head.     Soft tissues: There is mild right gluteal medius and minimus tendinosis and  mild amount of fluid in the gluteal medius and trochanteric bursa.  Minimal  fluid in left trochanteric bursa.  The hamstring tendons are normal. No  significant bursitis.     Visceral anatomy:  Except for mild sigmoid diverticulosis the visceral  anatomy within the pelvis is unremarkable.  Hysterectomy.  No free fluid or  mass.     IMPRESSION:  1. Mild right gluteal medius and minimus tendinosis and mild trochanteric  bursitis.  2. Moderate degeneration fraying of the right labrum.  3. Mild to early moderate right hip osteoarthritis.    4 views left knee including weightbearing AP, tunnel, lateral, sunrise x-rays demonstrate no evidence of fracture or dislocation.  No significant degenerative changes.  Mechanical axis alignment is appropriate.  Patellofemoral tracking is excellent.  No subluxation or tilt.  There is minor subtle chondrocalcinosis involving meniscus.     ASSESSMENT AND PLAN:  Right hip trochanteric bursitis  Right hip abductor tendinitis  Referred pain from lumbar source.  Left knee chondrocalcinosis     I reviewed with the patient that I continue believe the majority her symptoms are related to trochanteric bursitis and gluteal tendinitis.  Recommend continued nonsurgical management.  This would be in the form of continued physical therapy exercises.  Recommend against recurring corticosteroid injections.  I indicated that with the dysesthesias that she has into the right leg, would recommend evaluation with spine team.  This would be consideration for a radiculopathy.  I reviewed with the patient a dermatome map.  Symptoms seem to be in the distribution of L5.  A referral was provided for this evaluation.  She was also provided a muscle

## 2025-03-28 NOTE — FLOWSHEET NOTE
[facility]- Outpatient Rehabilitation and Therapy [address], [place] [address] office: [phone] fax: [phone]        Physical Therapy: TREATMENT/PROGRESS NOTE   Patient: Lilia Robert (79 y.o. female)   Examination Date: 2025   :  1945 MRN: [id number]   Visit #: 15   Insurance Allowable Auth Needed   KX Modifier at visit 15 []Yes    []No    Insurance: Payor: MEDICARE / Plan: MEDICARE PART A AND B / Product Type: *No Product type* /   Insurance ID: [id number] - (Medicare)  Secondary Insurance (if applicable): [facility]   Treatment Diagnosis:         1. Right hip pain  M25.551         2. Muscle weakness of lower extremity  M62.81         Medical Diagnosis:  Greater trochanteric bursitis, left [M70.61]  Iliotibial band syndrome of right side [M76.31]   Referring Physician: Sony Kennedy MD  PCP: Ranjana Lyon APRN - CNP     Plan of care signed (Y/N):     Date of Patient follow up with Physician:      Plan of Care Report: NO  POC update due: (10 visits /OR AUTH LIMITS, whichever is less)  25                                            Medical History:  Comorbidities:  Cancer/Tumor  Diabetes (Type I or II)  Hypertension  Osteoarthritis  Other: Hyperlipidemia  Relevant Medical History:   Past Medical History:   Diagnosis Date    Allergic     Anemia     Arthritis of left hip 11/10/2020    Brain condition     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 Alberto and Dr. Huerta    Cerebral cavernoma     followed by Dr. Zuccarrello at     Chest pain 2011    Complication of anesthesia     Takes a long time to wake up    Diverticulosis     Dizziness 2019    Encounter for central line care 6/10/2015    Endometrial cancer (HCC)     Endometrial carcinoma (HCC) 2013    serous, poorly differnetiated ca s/p KINGSLEY/BSO on 13 along with six cycles of carboplatin/taxol , completed

## 2025-04-01 ENCOUNTER — HOSPITAL ENCOUNTER (OUTPATIENT)
Dept: PHYSICAL THERAPY | Age: 80
Setting detail: THERAPIES SERIES
Discharge: HOME | End: 2025-04-01
Payer: MEDICARE

## 2025-04-01 PROCEDURE — 97112 NEUROMUSCULAR REEDUCATION: CPT | Performed by: PHYSICAL THERAPY ASSISTANT

## 2025-04-01 PROCEDURE — 97140 MANUAL THERAPY 1/> REGIONS: CPT | Performed by: PHYSICAL THERAPY ASSISTANT

## 2025-04-01 PROCEDURE — 97110 THERAPEUTIC EXERCISES: CPT | Performed by: PHYSICAL THERAPY ASSISTANT

## 2025-04-01 NOTE — FLOWSHEET NOTE
[facility]- Outpatient Rehabilitation and Therapy [address], [place] [address] office: [phone] fax: [phone]        Physical Therapy: TREATMENT/PROGRESS NOTE   Patient: Lilia Robert (79 y.o. female)   Examination Date: 2025   :  1945 MRN: [id number]   Visit #: 16   Insurance Allowable Auth Needed   KX Modifier at visit 15 []Yes    []No    Insurance: Payor: MEDICARE / Plan: MEDICARE PART A AND B / Product Type: *No Product type* /   Insurance ID: [id number] - (Medicare)  Secondary Insurance (if applicable): [facility]   Treatment Diagnosis:         1. Right hip pain  M25.551         2. Muscle weakness of lower extremity  M62.81         Medical Diagnosis:  Greater trochanteric bursitis, left [M70.61]  Iliotibial band syndrome of right side [M76.31]   Referring Physician: Sony Kennedy MD  PCP: Ranjana Lyon APRN - CNP     Plan of care signed (Y/N):     Date of Patient follow up with Physician:      Plan of Care Report: NO  POC update due: (10 visits /OR AUTH LIMITS, whichever is less)  25                                            Medical History:  Comorbidities:  Cancer/Tumor  Diabetes (Type I or II)  Hypertension  Osteoarthritis  Other: Hyperlipidemia  Relevant Medical History:   Past Medical History:   Diagnosis Date    Allergic     Anemia     Arthritis of left hip 11/10/2020    Brain condition     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 Alberto and Dr. Huerta    Cerebral cavernoma     followed by Dr. Zuccarrello at     Chest pain 2011    Complication of anesthesia     Takes a long time to wake up    Diverticulosis     Dizziness 2019    Encounter for central line care 6/10/2015    Endometrial cancer (HCC)     Endometrial carcinoma (HCC) 2013    serous, poorly differnetiated ca s/p KINGSLEY/BSO on 13 along with six cycles of carboplatin/taxol , completed

## 2025-04-03 ENCOUNTER — OFFICE VISIT (OUTPATIENT)
Dept: ORTHOPEDIC SURGERY | Age: 80
End: 2025-04-03
Payer: MEDICARE

## 2025-04-03 VITALS — HEIGHT: 61 IN | WEIGHT: 150 LBS | BODY MASS INDEX: 28.32 KG/M2

## 2025-04-03 DIAGNOSIS — M47.26 OTHER SPONDYLOSIS WITH RADICULOPATHY, LUMBAR REGION: ICD-10-CM

## 2025-04-03 DIAGNOSIS — M54.50 LUMBAR PAIN: Primary | ICD-10-CM

## 2025-04-03 PROCEDURE — 1090F PRES/ABSN URINE INCON ASSESS: CPT | Performed by: PHYSICIAN ASSISTANT

## 2025-04-03 PROCEDURE — 1159F MED LIST DOCD IN RCRD: CPT | Performed by: PHYSICIAN ASSISTANT

## 2025-04-03 PROCEDURE — 99214 OFFICE O/P EST MOD 30 MIN: CPT | Performed by: PHYSICIAN ASSISTANT

## 2025-04-03 PROCEDURE — 1036F TOBACCO NON-USER: CPT | Performed by: PHYSICIAN ASSISTANT

## 2025-04-03 PROCEDURE — 1123F ACP DISCUSS/DSCN MKR DOCD: CPT | Performed by: PHYSICIAN ASSISTANT

## 2025-04-03 PROCEDURE — G8417 CALC BMI ABV UP PARAM F/U: HCPCS | Performed by: PHYSICIAN ASSISTANT

## 2025-04-03 PROCEDURE — G8399 PT W/DXA RESULTS DOCUMENT: HCPCS | Performed by: PHYSICIAN ASSISTANT

## 2025-04-03 PROCEDURE — G8427 DOCREV CUR MEDS BY ELIG CLIN: HCPCS | Performed by: PHYSICIAN ASSISTANT

## 2025-04-03 NOTE — PROGRESS NOTES
New Patient: LUMBAR SPINE    Referring Provider:  Sony Kennedy MD    CHIEF COMPLAINT:    Chief Complaint   Patient presents with    Back Pain     LUMBAR       HISTORY OF PRESENT ILLNESS:       Ms. Lilia Robert  is a pleasant 79 y.o. female here for evaluation regarding her LBP and right leg pain. She states her pain began insidiously 5 to 6 months ago.  Patient has a significant history of right trochanteric bursitis and has had previous trochanteric bursal injection which in the past given her complete relief of her symptoms for 2 weeks.  At the present time she is complaining of pain which is intermittent which she rates 10/10 in the last throughout the day and sometimes interferes with her sleep at night.  She has no difficulty with standing and walking due to back or leg pain.  She does have a sense of weakness at times within her right leg and her left knee.  She has a history of a left knee injury.  She has had physical therapy for her hip which has somewhat helped.  She denies any bowel or bladder dysfunction or saddle anesthesia.   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 Alberto and Dr. Huerta    Cerebral cavernoma     followed by Dr. Zuccarrello at     Chest pain 9/19/2011    Complication of anesthesia     Takes a long time to wake up    Diverticulosis

## 2025-04-04 ENCOUNTER — HOSPITAL ENCOUNTER (OUTPATIENT)
Dept: PHYSICAL THERAPY | Age: 80
Setting detail: THERAPIES SERIES
Discharge: HOME | End: 2025-04-04
Payer: MEDICARE

## 2025-04-04 PROCEDURE — 97110 THERAPEUTIC EXERCISES: CPT | Performed by: PHYSICAL THERAPY ASSISTANT

## 2025-04-04 PROCEDURE — 97140 MANUAL THERAPY 1/> REGIONS: CPT | Performed by: PHYSICAL THERAPY ASSISTANT

## 2025-04-04 PROCEDURE — 97112 NEUROMUSCULAR REEDUCATION: CPT | Performed by: PHYSICAL THERAPY ASSISTANT

## 2025-04-04 NOTE — FLOWSHEET NOTE
[facility]- Outpatient Rehabilitation and Therapy [address]. [address], [place] [address] office: [phone] fax: [phone]        Physical Therapy: TREATMENT/PROGRESS NOTE   Patient: Lilia Robert (79 y.o. female)   Examination Date: 2025   :  1945 MRN: [id number]   Visit #: 17   Insurance Allowable Auth Needed   KX Modifier at visit 15 []Yes    []No    Insurance: Payor: MEDICARE / Plan: MEDICARE PART A AND B / Product Type: *No Product type* /   Insurance ID: [id number] - (Medicare)  Secondary Insurance (if applicable): [facility]   Treatment Diagnosis:         1. Right hip pain  M25.551         2. Muscle weakness of lower extremity  M62.81         Medical Diagnosis:  Greater trochanteric bursitis, left [M70.61]  Iliotibial band syndrome of right side [M76.31]   Referring Physician: Sony Kennedy MD  PCP: Ranjana Lyon APRN - CNP     Plan of care signed (Y/N):     Date of Patient follow up with Physician:      Plan of Care Report: NO  POC update due: (10 visits /OR AUTH LIMITS, whichever is less)  25                                            Medical History:  Comorbidities:  Cancer/Tumor  Diabetes (Type I or II)  Hypertension  Osteoarthritis  Other: Hyperlipidemia  Relevant Medical History:   Past Medical History:   Diagnosis Date    Allergic     Anemia     Arthritis of left hip 11/10/2020    Brain condition     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 Alberto and Dr. Huerta    Cerebral cavernoma     followed by Dr. Zuccarrello at     Chest pain 2011    Complication of anesthesia     Takes a long time to wake up    Diverticulosis     Dizziness 2019    Encounter for central line care 6/10/2015    Endometrial cancer (HCC)     Endometrial carcinoma 2013    serous, poorly differnetiated ca s/p KINGSLEY/BSO on 13 along with six cycles of carboplatin/taxol , completed on

## 2025-04-08 ENCOUNTER — TELEPHONE (OUTPATIENT)
Dept: ORTHOPEDIC SURGERY | Age: 80
End: 2025-04-08

## 2025-04-08 ENCOUNTER — HOSPITAL ENCOUNTER (OUTPATIENT)
Dept: PHYSICAL THERAPY | Age: 80
Setting detail: THERAPIES SERIES
Discharge: HOME | End: 2025-04-08
Payer: MEDICARE

## 2025-04-08 PROCEDURE — 97112 NEUROMUSCULAR REEDUCATION: CPT | Performed by: PHYSICAL THERAPY ASSISTANT

## 2025-04-08 PROCEDURE — 97140 MANUAL THERAPY 1/> REGIONS: CPT | Performed by: PHYSICAL THERAPY ASSISTANT

## 2025-04-08 PROCEDURE — 97110 THERAPEUTIC EXERCISES: CPT | Performed by: PHYSICAL THERAPY ASSISTANT

## 2025-04-08 NOTE — FLOWSHEET NOTE
[facility]- Outpatient Rehabilitation and Therapy [address], [place] [address] office: [phone] fax: [phone]        Physical Therapy: TREATMENT/PROGRESS NOTE   Patient: Lilia Robert (79 y.o. female)   Examination Date: 2025   :  1945 MRN: [id number]   Visit #: 18   Insurance Allowable Auth Needed   KX Modifier at visit 15 []Yes    []No    Insurance: Payor: MEDICARE / Plan: MEDICARE PART A AND B / Product Type: *No Product type* /   Insurance ID: [id number] - (Medicare)  Secondary Insurance (if applicable): [facility]   Treatment Diagnosis:         1. Right hip pain  M25.551         2. Muscle weakness of lower extremity  M62.81         Medical Diagnosis:  Greater trochanteric bursitis, left [M70.61]  Iliotibial band syndrome of right side [M76.31]   Referring Physician: Sony Kennedy MD  PCP: Ranjana Lyon APRN - CNP     Plan of care signed (Y/N):     Date of Patient follow up with Physician:      Plan of Care Report: NO  POC update due: (10 visits /OR AUTH LIMITS, whichever is less)  25                                            Medical History:  Comorbidities:  Cancer/Tumor  Diabetes (Type I or II)  Hypertension  Osteoarthritis  Other: Hyperlipidemia  Relevant Medical History:   Past Medical History:   Diagnosis Date    Allergic     Anemia     Arthritis of left hip 11/10/2020    Brain condition     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 Alberto and Dr. Huerta    Cerebral cavernoma     followed by Dr. Zuccarrello at     Chest pain 2011    Complication of anesthesia     Takes a long time to wake up    Diverticulosis     Dizziness 2019    Encounter for central line care 6/10/2015    Endometrial cancer (HCC)     Endometrial carcinoma 2013    serous, poorly differnetiated ca s/p KINGSLEY/BSO on 13 along with six cycles of carboplatin/taxol , completed on

## 2025-04-09 ENCOUNTER — TELEPHONE (OUTPATIENT)
Dept: ORTHOPEDIC SURGERY | Age: 80
End: 2025-04-09

## 2025-04-09 NOTE — TELEPHONE ENCOUNTER
General Question     Subject: MRI REQUEST  Patient and /or Facility Request: Lilia Robert   Contact Number: [phone]       PATIENT IS REQ A RETURN CALL FROM [facility]. SHE HAS SOME QUESTIONS CONCERNING THE MRI.      MAY RETURN CALL TO THE ABOVE NUMBER

## 2025-04-11 ENCOUNTER — HOSPITAL ENCOUNTER (OUTPATIENT)
Dept: PHYSICAL THERAPY | Age: 80
Setting detail: THERAPIES SERIES
Discharge: HOME | End: 2025-04-11
Payer: MEDICARE

## 2025-04-11 PROCEDURE — 97140 MANUAL THERAPY 1/> REGIONS: CPT | Performed by: PHYSICAL THERAPY ASSISTANT

## 2025-04-11 PROCEDURE — 97112 NEUROMUSCULAR REEDUCATION: CPT | Performed by: PHYSICAL THERAPY ASSISTANT

## 2025-04-11 PROCEDURE — 97110 THERAPEUTIC EXERCISES: CPT | Performed by: PHYSICAL THERAPY ASSISTANT

## 2025-04-11 NOTE — FLOWSHEET NOTE
functional goals listed.    [x] Progression is slowed due to complexities/Impairments listed.  [] Progression has been slowed due to co-morbidities.  [] Plan just implemented, too soon (<30days) to assess goals progression   [] Goals require adjustment due to lack of progress  [] Patient is not progressing as expected and requires additional follow up with physician  [] Other:     TREATMENT PLAN     Frequency/Duration: 1-2x/week for  12  weeks for the following treatment interventions:    Interventions:  Therapeutic Exercise (24268) including: strength training, ROM, and functional mobility  Therapeutic Activities (73470) including: functional mobility training and education.  Neuromuscular Re-education (26880) activation and proprioception, including postural re-education.    Gait Training (70382) for normalization of ambulation patterns and AD training.   Manual Therapy (92569) as indicated to include: Passive Range of Motion, Gr I-IV mobilizations, Soft Tissue Mobilization, Trigger Point Release, and Myofascial Release  Modalities as needed that may include: Cryotherapy, Electrical Stimulation, Thermal Agents, and Vasoneumatic Compression  Patient education on joint protection, postural re-education, activity modification, and progression of HEP    Plan: Cont POC- Continue emphasis/focus on improving proper muscle recruitment and activation/motor control patterns, modulating pain, increasing ROM, improving soft tissue extensibility, and static and dynamic balance. Next visit plan to add new exercises and continue current phase     Electronically Signed by Jessica Mendoza PTA  Date: 04/11/2025     Note: Portions of this note have been templated and/or copied from initial evaluation, reassessments and prior notes for documentation efficiency.    Note: If patient does not return for scheduled/recommended follow up visits, this note will serve as a discharge from care along with the most recent update on progress.

## 2025-04-15 ENCOUNTER — HOSPITAL ENCOUNTER (OUTPATIENT)
Dept: PHYSICAL THERAPY | Age: 80
Setting detail: THERAPIES SERIES
Discharge: HOME | End: 2025-04-15
Payer: MEDICARE

## 2025-04-15 PROCEDURE — 97110 THERAPEUTIC EXERCISES: CPT | Performed by: PHYSICAL THERAPY ASSISTANT

## 2025-04-15 PROCEDURE — 97140 MANUAL THERAPY 1/> REGIONS: CPT | Performed by: PHYSICAL THERAPY ASSISTANT

## 2025-04-15 PROCEDURE — 97112 NEUROMUSCULAR REEDUCATION: CPT | Performed by: PHYSICAL THERAPY ASSISTANT

## 2025-04-15 NOTE — FLOWSHEET NOTE
[facility]- Outpatient Rehabilitation and Therapy [address], [place] [address] office: [phone] fax: [phone]        Physical Therapy: TREATMENT/PROGRESS NOTE   Patient: Lilia Robert (79 y.o. female)   Examination Date: 04/15/2025   :  1945 MRN: [id number]   Visit #: 20   Insurance Allowable Auth Needed   KX Modifier at visit 15 []Yes    []No    Insurance: Payor: MEDICARE / Plan: MEDICARE PART A AND B / Product Type: *No Product type* /   Insurance ID: [id number] - (Medicare)  Secondary Insurance (if applicable): [facility]   Treatment Diagnosis:         1. Right hip pain  M25.551         2. Muscle weakness of lower extremity  M62.81         Medical Diagnosis:  Greater trochanteric bursitis, left [M70.61]  Iliotibial band syndrome of right side [M76.31]   Referring Physician: Sony Kennedy MD  PCP: Ranjana Lyon APRN - CNP     Plan of care signed (Y/N):     Date of Patient follow up with Physician:      Plan of Care Report: NO  POC update due: (10 visits /OR AUTH LIMITS, whichever is less)  25                                            Medical History:  Comorbidities:  Cancer/Tumor  Diabetes (Type I or II)  Hypertension  Osteoarthritis  Other: Hyperlipidemia  Relevant Medical History:   Past Medical History:   Diagnosis Date    Allergic     Anemia     Arthritis of left hip 11/10/2020    Brain condition     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 Alberto and Dr. Huerta    Cerebral cavernoma     followed by Dr. Zuccarrello at     Chest pain 2011    Complication of anesthesia     Takes a long time to wake up    Diverticulosis     Dizziness 2019    Encounter for central line care 6/10/2015    Endometrial cancer (HCC)     Endometrial carcinoma 2013    serous, poorly differnetiated ca s/p KINGSLEY/BSO on 13 along with six cycles of carboplatin/taxol , completed on

## 2025-04-17 ENCOUNTER — HOSPITAL ENCOUNTER (OUTPATIENT)
Dept: PHYSICAL THERAPY | Age: 80
Setting detail: THERAPIES SERIES
Discharge: HOME | End: 2025-04-17
Payer: MEDICARE

## 2025-04-17 PROCEDURE — 97140 MANUAL THERAPY 1/> REGIONS: CPT | Performed by: PHYSICAL THERAPY ASSISTANT

## 2025-04-17 PROCEDURE — 97112 NEUROMUSCULAR REEDUCATION: CPT | Performed by: PHYSICAL THERAPY ASSISTANT

## 2025-04-17 PROCEDURE — 97110 THERAPEUTIC EXERCISES: CPT | Performed by: PHYSICAL THERAPY ASSISTANT

## 2025-04-17 NOTE — FLOWSHEET NOTE
has been slowed due to co-morbidities.  [] Plan just implemented, too soon (<30days) to assess goals progression   [] Goals require adjustment due to lack of progress  [] Patient is not progressing as expected and requires additional follow up with physician  [] Other:     TREATMENT PLAN     Frequency/Duration: 1-2x/week for  12  weeks for the following treatment interventions:    Interventions:  Therapeutic Exercise (50225) including: strength training, ROM, and functional mobility  Therapeutic Activities (79581) including: functional mobility training and education.  Neuromuscular Re-education (87442) activation and proprioception, including postural re-education.    Gait Training (83845) for normalization of ambulation patterns and AD training.   Manual Therapy (63777) as indicated to include: Passive Range of Motion, Gr I-IV mobilizations, Soft Tissue Mobilization, Trigger Point Release, and Myofascial Release  Modalities as needed that may include: Cryotherapy, Electrical Stimulation, Thermal Agents, and Vasoneumatic Compression  Patient education on joint protection, postural re-education, activity modification, and progression of HEP    Plan: Cont POC- Continue emphasis/focus on improving proper muscle recruitment and activation/motor control patterns, modulating pain, increasing ROM, improving soft tissue extensibility, and static and dynamic balance. Next visit plan to add new exercises and continue current phase     Electronically Signed by Jessica Mendoza PTA  Date: 04/17/2025     Note: Portions of this note have been templated and/or copied from initial evaluation, reassessments and prior notes for documentation efficiency.    Note: If patient does not return for scheduled/recommended follow up visits, this note will serve as a discharge from care along with the most recent update on progress.    Ortho Evaluation

## 2025-04-22 ENCOUNTER — HOSPITAL ENCOUNTER (OUTPATIENT)
Dept: PHYSICAL THERAPY | Age: 80
Setting detail: THERAPIES SERIES
Discharge: HOME | End: 2025-04-22
Payer: MEDICARE

## 2025-04-22 PROCEDURE — 97140 MANUAL THERAPY 1/> REGIONS: CPT | Performed by: PHYSICAL THERAPY ASSISTANT

## 2025-04-22 PROCEDURE — 97112 NEUROMUSCULAR REEDUCATION: CPT | Performed by: PHYSICAL THERAPY ASSISTANT

## 2025-04-22 PROCEDURE — 97110 THERAPEUTIC EXERCISES: CPT | Performed by: PHYSICAL THERAPY ASSISTANT

## 2025-04-22 NOTE — FLOWSHEET NOTE
interventions:    Interventions:  Therapeutic Exercise (32138) including: strength training, ROM, and functional mobility  Therapeutic Activities (82616) including: functional mobility training and education.  Neuromuscular Re-education (61584) activation and proprioception, including postural re-education.    Gait Training (03004) for normalization of ambulation patterns and AD training.   Manual Therapy (40379) as indicated to include: Passive Range of Motion, Gr I-IV mobilizations, Soft Tissue Mobilization, Trigger Point Release, and Myofascial Release  Modalities as needed that may include: Cryotherapy, Electrical Stimulation, Thermal Agents, and Vasoneumatic Compression  Patient education on joint protection, postural re-education, activity modification, and progression of HEP    Plan: Cont POC- Continue emphasis/focus on improving proper muscle recruitment and activation/motor control patterns, modulating pain, increasing ROM, improving soft tissue extensibility, and static and dynamic balance. Next visit plan to add new exercises and continue current phase     Electronically Signed by Jessica Mendoza, MAX  Date: 04/22/2025     Note: Portions of this note have been templated and/or copied from initial evaluation, reassessments and prior notes for documentation efficiency.    Note: If patient does not return for scheduled/recommended follow up visits, this note will serve as a discharge from care along with the most recent update on progress.    Ortho Evaluation

## 2025-04-24 ENCOUNTER — TELEPHONE (OUTPATIENT)
Dept: ORTHOPEDIC SURGERY | Age: 80
End: 2025-04-24

## 2025-04-24 DIAGNOSIS — M47.26 OTHER SPONDYLOSIS WITH RADICULOPATHY, LUMBAR REGION: Primary | ICD-10-CM

## 2025-04-24 NOTE — TELEPHONE ENCOUNTER
General Question     Subject: MRI QUESTIONS   Patient and /or Facility Request: Lilia Robert   Contact Number: [phone]     PATIENT REQUESTING TO SPEAK WITH [facility] REGARDING MRI QUESTIONS SHE HAVE    CALL PATIENT AT ABOVE NUMBER

## 2025-04-25 ENCOUNTER — HOSPITAL ENCOUNTER (OUTPATIENT)
Dept: PHYSICAL THERAPY | Age: 80
Setting detail: THERAPIES SERIES
Discharge: HOME | End: 2025-04-25
Payer: MEDICARE

## 2025-04-25 PROCEDURE — 97112 NEUROMUSCULAR REEDUCATION: CPT | Performed by: PHYSICAL THERAPY ASSISTANT

## 2025-04-25 PROCEDURE — 97140 MANUAL THERAPY 1/> REGIONS: CPT | Performed by: PHYSICAL THERAPY ASSISTANT

## 2025-04-25 PROCEDURE — 97110 THERAPEUTIC EXERCISES: CPT | Performed by: PHYSICAL THERAPY ASSISTANT

## 2025-04-25 NOTE — PLAN OF CARE
[facility]- Outpatient Rehabilitation and Therapy [address], [place] [address] office: [phone] fax: [phone]    Physical Therapy Re-Certification Plan of Care    Dear Sony Kennedy MD  ,    We had the pleasure of treating the following patient for physical therapy services at [facility] Outpatient Physical Therapy. A summary of our findings can be found in the updated assessment below.  This includes our plan of care.  If you have any questions or concerns regarding these findings, please do not hesitate to contact me at the office phone number checked above.  Thank you for the referral.     Physician Signature:________________________________Date:__________________  By signing above (or electronic signature), therapist's plan is approved by physician      Total Visits: 23     Patient stated complaint:    Feels she is making some slow improvement. She is feeling a little more flexible.  She notes she has a good day every other day.  She notes her hip is feeling better however she is having a burning pain and aching in bilateral lower extremities to the shins/ankles.  Sleeping varies dependant up on how she is feeling.  Rolling over in bed is better.  She still  has more trouble getting out of the car than getting into the car - also depends on the car.  She is doing her laundry and cooking, using a light battery vacuum but has held off on heavier duties like mopping.      She received a call yesterday from the MD office and they have given her the number to go ahead and get and MRI for her lumbar spine.      Allegra Starkey 4/3/2025 Plan:       We discussed the diagnosis and treatment options including observation, oral steroids, physical therapy, epidural injections and additional imaging. She wishes to proceed with outpatient physical therapy for lumbar stabilization and progressive core strengthening exercises with modalities of choice.  If she finds that she has had no

## 2025-04-29 ENCOUNTER — HOSPITAL ENCOUNTER (OUTPATIENT)
Dept: PHYSICAL THERAPY | Age: 80
Setting detail: THERAPIES SERIES
Discharge: HOME | End: 2025-04-29
Payer: MEDICARE

## 2025-04-29 PROCEDURE — 97112 NEUROMUSCULAR REEDUCATION: CPT | Performed by: PHYSICAL THERAPY ASSISTANT

## 2025-04-29 PROCEDURE — 97110 THERAPEUTIC EXERCISES: CPT | Performed by: PHYSICAL THERAPY ASSISTANT

## 2025-04-29 PROCEDURE — 97140 MANUAL THERAPY 1/> REGIONS: CPT | Performed by: PHYSICAL THERAPY ASSISTANT

## 2025-04-29 NOTE — FLOWSHEET NOTE
treatment options including observation, oral steroids, physical therapy, epidural injections and additional imaging. She wishes to proceed with outpatient physical therapy for lumbar stabilization and progressive core strengthening exercises with modalities of choice.  If she finds that she has had no durable benefit from these conservative treatments she will contact the office for scheduling of a lumbar MRI..     Follow up -as needed       Dr. Kennedy PLAN 3/28/2025 - I reviewed with the patient that I continue believe the majority her symptoms are related to trochanteric bursitis and gluteal tendinitis. Recommend continued nonsurgical management. This would be in the form of continued physical therapy exercises. Recommend against recurring corticosteroid injections. I indicated that with the dysesthesias that she has into the right leg, would recommend evaluation with spine team. This would be consideration for a radiculopathy. I reviewed with the patient a dermatome map. Symptoms seem to be in the distribution of L5. A referral was provided for this evaluation. She was also provided a muscle relaxant to see if this might benefit some of her symptoms. 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 Recommend NSAIDs, oral or topical to her benefit. Return to clinic if needed in the future.      Test used Initial score  1/22/25 04/29/2025   Pain Summary VAS 1/10 current    10/10 prior to steroids 5-6/10 with walking in the right hip and the left knee    Any an intermittent ache with sitting     Functional questionnaire LEFS 14/80  83% 39/80  51%   Other:                OBJECTIVE EXAMINATION     1/22/25  ROM/Strength: (Blank cells denote NT)         Mvmt (norm) ROM L ROM R Notes MMT L MMT R Notes     LUMBAR   Flex (90) 40 °        Ext (25) 10 °        SB (25)          Lumbar Rot             HIP Flex (120)          Abd (45)

## 2025-05-02 ENCOUNTER — HOSPITAL ENCOUNTER (OUTPATIENT)
Dept: MRI IMAGING | Age: 80
Discharge: HOME | End: 2025-05-02
Payer: MEDICARE

## 2025-05-02 ENCOUNTER — HOSPITAL ENCOUNTER (OUTPATIENT)
Dept: PHYSICAL THERAPY | Age: 80
Setting detail: THERAPIES SERIES
Discharge: HOME | End: 2025-05-02
Payer: MEDICARE

## 2025-05-02 DIAGNOSIS — M47.26 OTHER SPONDYLOSIS WITH RADICULOPATHY, LUMBAR REGION: ICD-10-CM

## 2025-05-02 PROCEDURE — 97112 NEUROMUSCULAR REEDUCATION: CPT | Performed by: PHYSICAL THERAPY ASSISTANT

## 2025-05-02 PROCEDURE — 97140 MANUAL THERAPY 1/> REGIONS: CPT | Performed by: PHYSICAL THERAPY ASSISTANT

## 2025-05-02 PROCEDURE — 72148 MRI LUMBAR SPINE W/O DYE: CPT

## 2025-05-02 PROCEDURE — 97110 THERAPEUTIC EXERCISES: CPT | Performed by: PHYSICAL THERAPY ASSISTANT

## 2025-05-02 NOTE — FLOWSHEET NOTE
[facility]- Outpatient Rehabilitation and Therapy [address], [place] [address] office: [phone] fax: [phone]        Physical Therapy: TREATMENT/PROGRESS NOTE   Patient: Lilia Robert (79 y.o. female)   Examination Date: 2025   :  1945 MRN: [id number]   Visit #: 25   Insurance Allowable Auth Needed   KX Modifier at visit 15 []Yes    []No    Insurance: Payor: MEDICARE / Plan: MEDICARE PART A AND B / Product Type: *No Product type* /   Insurance ID: [id number] - (Medicare)  Secondary Insurance (if applicable): [facility]   Treatment Diagnosis:         1. Right hip pain  M25.551         2. Muscle weakness of lower extremity  M62.81         Medical Diagnosis:  Greater trochanteric bursitis, left [M70.61]  Iliotibial band syndrome of right side [M76.31]   Referring Physician: Sony Kennedy MD  PCP: Ranjana Lyon APRN - CNP     Plan of care signed (Y/N):     Date of Patient follow up with Physician:      Plan of Care Report: NO  POC update due: (10 visits /OR AUTH LIMITS, whichever is less)  25                                            Medical History:  Comorbidities:  Cancer/Tumor  Diabetes (Type I or II)  Hypertension  Osteoarthritis  Other: Hyperlipidemia  Relevant Medical History:   Past Medical History:   Diagnosis Date    Allergic     Anemia     Arthritis of left hip 11/10/2020    Brain condition     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 Alberto and Dr. Huerta    Cerebral cavernoma     followed by Dr. Zuccarrello at     Chest pain 2011    Complication of anesthesia     Takes a long time to wake up    Diverticulosis     Dizziness 2019    Encounter for central line care 6/10/2015    Endometrial cancer (HCC)     Endometrial carcinoma (HCC) 2013    serous, poorly differnetiated ca s/p KINGSLEY/BSO on 13 along with six cycles of carboplatin/taxol , completed

## 2025-05-09 RX ORDER — METFORMIN HYDROCHLORIDE 500 MG/1
TABLET, EXTENDED RELEASE ORAL
Qty: 360 TABLET | Refills: 1 | Status: SHIPPED | OUTPATIENT
Start: 2025-05-09

## 2025-05-09 NOTE — TELEPHONE ENCOUNTER
Refill Request     CONFIRM preferrred pharmacy with the patient.    If Mail Order Rx - Pend for 90 day refill.      Last Seen: Last Seen Department: 2/26/2025  Last Seen by PCP: 2/26/2025    Last Written:     If no future appointment scheduled, route STAFF MESSAGE with patient name to the  Pool for scheduling.      Next Appointment:   Future Appointments   Date Time Provider Department Center   5/12/2025 10:30 AM Bienvenido Dinh PA-C [facility]   5/29/2025 10:00 AM Ranjana Lyon, APRN - [facility]   9/11/2025  1:00 PM Tiffanie Luna PA-C [facility]   11/13/2025 10:15 AM Clari Kitchen MD [facility]       Message sent to  to schedule appt with patient?  NO      Requested Prescriptions     Pending Prescriptions Disp Refills    metFORMIN (GLUCOPHAGE-XR) 500 MG extended release tablet [Pharmacy Med Name: METFORMIN HCL  MG TABLET] 360 tablet 1     Sig: TAKE FOUR TABLETS BY MOUTH DAILY WITH BREAKFAST

## 2025-05-15 ENCOUNTER — OFFICE VISIT (OUTPATIENT)
Dept: ORTHOPEDIC SURGERY | Age: 80
End: 2025-05-15
Payer: MEDICARE

## 2025-05-15 VITALS — BODY MASS INDEX: 28.32 KG/M2 | HEIGHT: 61 IN | WEIGHT: 150 LBS

## 2025-05-15 DIAGNOSIS — M48.061 SPINAL STENOSIS OF LUMBAR REGION, UNSPECIFIED WHETHER NEUROGENIC CLAUDICATION PRESENT: ICD-10-CM

## 2025-05-15 DIAGNOSIS — M51.369 BULGE OF LUMBAR DISC WITHOUT MYELOPATHY: ICD-10-CM

## 2025-05-15 DIAGNOSIS — M70.61 TROCHANTERIC BURSITIS OF RIGHT HIP: Primary | ICD-10-CM

## 2025-05-15 PROCEDURE — 1159F MED LIST DOCD IN RCRD: CPT | Performed by: PHYSICIAN ASSISTANT

## 2025-05-15 PROCEDURE — G8417 CALC BMI ABV UP PARAM F/U: HCPCS | Performed by: PHYSICIAN ASSISTANT

## 2025-05-15 PROCEDURE — 1090F PRES/ABSN URINE INCON ASSESS: CPT | Performed by: PHYSICIAN ASSISTANT

## 2025-05-15 PROCEDURE — 1036F TOBACCO NON-USER: CPT | Performed by: PHYSICIAN ASSISTANT

## 2025-05-15 PROCEDURE — 99213 OFFICE O/P EST LOW 20 MIN: CPT | Performed by: PHYSICIAN ASSISTANT

## 2025-05-15 PROCEDURE — G8399 PT W/DXA RESULTS DOCUMENT: HCPCS | Performed by: PHYSICIAN ASSISTANT

## 2025-05-15 PROCEDURE — G8427 DOCREV CUR MEDS BY ELIG CLIN: HCPCS | Performed by: PHYSICIAN ASSISTANT

## 2025-05-15 PROCEDURE — 1123F ACP DISCUSS/DSCN MKR DOCD: CPT | Performed by: PHYSICIAN ASSISTANT

## 2025-05-15 NOTE — PROGRESS NOTES
Follow-up: LUMBAR SPINE    Referring Provider:  No ref. provider found    CHIEF COMPLAINT:    Chief Complaint   Patient presents with    Back Pain     Lumbar        HISTORY OF PRESENT ILLNESS:       Ms. Lilia Robert  is a pleasant 79 y.o. female here for follow-up evaluation regarding her LBP and right leg pain and to review her lumbar MRI.  Patient has been involved in outpatient physical therapy since she was last seen in April 2025 and has found no durable benefit from the physical therapy.  She continues to experience pain over the right trochanteric bursa and mildly over the right low back.  She denies any radicular symptoms.  It was noted with Dr. Bryant Kennedy's last note that she did have a right trochanteric bursa injection which she states lasted only 3 weeks but she had complete relief of her symptoms.  Pain Assessment  Location of Pain: Back  Location Modifiers: Other (Comment)  Severity of Pain: 10  Quality of Pain: Other (Comment)  Duration of Pain: Other (Comment)  Frequency of Pain: Other (Comment)]    4/3/2025   She states her pain began insidiously 5 to 6 months ago.  Patient has a significant history of right trochanteric bursitis and has had previous trochanteric bursal injection which in the past given her complete relief of her symptoms for 2 weeks.  At the present time she is complaining of pain which is intermittent which she rates 10/10 in the last throughout the day and sometimes interferes with her sleep at night.  She has no difficulty with standing and walking due to back or leg pain.  She does have a sense of weakness at times within her right leg and her left knee.  She has a history of a left knee injury.  She has had physical therapy for her hip which has somewhat helped.  She denies any bowel or bladder dysfunction or saddle anesthesia.     Current/Past Treatment:   Physical Therapy: For her hip currently  Chiropractic: None  Injection: Trochanteric bursal injections  Medications:

## 2025-05-26 DIAGNOSIS — I10 ESSENTIAL HYPERTENSION: ICD-10-CM

## 2025-05-26 DIAGNOSIS — E78.2 MIXED HYPERLIPIDEMIA: ICD-10-CM

## 2025-05-27 ENCOUNTER — TELEPHONE (OUTPATIENT)
Dept: ORTHOPEDIC SURGERY | Age: 80
End: 2025-05-27

## 2025-05-27 RX ORDER — ROSUVASTATIN CALCIUM 10 MG/1
TABLET, COATED ORAL
Qty: 90 TABLET | Refills: 0 | Status: SHIPPED | OUTPATIENT
Start: 2025-05-27

## 2025-05-27 RX ORDER — LOSARTAN POTASSIUM 100 MG/1
100 TABLET ORAL DAILY
Qty: 90 TABLET | Refills: 0 | Status: SHIPPED | OUTPATIENT
Start: 2025-05-27

## 2025-05-27 NOTE — TELEPHONE ENCOUNTER
Refill Request     CONFIRM preferrred pharmacy with the patient.    If Mail Order Rx - Pend for 90 day refill.      Last Seen: Last Seen Department: 2/26/2025  Last Seen by PCP: 2/26/2025    Last Written: 5/30/24    If no future appointment scheduled, route STAFF MESSAGE with patient name to the  Pool for scheduling.      Next Appointment:   Future Appointments   Date Time Provider Department Center   5/29/2025 10:00 AM Ranjana Lyon, APRN - CNP [place]  [facility]   6/2/2025  1:00 PM Jessica Mendoza PTA [facility]   6/23/2025 11:30 AM Akash Grubbs MD [facility]   7/8/2025 10:30 AM Akash Grubbs MD [facility]   9/11/2025  1:00 PM Tiffanie Luna PA-C [facility]   11/13/2025 10:15 AM Clari Kitchen MD [facility]       Message sent to  to schedule appt with patient?  NO      Requested Prescriptions     Pending Prescriptions Disp Refills    losartan (COZAAR) 100 MG tablet [Pharmacy Med Name: LOSARTAN TABS 100MG] 90 tablet 3     Sig: TAKE 1 TABLET DAILY    rosuvastatin (CRESTOR) 10 MG tablet [Pharmacy Med Name: ROSUVASTATIN TABS 10MG] 90 tablet 3     Sig: TAKE 1 TABLET DAILY AT NIGHT

## 2025-05-27 NOTE — TELEPHONE ENCOUNTER
----- Message from Vera TREVINO sent at 5/27/2025 12:13 PM EDT -----  Regarding: Specialty Message to Provider  Specialty Message to Provider    Relationship to Patient: Self     Patient Message: PATIENT STATES THAT SHE NEEDS TO SPEAK WITH SOFIE REGARDING PT  --------------------------------------------------------------------------------------------------------------------------    Call Back Information: OK to leave message on voicemail  Preferred Call Back Number: Phone [phone]

## 2025-05-29 ENCOUNTER — OFFICE VISIT (OUTPATIENT)
Dept: FAMILY MEDICINE CLINIC | Age: 80
End: 2025-05-29

## 2025-05-29 ENCOUNTER — TELEPHONE (OUTPATIENT)
Dept: ORTHOPEDIC SURGERY | Age: 80
End: 2025-05-29

## 2025-05-29 VITALS
DIASTOLIC BLOOD PRESSURE: 68 MMHG | WEIGHT: 153 LBS | SYSTOLIC BLOOD PRESSURE: 134 MMHG | HEART RATE: 60 BPM | HEIGHT: 61 IN | BODY MASS INDEX: 28.89 KG/M2 | OXYGEN SATURATION: 98 %

## 2025-05-29 DIAGNOSIS — I10 ESSENTIAL HYPERTENSION: ICD-10-CM

## 2025-05-29 DIAGNOSIS — I34.1 MVP (MITRAL VALVE PROLAPSE): ICD-10-CM

## 2025-05-29 DIAGNOSIS — E89.0 POSTSURGICAL HYPOTHYROIDISM: ICD-10-CM

## 2025-05-29 DIAGNOSIS — E04.1 THYROID NODULE: ICD-10-CM

## 2025-05-29 DIAGNOSIS — E78.2 MIXED HYPERLIPIDEMIA: ICD-10-CM

## 2025-05-29 DIAGNOSIS — E55.9 VITAMIN D DEFICIENCY: ICD-10-CM

## 2025-05-29 DIAGNOSIS — R13.10 DYSPHAGIA, UNSPECIFIED TYPE: ICD-10-CM

## 2025-05-29 DIAGNOSIS — E11.42 TYPE 2 DIABETES MELLITUS WITH DIABETIC POLYNEUROPATHY, WITHOUT LONG-TERM CURRENT USE OF INSULIN (HCC): Primary | ICD-10-CM

## 2025-05-29 DIAGNOSIS — F41.9 ANXIETY: ICD-10-CM

## 2025-05-29 DIAGNOSIS — E83.42 HYPOMAGNESEMIA: ICD-10-CM

## 2025-05-29 RX ORDER — METOPROLOL TARTRATE 50 MG
TABLET ORAL
Qty: 180 TABLET | Refills: 2 | Status: SHIPPED | OUTPATIENT
Start: 2025-05-29

## 2025-05-29 ASSESSMENT — PATIENT HEALTH QUESTIONNAIRE - PHQ9
4. FEELING TIRED OR HAVING LITTLE ENERGY: NOT AT ALL
SUM OF ALL RESPONSES TO PHQ QUESTIONS 1-9: 2
2. FEELING DOWN, DEPRESSED OR HOPELESS: NOT AT ALL
9. THOUGHTS THAT YOU WOULD BE BETTER OFF DEAD, OR OF HURTING YOURSELF: NOT AT ALL
SUM OF ALL RESPONSES TO PHQ QUESTIONS 1-9: 2
1. LITTLE INTEREST OR PLEASURE IN DOING THINGS: NOT AT ALL
7. TROUBLE CONCENTRATING ON THINGS, SUCH AS READING THE NEWSPAPER OR WATCHING TELEVISION: NOT AT ALL
3. TROUBLE FALLING OR STAYING ASLEEP: MORE THAN HALF THE DAYS
SUM OF ALL RESPONSES TO PHQ QUESTIONS 1-9: 2
SUM OF ALL RESPONSES TO PHQ QUESTIONS 1-9: 2
6. FEELING BAD ABOUT YOURSELF - OR THAT YOU ARE A FAILURE OR HAVE LET YOURSELF OR YOUR FAMILY DOWN: NOT AT ALL
8. MOVING OR SPEAKING SO SLOWLY THAT OTHER PEOPLE COULD HAVE NOTICED. OR THE OPPOSITE, BEING SO FIGETY OR RESTLESS THAT YOU HAVE BEEN MOVING AROUND A LOT MORE THAN USUAL: NOT AT ALL
5. POOR APPETITE OR OVEREATING: NOT AT ALL
10. IF YOU CHECKED OFF ANY PROBLEMS, HOW DIFFICULT HAVE THESE PROBLEMS MADE IT FOR YOU TO DO YOUR WORK, TAKE CARE OF THINGS AT HOME, OR GET ALONG WITH OTHER PEOPLE: NOT DIFFICULT AT ALL

## 2025-05-29 ASSESSMENT — ENCOUNTER SYMPTOMS
SHORTNESS OF BREATH: 0
BLOOD IN STOOL: 0
TROUBLE SWALLOWING: 1
ANAL BLEEDING: 0
GASTROINTESTINAL NEGATIVE: 1
ALLERGIC/IMMUNOLOGIC NEGATIVE: 1
ABDOMINAL PAIN: 0
NAUSEA: 0
EYES NEGATIVE: 1
RESPIRATORY NEGATIVE: 1
BACK PAIN: 1

## 2025-05-29 NOTE — TELEPHONE ENCOUNTER
----- Message from Jordon STANELY sent at 5/29/2025 12:59 PM EDT -----  Regarding: Specialty Message to Provider  Specialty Message to Provider    Relationship to Patient: Self     Patient Message: PATIENT CALLED IN TO SEE IF SHE CAN SPEAK TO SOMEONE IN THE OFFICE ABOUT PHYSICAL THEAPHY FOR HER LUMBAR. THE [facility] OFFICE NEED THE ORDER. WHEN DO HE GETTING AN INJECTION IN HER BACK...  REQ A CALL BACK.. PLEASE ADVISE   --------------------------------------------------------------------------------------------------------------------------    Call Back Information: OK to leave message on voicemail  Preferred Call Back Number: [phone]

## 2025-05-29 NOTE — PROGRESS NOTES
mouth nightly as needed (muscle spasms)     Associated Diagnoses:  Lumbar radiculopathy     triamcinolone (KENALOG) 0.1 % cream  10/04/22  --     Associated Diagnoses:  --             I have discussed the diagnosis with the patient and the intended plan as seen in the above orders.   The patient understands and agrees with the plan. The patient has received an after-visit summary and questions were answered concerning future plans.     Patient Active Problem List   Diagnosis Code    Palpitations R00.2    MVP (mitral valve prolapse) I34.1    Fasciitis M72.9    Hypothyroidism E03.9    JOAQUIN (obstructive sleep apnea) G47.33    Type 2 diabetes mellitus with diabetic polyneuropathy, without long-term current use of insulin (HCC) E11.42    Vitamin D deficiency E55.9    Essential hypertension I10    Hyperlipidemia E78.5    Vitamin B12 deficiency E53.8    Cerebral cavernoma Q28.3    Postmenopausal atrophic vaginitis N95.2    Herniation of rectum into vagina N81.6    Female cystocele N81.10    Vaginal atrophy N95.2    History of endometrial cancer Z85.42    Hypomagnesemia E83.42    Anxiety F41.9    Elevated CA-125 R97.1    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    Pelvic prolapse N81.9    Bilateral senile cataracts H25.9    Chronic lymphocytic thyroiditis E06.3    Goiter, nontoxic, multinodular E04.2    H/O total hysterectomy with removal of both tubes and ovaries Z90.710, Z90.79, Z90.722    Post-menopause Z78.0    Postsurgical hypothyroidism E89.0    Status post chemotherapy Z92.21    Fibrohistiocytic proliferation of the skin L98.9    Lumbar paraspinal muscle spasm M62.830    Arthritis of left hip M16.12    S/P partial thyroidectomy E89.0    Nonrheumatic tricuspid valve regurgitation I36.1    HH (hiatus hernia) K44.9    Mixed stress and urge urinary incontinence N39.46    Other specified menopausal and perimenopausal disorders N95.8    Chronic fatigue R53.82    Edema of foot R60.0    Tinnitus of both ears H93.13    Valgus deformity of both

## 2025-05-30 ENCOUNTER — RESULTS FOLLOW-UP (OUTPATIENT)
Dept: FAMILY MEDICINE CLINIC | Age: 80
End: 2025-05-30

## 2025-05-30 LAB
25(OH)D3 SERPL-MCNC: 54 NG/ML
ALBUMIN SERPL-MCNC: 4.7 G/DL (ref 3.4–5)
ALBUMIN/GLOB SERPL: 2.2 {RATIO} (ref 1.1–2.2)
ALP SERPL-CCNC: 73 U/L (ref 40–129)
ALT SERPL-CCNC: 20 U/L (ref 10–40)
ANION GAP SERPL CALCULATED.3IONS-SCNC: 13 MMOL/L (ref 3–16)
AST SERPL-CCNC: 20 U/L (ref 15–37)
BILIRUB SERPL-MCNC: <0.2 MG/DL (ref 0–1)
BUN SERPL-MCNC: 16 MG/DL (ref 7–20)
CALCIUM SERPL-MCNC: 9.7 MG/DL (ref 8.3–10.6)
CHLORIDE SERPL-SCNC: 103 MMOL/L (ref 99–110)
CHOLEST SERPL-MCNC: 175 MG/DL (ref 0–199)
CO2 SERPL-SCNC: 26 MMOL/L (ref 21–32)
CREAT SERPL-MCNC: 0.7 MG/DL (ref 0.6–1.2)
EST. AVERAGE GLUCOSE BLD GHB EST-MCNC: 125.5 MG/DL
GFR SERPLBLD CREATININE-BSD FMLA CKD-EPI: 89 ML/MIN/{1.73_M2}
GLUCOSE SERPL-MCNC: 109 MG/DL (ref 70–99)
HBA1C MFR BLD: 6 %
HDLC SERPL-MCNC: 62 MG/DL (ref 40–60)
LDLC SERPL CALC-MCNC: 74 MG/DL
MAGNESIUM SERPL-MCNC: 1.77 MG/DL (ref 1.8–2.4)
POTASSIUM SERPL-SCNC: 4.8 MMOL/L (ref 3.5–5.1)
PROT SERPL-MCNC: 6.8 G/DL (ref 6.4–8.2)
SODIUM SERPL-SCNC: 142 MMOL/L (ref 136–145)
TRIGL SERPL-MCNC: 197 MG/DL (ref 0–150)
VLDLC SERPL CALC-MCNC: 39 MG/DL

## 2025-06-11 ENCOUNTER — HOSPITAL ENCOUNTER (OUTPATIENT)
Dept: ULTRASOUND IMAGING | Age: 80
Discharge: HOME | End: 2025-06-11
Payer: MEDICARE

## 2025-06-11 DIAGNOSIS — E89.0 POSTSURGICAL HYPOTHYROIDISM: ICD-10-CM

## 2025-06-11 DIAGNOSIS — E04.1 THYROID NODULE: ICD-10-CM

## 2025-06-11 DIAGNOSIS — R13.10 DYSPHAGIA, UNSPECIFIED TYPE: ICD-10-CM

## 2025-06-11 PROCEDURE — 76536 US EXAM OF HEAD AND NECK: CPT

## 2025-06-21 DIAGNOSIS — I10 ESSENTIAL HYPERTENSION: ICD-10-CM

## 2025-06-21 DIAGNOSIS — I34.1 MVP (MITRAL VALVE PROLAPSE): ICD-10-CM

## 2025-06-23 RX ORDER — METOPROLOL TARTRATE 50 MG
50 TABLET ORAL 2 TIMES DAILY
Qty: 180 TABLET | Refills: 2 | Status: SHIPPED | OUTPATIENT
Start: 2025-06-23

## 2025-06-23 NOTE — TELEPHONE ENCOUNTER
Refill Request     CONFIRM preferrred pharmacy with the patient.    If Mail Order Rx - Pend for 90 day refill.      Last Seen: Last Seen Department: 5/29/2025  Last Seen by PCP: 5/29/2025    Last Written: 5-    If no future appointment scheduled, route STAFF MESSAGE with patient name to the  Pool for scheduling.      Next Appointment:   Future Appointments   Date Time Provider Department Center   6/23/2025 11:30 AM Akash Grubbs MD [facility]   7/8/2025 10:30 AM Akash Grubbs MD [facility]   9/4/2025 10:00 AM [facility]   9/11/2025  1:00 PM Tiffanie Luna PA-C [facility]   11/13/2025 10:15 AM Clari Kitchen MD [facility]       Message sent to  to schedule appt with patient?  N/A      Requested Prescriptions     Pending Prescriptions Disp Refills    metoprolol tartrate (LOPRESSOR) 50 MG tablet [Pharmacy Med Name: METOPROLOL TARTRATE 50 MG TAB] 180 tablet 2     Sig: TAKE 1 TABLET BY MOUTH 2 TIMES A DAY

## 2025-08-25 DIAGNOSIS — E89.0 POSTSURGICAL HYPOTHYROIDISM: ICD-10-CM

## 2025-08-25 DIAGNOSIS — I10 ESSENTIAL HYPERTENSION: ICD-10-CM

## 2025-08-25 DIAGNOSIS — E78.2 MIXED HYPERLIPIDEMIA: ICD-10-CM

## 2025-08-25 RX ORDER — LEVOTHYROXINE SODIUM 75 UG/1
75 TABLET ORAL DAILY
Qty: 90 TABLET | Refills: 0 | Status: SHIPPED | OUTPATIENT
Start: 2025-08-25

## 2025-08-25 RX ORDER — LOSARTAN POTASSIUM 100 MG/1
100 TABLET ORAL DAILY
Qty: 90 TABLET | Refills: 0 | Status: SHIPPED | OUTPATIENT
Start: 2025-08-25

## 2025-08-25 RX ORDER — AMLODIPINE BESYLATE 5 MG/1
5 TABLET ORAL DAILY
Qty: 90 TABLET | Refills: 0 | Status: SHIPPED | OUTPATIENT
Start: 2025-08-25

## 2025-08-25 RX ORDER — ROSUVASTATIN CALCIUM 10 MG/1
TABLET, COATED ORAL
Qty: 90 TABLET | Refills: 0 | Status: SHIPPED | OUTPATIENT
Start: 2025-08-25

## 2025-09-04 ENCOUNTER — HOSPITAL ENCOUNTER (OUTPATIENT)
Dept: CT IMAGING | Age: 80
Discharge: HOME | End: 2025-09-04
Payer: MEDICARE

## 2025-09-04 ENCOUNTER — OFFICE VISIT (OUTPATIENT)
Dept: FAMILY MEDICINE CLINIC | Age: 80
End: 2025-09-04

## 2025-09-04 ENCOUNTER — RESULTS FOLLOW-UP (OUTPATIENT)
Dept: FAMILY MEDICINE CLINIC | Age: 80
End: 2025-09-04

## 2025-09-04 VITALS
SYSTOLIC BLOOD PRESSURE: 136 MMHG | HEIGHT: 61 IN | WEIGHT: 156 LBS | HEART RATE: 60 BPM | DIASTOLIC BLOOD PRESSURE: 64 MMHG | BODY MASS INDEX: 29.45 KG/M2 | OXYGEN SATURATION: 97 %

## 2025-09-04 DIAGNOSIS — H53.9 VISUAL DISTURBANCE: ICD-10-CM

## 2025-09-04 DIAGNOSIS — R47.89 WORD FINDING DIFFICULTY: ICD-10-CM

## 2025-09-04 DIAGNOSIS — R26.89 BALANCE PROBLEM: ICD-10-CM

## 2025-09-04 DIAGNOSIS — E11.42 TYPE 2 DIABETES MELLITUS WITH DIABETIC POLYNEUROPATHY, WITHOUT LONG-TERM CURRENT USE OF INSULIN (HCC): Primary | ICD-10-CM

## 2025-09-04 DIAGNOSIS — R42 DIZZINESS: ICD-10-CM

## 2025-09-04 DIAGNOSIS — H93.13 TINNITUS OF BOTH EARS: ICD-10-CM

## 2025-09-04 DIAGNOSIS — G93.9 BRAIN LESION: Primary | ICD-10-CM

## 2025-09-04 DIAGNOSIS — E83.42 HYPOMAGNESEMIA: ICD-10-CM

## 2025-09-04 DIAGNOSIS — Z79.899 HIGH RISK MEDICATION USE: ICD-10-CM

## 2025-09-04 DIAGNOSIS — R51.9 NEW ONSET OF HEADACHES AFTER AGE 50: ICD-10-CM

## 2025-09-04 DIAGNOSIS — F41.9 ANXIETY: ICD-10-CM

## 2025-09-04 PROCEDURE — 70450 CT HEAD/BRAIN W/O DYE: CPT

## 2025-09-04 ASSESSMENT — PATIENT HEALTH QUESTIONNAIRE - PHQ9
6. FEELING BAD ABOUT YOURSELF - OR THAT YOU ARE A FAILURE OR HAVE LET YOURSELF OR YOUR FAMILY DOWN: NOT AT ALL
SUM OF ALL RESPONSES TO PHQ QUESTIONS 1-9: 6
2. FEELING DOWN, DEPRESSED OR HOPELESS: SEVERAL DAYS
SUM OF ALL RESPONSES TO PHQ QUESTIONS 1-9: 6
SUM OF ALL RESPONSES TO PHQ QUESTIONS 1-9: 6
9. THOUGHTS THAT YOU WOULD BE BETTER OFF DEAD, OR OF HURTING YOURSELF: NOT AT ALL
1. LITTLE INTEREST OR PLEASURE IN DOING THINGS: SEVERAL DAYS
5. POOR APPETITE OR OVEREATING: NOT AT ALL
3. TROUBLE FALLING OR STAYING ASLEEP: SEVERAL DAYS
SUM OF ALL RESPONSES TO PHQ QUESTIONS 1-9: 6
4. FEELING TIRED OR HAVING LITTLE ENERGY: NEARLY EVERY DAY
10. IF YOU CHECKED OFF ANY PROBLEMS, HOW DIFFICULT HAVE THESE PROBLEMS MADE IT FOR YOU TO DO YOUR WORK, TAKE CARE OF THINGS AT HOME, OR GET ALONG WITH OTHER PEOPLE: NOT DIFFICULT AT ALL
8. MOVING OR SPEAKING SO SLOWLY THAT OTHER PEOPLE COULD HAVE NOTICED. OR THE OPPOSITE, BEING SO FIGETY OR RESTLESS THAT YOU HAVE BEEN MOVING AROUND A LOT MORE THAN USUAL: NOT AT ALL
7. TROUBLE CONCENTRATING ON THINGS, SUCH AS READING THE NEWSPAPER OR WATCHING TELEVISION: NOT AT ALL

## 2025-09-04 ASSESSMENT — ENCOUNTER SYMPTOMS
NAUSEA: 0
EYES NEGATIVE: 1
GASTROINTESTINAL NEGATIVE: 1
RESPIRATORY NEGATIVE: 1
ANAL BLEEDING: 0
ABDOMINAL PAIN: 0
BLOOD IN STOOL: 0
SHORTNESS OF BREATH: 0
ALLERGIC/IMMUNOLOGIC NEGATIVE: 1

## 2025-09-05 LAB
ANNOTATION COMMENT IMP: NORMAL
EST. AVERAGE GLUCOSE BLD GHB EST-MCNC: 131.2 MG/DL
HBA1C MFR BLD: 6.2 %
MAGNESIUM SERPL-MCNC: 2.08 MG/DL (ref 1.8–2.4)

## (undated) DEVICE — CANNULA NSL AD TBNG L7FT PVC STR NONFLARED PRNG O2 DEL W STD

## (undated) DEVICE — BRAVO CF CAPSULE  DELIVERY DEV, 5-PK: Brand: BRAVO

## (undated) DEVICE — ENDOSCOPIC KIT 2 12 FT OP4 DE2 GWN SYR

## (undated) DEVICE — ELECTRODE,ECG,STRESS,FOAM,3PK: Brand: MEDLINE

## (undated) DEVICE — CONMED SCOPE SAVER BITE BLOCK, 20X27 MM: Brand: SCOPE SAVER

## (undated) DEVICE — FORCEPS BX L240CM JAW DIA2.8MM L CAP W/ NDL MIC MESH TOOTH